# Patient Record
Sex: MALE | Race: WHITE | NOT HISPANIC OR LATINO | URBAN - METROPOLITAN AREA
[De-identification: names, ages, dates, MRNs, and addresses within clinical notes are randomized per-mention and may not be internally consistent; named-entity substitution may affect disease eponyms.]

---

## 2020-01-01 ENCOUNTER — OFFICE (OUTPATIENT)
Dept: URBAN - METROPOLITAN AREA CLINIC 64 | Facility: CLINIC | Age: 85
End: 2020-01-01
Payer: COMMERCIAL

## 2020-01-01 ENCOUNTER — INPATIENT HOSPITAL (OUTPATIENT)
Dept: URBAN - METROPOLITAN AREA HOSPITAL 84 | Facility: HOSPITAL | Age: 85
End: 2020-01-01
Payer: COMMERCIAL

## 2020-01-01 ENCOUNTER — ON CAMPUS - OUTPATIENT (OUTPATIENT)
Dept: RURAL HOSPITAL 3 | Facility: HOSPITAL | Age: 85
End: 2020-01-01
Payer: COMMERCIAL

## 2020-01-01 VITALS
HEART RATE: 69 BPM | WEIGHT: 195 LBS | SYSTOLIC BLOOD PRESSURE: 109 MMHG | DIASTOLIC BLOOD PRESSURE: 52 MMHG | HEIGHT: 69 IN

## 2020-01-01 VITALS
HEIGHT: 69 IN | SYSTOLIC BLOOD PRESSURE: 145 MMHG | DIASTOLIC BLOOD PRESSURE: 74 MMHG | WEIGHT: 207 LBS | HEART RATE: 74 BPM

## 2020-01-01 DIAGNOSIS — D50.9 IRON DEFICIENCY ANEMIA, UNSPECIFIED: ICD-10-CM

## 2020-01-01 DIAGNOSIS — C22.1 INTRAHEPATIC BILE DUCT CARCINOMA: ICD-10-CM

## 2020-01-01 DIAGNOSIS — M62.82 RHABDOMYOLYSIS: ICD-10-CM

## 2020-01-01 DIAGNOSIS — D12.0 BENIGN NEOPLASM OF CECUM: ICD-10-CM

## 2020-01-01 DIAGNOSIS — K75.81 NONALCOHOLIC STEATOHEPATITIS (NASH): ICD-10-CM

## 2020-01-01 DIAGNOSIS — R94.5 ABNORMAL RESULTS OF LIVER FUNCTION STUDIES: ICD-10-CM

## 2020-01-01 DIAGNOSIS — C22.0 LIVER CELL CARCINOMA: ICD-10-CM

## 2020-01-01 DIAGNOSIS — K74.69 OTHER CIRRHOSIS OF LIVER: ICD-10-CM

## 2020-01-01 DIAGNOSIS — K31.7 POLYP OF STOMACH AND DUODENUM: ICD-10-CM

## 2020-01-01 DIAGNOSIS — R63.0 ANOREXIA: ICD-10-CM

## 2020-01-01 DIAGNOSIS — D72.829 ELEVATED WHITE BLOOD CELL COUNT, UNSPECIFIED: ICD-10-CM

## 2020-01-01 DIAGNOSIS — I10 ESSENTIAL (PRIMARY) HYPERTENSION: ICD-10-CM

## 2020-01-01 DIAGNOSIS — R63.4 ABNORMAL WEIGHT LOSS: ICD-10-CM

## 2020-01-01 DIAGNOSIS — K57.30 DIVERTICULOSIS OF LARGE INTESTINE WITHOUT PERFORATION OR ABS: ICD-10-CM

## 2020-01-01 DIAGNOSIS — R10.31 RIGHT LOWER QUADRANT PAIN: ICD-10-CM

## 2020-01-01 DIAGNOSIS — D12.2 BENIGN NEOPLASM OF ASCENDING COLON: ICD-10-CM

## 2020-01-01 DIAGNOSIS — K21.9 GASTRO-ESOPHAGEAL REFLUX DISEASE WITHOUT ESOPHAGITIS: ICD-10-CM

## 2020-01-01 DIAGNOSIS — K64.1 SECOND DEGREE HEMORRHOIDS: ICD-10-CM

## 2020-01-01 DIAGNOSIS — J44.9 CHRONIC OBSTRUCTIVE PULMONARY DISEASE, UNSPECIFIED: ICD-10-CM

## 2020-01-01 DIAGNOSIS — K44.9 DIAPHRAGMATIC HERNIA WITHOUT OBSTRUCTION OR GANGRENE: ICD-10-CM

## 2020-01-01 DIAGNOSIS — D12.4 BENIGN NEOPLASM OF DESCENDING COLON: ICD-10-CM

## 2020-01-01 DIAGNOSIS — Z12.11 ENCOUNTER FOR SCREENING FOR MALIGNANT NEOPLASM OF COLON: ICD-10-CM

## 2020-01-01 DIAGNOSIS — E78.5 HYPERLIPIDEMIA, UNSPECIFIED: ICD-10-CM

## 2020-01-01 PROCEDURE — 99222 1ST HOSP IP/OBS MODERATE 55: CPT | Performed by: NURSE PRACTITIONER

## 2020-01-01 PROCEDURE — 99203 OFFICE O/P NEW LOW 30 MIN: CPT | Performed by: INTERNAL MEDICINE

## 2020-01-01 PROCEDURE — 43239 EGD BIOPSY SINGLE/MULTIPLE: CPT | Performed by: INTERNAL MEDICINE

## 2020-01-01 PROCEDURE — 45380 COLONOSCOPY AND BIOPSY: CPT | Performed by: INTERNAL MEDICINE

## 2020-01-01 PROCEDURE — 99213 OFFICE O/P EST LOW 20 MIN: CPT | Performed by: INTERNAL MEDICINE

## 2020-01-01 RX ORDER — URSODIOL 500 MG/1
TABLET, FILM COATED ORAL
Qty: 180 | Refills: 10 | Status: ACTIVE
Start: 2020-01-01

## 2020-01-01 RX ORDER — OMEPRAZOLE 40 MG/1
40 CAPSULE, DELAYED RELEASE ORAL
Qty: 90 | Refills: 5 | Status: ACTIVE

## 2020-10-07 RX ORDER — ATENOLOL 25 MG/1
25 TABLET ORAL DAILY
COMMUNITY
End: 2021-02-11 | Stop reason: HOSPADM

## 2020-10-07 RX ORDER — AMLODIPINE BESYLATE 10 MG/1
10 TABLET ORAL DAILY
COMMUNITY
End: 2020-10-21 | Stop reason: HOSPADM

## 2020-10-07 RX ORDER — FUROSEMIDE 40 MG/1
40 TABLET ORAL DAILY
Status: ON HOLD | COMMUNITY
End: 2020-10-21 | Stop reason: SDUPTHER

## 2020-10-07 RX ORDER — ALLOPURINOL 300 MG/1
300 TABLET ORAL DAILY
COMMUNITY
End: 2020-10-21 | Stop reason: HOSPADM

## 2020-10-07 RX ORDER — OMEPRAZOLE 20 MG/1
40 CAPSULE, DELAYED RELEASE ORAL DAILY
COMMUNITY

## 2020-10-07 RX ORDER — LOSARTAN POTASSIUM 50 MG/1
100 TABLET ORAL DAILY
COMMUNITY
End: 2020-10-21 | Stop reason: HOSPADM

## 2020-10-14 ENCOUNTER — HOSPITAL ENCOUNTER (OUTPATIENT)
Dept: CT IMAGING | Facility: HOSPITAL | Age: 85
Discharge: HOME | End: 2020-10-14
Admitting: RADIOLOGY

## 2020-10-14 VITALS
DIASTOLIC BLOOD PRESSURE: 60 MMHG | BODY MASS INDEX: 30.96 KG/M2 | HEART RATE: 67 BPM | SYSTOLIC BLOOD PRESSURE: 127 MMHG | RESPIRATION RATE: 10 BRPM | TEMPERATURE: 97.8 F | HEIGHT: 69 IN | WEIGHT: 209 LBS | OXYGEN SATURATION: 97 %

## 2020-10-14 DIAGNOSIS — D49.0 NEOPLASM OF LIVER: ICD-10-CM

## 2020-10-14 DIAGNOSIS — R63.4 LOSS OF WEIGHT: ICD-10-CM

## 2020-10-14 DIAGNOSIS — K75.81 NASH (NONALCOHOLIC STEATOHEPATITIS): ICD-10-CM

## 2020-10-14 DIAGNOSIS — K74.60 HEPATIC CIRRHOSIS, UNSPECIFIED HEPATIC CIRRHOSIS TYPE, UNSPECIFIED WHETHER ASCITES PRESENT (HCC): ICD-10-CM

## 2020-10-14 DIAGNOSIS — K21.9 GASTROESOPHAGEAL REFLUX DISEASE, UNSPECIFIED WHETHER ESOPHAGITIS PRESENT: ICD-10-CM

## 2020-10-14 DIAGNOSIS — Z12.11 SPECIAL SCREENING FOR MALIGNANT NEOPLASMS, COLON: ICD-10-CM

## 2020-10-14 LAB
APTT PPP: 26.1 SECONDS (ref 24–31)
BASOPHILS # BLD AUTO: 0.1 10*3/MM3 (ref 0–0.2)
BASOPHILS NFR BLD AUTO: 0.9 % (ref 0–1.5)
DEPRECATED RDW RBC AUTO: 56 FL (ref 37–54)
EOSINOPHIL # BLD AUTO: 0.2 10*3/MM3 (ref 0–0.4)
EOSINOPHIL NFR BLD AUTO: 1.6 % (ref 0.3–6.2)
ERYTHROCYTE [DISTWIDTH] IN BLOOD BY AUTOMATED COUNT: 19.3 % (ref 12.3–15.4)
HCT VFR BLD AUTO: 34.7 % (ref 37.5–51)
HGB BLD-MCNC: 10.7 G/DL (ref 13–17.7)
INR PPP: 0.98 (ref 0.93–1.1)
LYMPHOCYTES # BLD AUTO: 2.3 10*3/MM3 (ref 0.7–3.1)
LYMPHOCYTES NFR BLD AUTO: 17.8 % (ref 19.6–45.3)
MCH RBC QN AUTO: 25.7 PG (ref 26.6–33)
MCHC RBC AUTO-ENTMCNC: 30.8 G/DL (ref 31.5–35.7)
MCV RBC AUTO: 83.4 FL (ref 79–97)
MONOCYTES # BLD AUTO: 1.7 10*3/MM3 (ref 0.1–0.9)
MONOCYTES NFR BLD AUTO: 12.7 % (ref 5–12)
NEUTROPHILS NFR BLD AUTO: 67 % (ref 42.7–76)
NEUTROPHILS NFR BLD AUTO: 8.8 10*3/MM3 (ref 1.7–7)
NRBC BLD AUTO-RTO: 0 /100 WBC (ref 0–0.2)
PLATELET # BLD AUTO: 403 10*3/MM3 (ref 140–450)
PMV BLD AUTO: 7.8 FL (ref 6–12)
PROTHROMBIN TIME: 10.8 SECONDS (ref 9.6–11.7)
RBC # BLD AUTO: 4.16 10*6/MM3 (ref 4.14–5.8)
WBC # BLD AUTO: 13.1 10*3/MM3 (ref 3.4–10.8)

## 2020-10-14 PROCEDURE — 88377 M/PHMTRC ALYS ISHQUANT/SEMIQ: CPT

## 2020-10-14 PROCEDURE — 25010000002 MIDAZOLAM PER 1 MG: Performed by: RADIOLOGY

## 2020-10-14 PROCEDURE — 25010000002 FENTANYL CITRATE (PF) 100 MCG/2ML SOLUTION: Performed by: RADIOLOGY

## 2020-10-14 PROCEDURE — 25010000003 LIDOCAINE 1 % SOLUTION: Performed by: RADIOLOGY

## 2020-10-14 PROCEDURE — 88341 IMHCHEM/IMCYTCHM EA ADD ANTB: CPT

## 2020-10-14 PROCEDURE — 85730 THROMBOPLASTIN TIME PARTIAL: CPT | Performed by: RADIOLOGY

## 2020-10-14 PROCEDURE — 99153 MOD SED SAME PHYS/QHP EA: CPT

## 2020-10-14 PROCEDURE — 88341 IMHCHEM/IMCYTCHM EA ADD ANTB: CPT | Performed by: INTERNAL MEDICINE

## 2020-10-14 PROCEDURE — 88342 IMHCHEM/IMCYTCHM 1ST ANTB: CPT

## 2020-10-14 PROCEDURE — 85610 PROTHROMBIN TIME: CPT | Performed by: RADIOLOGY

## 2020-10-14 PROCEDURE — 88333 PATH CONSLTJ SURG CYTO XM 1: CPT | Performed by: INTERNAL MEDICINE

## 2020-10-14 PROCEDURE — 99152 MOD SED SAME PHYS/QHP 5/>YRS: CPT

## 2020-10-14 PROCEDURE — 88381 MICRODISSECTION MANUAL: CPT

## 2020-10-14 PROCEDURE — 85025 COMPLETE CBC W/AUTO DIFF WBC: CPT | Performed by: RADIOLOGY

## 2020-10-14 PROCEDURE — 88307 TISSUE EXAM BY PATHOLOGIST: CPT | Performed by: INTERNAL MEDICINE

## 2020-10-14 PROCEDURE — 77012 CT SCAN FOR NEEDLE BIOPSY: CPT

## 2020-10-14 PROCEDURE — 88342 IMHCHEM/IMCYTCHM 1ST ANTB: CPT | Performed by: INTERNAL MEDICINE

## 2020-10-14 PROCEDURE — 88360 TUMOR IMMUNOHISTOCHEM/MANUAL: CPT

## 2020-10-14 RX ORDER — LIDOCAINE HYDROCHLORIDE 10 MG/ML
INJECTION, SOLUTION INFILTRATION; PERINEURAL
Status: COMPLETED | OUTPATIENT
Start: 2020-10-14 | End: 2020-10-14

## 2020-10-14 RX ORDER — ACETAMINOPHEN 325 MG/1
650 TABLET ORAL EVERY 4 HOURS PRN
Status: DISCONTINUED | OUTPATIENT
Start: 2020-10-14 | End: 2020-10-15 | Stop reason: HOSPADM

## 2020-10-14 RX ORDER — NALOXONE HCL 0.4 MG/ML
0.4 VIAL (ML) INJECTION
Status: DISCONTINUED | OUTPATIENT
Start: 2020-10-14 | End: 2020-10-15 | Stop reason: HOSPADM

## 2020-10-14 RX ORDER — MIDAZOLAM HYDROCHLORIDE 1 MG/ML
INJECTION INTRAMUSCULAR; INTRAVENOUS
Status: COMPLETED | OUTPATIENT
Start: 2020-10-14 | End: 2020-10-14

## 2020-10-14 RX ORDER — SODIUM CHLORIDE 9 MG/ML
75 INJECTION, SOLUTION INTRAVENOUS CONTINUOUS
Status: DISCONTINUED | OUTPATIENT
Start: 2020-10-14 | End: 2020-10-15 | Stop reason: HOSPADM

## 2020-10-14 RX ORDER — SODIUM CHLORIDE 0.9 % (FLUSH) 0.9 %
3 SYRINGE (ML) INJECTION EVERY 12 HOURS SCHEDULED
Status: DISCONTINUED | OUTPATIENT
Start: 2020-10-14 | End: 2020-10-14 | Stop reason: HOSPADM

## 2020-10-14 RX ORDER — HYDROMORPHONE HCL 110MG/55ML
0.5 PATIENT CONTROLLED ANALGESIA SYRINGE INTRAVENOUS
Status: DISCONTINUED | OUTPATIENT
Start: 2020-10-14 | End: 2020-10-15 | Stop reason: HOSPADM

## 2020-10-14 RX ORDER — FENTANYL CITRATE 50 UG/ML
INJECTION, SOLUTION INTRAMUSCULAR; INTRAVENOUS
Status: COMPLETED | OUTPATIENT
Start: 2020-10-14 | End: 2020-10-14

## 2020-10-14 RX ADMIN — MIDAZOLAM 0.5 MG: 1 INJECTION INTRAMUSCULAR; INTRAVENOUS at 09:50

## 2020-10-14 RX ADMIN — FENTANYL CITRATE 50 MCG: 50 INJECTION, SOLUTION INTRAMUSCULAR; INTRAVENOUS at 09:49

## 2020-10-14 RX ADMIN — FENTANYL CITRATE 50 MCG: 50 INJECTION, SOLUTION INTRAMUSCULAR; INTRAVENOUS at 09:52

## 2020-10-14 RX ADMIN — LIDOCAINE HYDROCHLORIDE 10 ML: 10 INJECTION, SOLUTION INFILTRATION; PERINEURAL at 10:03

## 2020-10-14 RX ADMIN — SODIUM CHLORIDE 75 ML/HR: 0.9 INJECTION, SOLUTION INTRAVENOUS at 08:27

## 2020-10-14 RX ADMIN — MIDAZOLAM 0.5 MG: 1 INJECTION INTRAMUSCULAR; INTRAVENOUS at 09:52

## 2020-10-14 NOTE — DISCHARGE INSTRUCTIONS
A responsible adult should stay with you and you should rest quietly for the rest of the day. Do not drink alcohol, drive or cook for 24 hours following your procedure.  Progress your diet as tolerated.  Resume your usual medications including aspirin.  When you remove your dressing in 48 hours, a small amount of blood is to be expected. Do not be alarmed.  If you feel it is bleeding excessively apply pressure and proceed to the Emergency room.  Do not shower, bath, or get your dressing wet at all for 48 hours.  You may shower after the dressing is removed. No lifting more that 10 pounds for 48 hours.  If severe pain, increased shortness of air or racing heartbeat occur, seek immediate medical attention.  Follow up with Dr. Mojica for results.

## 2020-10-18 ENCOUNTER — HOSPITAL ENCOUNTER (INPATIENT)
Facility: HOSPITAL | Age: 85
LOS: 3 days | Discharge: NURSING, REHAB OR LONG-TERM CARE | End: 2020-10-21
Attending: INTERNAL MEDICINE | Admitting: INTERNAL MEDICINE

## 2020-10-18 PROBLEM — R16.0 LIVER MASS, RIGHT LOBE: Status: ACTIVE | Noted: 2020-10-18

## 2020-10-18 LAB
ALBUMIN SERPL-MCNC: 2.2 G/DL (ref 3.5–5.2)
ALBUMIN/GLOB SERPL: 0.7 G/DL
ALP SERPL-CCNC: 138 U/L (ref 39–117)
ALT SERPL W P-5'-P-CCNC: 52 U/L (ref 1–41)
ANION GAP SERPL CALCULATED.3IONS-SCNC: 11 MMOL/L (ref 5–15)
AST SERPL-CCNC: 146 U/L (ref 1–40)
BASOPHILS # BLD AUTO: 0 10*3/MM3 (ref 0–0.2)
BASOPHILS NFR BLD AUTO: 0.2 % (ref 0–1.5)
BILIRUB SERPL-MCNC: 0.2 MG/DL (ref 0–1.2)
BUN SERPL-MCNC: 29 MG/DL (ref 8–23)
BUN/CREAT SERPL: 15.1 (ref 7–25)
CALCIUM SPEC-SCNC: 7.7 MG/DL (ref 8.6–10.5)
CHLORIDE SERPL-SCNC: 100 MMOL/L (ref 98–107)
CK SERPL-CCNC: 3908 U/L (ref 20–200)
CO2 SERPL-SCNC: 26 MMOL/L (ref 22–29)
CREAT SERPL-MCNC: 1.92 MG/DL (ref 0.76–1.27)
DEPRECATED RDW RBC AUTO: 52.9 FL (ref 37–54)
EOSINOPHIL # BLD AUTO: 0.1 10*3/MM3 (ref 0–0.4)
EOSINOPHIL NFR BLD AUTO: 0.6 % (ref 0.3–6.2)
ERYTHROCYTE [DISTWIDTH] IN BLOOD BY AUTOMATED COUNT: 18.4 % (ref 12.3–15.4)
GFR SERPL CREATININE-BSD FRML MDRD: 33 ML/MIN/1.73
GLOBULIN UR ELPH-MCNC: 3 GM/DL
GLUCOSE SERPL-MCNC: 152 MG/DL (ref 65–99)
HCT VFR BLD AUTO: 25.9 % (ref 37.5–51)
HGB BLD-MCNC: 8 G/DL (ref 13–17.7)
LYMPHOCYTES # BLD AUTO: 1.5 10*3/MM3 (ref 0.7–3.1)
LYMPHOCYTES NFR BLD AUTO: 12.1 % (ref 19.6–45.3)
MCH RBC QN AUTO: 25.3 PG (ref 26.6–33)
MCHC RBC AUTO-ENTMCNC: 31 G/DL (ref 31.5–35.7)
MCV RBC AUTO: 81.7 FL (ref 79–97)
MONOCYTES # BLD AUTO: 1.5 10*3/MM3 (ref 0.1–0.9)
MONOCYTES NFR BLD AUTO: 12.8 % (ref 5–12)
NEUTROPHILS NFR BLD AUTO: 74.3 % (ref 42.7–76)
NEUTROPHILS NFR BLD AUTO: 9 10*3/MM3 (ref 1.7–7)
NRBC BLD AUTO-RTO: 0.1 /100 WBC (ref 0–0.2)
PLATELET # BLD AUTO: 358 10*3/MM3 (ref 140–450)
PMV BLD AUTO: 7.5 FL (ref 6–12)
POTASSIUM SERPL-SCNC: 3.7 MMOL/L (ref 3.5–5.2)
PROT SERPL-MCNC: 5.2 G/DL (ref 6–8.5)
RBC # BLD AUTO: 3.17 10*6/MM3 (ref 4.14–5.8)
SODIUM SERPL-SCNC: 137 MMOL/L (ref 136–145)
WBC # BLD AUTO: 12 10*3/MM3 (ref 3.4–10.8)

## 2020-10-18 PROCEDURE — 93010 ELECTROCARDIOGRAM REPORT: CPT | Performed by: INTERNAL MEDICINE

## 2020-10-18 PROCEDURE — 93005 ELECTROCARDIOGRAM TRACING: CPT | Performed by: INTERNAL MEDICINE

## 2020-10-18 PROCEDURE — 99223 1ST HOSP IP/OBS HIGH 75: CPT | Performed by: INTERNAL MEDICINE

## 2020-10-18 PROCEDURE — 82550 ASSAY OF CK (CPK): CPT | Performed by: INTERNAL MEDICINE

## 2020-10-18 PROCEDURE — 85025 COMPLETE CBC W/AUTO DIFF WBC: CPT | Performed by: INTERNAL MEDICINE

## 2020-10-18 PROCEDURE — 87040 BLOOD CULTURE FOR BACTERIA: CPT | Performed by: INTERNAL MEDICINE

## 2020-10-18 PROCEDURE — 80053 COMPREHEN METABOLIC PANEL: CPT | Performed by: INTERNAL MEDICINE

## 2020-10-18 PROCEDURE — 25010000002 PIPERACILLIN SOD-TAZOBACTAM PER 1 G: Performed by: INTERNAL MEDICINE

## 2020-10-18 RX ORDER — ALLOPURINOL 100 MG/1
100 TABLET ORAL DAILY
Status: DISCONTINUED | OUTPATIENT
Start: 2020-10-19 | End: 2020-10-21 | Stop reason: HOSPADM

## 2020-10-18 RX ORDER — SODIUM CHLORIDE 0.9 % (FLUSH) 0.9 %
10 SYRINGE (ML) INJECTION EVERY 12 HOURS SCHEDULED
Status: DISCONTINUED | OUTPATIENT
Start: 2020-10-18 | End: 2020-10-21 | Stop reason: HOSPADM

## 2020-10-18 RX ORDER — ONDANSETRON 4 MG/1
4 TABLET, FILM COATED ORAL EVERY 6 HOURS PRN
Status: DISCONTINUED | OUTPATIENT
Start: 2020-10-18 | End: 2020-10-21 | Stop reason: HOSPADM

## 2020-10-18 RX ORDER — ONDANSETRON 2 MG/ML
4 INJECTION INTRAMUSCULAR; INTRAVENOUS EVERY 6 HOURS PRN
Status: DISCONTINUED | OUTPATIENT
Start: 2020-10-18 | End: 2020-10-21 | Stop reason: HOSPADM

## 2020-10-18 RX ORDER — PANTOPRAZOLE SODIUM 40 MG/10ML
40 INJECTION, POWDER, LYOPHILIZED, FOR SOLUTION INTRAVENOUS
Status: DISCONTINUED | OUTPATIENT
Start: 2020-10-19 | End: 2020-10-21 | Stop reason: HOSPADM

## 2020-10-18 RX ORDER — SODIUM CHLORIDE 0.9 % (FLUSH) 0.9 %
10 SYRINGE (ML) INJECTION AS NEEDED
Status: DISCONTINUED | OUTPATIENT
Start: 2020-10-18 | End: 2020-10-21 | Stop reason: HOSPADM

## 2020-10-18 RX ORDER — ACETAMINOPHEN 325 MG/1
650 TABLET ORAL EVERY 4 HOURS PRN
Status: DISCONTINUED | OUTPATIENT
Start: 2020-10-18 | End: 2020-10-21 | Stop reason: HOSPADM

## 2020-10-18 RX ORDER — NITROGLYCERIN 0.4 MG/1
0.4 TABLET SUBLINGUAL
Status: DISCONTINUED | OUTPATIENT
Start: 2020-10-18 | End: 2020-10-21 | Stop reason: HOSPADM

## 2020-10-18 RX ORDER — ACETAMINOPHEN 160 MG/5ML
650 SOLUTION ORAL EVERY 4 HOURS PRN
Status: DISCONTINUED | OUTPATIENT
Start: 2020-10-18 | End: 2020-10-21 | Stop reason: HOSPADM

## 2020-10-18 RX ORDER — SODIUM CHLORIDE 9 MG/ML
200 INJECTION, SOLUTION INTRAVENOUS CONTINUOUS
Status: DISCONTINUED | OUTPATIENT
Start: 2020-10-18 | End: 2020-10-21 | Stop reason: HOSPADM

## 2020-10-18 RX ORDER — ACETAMINOPHEN 650 MG/1
650 SUPPOSITORY RECTAL EVERY 4 HOURS PRN
Status: DISCONTINUED | OUTPATIENT
Start: 2020-10-18 | End: 2020-10-21 | Stop reason: HOSPADM

## 2020-10-18 RX ORDER — IPRATROPIUM BROMIDE AND ALBUTEROL SULFATE 2.5; .5 MG/3ML; MG/3ML
3 SOLUTION RESPIRATORY (INHALATION) EVERY 4 HOURS PRN
Status: DISCONTINUED | OUTPATIENT
Start: 2020-10-18 | End: 2020-10-21 | Stop reason: HOSPADM

## 2020-10-18 RX ORDER — CHOLECALCIFEROL (VITAMIN D3) 125 MCG
5 CAPSULE ORAL NIGHTLY PRN
Status: DISCONTINUED | OUTPATIENT
Start: 2020-10-18 | End: 2020-10-21 | Stop reason: HOSPADM

## 2020-10-18 RX ADMIN — SODIUM CHLORIDE 100 ML/HR: 9 INJECTION, SOLUTION INTRAVENOUS at 21:27

## 2020-10-18 RX ADMIN — PIPERACILLIN AND TAZOBACTAM 3.38 G: 3; .375 INJECTION, POWDER, LYOPHILIZED, FOR SOLUTION INTRAVENOUS at 21:28

## 2020-10-18 NOTE — H&P
[facility] Medicine Services      Patient Name: Tristen Garcia  : 1935  MRN: [id number]  Primary Care Physician: Ann Marie Hodgson MD  Date of admission: 10/18/2020    Patient Care Team:  Ann Marie Hodgson MD as PCP - General (Family Medicine)          Subjective   History Present Illness     Chief Complaint: No chief complaint on file.    Generalized body weakness             History of Present Illness     Patient is an 85-year-old male with multiple comorbidities including COPD with chronic hypoxic respiratory failure on home oxygen, hypertension and hyperlipidemia.  Was recently found to have right upper lobe liver mass on imaging for which he underwent liver biopsy on 10/14/2020 at [facility]-biopsy results still pending.    He presented to [facility] ED on 10/17/2020 after granddaughter found him lying on the floor between 3 AM and 9:30 AM.  At the ED patient was noted to be hypotensive.  Blood work showed leukocytosis with WBC of 21,000, procalcitonin 26 and hemoglobin of 10.2.  Serum creatinine was noted to be 2.8, patient's baseline serum creatinine is around 1.8.  CK was 15,000 patient also had indeterminant troponin elevation though EKG did not show any findings suggestive of acute ischemia.  Patient was diagnosed with rhabdomyolysis, acute on chronic kidney disease and sepsis of unknown source.    He was admitted to the hospitalist service at [facility] where he was started on empirical antibiotics with IV vancomycin, IV Zosyn and IV Flagyl.  He was also given IV normal saline along with D5 water with bicarb drip.  Blood cultures were obtained.  While in [facility] he had a fever spike.  Due to concern for possible liver abscess family requested the patient to be transferred to [facility] for further evaluation.      Review of Systems   Constitution: Positive for chills and malaise/fatigue. Negative for fever.   HENT:  Negative for congestion.    Eyes: Negative for blurred vision.   Cardiovascular: Negative for chest pain.   Respiratory: Negative for shortness of breath.    Endocrine: Negative for cold intolerance and heat intolerance.   Musculoskeletal: Positive for back pain.   Gastrointestinal: Positive for nausea. Negative for abdominal pain and vomiting.   Genitourinary: Negative for dysuria.   Neurological: Positive for weakness.   Psychiatric/Behavioral: Negative for altered mental status.           Personal History     Past Medical History:   Past Medical History:   Diagnosis Date   • Arthritis    • Cancer (CMS/HCC)    • COPD (chronic obstructive pulmonary disease) (CMS/HCC)    • Elevated cholesterol    • GERD (gastroesophageal reflux disease)    • Hyperlipidemia    • Hypertension        Surgical History:      Past Surgical History:   Procedure Laterality Date   • COLONOSCOPY     • EYE SURGERY     • SKIN BIOPSY             Family History: family history is not on file. Otherwise pertinent FHx was reviewed and unremarkable.     Social History:  reports that he has quit smoking. He has never used smokeless tobacco. He reports previous alcohol use. He reports that he does not use drugs.      Medications:  Prior to Admission medications    Medication Sig Start Date End Date Taking? Authorizing Provider   allopurinol (ZYLOPRIM) 300 MG tablet Take 300 mg by mouth Daily.    Wendy Rivers MD   amLODIPine (NORVASC) 10 MG tablet Take 10 mg by mouth Daily.    Wendy Rivers MD   atenolol (TENORMIN) 25 MG tablet Take 25 mg by mouth Daily.    Wendy Rivers MD   furosemide (LASIX) 40 MG tablet Take 40 mg by mouth Daily.    Wendy Rivers MD   losartan (COZAAR) 50 MG tablet Take 100 mg by mouth Daily.    Wendy Rivers MD   omeprazole (priLOSEC) 20 MG capsule Take 20 mg by mouth Daily.    Wendy Rivers MD       Allergies:  No Known Allergies    Objective   Objective     Vital Signs  Temp:   [98.5 °F (36.9 °C)] 98.5 °F (36.9 °C)  Heart Rate:  [71] 71  Resp:  [18] 18  BP: (117)/(67) 117/67  SpO2:  [94 %] 94 %  on  Flow (L/min):  [3.5] 3.5;   Device (Oxygen Therapy): nasal cannula  Body mass index is 32.19 kg/m².    Physical Exam  Vitals signs reviewed.   Constitutional:       Appearance: He is ill-appearing.   HENT:      Head: Normocephalic and atraumatic.      Nose: Nose normal.      Mouth/Throat:      Mouth: Mucous membranes are moist.   Eyes:      Extraocular Movements: Extraocular movements intact.      Conjunctiva/sclera: Conjunctivae normal.      Pupils: Pupils are equal, round, and reactive to light.   Neck:      Musculoskeletal: Neck supple.   Cardiovascular:      Rate and Rhythm: Normal rate and regular rhythm.   Pulmonary:      Effort: No respiratory distress.      Breath sounds: Normal breath sounds.   Abdominal:      General: Bowel sounds are normal. There is distension.      Palpations: Abdomen is soft.      Tenderness: There is no abdominal tenderness.   Musculoskeletal:      Comments: Degenerative changes   Skin:     General: Skin is warm and dry.   Neurological:      Mental Status: He is alert and oriented to person, place, and time.   Psychiatric:         Mood and Affect: Mood normal.         Results Review:  I have personally reviewed most recent medical records that he came with from [facility]    Results from last 7 days   Lab Units 10/14/20  0814 10/14/20  0813   WBC 10*3/mm3 13.10*  --    HEMOGLOBIN g/dL 10.7*  --    HEMATOCRIT % 34.7*  --    PLATELETS 10*3/mm3 403  --    INR   --  0.98           Invalid input(s):  ALKPHOS  CrCl cannot be calculated (No successful lab value found.).  Brief Urine Lab Results     None          Microbiology Results (last 10 days)     ** No results found for the last 240 hours. **          ECG/EMG Results (most recent)     None                    Ct Needle Biopsy Liver    Result Date: 10/14/2020  Technically successful CT-guided  percutaneous liver mass core biopsy procedure as described above.  Electronically Signed By-Kalia Herrera On:10/14/2020 12:18 PM This report was finalized on [id number] by  Kalia Herrera, .        CrCl cannot be calculated (No successful lab value found.).    Assessment/Plan   Assessment/Plan       There are no hospital problems to display for this patient.    Rhabdomyolysis  Due to prolonged immobilization  Improving  CK on the day of transfer 6000    Acute kidney injury on CKD stage III.  Probably due to rhabdomyolysis above.  Baseline serum creatinine 1.8  Avoid nephrotoxic agents.  Serum creatinine on the day of transfer was 2.1    Leukocytosis  Was said to have WBC of 21,000 on presentation  No clear source of infection from transfer facility  Patient recently had liver biopsy  CT on admission showed fluid collection-rule out abscess  Will continue on Zosyn    Liver mass  Status post liver biopsy 10/14/2020  Biopsy reports pending  Consult GI    Hypotensive on presentation to the outlying ED  Systolic blood pressure in the low 100s  Continue to hold BP medications-atenolol, Norvasc, losartan and furosemide    GERD-on PPI    COPD-not in exacerbation  Respiratory care with bronchodilators  Oxygen therapy and titration        VTE Prophylaxis -SCD    Mechanical Order History:     None      Pharmalogical Order History:     None          CODE STATUS:    There are no questions and answers to display.       This patient has been examined with appropriate PPE. 10/18/20      I discussed the patient's findings and my recommendations with patient      Electronically signed by Harsha Murcia MD, 10/18/20, 7:35 PM EDT.  [facility] Hospitalist Team

## 2020-10-19 ENCOUNTER — APPOINTMENT (OUTPATIENT)
Dept: GENERAL RADIOLOGY | Facility: HOSPITAL | Age: 85
End: 2020-10-19

## 2020-10-19 PROBLEM — M62.82 RHABDOMYOLYSIS: Status: ACTIVE | Noted: 2020-10-19

## 2020-10-19 LAB
ALBUMIN SERPL-MCNC: 2.3 G/DL (ref 3.5–5.2)
ALBUMIN/GLOB SERPL: 0.7 G/DL
ALP SERPL-CCNC: 213 U/L (ref 39–117)
ALT SERPL W P-5'-P-CCNC: 63 U/L (ref 1–41)
ANION GAP SERPL CALCULATED.3IONS-SCNC: 11 MMOL/L (ref 5–15)
ANION GAP SERPL CALCULATED.3IONS-SCNC: 9 MMOL/L (ref 5–15)
AST SERPL-CCNC: 134 U/L (ref 1–40)
BASOPHILS # BLD AUTO: 0.1 10*3/MM3 (ref 0–0.2)
BASOPHILS NFR BLD AUTO: 0.4 % (ref 0–1.5)
BASOPHILS NFR BLD AUTO: 1 % (ref 0–1.5)
BASOPHILS NFR BLD AUTO: 1.1 % (ref 0–1.5)
BILIRUB SERPL-MCNC: 0.2 MG/DL (ref 0–1.2)
BUN SERPL-MCNC: 27 MG/DL (ref 8–23)
BUN SERPL-MCNC: 28 MG/DL (ref 8–23)
BUN/CREAT SERPL: 15.6 (ref 7–25)
BUN/CREAT SERPL: 16 (ref 7–25)
CALCIUM SPEC-SCNC: 7.9 MG/DL (ref 8.6–10.5)
CALCIUM SPEC-SCNC: 8.8 MG/DL (ref 8.6–10.5)
CHLORIDE SERPL-SCNC: 101 MMOL/L (ref 98–107)
CHLORIDE SERPL-SCNC: 102 MMOL/L (ref 98–107)
CK SERPL-CCNC: 2699 U/L (ref 20–200)
CK SERPL-CCNC: 3847 U/L (ref 20–200)
CO2 SERPL-SCNC: 28 MMOL/L (ref 22–29)
CO2 SERPL-SCNC: 28 MMOL/L (ref 22–29)
CREAT SERPL-MCNC: 1.69 MG/DL (ref 0.76–1.27)
CREAT SERPL-MCNC: 1.8 MG/DL (ref 0.76–1.27)
DEPRECATED RDW RBC AUTO: 52.9 FL (ref 37–54)
DEPRECATED RDW RBC AUTO: 53.8 FL (ref 37–54)
DEPRECATED RDW RBC AUTO: 55.6 FL (ref 37–54)
EOSINOPHIL # BLD AUTO: 0.1 10*3/MM3 (ref 0–0.4)
EOSINOPHIL NFR BLD AUTO: 0.6 % (ref 0.3–6.2)
EOSINOPHIL NFR BLD AUTO: 0.7 % (ref 0.3–6.2)
EOSINOPHIL NFR BLD AUTO: 0.9 % (ref 0.3–6.2)
ERYTHROCYTE [DISTWIDTH] IN BLOOD BY AUTOMATED COUNT: 18.6 % (ref 12.3–15.4)
ERYTHROCYTE [DISTWIDTH] IN BLOOD BY AUTOMATED COUNT: 18.9 % (ref 12.3–15.4)
ERYTHROCYTE [DISTWIDTH] IN BLOOD BY AUTOMATED COUNT: 19.2 % (ref 12.3–15.4)
FERRITIN SERPL-MCNC: 909.3 NG/ML (ref 30–400)
GFR SERPL CREATININE-BSD FRML MDRD: 36 ML/MIN/1.73
GFR SERPL CREATININE-BSD FRML MDRD: 39 ML/MIN/1.73
GLOBULIN UR ELPH-MCNC: 3.2 GM/DL
GLUCOSE SERPL-MCNC: 107 MG/DL (ref 65–99)
GLUCOSE SERPL-MCNC: 111 MG/DL (ref 65–99)
HCT VFR BLD AUTO: 27.2 % (ref 37.5–51)
HCT VFR BLD AUTO: 29 % (ref 37.5–51)
HCT VFR BLD AUTO: 29.7 % (ref 37.5–51)
HGB BLD-MCNC: 8.5 G/DL (ref 13–17.7)
HGB BLD-MCNC: 8.9 G/DL (ref 13–17.7)
HGB BLD-MCNC: 9.3 G/DL (ref 13–17.7)
IRON 24H UR-MRATE: 16 MCG/DL (ref 59–158)
IRON SATN MFR SERPL: 10 % (ref 20–50)
LDH SERPL-CCNC: 401 U/L (ref 135–225)
LYMPHOCYTES # BLD AUTO: 1.3 10*3/MM3 (ref 0.7–3.1)
LYMPHOCYTES # BLD AUTO: 1.9 10*3/MM3 (ref 0.7–3.1)
LYMPHOCYTES # BLD AUTO: 2.3 10*3/MM3 (ref 0.7–3.1)
LYMPHOCYTES NFR BLD AUTO: 11.5 % (ref 19.6–45.3)
LYMPHOCYTES NFR BLD AUTO: 14.5 % (ref 19.6–45.3)
LYMPHOCYTES NFR BLD AUTO: 16.7 % (ref 19.6–45.3)
MAGNESIUM SERPL-MCNC: 2 MG/DL (ref 1.6–2.4)
MCH RBC QN AUTO: 24.9 PG (ref 26.6–33)
MCH RBC QN AUTO: 25.3 PG (ref 26.6–33)
MCH RBC QN AUTO: 25.5 PG (ref 26.6–33)
MCHC RBC AUTO-ENTMCNC: 30.5 G/DL (ref 31.5–35.7)
MCHC RBC AUTO-ENTMCNC: 31.2 G/DL (ref 31.5–35.7)
MCHC RBC AUTO-ENTMCNC: 31.2 G/DL (ref 31.5–35.7)
MCV RBC AUTO: 81.2 FL (ref 79–97)
MCV RBC AUTO: 81.6 FL (ref 79–97)
MCV RBC AUTO: 81.8 FL (ref 79–97)
MONOCYTES # BLD AUTO: 1.6 10*3/MM3 (ref 0.1–0.9)
MONOCYTES # BLD AUTO: 1.7 10*3/MM3 (ref 0.1–0.9)
MONOCYTES # BLD AUTO: 1.9 10*3/MM3 (ref 0.1–0.9)
MONOCYTES NFR BLD AUTO: 13.1 % (ref 5–12)
MONOCYTES NFR BLD AUTO: 13.3 % (ref 5–12)
MONOCYTES NFR BLD AUTO: 13.9 % (ref 5–12)
NEUTROPHILS NFR BLD AUTO: 68.7 % (ref 42.7–76)
NEUTROPHILS NFR BLD AUTO: 70.6 % (ref 42.7–76)
NEUTROPHILS NFR BLD AUTO: 73 % (ref 42.7–76)
NEUTROPHILS NFR BLD AUTO: 8.4 10*3/MM3 (ref 1.7–7)
NEUTROPHILS NFR BLD AUTO: 9 10*3/MM3 (ref 1.7–7)
NEUTROPHILS NFR BLD AUTO: 9.6 10*3/MM3 (ref 1.7–7)
NRBC BLD AUTO-RTO: 0 /100 WBC (ref 0–0.2)
PHOSPHATE SERPL-MCNC: 2.7 MG/DL (ref 2.5–4.5)
PLATELET # BLD AUTO: 394 10*3/MM3 (ref 140–450)
PLATELET # BLD AUTO: 417 10*3/MM3 (ref 140–450)
PLATELET # BLD AUTO: 430 10*3/MM3 (ref 140–450)
PMV BLD AUTO: 8.1 FL (ref 6–12)
PMV BLD AUTO: 8.3 FL (ref 6–12)
PMV BLD AUTO: 8.4 FL (ref 6–12)
POTASSIUM SERPL-SCNC: 4.2 MMOL/L (ref 3.5–5.2)
POTASSIUM SERPL-SCNC: 4.3 MMOL/L (ref 3.5–5.2)
PROT SERPL-MCNC: 5.5 G/DL (ref 6–8.5)
RBC # BLD AUTO: 3.35 10*6/MM3 (ref 4.14–5.8)
RBC # BLD AUTO: 3.56 10*6/MM3 (ref 4.14–5.8)
RBC # BLD AUTO: 3.63 10*6/MM3 (ref 4.14–5.8)
RETICS # AUTO: 0.04 10*6/MM3 (ref 0.02–0.13)
RETICS # AUTO: 0.04 10*6/MM3 (ref 0.02–0.13)
RETICS/RBC NFR AUTO: 1.1 % (ref 0.7–1.9)
RETICS/RBC NFR AUTO: 1.17 % (ref 0.7–1.9)
SODIUM SERPL-SCNC: 139 MMOL/L (ref 136–145)
SODIUM SERPL-SCNC: 140 MMOL/L (ref 136–145)
TIBC SERPL-MCNC: 159 MCG/DL (ref 298–536)
TRANSFERRIN SERPL-MCNC: 107 MG/DL (ref 200–360)
WBC # BLD AUTO: 11.6 10*3/MM3 (ref 3.4–10.8)
WBC # BLD AUTO: 12.8 10*3/MM3 (ref 3.4–10.8)
WBC # BLD AUTO: 14.1 10*3/MM3 (ref 3.4–10.8)

## 2020-10-19 PROCEDURE — 83010 ASSAY OF HAPTOGLOBIN QUANT: CPT | Performed by: NURSE PRACTITIONER

## 2020-10-19 PROCEDURE — 25010000002 PIPERACILLIN SOD-TAZOBACTAM PER 1 G: Performed by: INTERNAL MEDICINE

## 2020-10-19 PROCEDURE — 85025 COMPLETE CBC W/AUTO DIFF WBC: CPT | Performed by: INTERNAL MEDICINE

## 2020-10-19 PROCEDURE — 83540 ASSAY OF IRON: CPT | Performed by: NURSE PRACTITIONER

## 2020-10-19 PROCEDURE — 87040 BLOOD CULTURE FOR BACTERIA: CPT | Performed by: INTERNAL MEDICINE

## 2020-10-19 PROCEDURE — 86301 IMMUNOASSAY TUMOR CA 19-9: CPT | Performed by: INTERNAL MEDICINE

## 2020-10-19 PROCEDURE — 82728 ASSAY OF FERRITIN: CPT | Performed by: NURSE PRACTITIONER

## 2020-10-19 PROCEDURE — 82550 ASSAY OF CK (CPK): CPT | Performed by: INTERNAL MEDICINE

## 2020-10-19 PROCEDURE — 97162 PT EVAL MOD COMPLEX 30 MIN: CPT

## 2020-10-19 PROCEDURE — 83615 LACTATE (LD) (LDH) ENZYME: CPT | Performed by: NURSE PRACTITIONER

## 2020-10-19 PROCEDURE — 84466 ASSAY OF TRANSFERRIN: CPT | Performed by: NURSE PRACTITIONER

## 2020-10-19 PROCEDURE — 84100 ASSAY OF PHOSPHORUS: CPT | Performed by: INTERNAL MEDICINE

## 2020-10-19 PROCEDURE — 97166 OT EVAL MOD COMPLEX 45 MIN: CPT

## 2020-10-19 PROCEDURE — 85045 AUTOMATED RETICULOCYTE COUNT: CPT | Performed by: INTERNAL MEDICINE

## 2020-10-19 PROCEDURE — 83921 ORGANIC ACID SINGLE QUANT: CPT | Performed by: NURSE PRACTITIONER

## 2020-10-19 PROCEDURE — 82378 CARCINOEMBRYONIC ANTIGEN: CPT | Performed by: INTERNAL MEDICINE

## 2020-10-19 PROCEDURE — 97530 THERAPEUTIC ACTIVITIES: CPT

## 2020-10-19 PROCEDURE — 84165 PROTEIN E-PHORESIS SERUM: CPT | Performed by: NURSE PRACTITIONER

## 2020-10-19 PROCEDURE — 82746 ASSAY OF FOLIC ACID SERUM: CPT | Performed by: NURSE PRACTITIONER

## 2020-10-19 PROCEDURE — 83735 ASSAY OF MAGNESIUM: CPT | Performed by: INTERNAL MEDICINE

## 2020-10-19 PROCEDURE — 85045 AUTOMATED RETICULOCYTE COUNT: CPT | Performed by: NURSE PRACTITIONER

## 2020-10-19 PROCEDURE — 82607 VITAMIN B-12: CPT | Performed by: NURSE PRACTITIONER

## 2020-10-19 PROCEDURE — 82105 ALPHA-FETOPROTEIN SERUM: CPT | Performed by: INTERNAL MEDICINE

## 2020-10-19 PROCEDURE — 99233 SBSQ HOSP IP/OBS HIGH 50: CPT | Performed by: INTERNAL MEDICINE

## 2020-10-19 PROCEDURE — 80053 COMPREHEN METABOLIC PANEL: CPT | Performed by: INTERNAL MEDICINE

## 2020-10-19 PROCEDURE — 99223 1ST HOSP IP/OBS HIGH 75: CPT | Performed by: INTERNAL MEDICINE

## 2020-10-19 RX ORDER — SODIUM BICARBONATE 650 MG/1
650 TABLET ORAL 3 TIMES DAILY
Status: DISCONTINUED | OUTPATIENT
Start: 2020-10-19 | End: 2020-10-21 | Stop reason: HOSPADM

## 2020-10-19 RX ADMIN — Medication 10 ML: at 20:41

## 2020-10-19 RX ADMIN — PIPERACILLIN AND TAZOBACTAM 3.38 G: 3; .375 INJECTION, POWDER, LYOPHILIZED, FOR SOLUTION INTRAVENOUS at 20:40

## 2020-10-19 RX ADMIN — ALLOPURINOL 100 MG: 100 TABLET ORAL at 08:02

## 2020-10-19 RX ADMIN — PANTOPRAZOLE SODIUM 40 MG: 40 INJECTION, POWDER, FOR SOLUTION INTRAVENOUS at 07:32

## 2020-10-19 RX ADMIN — Medication 10 ML: at 07:32

## 2020-10-19 RX ADMIN — SODIUM BICARBONATE 650 MG TABLET 650 MG: at 17:29

## 2020-10-19 RX ADMIN — Medication 5 MG: at 20:41

## 2020-10-19 RX ADMIN — SODIUM BICARBONATE 650 MG TABLET 650 MG: at 21:00

## 2020-10-19 RX ADMIN — PIPERACILLIN AND TAZOBACTAM 3.38 G: 3; .375 INJECTION, POWDER, LYOPHILIZED, FOR SOLUTION INTRAVENOUS at 02:56

## 2020-10-19 RX ADMIN — PIPERACILLIN AND TAZOBACTAM 3.38 G: 3; .375 INJECTION, POWDER, LYOPHILIZED, FOR SOLUTION INTRAVENOUS at 11:30

## 2020-10-19 NOTE — PLAN OF CARE
Pt is an 84 yo male ADM to the hospital with DX rhabdomyolysis, liver mass right lobe. Pt lives alone, PLOF is (I) for mobility, ADLs, and driving. Pt claims he does not use AE, according to  pt has a RW, w/c, and nebulizer. Pt this date states he is regaining strength back since his recent admit. Pt this date is SBA for bed mobility and STS, CGA for don/doff socks seated EOB. PT'S O2 sats WNL on 4L O2 throughout. Pt is limited by generalized weakness, limited endurance with minimal exertion. Recommend IP rehab at d/c to address above mentioned deficits. PPE; mask, gloves and shield.

## 2020-10-19 NOTE — PROGRESS NOTES
Discharge Planning Assessment   Zhao     Patient Name: Tristen Garcia  MRN: [id number]  Today's Date: 10/19/2020    Admit Date: 10/18/2020    Discharge Needs Assessment     Row Name 10/19/20 1032       Living Environment    Lives With  alone    Current Living Arrangements  home/apartment/condo    Primary Care Provided by  self    Provides Primary Care For  no one, unable/limited ability to care for self    Able to Return to Prior Arrangements  yes       Resource/Environmental Concerns    Resource/Environmental Concerns  none    Transportation Concerns  car, none       Transition Planning    Patient/Family Anticipates Transition to  inpatient rehabilitation facility    Patient/Family Anticipated Services at Transition  rehabilitation services    Transportation Anticipated  family or friend will provide       Discharge Needs Assessment    Readmission Within the Last 30 Days  no previous admission in last 30 days    Equipment Currently Used at Home  cane, straight;walker, rolling;oxygen;nebulizer;other (see comments) HS and Inogen tank through [facility]    Concerns to be Addressed  discharge planning    Anticipated Changes Related to Illness  inability to care for self    Equipment Needed After Discharge  none    Provided Post Acute Provider List?  Yes    Post Acute Provider List  Inpatient Rehab    Delivered To  Patient    Method of Delivery  In person    Current Discharge Risk  lives alone        Discharge Plan     Row Name 10/19/20 1033       Plan    Plan  Referral to [facility] Swing Bed Unit- pending acceptance. See SW for PASRR. No precert required.    Patient/Family in Agreement with Plan  yes    Plan Comments  Met with patient at bedside with a mask at a distance greater than 6 feet. He reports he lives at home alone, I with ADLs, still drives. PCP confirmed. No issues affording food or medications. No services currently assist in the home. Patient stated therapy just worked with him and  recommended IP rehab, he is hesitant but agreeable at this time and wanted a referral to [facility]. Notified Servando KING to make this referral. DC barriers: hem/onc consult, IVF, IV abx        Demographic Summary     Row Name 10/19/20 1032       General Information    Admission Type  inpatient    Arrived From  hospital    Required Notices Provided  Important Message from Medicare    Referral Source  admission list    Reason for Consult  discharge planning    Preferred Language  English     Used During This Interaction  no        Functional Status     Row Name 10/19/20 1032       Functional Status    Usual Activity Tolerance  moderate    Current Activity Tolerance  moderate       Functional Status, IADL    Medications  independent          Patient Forms     Row Name 10/19/20 1035       Patient Forms    Important Message from Medicare (IMM)  Delivered IM delivered 10/18            Willa Che RN

## 2020-10-19 NOTE — DISCHARGE PLACEMENT REQUEST
"Carmita Angeles (85 y.o. Male)     Date of Birth Social Security Number Address Home Phone MRN    1935  [address] [place] [facility] [phone] [id number]    [employer] Status          None Single       Admission Date Admission Type Admitting Provider Attending Provider Department, Room/Bed    10/18/20 Urgent Jese Mason MD Gad, George Fayez Labib Youssief, MD [facility] 2C MEDICAL INPATIENT, 240/1    Discharge Date Discharge Disposition Discharge Destination                       Attending Provider: Jese Mason MD    Allergies: No Known Allergies    Isolation: None   Infection: None   Code Status: CPR    Ht: 175.3 cm (69.02\")   Wt: 100 kg (220 lb 8 oz)    Admission Cmt: None   Principal Problem: None                Active Insurance as of 10/18/2020     Primary Coverage     Payor Plan Insurance Group Employer/Plan Group    MEDICARE MEDICARE A & B      Payor Plan Address Payor Plan Phone Number Payor Plan Fax Number Effective Dates    [address] [phone]  8/1/2000 - None Entered    [facility] [id number]       Subscriber Name Subscriber Birth Date Member ID       CARMITA ANGELES 1935 [id number]           Secondary Coverage     Payor Plan Insurance Group Employer/Plan Group    CIG[employer]                Payor Plan Address Payor Plan Phone Number Payor Plan Fax Number Effective Dates    [address] [address]   11/2/2010 - None Entered    [facility] [id number]       Subscriber Name Subscriber Birth Date Member ID       CARMITA ANGELES 1935 [id number]                 Emergency Contacts      (Rel.) Home Phone Work Phone Mobile Phone    REMA TELLES (Sister) [phone] -- [phone]    JOSE FISHER (Grandchild) [phone] -- [phone]            Emergency Contact Information     Name Relation Home Work Mobile    REMA TELLES Sister [phone]    JOSE FISHER Grandchild [phone]  " [phone]          Insurance Information                MEDICARE/MEDICARE A & B Phone: [phone]    Subscriber: Tristen Garcia Subscriber#: [id number]    Group#:  Precert#:         CIGNA/[employer]           Phone:     Subscriber: Tristen Garcia Subscriber#: [id number]    Group#:  Precert#:              History & Physical      Harsha Murcia MD at 10/18/20 1934                [facility] Medicine Services      Patient Name: Tristen Garcia  : 1935  MRN: [id number]  Primary Care Physician: Ann Marie Hodgson MD  Date of admission: 10/18/2020    Patient Care Team:  Ann Marie Hodgson MD as PCP - General (Family Medicine)          Subjective   History Present Illness     Chief Complaint: No chief complaint on file.    Generalized body weakness             History of Present Illness     Patient is an 85-year-old male with multiple comorbidities including COPD with chronic hypoxic respiratory failure on home oxygen, hypertension and hyperlipidemia.  Was recently found to have right upper lobe liver mass on imaging for which he underwent liver biopsy on 10/14/2020 at [facility]-biopsy results still pending.    He presented to [facility] ED on 10/17/2020 after granddaughter found him lying on the floor between 3 AM and 9:30 AM.  At the ED patient was noted to be hypotensive.  Blood work showed leukocytosis with WBC of 21,000, procalcitonin 26 and hemoglobin of 10.2.  Serum creatinine was noted to be 2.8, patient's baseline serum creatinine is around 1.8.  CK was 15,000 patient also had indeterminant troponin elevation though EKG did not show any findings suggestive of acute ischemia.  Patient was diagnosed with rhabdomyolysis, acute on chronic kidney disease and sepsis of unknown source.    He was admitted to the hospitalist service at [facility] where he was started on empirical antibiotics with IV vancomycin, IV Zosyn and IV Flagyl.  He was also  given IV normal saline along with D5 water with bicarb drip.  Blood cultures were obtained.  While in [facility] he had a fever spike.  Due to concern for possible liver abscess family requested the patient to be transferred to [facility] for further evaluation.      Review of Systems   Constitution: Positive for chills and malaise/fatigue. Negative for fever.   HENT: Negative for congestion.    Eyes: Negative for blurred vision.   Cardiovascular: Negative for chest pain.   Respiratory: Negative for shortness of breath.    Endocrine: Negative for cold intolerance and heat intolerance.   Musculoskeletal: Positive for back pain.   Gastrointestinal: Positive for nausea. Negative for abdominal pain and vomiting.   Genitourinary: Negative for dysuria.   Neurological: Positive for weakness.   Psychiatric/Behavioral: Negative for altered mental status.           Personal History     Past Medical History:   Past Medical History:   Diagnosis Date   • Arthritis    • Cancer (CMS/HCC)    • COPD (chronic obstructive pulmonary disease) (CMS/HCC)    • Elevated cholesterol    • GERD (gastroesophageal reflux disease)    • Hyperlipidemia    • Hypertension        Surgical History:      Past Surgical History:   Procedure Laterality Date   • COLONOSCOPY     • EYE SURGERY     • SKIN BIOPSY             Family History: family history is not on file. Otherwise pertinent FHx was reviewed and unremarkable.     Social History:  reports that he has quit smoking. He has never used smokeless tobacco. He reports previous alcohol use. He reports that he does not use drugs.      Medications:  Prior to Admission medications    Medication Sig Start Date End Date Taking? Authorizing Provider   allopurinol (ZYLOPRIM) 300 MG tablet Take 300 mg by mouth Daily.    ProviderWendy MD   amLODIPine (NORVASC) 10 MG tablet Take 10 mg by mouth Daily.    ProviderWendy MD   atenolol (TENORMIN) 25 MG tablet Take 25 mg by mouth  Daily.    ProviderWendy MD   furosemide (LASIX) 40 MG tablet Take 40 mg by mouth Daily.    Wendy Rivers MD   losartan (COZAAR) 50 MG tablet Take 100 mg by mouth Daily.    Wendy Rivers MD   omeprazole (priLOSEC) 20 MG capsule Take 20 mg by mouth Daily.    Wendy Rivers MD       Allergies:  No Known Allergies    Objective   Objective     Vital Signs  Temp:  [98.5 °F (36.9 °C)] 98.5 °F (36.9 °C)  Heart Rate:  [71] 71  Resp:  [18] 18  BP: (117)/(67) 117/67  SpO2:  [94 %] 94 %  on  Flow (L/min):  [3.5] 3.5;   Device (Oxygen Therapy): nasal cannula  Body mass index is 32.19 kg/m².    Physical Exam  Vitals signs reviewed.   Constitutional:       Appearance: He is ill-appearing.   HENT:      Head: Normocephalic and atraumatic.      Nose: Nose normal.      Mouth/Throat:      Mouth: Mucous membranes are moist.   Eyes:      Extraocular Movements: Extraocular movements intact.      Conjunctiva/sclera: Conjunctivae normal.      Pupils: Pupils are equal, round, and reactive to light.   Neck:      Musculoskeletal: Neck supple.   Cardiovascular:      Rate and Rhythm: Normal rate and regular rhythm.   Pulmonary:      Effort: No respiratory distress.      Breath sounds: Normal breath sounds.   Abdominal:      General: Bowel sounds are normal. There is distension.      Palpations: Abdomen is soft.      Tenderness: There is no abdominal tenderness.   Musculoskeletal:      Comments: Degenerative changes   Skin:     General: Skin is warm and dry.   Neurological:      Mental Status: He is alert and oriented to person, place, and time.   Psychiatric:         Mood and Affect: Mood normal.         Results Review:  I have personally reviewed most recent medical records that he came with from [facility]    Results from last 7 days   Lab Units 10/14/20  0814 10/14/20  0813   WBC 10*3/mm3 13.10*  --    HEMOGLOBIN g/dL 10.7*  --    HEMATOCRIT % 34.7*  --    PLATELETS 10*3/mm3 403  --    INR   --   0.98           Invalid input(s):  ALKPHOS  CrCl cannot be calculated (No successful lab value found.).  Brief Urine Lab Results     None          Microbiology Results (last 10 days)     ** No results found for the last 240 hours. **          ECG/EMG Results (most recent)     None                    Ct Needle Biopsy Liver    Result Date: 10/14/2020  Technically successful CT-guided percutaneous liver mass core biopsy procedure as described above.  Electronically Signed By-Kalia Herrera On:10/14/2020 12:18 PM This report was finalized on [id number] by  Kalia Herrera, .        CrCl cannot be calculated (No successful lab value found.).    Assessment/Plan   Assessment/Plan       There are no hospital problems to display for this patient.    Rhabdomyolysis  Due to prolonged immobilization  Improving  CK on the day of transfer 6000    Acute kidney injury on CKD stage III.  Probably due to rhabdomyolysis above.  Baseline serum creatinine 1.8  Avoid nephrotoxic agents.  Serum creatinine on the day of transfer was 2.1    Leukocytosis  Was said to have WBC of 21,000 on presentation  No clear source of infection from transfer facility  Patient recently had liver biopsy  CT on admission showed fluid collection-rule out abscess  Will continue on Zosyn    Liver mass  Status post liver biopsy 10/14/2020  Biopsy reports pending  Consult GI    Hypotensive on presentation to the outlying ED  Systolic blood pressure in the low 100s  Continue to hold BP medications-atenolol, Norvasc, losartan and furosemide    GERD-on PPI    COPD-not in exacerbation  Respiratory care with bronchodilators  Oxygen therapy and titration        VTE Prophylaxis -SCD    Mechanical Order History:     None      Pharmalogical Order History:     None          CODE STATUS:    There are no questions and answers to display.       This patient has been examined with appropriate PPE. 10/18/20      I discussed the patient's findings and my recommendations with  patient      Electronically signed by Harsha Murcia MD, 10/18/20, 7:35 PM EDT.  [facility] Hospitalist Team          Electronically signed by Harsha Murcia MD at 10/18/20 2006         Current Facility-Administered Medications   Medication Dose Route Frequency Provider Last Rate Last Dose   • acetaminophen (TYLENOL) tablet 650 mg  650 mg Oral Q4H PRN Harsha Murcia MD        Or   • acetaminophen (TYLENOL) 160 MG/5ML solution 650 mg  650 mg Oral Q4H PRN Harsha Murcia MD        Or   • acetaminophen (TYLENOL) suppository 650 mg  650 mg Rectal Q4H PRN Harsha Murcia MD       • allopurinol (ZYLOPRIM) tablet 100 mg  100 mg Oral Daily Harsha Murcia MD   100 mg at 10/19/20 0802   • ipratropium-albuterol (DUO-NEB) nebulizer solution 3 mL  3 mL Nebulization Q4H PRN Harsha Murcia MD       • melatonin tablet 5 mg  5 mg Oral Nightly PRN Harsha Murcia MD       • nitroglycerin (NITROSTAT) SL tablet 0.4 mg  0.4 mg Sublingual Q5 Min PRN Harsha Murcia MD       • ondansetron (ZOFRAN) tablet 4 mg  4 mg Oral Q6H PRN Harsha Murcia MD        Or   • ondansetron (ZOFRAN) injection 4 mg  4 mg Intravenous Q6H PRN Harsha Murcia MD       • pantoprazole (PROTONIX) injection 40 mg  40 mg Intravenous Q AM Harsha Murcia MD   40 mg at 10/19/20 0732   • Pharmacy to Dose Zosyn   Does not apply Continuous PRN Harsha Murcia MD       • piperacillin-tazobactam (ZOSYN) IVPB 3.375 g in 100 mL NS (CD)  3.375 g Intravenous Q8H Harsha Murcia MD   3.375 g at 10/19/20 0256   • sodium chloride 0.9 % flush 10 mL  10 mL Intravenous Q12H Harsha Murcia MD   10 mL at 10/19/20 0732   • sodium chloride 0.9 % flush 10 mL  10 mL Intravenous PRN Harsha Murcia MD       • sodium chloride 0.9 % infusion  100 mL/hr Intravenous Continuous Harsha Murcia  mL/hr at 10/18/20 2127 100 mL/hr at 10/18/20 2127     Physician Progress Notes (most recent note)    No notes of this type exist for this encounter.         Physical Therapy Notes (most recent note)    No  notes exist for this encounter.         Occupational Therapy Notes (most recent note)    No notes exist for this encounter.

## 2020-10-19 NOTE — PROGRESS NOTES
[facility] Medicine Services Daily Progress Note      Hospitalist Team  LOS 1 days      Patient Care Team:  Ann Marie Hodgson MD as PCP - General (Family Medicine)    Patient Location: 240/1      Subjective   Subjective     Chief Complaint / Subjective  \foot pain        Brief Synopsis of Hospital Course/HPI  Patient is an 85-year-old male with multiple comorbidities including COPD with chronic hypoxic respiratory failure on home oxygen, hypertension and hyperlipidemia.  Was recently found to have right upper lobe liver mass on imaging for which he underwent liver biopsy on 10/14/2020 at [facility]-biopsy results still pending.     He presented to [facility] ED on 10/17/2020 after granddaughter found him lying on the floor between 3 AM and 9:30 AM.  At the ED patient was noted to be hypotensive.  Blood work showed leukocytosis with WBC of 21,000, procalcitonin 26 and hemoglobin of 10.2.  Serum creatinine was noted to be 2.8, patient's baseline serum creatinine is around 1.8.  CK was 15,000 patient also had indeterminant troponin elevation though EKG did not show any findings suggestive of acute ischemia.  Patient was diagnosed with rhabdomyolysis, acute on chronic kidney disease and sepsis of unknown source.     He was admitted to the hospitalist service at [facility] where he was started on empirical antibiotics with IV vancomycin, IV Zosyn and IV Flagyl.  He was also given IV normal saline along with D5 water with bicarb drip.  Blood cultures were obtained.  While in [facility] he had a fever spike.  Due to concern for possible liver abscess family requested the patient to be transferred to [facility] for further evaluation.         Date::    10/19/2020  Patient complaining of right foot pain and concerned about his fall.  Imaging did not have any x-rays done in [facility].  X-rays ordered right foot and leg.  He reports  "being lying down on the floor for several hours.  This might explain his rhabdomyolysis.  Labs yesterday showed worsening hemoglobin drop  We will check again labs today  No active bleeding per patient.  Sister at the bedside  PT OT evaluation noted      ROS  All other systems reviewed and are negative    Objective   Objective      Vital Signs  Temp:  [98 °F (36.7 °C)-99 °F (37.2 °C)] 98 °F (36.7 °C)  Heart Rate:  [71-94] 77  Resp:  [14-18] 16  BP: ()/(50-68) 134/68  Oxygen Therapy  SpO2: 95 %  Pulse Oximetry Type: Intermittent  Device (Oxygen Therapy): nasal cannula  Flow (L/min): 2  Flowsheet Rows      First Filed Value   Admission Height  175.3 cm (69.02\") Documented at 10/19/2020 0030   Admission Weight  98.8 kg (217 lb 14.4 oz) Documented at 10/18/2020 1500        Intake & Output (last 3 days)       10/16 0701 - 10/17 0700 10/17 0701 - 10/18 0700 10/18 0701 - 10/19 0700 10/19 0701 - 10/20 0700    P.O.   400 300    Other    575    Total Intake(mL/kg)   400 (4) 875 (8.8)    Urine (mL/kg/hr)   1000 550 (0.6)    Stool   0 0    Total Output   1000 550    Net   -600 +325            Stool Unmeasured Occurrence   1 x 1 x        Lines, Drains & Airways    Active LDAs     Name:   Placement date:   Placement time:   Site:   Days:    Peripheral IV 10/18/20 1717 Anterior;Right Forearm   10/18/20    1717    Forearm   less than 1                  Physical Exam:    Physical Exam  Vitals signs and nursing note reviewed.   Constitutional:       General: He is not in acute distress.     Appearance: Normal appearance. He is well-developed. He is obese. He is not ill-appearing, toxic-appearing or diaphoretic.   HENT:      Head: Normocephalic and atraumatic.      Right Ear: Ear canal and external ear normal.      Left Ear: Ear canal and external ear normal.      Nose: Nose normal. No congestion or rhinorrhea.      Mouth/Throat:      Mouth: Mucous membranes are moist.      Pharynx: No oropharyngeal exudate.   Eyes:      " General: No scleral icterus.        Right eye: No discharge.         Left eye: No discharge.      Extraocular Movements: Extraocular movements intact.      Conjunctiva/sclera: Conjunctivae normal.      Pupils: Pupils are equal, round, and reactive to light.   Neck:      Musculoskeletal: Normal range of motion and neck supple. No neck rigidity or muscular tenderness.      Thyroid: No thyromegaly.      Vascular: No carotid bruit or JVD.      Trachea: No tracheal deviation.   Cardiovascular:      Rate and Rhythm: Normal rate and regular rhythm.      Pulses: Normal pulses.      Heart sounds: Normal heart sounds. No murmur. No friction rub. No gallop.    Pulmonary:      Effort: Pulmonary effort is normal. No respiratory distress.      Breath sounds: Normal breath sounds. No stridor. No wheezing, rhonchi or rales.   Chest:      Chest wall: No tenderness.   Abdominal:      General: Bowel sounds are normal. There is no distension.      Palpations: Abdomen is soft. There is hepatomegaly. There is no mass.      Tenderness: There is no abdominal tenderness. There is no guarding or rebound.      Hernia: No hernia is present.      Comments: Noted hepatomegaly   Musculoskeletal: Normal range of motion.         General: No swelling, tenderness, deformity or signs of injury.      Right lower leg: No edema.      Left lower leg: No edema.   Lymphadenopathy:      Cervical: No cervical adenopathy.   Skin:     General: Skin is warm and dry.      Coloration: Skin is not jaundiced or pale.      Findings: No bruising, erythema or rash.   Neurological:      General: No focal deficit present.      Mental Status: He is alert and oriented to person, place, and time. Mental status is at baseline.      Cranial Nerves: No cranial nerve deficit.      Sensory: No sensory deficit.      Motor: No weakness or abnormal muscle tone.      Coordination: Coordination normal.   Psychiatric:         Mood and Affect: Mood normal.         Behavior: Behavior  normal.         Thought Content: Thought content normal.         Judgment: Judgment normal.              Wounds (last 24 hours)      LDA Wound     Row Name 10/19/20 0300 10/18/20 2300 10/18/20 2000       Wound 10/18/20 1719 Right anterior knee Abrasion    Wound - Properties Group Placement Date: 10/18/20  -SW Placement Time: 1719 -SW Present on Hospital Admission: Y  -SW Side: Right  -SW Orientation: anterior  -SW Location: knee  -SW Primary Wound Type: Abrasion  -SW    Dressing Appearance  dry;intact  -KS  dry;intact  -KS  dry;intact  -KS    Closure  Adhesive bandage  -KS  --  Adhesive bandage  -KS    Base  --  --  moist;red  -KS    Drainage Amount  none  -KS  none  -KS  none  -KS    Retired Wound - Properties Group Date first assessed: 10/18/20  -SW Time first assessed: 1719 -SW Present on Hospital Admission: Y  -SW Side: Right  -SW Location: knee  -SW Primary Wound Type: Abrasion  -SW       Wound 10/18/20 1719 Left anterior knee Abrasion    Wound - Properties Group Placement Date: 10/18/20  -SW Placement Time: 1719 -SW Present on Hospital Admission: Y  -SW Side: Left  -SW Orientation: anterior  -SW Location: knee  -SW Primary Wound Type: Abrasion  -SW    Dressing Appearance  dry;intact  -KS  dry;intact  -KS  dry;intact  -KS    Closure  Adhesive bandage  -KS  Adhesive bandage  -KS  Adhesive bandage  -KS    Base  moist  -KS  --  moist;red  -KS    Drainage Amount  none  -KS  none  -KS  none  -KS    Retired Wound - Properties Group Date first assessed: 10/18/20  -SW Time first assessed: 1719 -SW Present on Hospital Admission: Y  -SW Side: Left  -SW Location: knee  -SW Primary Wound Type: Abrasion  -SW    Row Name 10/18/20 1715             Wound 10/18/20 1719 Right anterior knee Abrasion    Wound - Properties Group Placement Date: 10/18/20  -SW Placement Time: 1719 -SW Present on Hospital Admission: Y  -SW Side: Right  -SW Orientation: anterior  -SW Location: knee  -SW Primary Wound Type: Abrasion  -SW     Dressing Appearance  dry;intact  -SW      Closure  Adhesive bandage  -SW      Base  moist;red  -SW      Drainage Amount  none  -SW      Retired Wound - Properties Group Date first assessed: 10/18/20  -SW Time first assessed: 1719 -SW Present on Hospital Admission: Y  -SW Side: Right  -SW Location: knee  -SW Primary Wound Type: Abrasion  -SW       Wound 10/18/20 1719 Left anterior knee Abrasion    Wound - Properties Group Placement Date: 10/18/20  -SW Placement Time: 1719 -SW Present on Hospital Admission: Y  -SW Side: Left  -SW Orientation: anterior  -SW Location: knee  -SW Primary Wound Type: Abrasion  -SW    Dressing Appearance  dry;intact  -SW      Closure  Adhesive bandage  -SW      Base  moist;red  -SW      Drainage Amount  none  -SW      Retired Wound - Properties Group Date first assessed: 10/18/20  -SW Time first assessed: 1719 -SW Present on Hospital Admission: Y  -SW Side: Left  -SW Location: knee  -SW Primary Wound Type: Abrasion  -SW      User Key  (r) = Recorded By, (t) = Taken By, (c) = Cosigned By    Initials Name Provider Type    Keysha Loza RN Registered Nurse    Wilda Smith RN Registered Nurse          Procedures:              Results Review:     I reviewed the patient's new clinical results.      Lab Results (last 24 hours)     Procedure Component Value Units Date/Time    Comprehensive Metabolic Panel [[id number]]  (Abnormal) Collected: 10/19/20 1527    Specimen: Blood Updated: 10/19/20 1645     Glucose 111 mg/dL      BUN 27 mg/dL      Creatinine 1.69 mg/dL      Sodium 139 mmol/L      Potassium 4.3 mmol/L      Chloride 102 mmol/L      CO2 28.0 mmol/L      Calcium 7.9 mg/dL      Total Protein 5.5 g/dL      Albumin 2.30 g/dL      ALT (SGPT) 63 U/L      AST (SGOT) 134 U/L      Alkaline Phosphatase 213 U/L      Total Bilirubin 0.2 mg/dL      eGFR Non African Amer 39 mL/min/1.73      Globulin 3.2 gm/dL      A/G Ratio 0.7 g/dL      BUN/Creatinine Ratio 16.0     Anion Gap 9.0  mmol/L     Narrative:      GFR Normal >60  Chronic Kidney Disease <60  Kidney Failure <15      CK [[id number]]  (Abnormal) Collected: 10/19/20 1527    Specimen: Blood Updated: 10/19/20 1644     Creatine Kinase 2,699 U/L     CBC & Differential [[id number]]  (Abnormal) Collected: 10/19/20 1527    Specimen: Blood Updated: 10/19/20 1604    Narrative:      The following orders were created for panel order CBC & Differential.  Procedure                               Abnormality         Status                     ---------                               -----------         ------                     CBC Auto Differential[[id number]]        Abnormal            Final result                 Please view results for these tests on the individual orders.    CBC Auto Differential [[id number]]  (Abnormal) Collected: 10/19/20 1527    Specimen: Blood Updated: 10/19/20 1604     WBC 11.60 10*3/mm3      RBC 3.35 10*6/mm3      Hemoglobin 8.5 g/dL      Hematocrit 27.2 %      MCV 81.2 fL      MCH 25.3 pg      MCHC 31.2 g/dL      RDW 18.9 %      RDW-SD 53.8 fl      MPV 8.1 fL      Platelets 394 10*3/mm3      Neutrophil % 73.0 %      Lymphocyte % 11.5 %      Monocyte % 13.9 %      Eosinophil % 0.6 %      Basophil % 1.0 %      Neutrophils, Absolute 8.40 10*3/mm3      Lymphocytes, Absolute 1.30 10*3/mm3      Monocytes, Absolute 1.60 10*3/mm3      Eosinophils, Absolute 0.10 10*3/mm3      Basophils, Absolute 0.10 10*3/mm3      nRBC 0.0 /100 WBC     CK [[id number]]  (Abnormal) Collected: 10/19/20 0321    Specimen: Blood Updated: 10/19/20 0500     Creatine Kinase 3,847 U/L     Basic Metabolic Panel [[id number]]  (Abnormal) Collected: 10/19/20 0321    Specimen: Blood Updated: 10/19/20 0447     Glucose 107 mg/dL      BUN 28 mg/dL      Creatinine 1.80 mg/dL      Sodium 140 mmol/L      Potassium 4.2 mmol/L      Chloride 101 mmol/L      CO2 28.0 mmol/L      Calcium 8.8 mg/dL      eGFR Non African Amer 36 mL/min/1.73      BUN/Creatinine Ratio 15.6     Anion  Gap 11.0 mmol/L     Narrative:      GFR Normal >60  Chronic Kidney Disease <60  Kidney Failure <15      CBC Auto Differential [[id number]]  (Abnormal) Collected: 10/19/20 0321    Specimen: Blood Updated: 10/19/20 0424     WBC 14.10 10*3/mm3      RBC 3.56 10*6/mm3      Hemoglobin 8.9 g/dL      Hematocrit 29.0 %      MCV 81.6 fL      MCH 24.9 pg      MCHC 30.5 g/dL      RDW 18.6 %      RDW-SD 52.9 fl      MPV 8.3 fL      Platelets 430 10*3/mm3      Neutrophil % 68.7 %      Lymphocyte % 16.7 %      Monocyte % 13.3 %      Eosinophil % 0.9 %      Basophil % 0.4 %      Neutrophils, Absolute 9.60 10*3/mm3      Lymphocytes, Absolute 2.30 10*3/mm3      Monocytes, Absolute 1.90 10*3/mm3      Eosinophils, Absolute 0.10 10*3/mm3      Basophils, Absolute 0.10 10*3/mm3      nRBC 0.0 /100 WBC     CK [[id number]]  (Abnormal) Collected: 10/18/20 2026    Specimen: Blood Updated: 10/18/20 2117     Creatine Kinase 3,908 U/L     Comprehensive Metabolic Panel [[id number]]  (Abnormal) Collected: 10/18/20 2026    Specimen: Blood Updated: 10/18/20 2104     Glucose 152 mg/dL      BUN 29 mg/dL      Creatinine 1.92 mg/dL      Sodium 137 mmol/L      Potassium 3.7 mmol/L      Chloride 100 mmol/L      CO2 26.0 mmol/L      Calcium 7.7 mg/dL      Total Protein 5.2 g/dL      Albumin 2.20 g/dL      ALT (SGPT) 52 U/L      AST (SGOT) 146 U/L      Alkaline Phosphatase 138 U/L      Total Bilirubin 0.2 mg/dL      eGFR Non African Amer 33 mL/min/1.73      Globulin 3.0 gm/dL      A/G Ratio 0.7 g/dL      BUN/Creatinine Ratio 15.1     Anion Gap 11.0 mmol/L     Narrative:      GFR Normal >60  Chronic Kidney Disease <60  Kidney Failure <15      CBC Auto Differential [[id number]]  (Abnormal) Collected: 10/18/20 2026    Specimen: Blood Updated: 10/18/20 2045     WBC 12.00 10*3/mm3      RBC 3.17 10*6/mm3      Hemoglobin 8.0 g/dL      Hematocrit 25.9 %      MCV 81.7 fL      MCH 25.3 pg      MCHC 31.0 g/dL      RDW 18.4 %      RDW-SD 52.9 fl      MPV 7.5 fL       Platelets 358 10*3/mm3      Neutrophil % 74.3 %      Lymphocyte % 12.1 %      Monocyte % 12.8 %      Eosinophil % 0.6 %      Basophil % 0.2 %      Neutrophils, Absolute 9.00 10*3/mm3      Lymphocytes, Absolute 1.50 10*3/mm3      Monocytes, Absolute 1.50 10*3/mm3      Eosinophils, Absolute 0.10 10*3/mm3      Basophils, Absolute 0.00 10*3/mm3      nRBC 0.1 /100 WBC     Blood Culture - Blood, Arm, Left [[id number]] Collected: 10/18/20 2025    Specimen: Blood from Arm, Left Updated: 10/18/20 2040        No results found for: HGBA1C  Results from last 7 days   Lab Units 10/14/20  0813   INR  0.98           No results found for: LIPASE  No results found for: CHOL, CHLPL, TRIG, HDL, LDL, LDLDIRECT    Lab Results   Lab Value Date/Time    INTRAOP  10/14/2020 1009     TP: Positive for malignancy.     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 Immunohistochemistry was performed utilizing appropriate controls and the tumor is strongly positive for only CK7. The CK20, CDX2, TTF-1, napsin A, chromogranin, synaptophysin and CD56 are all negative. This is a nonspecific staining pattern but can be seen in tumors of pancreatobiliary origin, including cholangiocarcinoma which is favored in this case. Adenocarcinomas of the upper gastrointestinal tract can also display only CK7 positivity and should be excluded clinically. This staining pattern argues against, but does not completely exclude a metastasis from a primary lung adenocarcinoma as approximately 10 to 15% of lung adenocarcinomas are negative for TTF-1 and napsin A. This staining pattern does exclude a neuroendocrine tumor and adenocarcinoma of colonic origin. Further clinical workup with imaging studies and serum tumor markers is recommended.          Microbiology Results  (last 10 days)     ** No results found for the last 240 hours. **          ECG/EMG Results (most recent)     None                    Ct Needle Biopsy Liver    Result Date: 10/14/2020  Technically successful CT-guided percutaneous liver mass core biopsy procedure as described above.  Electronically Signed By-Kalia Herrera On:10/14/2020 12:18 PM This report was finalized on [id number] by  Kalia Herrera, .          Xrays, labs reviewed personally by physician.    Medication Review:   I have reviewed the patient's current medication list      Scheduled Meds  allopurinol, 100 mg, Oral, Daily  pantoprazole, 40 mg, Intravenous, Q AM  piperacillin-tazobactam, 3.375 g, Intravenous, Q8H  sodium bicarbonate, 650 mg, Oral, TID  sodium chloride, 10 mL, Intravenous, Q12H        Meds Infusions  Pharmacy to Dose Zosyn,   sodium chloride, 200 mL/hr, Last Rate: 100 mL/hr (10/18/20 2127)        Meds PRN  •  acetaminophen **OR** acetaminophen **OR** acetaminophen  •  ipratropium-albuterol  •  melatonin  •  nitroglycerin  •  ondansetron **OR** ondansetron  •  Pharmacy to Dose Zosyn  •  sodium chloride        Assessment/Plan   Assessment/Plan     Active Hospital Problems    Diagnosis  POA   • **Rhabdomyolysis [M62.82]  Yes   • Liver mass, right lobe [R16.0]  Yes      Resolved Hospital Problems   No resolved problems to display.       MEDICAL DECISION MAKING COMPLEXITY BY PROBLEM:   Rhabdomyolysis  Due to prolonged immobilization  Improving  CK on the day of transfer 6000  Monitor daily  Increase IV fluids  Add p.o. bicarb  Watch for fluid overload  Monitor and correct electrolytes as needed    Acute kidney injury on CKD stage III.  Probably due to rhabdomyolysis above.  Baseline serum creatinine 1.8  Avoid nephrotoxic agents.  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  Status post liver biopsy 10/14/2020  Biopsy reports reviewed  Consult GI  Oncologist consulted    Hypotensive on presentation to the [facility] ED  Systolic blood pressure in the low 100s  Continue to hold BP medications-atenolol, Norvasc, losartan and furosemide     GERD-on PPI     COPD-not in exacerbation  Respiratory care with bronchodilators  Oxygen therapy and titration           VTE Prophylaxis -SCD  VTE Prophylaxis -   Mechanical Order History:      Ordered        10/18/20 2004  Place Sequential Compression Device  Once         10/18/20 2004  Maintain Sequential Compression Device  Continuous                 Pharmalogical Order History:     None            Code Status -   Code Status and Medical Interventions:   Ordered at: 10/18/20 2004     Level Of Support Discussed With:    Patient     Code Status:    CPR     Medical Interventions (Level of Support Prior to Arrest):    Full       This patient has been examined wearing appropriate Personal Protective Equipment and discussed with hospital infection control department. 10/19/20        Discharge Planning  Home versus rehab          Electronically signed by Jese Mason MD, 10/19/20, 16:47 EDT.  [facility] Hospitalist Team

## 2020-10-19 NOTE — THERAPY EVALUATION
Patient Name: Tristen Garcia  : 1935    MRN: [id number]                              Today's Date: 10/19/2020       Admit Date: 10/18/2020    Visit Dx: No diagnosis found.  Patient Active Problem List   Diagnosis   • Liver mass, right lobe     Past Medical History:   Diagnosis Date   • Arthritis    • Cancer (CMS/HCC)    • COPD (chronic obstructive pulmonary disease) (CMS/HCC)    • Elevated cholesterol    • GERD (gastroesophageal reflux disease)    • Hyperlipidemia    • Hypertension      Past Surgical History:   Procedure Laterality Date   • COLONOSCOPY     • EYE SURGERY     • SKIN BIOPSY       General Information     Row Name 10/19/20 1458          OT Time and Intention    Document Type  evaluation  (Pended)   -AB     Mode of Treatment  occupational therapy  (Pended)   -AB     Row Name 10/19/20 1458          General Information    Patient Profile Reviewed  yes  (Pended)   -AB     Prior Level of Function  independent:;ADL's;driving;transfer;community mobility;all household mobility;home management;shopping  (Pended)   -AB     Existing Precautions/Restrictions  oxygen therapy device and L/min  (Pended)   -AB     Barriers to Rehab  none identified  (Pended)   -AB     Row Name 10/19/20 1458          Living Environment    Lives With  alone  (Pended)   -AB     Row Name 10/19/20 1458          Cognition    Orientation Status (Cognition)  oriented x 4  (Pended)   -AB     Row Name 10/19/20 1458          Safety Issues, Functional Mobility    Impairments Affecting Function (Mobility)  endurance/activity tolerance;strength  (Pended)   -AB       User Key  (r) = Recorded By, (t) = Taken By, (c) = Cosigned By    Initials Name Provider Type    AB Chilango Mendez, OT Student OT Student        Mobility/ADL's     Row Name 10/19/20 1459          Bed Mobility    Bed Mobility  bed mobility (all) activities  (Pended)   -AB     All Activities, [place] (Bed Mobility)  standby assist  (Pended)   -AB     Rolling Right [place]  (Bed Mobility)  standby assist  (Pended)   -AB     Supine-Sit [place] (Bed Mobility)  standby assist  (Pended)   -AB     Bed Mobility, Safety Issues  decreased use of arms for pushing/pulling;decreased use of legs for bridging/pushing  (Pended)   -AB     Assistive Device (Bed Mobility)  bed rails  (Pended)   -AB     Row Name 10/19/20 1459          Transfers    Transfers  sit-stand transfer  (Pended)   -AB     Sit-Stand [place] (Transfers)  contact guard  (Pended)   -AB     Row Name 10/19/20 1459          Functional Mobility    Functional Mobility- Ind. Level  not tested  (Pended)   -AB     Row Name 10/19/20 1459          Activities of Daily Living    BADL Assessment/Intervention  lower body dressing  (Pended)   -AB     Row Name 10/19/20 1459          Lower Body Dressing Assessment/Training    [place] Level (Lower Body Dressing)  don;doff;socks;contact guard assist  (Pended)   -AB     Position (Lower Body Dressing)  edge of bed sitting  (Pended)   -AB       User Key  (r) = Recorded By, (t) = Taken By, (c) = Cosigned By    Initials Name Provider Type    AB Chilango Mendez, OT Student OT Student        Obj/Interventions     Row Name 10/19/20 1500          Range of Motion Comprehensive    General Range of Motion  no range of motion deficits identified  (Pended)   -AB     Row Name 10/19/20 1500          Strength Comprehensive (MMT)    General Manual Muscle Testing (MMT) Assessment  upper extremity strength deficits identified  (Pended)   -AB     Comment, General Manual Muscle Testing (MMT) Assessment  BUE grossly 4-/5  (Pended)   -AB       User Key  (r) = Recorded By, (t) = Taken By, (c) = Cosigned By    Initials Name Provider Type    AB Chilango Mendez OT Student OT Student        Goals/Plan     Row Name 10/19/20 1501          Transfer Goal 1 (OT)    Activity/Assistive Device (Transfer Goal 1, OT)  transfers, all  (Pended)   -AB     [place] Level/Cues Needed (Transfer Goal 1, OT)  supervision required   (Pended)   -AB     Time Frame (Transfer Goal 1, OT)  long term goal (LTG);2 weeks  (Pended)   -AB     Row Name 10/19/20 1501          Bathing Goal 1 (OT)    Activity/Device (Bathing Goal 1, OT)  bathing skills, all  (Pended)   -AB     [place] Level/Cues Needed (Bathing Goal 1, OT)  supervision required  (Pended)   -AB     Time Frame (Bathing Goal 1, OT)  long term goal (LTG);2 weeks  (Pended)   -AB     Row Name 10/19/20 1501          Dressing Goal 1 (OT)    Activity/Device (Dressing Goal 1, OT)  dressing skills, all  (Pended)   -AB     [place]/Cues Needed (Dressing Goal 1, OT)  independent  (Pended)   -AB     Time Frame (Dressing Goal 1, OT)  long term goal (LTG);2 weeks  (Pended)   -AB       User Key  (r) = Recorded By, (t) = Taken By, (c) = Cosigned By    Initials Name Provider Type    Chilango [place]wood, OT Student OT Student        Clinical Impression     Row Name 10/19/20 1500          Pain Scale: FACES Pre/Post-Treatment    Pain: FACES Scale, Pretreatment  0-->no hurt  (Pended)   -AB     Posttreatment Pain Rating  0-->no hurt  (Pended)   -AB     Row Name 10/19/20 1500          Plan of Care Review    Plan of Care Reviewed With  patient;family  (Pended)   -AB     Progress  improving  (Pended)   -AB     Outcome Summary  Pt is an 84 yo male ADM to the hospital with DX rhabdomyolysis, liver mass right lobe. Pt lives alone, PLOF is (I) for mobility, ADLs, and driving. Pt claims he does not use AE, according to  pt has a RW, w/c, and nebulizer. Pt this date states he is regaining strength back since his recent admit. Pt this date is SBA for bed mobility and STS, CGA for don/doff socks seated EOB. PT’S O2 sats WNL on 4L O2 throughout. Pt is limited by generalized weakness, limited endurance with minimal exertion. Recommend IP rehab at d/c to address above mentioned deficits. PPE; mask, gloves and shield.  (Pended)   -AB     Row Name 10/19/20 1500          Therapy Assessment/Plan (OT)    Rehab  Potential (OT)  good, to achieve stated therapy goals  (Pended)   -AB     Criteria for Skilled Therapeutic Interventions Met (OT)  yes  (Pended)   -AB     Therapy Frequency (OT)  3 times/wk  (Pended)   -AB     Row Name 10/19/20 1500          Therapy Plan Review/Discharge Plan (OT)    Anticipated Discharge Disposition (OT)  inpatient rehabilitation facility  (Pended)   -AB     Row Name 10/19/20 1500          Vital Signs    O2 Delivery Pre Treatment  supplemental O2  (Pended)   -AB     O2 Delivery Intra Treatment  supplemental O2  (Pended)   -AB     O2 Delivery Post Treatment  supplemental O2  (Pended)   -AB     Pre Patient Position  Supine  (Pended)   -AB     Intra Patient Position  Standing  (Pended)   -AB     Post Patient Position  Supine  (Pended)   -AB     Row Name 10/19/20 1500          Positioning and Restraints    Pre-Treatment Position  in bed  (Pended)   -AB     Post Treatment Position  bed  (Pended)   -AB     In Bed  supine;call light within reach;encouraged to call for assist;exit alarm on;with family/caregiver  (Pended)   -AB       User Key  (r) = Recorded By, (t) = Taken By, (c) = Cosigned By    Initials Name Provider Type    AB Chilango Mendez OT Student OT Student        Outcome Measures    No documentation.       Occupational Therapy Education                 Title: PT OT SLP Therapies (Done)     Topic: Occupational Therapy (Done)     Point: ADL training (Done)     Description:   Instruct learner(s) on proper safety adaptation and remediation techniques during self care or transfers.   Instruct in proper use of assistive devices.              Learning Progress Summary           Patient Acceptance, E,TB, VU by AB at 10/19/2020 1502   Family Acceptance, E,TB, VU by AB at 10/19/2020 1502                               User Key     Initials Effective Dates Name Provider Type Discipline    AB 07/20/20 -  Chilango Mendez OT Student OT Student OT              OT Recommendation and Plan  Therapy Frequency (OT):  (P) 3 times/wk  Plan of Care Review  Plan of Care Reviewed With: (P) patient, family  Progress: (P) improving  Outcome Summary: (P) Pt is an 84 yo male ADM to the hospital with DX rhabdomyolysis, liver mass right lobe. Pt lives alone, PLOF is (I) for mobility, ADLs, and driving. Pt claims he does not use AE, according to  pt has a RW, w/c, and nebulizer. Pt this date states he is regaining strength back since his recent admit. Pt this date is SBA for bed mobility and STS, CGA for don/doff socks seated EOB. PT’S O2 sats WNL on 4L O2 throughout. Pt is limited by generalized weakness, limited endurance with minimal exertion. Recommend IP rehab at d/c to address above mentioned deficits. PPE; mask, gloves and shield.     Time Calculation:   Time Calculation- OT     Row Name 10/19/20 1502             Time Calculation- OT    OT Start Time  1347  (Pended)   -AB      OT Stop Time  1406  (Pended)   -AB      OT Time Calculation (min)  19 min  (Pended)   -AB      Total Timed Code Minutes- OT  10 minute(s)  (Pended)   -AB      OT Received On  10/19/20  (Pended)   -AB      OT - Next Appointment  10/21/20  (Pended)   -AB      OT Goal Re-Cert Due Date  11/02/20  (Pended)   -AB        User Key  (r) = Recorded By, (t) = Taken By, (c) = Cosigned By    Initials Name Provider Type    AB Chilango Mendez OT Student OT Student        Therapy Charges for Today     Code Description Service Date Service Provider Modifiers Qty    22046382230  OT EVAL MOD COMPLEXITY 2 10/19/2020 Chilango Mendez OT Student GO 1    [id number]  OT THERAPEUTIC ACT EA 15 MIN 10/19/2020 Chilango Mendez OT Student GO 1               ANGELA Sandoval  10/19/2020

## 2020-10-19 NOTE — THERAPY EVALUATION
Patient Name: Tristen Garcia  : 1935    MRN: [id number]                              Today's Date: 10/19/2020       Admit Date: 10/18/2020    Visit Dx: No diagnosis found.  Patient Active Problem List   Diagnosis   • Liver mass, right lobe     Past Medical History:   Diagnosis Date   • Arthritis    • Cancer (CMS/HCC)    • COPD (chronic obstructive pulmonary disease) (CMS/HCC)    • Elevated cholesterol    • GERD (gastroesophageal reflux disease)    • Hyperlipidemia    • Hypertension      Past Surgical History:   Procedure Laterality Date   • COLONOSCOPY     • EYE SURGERY     • SKIN BIOPSY       General Information     Row Name 10/19/20 1108          Physical Therapy Time and Intention    Document Type  evaluation  -     Mode of Treatment  physical therapy  -     Row Name 10/19/20 1108          General Information    Patient Profile Reviewed  yes  -     Prior Level of Function  independent:;feeding;grooming;dressing;bathing;bed mobility;driving  -     Existing Precautions/Restrictions  fall  -     Row Name 10/19/20 1108          Living Environment    Lives With  alone  -     Row Name 10/19/20 1108          Home Main Entrance    Number of Stairs, Main Entrance  none RAMP  -     Stair Railings, Main Entrance  none  -     Row Name 10/19/20 1108          Cognition    Orientation Status (Cognition)  oriented x 3  -     Row Name 10/19/20 1108          Safety Issues, Functional Mobility    Impairments Affecting Function (Mobility)  balance;endurance/activity tolerance;strength;postural/trunk control  -       User Key  (r) = Recorded By, (t) = Taken By, (c) = Cosigned By    Initials Name Provider Type     Myrna Gary PT Physical Therapist        Mobility     Row Name 10/19/20 1110          Bed Mobility    Bed Mobility  rolling right;supine-sit  -     Rolling Right [place] (Bed Mobility)  standby assist  -     Supine-Sit [place] (Bed Mobility)  minimum assist (75% patient effort)   -     Assistive Device (Bed Mobility)  bed rails;head of bed elevated  -     Row Name 10/19/20 1110          Sit-Stand Transfer    Sit-Stand [place] (Transfers)  minimum assist (75% patient effort)  -     Assistive Device (Sit-Stand Transfers)  walker, front-wheeled  -     Row Name 10/19/20 1110          Gait/Stairs (Locomotion)    [place] Level (Gait)  contact guard  -     Assistive Device (Gait)  walker, front-wheeled  -     Distance in Feet (Gait)  5  -     Deviations/Abnormal Patterns (Gait)  gait speed decreased;stride length decreased  -     Bilateral Gait Deviations  forward flexed posture  -       User Key  (r) = Recorded By, (t) = Taken By, (c) = Cosigned By    Initials Name Provider Type    Myrna Ma PT Physical Therapist        Obj/Interventions     Row Name 10/19/20 1111          Range of Motion Comprehensive    General Range of Motion  bilateral lower extremity ROM WFL  -     Row Name 10/19/20 1111          Strength Comprehensive (MMT)    Comment, General Manual Muscle Testing (MMT) Assessment  4/5 [place] LE grossly  -     Row Name 10/19/20 1111          Balance    Balance Assessment  sitting static balance;sitting dynamic balance;standing static balance;standing dynamic balance  -     Static Sitting Balance  mild impairment  -     Dynamic Sitting Balance  mild impairment  -     Static Standing Balance  mild impairment  -     Dynamic Standing Balance  moderate impairment  -     Balance Interventions  sitting;standing;sit to stand;supported;static;dynamic;minimal challenge  -       User Key  (r) = Recorded By, (t) = Taken By, (c) = Cosigned By    Initials Name Provider Type    Myrna Ma PT Physical Therapist        Goals/Plan     Row Name 10/19/20 1112          Bed Mobility Goal 1 (PT)    Activity/Assistive Device (Bed Mobility Goal 1, PT)  rolling to left;rolling to right;sit to supine;scooting;supine to sit  -     [place] Level/Cues Needed  (Bed Mobility Goal 1, PT)  modified independence  -WC     Time Frame (Bed Mobility Goal 1, PT)  short term goal (STG);2 weeks  -     Row Name 10/19/20 1112          Transfer Goal 1 (PT)    Activity/Assistive Device (Transfer Goal 1, PT)  sit-to-stand/stand-to-sit;bed-to-chair/chair-to-bed;toilet  -     [place] Level/Cues Needed (Transfer Goal 1, PT)  modified independence  -WC     Time Frame (Transfer Goal 1, PT)  2 weeks;short term goal (STG)  -     Row Name 10/19/20 1112          Gait Training Goal 1 (PT)    Activity/Assistive Device (Gait Training Goal 1, PT)  gait (walking locomotion);increase endurance/gait distance;decrease fall risk;walker, rolling  -     [place] Level (Gait Training Goal 1, PT)  modified independence  -WC     Distance (Gait Training Goal 1, PT)  45  -WC     Time Frame (Gait Training Goal 1, PT)  short term goal (STG);2 weeks  -     Row Name 10/19/20 1112          Patient Education Goal (PT)    Activity (Patient Education Goal, PT)  LE exercises  -     [place]/Cues/Accuracy (Memory Goal 2, PT)  demonstrates adequately;verbalizes understanding  -     Time Frame (Patient Education Goal, PT)  short term goal (STG);2 weeks  -       User Key  (r) = Recorded By, (t) = Taken By, (c) = Cosigned By    Initials Name Provider Type     Myrna Gary, PT Physical Therapist        Clinical Impression     Row Name 10/19/20 1111          Pain    Additional Documentation  Pain Scale: FACES Pre/Post-Treatment (Group)  -     Row Name 10/19/20 1111          Pain Scale: FACES Pre/Post-Treatment    Pain: FACES Scale, Pretreatment  2-->hurts little bit  -     Posttreatment Pain Rating  2-->hurts little bit  -     Pain Location  abdomen  -     Row Name 10/19/20 1111          Plan of Care Review    Plan of Care Reviewed With  patient  -     Row Name 10/19/20 1111          Therapy Assessment/Plan (PT)    Rehab Potential (PT)  fair, will monitor progress closely  -      Criteria for Skilled Interventions Met (PT)  yes  -     Predicted Duration of Therapy Intervention (PT)  Until D/C  -     Row Name 10/19/20 1111          Vital Signs    Pre Patient Position  Supine  -     Intra Patient Position  Standing  -WC     Post Patient Position  Sitting  -     Row Name 10/19/20 1111          Positioning and Restraints    Pre-Treatment Position  in bed  -     Post Treatment Position  chair  -       User Key  (r) = Recorded By, (t) = Taken By, (c) = Cosigned By    Initials Name Provider Type     Myrna Gary PT Physical Therapist        Outcome Measures     Row Name 10/19/20 1114          How much help from another person do you currently need...    Turning from your back to your side while in flat bed without using bedrails?  4  -WC     Moving from lying on back to sitting on the side of a flat bed without bedrails?  3  -WC     Moving to and from a bed to a chair (including a wheelchair)?  3  -WC     Standing up from a chair using your arms (e.g., wheelchair, bedside chair)?  3  -WC     Climbing 3-5 steps with a railing?  2  -WC     To walk in hospital room?  3  -WC     AM-PAC 6 Clicks Score (PT)  18  -     Row Name 10/19/20 1114          Modified [place] Scale    Pre-Stroke Modified [place] Scale  4 - Moderately severe disability.  Unable to walk without assistance, and unable to attend to own bodily needs without assistance.  -     Row Name 10/19/20 1114          Functional Assessment    Outcome Measure Options  Modified [place]  -       User Key  (r) = Recorded By, (t) = Taken By, (c) = Cosigned By    Initials Name Provider Type    Myrna Ma PT Physical Therapist        Physical Therapy Education                 Title: PT OT SLP Therapies (Done)     Topic: Physical Therapy (Done)     Point: Mobility training (Done)     Learning Progress Summary           Patient Acceptance, E,TB, VU by  at 10/19/2020 1115                   Point: Body mechanics (Done)      Learning Progress Summary           Patient Acceptance, E,TB, VU by  at 10/19/2020 1115                   Point: Precautions (Done)     Learning Progress Summary           Patient Acceptance, E,TB, VU by  at 10/19/2020 1115                               User Key     Initials Effective Dates Name Provider Type Discipline     01/07/20 -  Myrna Gary PT Physical Therapist PT              PT Recommendation and Plan  Pt is an 84 yo male ADM to the hospital with DX rhabdomyolysis, live massright lobe. Pt lives alone. Pt has a ramp to enter home.  Prior pt was independent with mobility in the home and community with out AD. Pt was driving self. Today pt as alert and oriented x 4. Pt needed MIN A supine to sit, sit to stand MIN A with FWW and CGA FWW 5 feet to chair. Pt reports he is improving however is afraid he will not be able to manage at home alone.      Pt is below baseline recommendation is D/C to IP Rehab. PPE: Mask, eyeshield, gloves.    Planned Therapy Interventions (PT): balance training, bed mobility training, gait training, home exercise program, patient/family education, postural re-education, neuromuscular re-education, strengthening, transfer training  Plan of Care Reviewed With: patient     Time Calculation:   PT Charges     Row Name 10/19/20 1120             Time Calculation    Start Time  0954  -      Stop Time  1019  -      Time Calculation (min)  25 min  -      PT Received On  10/19/20  -      PT - Next Appointment  10/20/20  -      PT Goal Re-Cert Due Date  11/02/20  -         Time Calculation- PT    TCU Minutes- PT  15 min  -        User Key  (r) = Recorded By, (t) = Taken By, (c) = Cosigned By    Initials Name Provider Type     Myrna Gary PT Physical Therapist        Therapy Charges for Today     Code Description Service Date Service Provider Modifiers Qty    01030737830  PT EVAL MOD COMPLEXITY 3 10/19/2020 Myrna Gary PT GP 1    [id number]  PT THERAPEUTIC ACT EA  15 MIN 10/19/2020 Myrna Gary, PT GP 1          PT G-Codes  Outcome Measure Options: Modified [place]  AM-PAC 6 Clicks Score (PT): 18    Myrna Gary, PT  10/19/2020

## 2020-10-19 NOTE — PLAN OF CARE
Problem: Adult Inpatient Plan of Care  Goal: Plan of Care Review  Recent Flowsheet Documentation  Taken 10/19/2020 1111 by Myrna Gary, MARIA D  Plan of Care Reviewed With: patient    Pt is an 84 yo male ADM to the hospital with DX rhabdomyolysis, live massright lobe. Pt lives alone. Pt has a ramp to enter home.  Prior pt was independent with mobility in the home and community with out AD. Pt was driving self. Today pt as alert and oriented x 4. Pt needed MIN A supine to sit, sit to stand MIN A with FWW and CGA FWW 5 feet to chair. Pt reports he is improving however is afraid he will not be able to manage at home alone.  Pt is below baseline recommendation is D/C to IP Rehab. PPE: Mask, eyeshield, gloves.

## 2020-10-19 NOTE — PROGRESS NOTES
Continued Stay Note   [facility]hao     Patient Name: Tristen Garcia  MRN: [id number]  Today's Date: 10/19/2020    Admit Date: 10/18/2020    Discharge Plan     Row Name 10/19/20 1559       Plan    Plan  [facility] No PAS[facility] required. No precert required.    Plan Comments  SW spoke with care coordination with [facility] and was informed that they can accept under Dr. العلي but a bed would not be available until Wednesday.        [place] ALMEIDA   Cell: [phone]  Office: [phone]  Fax: [phone]

## 2020-10-19 NOTE — PLAN OF CARE
Problem: Adult Inpatient Plan of Care  Goal: Plan of Care Review  10/19/2020 0032 by Wilda Mayes RN  Outcome: Ongoing, Progressing  Flowsheets (Taken 10/18/2020 2000)  Progress: no change  10/19/2020 0028 by Wilda Mayes RN  Outcome: Ongoing, Progressing  Flowsheets (Taken 10/19/2020 0028)  Outcome Summary: gastroenterolgy to see, iv antibiotics for acute kidney injury  10/19/2020 0027 by Wilda Mayes RN  Outcome: Ongoing, Progressing  Flowsheets (Taken 10/18/2020 2000)  Progress: no change  Goal: Patient-Specific Goal (Individualized)  10/19/2020 0032 by Wilda Mayes RN  Outcome: Ongoing, Progressing  10/19/2020 0028 by Wilda Mayes RN  Outcome: Ongoing, Progressing  10/19/2020 0027 by Wilda Mayes RN  Outcome: Ongoing, Progressing  Goal: Absence of Hospital-Acquired Illness or Injury  10/19/2020 0032 by Wilda Mayes RN  Outcome: Ongoing, Progressing  10/19/2020 0028 by Wilda Mayes RN  Outcome: Ongoing, Progressing  10/19/2020 0027 by Wilda Mayes RN  Outcome: Ongoing, Progressing  Intervention: Identify and Manage Fall Risk  Recent Flowsheet Documentation  Taken 10/18/2020 2300 by Wilda Mayes RN  Safety Promotion/Fall Prevention: safety round/check completed  Taken 10/18/2020 2000 by Wilda Mayes RN  Safety Promotion/Fall Prevention: safety round/check completed  Intervention: Prevent Skin Injury  Recent Flowsheet Documentation  Taken 10/18/2020 2000 by Wilda Mayes RN  Body Position: position maintained  Intervention: Prevent Infection  Recent Flowsheet Documentation  Taken 10/18/2020 2300 by Wilda Mayes RN  Infection Prevention: personal protective equipment utilized  Taken 10/18/2020 2000 by Wilda Mayes RN  Infection Prevention: personal protective equipment utilized  Goal: Optimal Comfort and Wellbeing  10/19/2020 0032 by Wilda Mayes RN  Outcome: Ongoing, Progressing  10/19/2020 0028 by Sugey  ELIEZER Astudillo  Outcome: Ongoing, Progressing  10/19/2020 0027 by Wilda Mayes RN  Outcome: Ongoing, Progressing  Goal: Readiness for Transition of Care  10/19/2020 0032 by Wilda Mayes RN  Outcome: Ongoing, Progressing  10/19/2020 0028 by Wilda Mayes RN  Outcome: Ongoing, Progressing  10/19/2020 0027 by Wilda Mayes RN  Outcome: Ongoing, Progressing   Goal Outcome Evaluation:     Progress: no change  Outcome Summary: gastroenterolgy to see, iv antibiotics for acute kidney injury

## 2020-10-19 NOTE — PLAN OF CARE
Problem: Adult Inpatient Plan of Care  Goal: Plan of Care Review  Outcome: Ongoing, Progressing  Goal: Patient-Specific Goal (Individualized)  Outcome: Ongoing, Progressing  Goal: Absence of Hospital-Acquired Illness or Injury  Outcome: Ongoing, Progressing  Intervention: Identify and Manage Fall Risk  Recent Flowsheet Documentation  Taken 10/18/2020 2300 by Wilda Mayes RN  Safety Promotion/Fall Prevention: safety round/check completed  Taken 10/18/2020 2000 by Wilda Mayes RN  Safety Promotion/Fall Prevention: safety round/check completed  Intervention: Prevent Skin Injury  Recent Flowsheet Documentation  Taken 10/18/2020 2000 by Wilda Mayes RN  Body Position: position maintained  Intervention: Prevent Infection  Recent Flowsheet Documentation  Taken 10/18/2020 2300 by Wilda Mayes RN  Infection Prevention: personal protective equipment utilized  Taken 10/18/2020 2000 by Wilda Mayes RN  Infection Prevention: personal protective equipment utilized  Goal: Optimal Comfort and Wellbeing  Outcome: Ongoing, Progressing  Goal: Readiness for Transition of Care  Outcome: Ongoing, Progressing   Goal Outcome Evaluation:

## 2020-10-19 NOTE — CONSULTS
GI CONSULT  NOTE:    Referring Provider:  Dr. Murcia    Chief complaint: Liver mass     Subjective .     History of present illness: Tristen Garcia is a 85 y.o. male with history of COPD, hyperlipidemia, and hypertension who presents with complaints of weakness.  The patient reportedly fell out of bed and was found down at home by family.  He was noted to have rhabdomyolysis on admission with a CK of 3908 (had been 15,000 at [facility] prior to transfer to [facility]).  We have been asked to consult for liver mass.  The patient is well-known to our practice and is normally followed by Dr. Briceno.  His initial visit with Dr. Briceno was on 9/30/2020.  At that time, CT guided liver biopsy was ordered as he had had outpatient imaging showing a focal lobulated large area of signal laterality in the liver parenchyma in the inferior tip of the right lobe of the liver which was thought to represent a primary liver cancer.  Liver biopsy was performed on 10/14/2020.  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  The patient reports that he has been having some right lower quadrant pain.  Unable to identify any exacerbating or alleviating factors.  He does complain of some nausea, but denies vomiting.  Denies any bright red blood per rectum or melena.  He denies any history of alcohol use, hepatitis, or jaundice.  He is unsure of any previous elevation in LFTs.  Review of records shows that the patient did spike a temperature at [facility] which prompted his transfer to [facility].  However, he has been afebrile here.  WBC count is trending down on antibiotics.    9/25/2020 MRCP -focal lobulated large area of signal laterality in the liver parenchyma in the inferior tip of the right lobe of the liver which could represent a primary liver cancer versus liver parenchymal infection, cholelithiasis without evidence of  choledocholithiasis  9/23/2020 abdominal US -patchy areas of hypoechogenicity in the right lobe of the liver of uncertain etiology which could be caused by liver cancer, metastatic liver disease, or cirrhosis      Endo History:  No record of previous endoscopy.    Past Medical History:  Past Medical History:   Diagnosis Date   • Arthritis    • Cancer (CMS/HCC)    • COPD (chronic obstructive pulmonary disease) (CMS/HCC)    • Elevated cholesterol    • GERD (gastroesophageal reflux disease)    • Hyperlipidemia    • Hypertension        Past Surgical History:  Past Surgical History:   Procedure Laterality Date   • COLONOSCOPY     • EYE SURGERY     • SKIN BIOPSY         Social History:  Social History     Tobacco Use   • Smoking status: Former Smoker   • Smokeless tobacco: Never Used   Substance Use Topics   • Alcohol use: Not Currently   • Drug use: Never       Family History:  No family history on file.    Medications:  Medications Prior to Admission   Medication Sig Dispense Refill Last Dose   • allopurinol (ZYLOPRIM) 300 MG tablet Take 300 mg by mouth Daily.      • amLODIPine (NORVASC) 10 MG tablet Take 10 mg by mouth Daily.      • atenolol (TENORMIN) 25 MG tablet Take 25 mg by mouth Daily.      • furosemide (LASIX) 40 MG tablet Take 40 mg by mouth Daily.      • losartan (COZAAR) 50 MG tablet Take 100 mg by mouth Daily.      • omeprazole (priLOSEC) 20 MG capsule Take 20 mg by mouth Daily.          Scheduled Meds:allopurinol, 100 mg, Oral, Daily  pantoprazole, 40 mg, Intravenous, Q AM  piperacillin-tazobactam, 3.375 g, Intravenous, Q8H  sodium chloride, 10 mL, Intravenous, Q12H      Continuous Infusions:Pharmacy to Dose Zosyn,   sodium chloride, 100 mL/hr, Last Rate: 100 mL/hr (10/18/20 2127)      PRN Meds:.•  acetaminophen **OR** acetaminophen **OR** acetaminophen  •  ipratropium-albuterol  •  melatonin  •  nitroglycerin  •  ondansetron **OR** ondansetron  •  Pharmacy to Dose Zosyn  •  sodium  "chloride    ALLERGIES:  Patient has no known allergies.    ROS:  The following systems were reviewed and negative;   Constitution:  Fever, no chills, no unintentional weight loss  Skin: no rash, no jaundice  Eyes:  No blurry vision, no eye pain  HENT:  No change in hearing or smell  Resp:  No dyspnea or cough  CV:  No chest pain or palpitations  :  No dysuria, hematuria  Musculoskeletal:  No leg cramps or arthralgias  Neuro:  No tremor, no numbness  Psych:  No depression or confusion    Objective     Vital Signs:   Vitals:    10/18/20 1650 10/18/20 2000 10/19/20 0030 10/19/20 0405   BP: 117/67 92/50 117/67 117/66   BP Location: Left arm Left arm  Left arm   Patient Position: Lying  Lying Lying   Pulse: 71 74 94 81   Resp: 18 18 18 14   Temp: 98.5 °F (36.9 °C) 99 °F (37.2 °C) 98.5 °F (36.9 °C) 98.4 °F (36.9 °C)   TempSrc: Oral Oral  Oral   SpO2: 94%  97% 92%   Weight: 98.9 kg (218 lb)  98.8 kg (217 lb 14.4 oz) 100 kg (220 lb 8 oz)   Height:   175.3 cm (69.02\")        Physical Exam:       General Appearance:    Awake and alert, in no acute distress   Head:    Normocephalic, without obvious abnormality, atraumatic   Throat:   No oral lesions, no thrush, oral mucosa moist   Lungs:     Respirations regular, even and unlabored   Chest Wall:    No abnormalities observed   Abdomen:     Soft, mild RLQ tenderness, no rebound or guarding, non-distended   Rectal:     Deferred   Extremities:   Moves all extremities, no edema, no cyanosis   Pulses:   Pulses palpable and equal bilaterally   Skin:   No rash, no jaundice, normal palpation   Lymph nodes:   No cervical, supraclavicular or submandibular palpable adenopathy   Neurologic:   Cranial nerves 2 - 12 grossly intact, no asterixis       Results Review:   I reviewed the patient's labs and imaging.  CBC    Results from last 7 days   Lab Units 10/19/20  0321 10/18/20  2026 10/14/20  0814   WBC 10*3/mm3 14.10* 12.00* 13.10*   HEMOGLOBIN g/dL 8.9* 8.0* 10.7*   PLATELETS 10*3/mm3 " 430 358 403     CMP   Results from last 7 days   Lab Units 10/19/20  0321 10/18/20  2026   SODIUM mmol/L 140 137   POTASSIUM mmol/L 4.2 3.7   CHLORIDE mmol/L 101 100   CO2 mmol/L 28.0 26.0   BUN mg/dL 28* 29*   CREATININE mg/dL 1.80* 1.92*   GLUCOSE mg/dL 107* 152*   ALBUMIN g/dL  --  2.20*   BILIRUBIN mg/dL  --  0.2   ALK PHOS U/L  --  138*   AST (SGOT) U/L  --  146*   ALT (SGPT) U/L  --  52*     Cr Clearance Estimated Creatinine Clearance: 35 mL/min (A) (by C-G formula based on SCr of 1.8 mg/dL (H)).  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 after being found down at home.  Has been diagnosed with rhabdomyolysis.  CK trending down.  Continue to monitor.  The patient has a known history of a liver mass.  Underwent CT-guided liver biopsy on 10/14/2020.  Pathology from liver biopsy shows well to moderately differentiated adenocarcinoma consistent with cholangiocarcinoma.  We will consult oncology.  Anemia noted.  The patient is currently scheduled for EGD/colonoscopy as an outpatient on 11/18/2020.  No current evidence of overt GI bleeding.  Continue to monitor H/H and transfuse as needed.  Continue antibiotics.  Antiemetics/analgesics as needed.  Supportive care.    I discussed the patients findings and my recommendations with the patient.  I will discuss the case with Dr. Marshall and change the plan accordingly.    We  appreciate the referral.    CAREY Myles  10/19/20  10:06 EDT

## 2020-10-19 NOTE — PLAN OF CARE
Problem: Adult Inpatient Plan of Care  Goal: Plan of Care Review  10/19/2020 0028 by Wilda Mayes RN  Outcome: Ongoing, Progressing  Flowsheets (Taken 10/19/2020 0028)  Outcome Summary: gastroenterolgy to see, iv antibiotics for acute kidney injury  10/19/2020 0027 by Wilda Mayes RN  Outcome: Ongoing, Progressing  Goal: Patient-Specific Goal (Individualized)  10/19/2020 0028 by Wilda Mayes RN  Outcome: Ongoing, Progressing  10/19/2020 0027 by Wilda Mayes RN  Outcome: Ongoing, Progressing  Goal: Absence of Hospital-Acquired Illness or Injury  10/19/2020 0028 by Wilda Mayes RN  Outcome: Ongoing, Progressing  10/19/2020 0027 by Wilda Mayes RN  Outcome: Ongoing, Progressing  Intervention: Identify and Manage Fall Risk  Recent Flowsheet Documentation  Taken 10/18/2020 2300 by Wilda Mayes RN  Safety Promotion/Fall Prevention: safety round/check completed  Taken 10/18/2020 2000 by Wilda Mayes RN  Safety Promotion/Fall Prevention: safety round/check completed  Intervention: Prevent Skin Injury  Recent Flowsheet Documentation  Taken 10/18/2020 2000 by Wilda Mayes RN  Body Position: position maintained  Intervention: Prevent Infection  Recent Flowsheet Documentation  Taken 10/18/2020 2300 by Wilda Mayes RN  Infection Prevention: personal protective equipment utilized  Taken 10/18/2020 2000 by Wilda Mayes RN  Infection Prevention: personal protective equipment utilized  Goal: Optimal Comfort and Wellbeing  10/19/2020 0028 by Wilda Mayes RN  Outcome: Ongoing, Progressing  10/19/2020 0027 by Wilda Mayes RN  Outcome: Ongoing, Progressing  Goal: Readiness for Transition of Care  10/19/2020 0028 by Wilda Mayes RN  Outcome: Ongoing, Progressing  10/19/2020 0027 by Wilda Mayes RN  Outcome: Ongoing, Progressing   Goal Outcome Evaluation:        Outcome Summary: gastroenterolgy to see, iv antibiotics for acute kidney  injury

## 2020-10-19 NOTE — CONSULTS
Hematology/Oncology Inpatient Consultation    Patient name: Tristen Garcia  : 1935  MRN: [id number]  Referring Provider: Abena Turner APRN  Reason for Consultation: new diagnosis cholangiocarcinoma from liver mass bx on 10/14/20    Chief complaint: Generalized weakness    History of present illness:    Tristen Garcia is a 85 y.o. male who presented to [facility] on 10/18/2020 is a transfer from [facility].  Patient went to [facility] ED on 10/17/2020 after his granddaughter found him lying on the floor between 3 AM and 9:30 AM.  Patient was noted to have leukocytosis with WBC of 21,000, anemia with hemoglobin of 10.2, and elevated creatinine of 2.8.  Patient was diagnosed with rhabdomyolysis, acute on chronic kidney disease, and sepsis at [facility].  He was started on antibiotics and given IV fluids.  Blood cultures were drawn.  There was concern for liver abscess and patient's family requested patient be transferred to [facility] for further evaluation.    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   · 2020 patient had a CT of the abdomen and pelvis completed for right upper quadrant abdominal pain, elevated WBC, and elevated alk phos.  CT of the abdomen and pelvis showed small amount of increased density with fatty soft tissues overlying the inferior tip of the right lobe of the liver of uncertain etiology might be caused by inflammation or infection or scarring.  Clinical correlation would be helpful.  This does not appear to be associated with the gallbladder or in the pancreas or the right kidney and has uncertain etiology.  This finding could be connected to the duodenum might be caused by peptic ulcer disease.  Clinical.  Correlation with pancreas enzymes to be helpful as well.  Small tiny amount of fluid in the pelvis might be caused by the  diverticulosis in the sigmoid colon.  This may be because of mild diverticulitis.  · 9/23/2020 ultrasound of the abdomen showed patchy areas of hypoechogenicity in the right lobe of the liver of uncertain etiology might be caused by liver cancer metastatic liver disease or cirrhosis of the liver possible fatty infiltrated areas liver parenchyma admixed with more normal liver parenchyma.  A CT liver with contrast MRI of the liver without and with contrast to further evaluate the abnormality was recommended.   · 10/5/2020: MRI of the abdomen was completed that showed inhomogenous ill-defined patchy areas of signal around the in the liver parenchyma in the inferior aspect of the right lobe of the liver and involved the inferior tip the right lobe of the liver of uncertain etiology.  This disease process appears to break through the liver The posterior medial aspect of the inferior tip of the right lobe of the liver.  The disease process in the liver parenchyma appears to extend into the fatty soft tissue adjacent to the inferior medial aspect of the inferior tip of the right lobe of the liver.  There Is a small amount of fluid surrounding the inferior tip of the right lobe of the liver.  · 10/14/2020 CT-guided core biopsy of the liver showed well to moderately differentiated adenocarcinoma.  The tumor is strongly positive for only CK7.  CK20, CDX2, TTF-1, Napsin A, chromogranin, synaptophysin and CD56 are negative.  Is a nonspecific staining pattern, but can be seen in tumors of pancreaticobiliary origin, including Reema angiosarcoma which is favored in this case.    He  has a past medical history of Arthritis, Cancer (CMS/HCC), COPD (chronic obstructive pulmonary disease) (CMS/HCC), Elevated cholesterol, GERD (gastroesophageal reflux disease), Hyperlipidemia, and Hypertension.    PCP: Ann Marie Hodgson MD    History:  Past Medical History:   Diagnosis Date   • Arthritis    • Cancer (CMS/HCC)    • COPD (chronic  obstructive pulmonary disease) (CMS/HCC)    • Elevated cholesterol    • GERD (gastroesophageal reflux disease)    • Hyperlipidemia    • Hypertension    ,   Past Surgical History:   Procedure Laterality Date   • COLONOSCOPY     • EYE SURGERY     • SKIN BIOPSY     , No family history on file.,   Social History     Tobacco Use   • Smoking status: Former Smoker   • Smokeless tobacco: Never Used   Substance Use Topics   • Alcohol use: Not Currently   • Drug use: Never   ,   Medications Prior to Admission   Medication Sig Dispense Refill Last Dose   • allopurinol (ZYLOPRIM) 300 MG tablet Take 300 mg by mouth Daily.      • amLODIPine (NORVASC) 10 MG tablet Take 10 mg by mouth Daily.      • atenolol (TENORMIN) 25 MG tablet Take 25 mg by mouth Daily.      • furosemide (LASIX) 40 MG tablet Take 40 mg by mouth Daily.      • losartan (COZAAR) 50 MG tablet Take 100 mg by mouth Daily.      • omeprazole (priLOSEC) 20 MG capsule Take 20 mg by mouth Daily.      , Scheduled Meds:  allopurinol, 100 mg, Oral, Daily  pantoprazole, 40 mg, Intravenous, Q AM  piperacillin-tazobactam, 3.375 g, Intravenous, Q8H  sodium chloride, 10 mL, Intravenous, Q12H    , Continuous Infusions:  Pharmacy to Dose Zosyn,   sodium chloride, 100 mL/hr, Last Rate: 100 mL/hr (10/18/20 2127)    , PRN Meds:  •  acetaminophen **OR** acetaminophen **OR** acetaminophen  •  ipratropium-albuterol  •  melatonin  •  nitroglycerin  •  ondansetron **OR** ondansetron  •  Pharmacy to Dose Zosyn  •  sodium chloride   Allergies:  Patient has no known allergies.    Subjective     ROS:  Review of Systems   Constitutional: Positive for chills, fatigue and fever.   HENT: Negative for mouth sores and nosebleeds.    Eyes: Negative for photophobia and visual disturbance.   Respiratory: Negative for cough and shortness of breath.    Gastrointestinal: Positive for abdominal pain. Negative for diarrhea, nausea and vomiting.   Endocrine: Negative for cold intolerance and heat  "intolerance.   Musculoskeletal: Negative for arthralgias and myalgias.   Neurological: Positive for weakness. Negative for dizziness and headaches.   Psychiatric/Behavioral: Negative for confusion. The patient is not nervous/anxious.         Objective   Vital Signs:   /66 (BP Location: Left arm, Patient Position: Lying)   Pulse 81   Temp 98.4 °F (36.9 °C) (Oral)   Resp 14   Ht 175.3 cm (69.02\")   Wt 100 kg (220 lb 8 oz)   SpO2 92%   BMI 32.55 kg/m²     Physical Exam: (performed by MD)  Physical Exam  Vitals signs and nursing note reviewed.   Constitutional:       General: He is not in acute distress.     Appearance: Normal appearance. He is obese. He is not diaphoretic.   HENT:      Head: Normocephalic and atraumatic.   Eyes:      General: No scleral icterus.        Right eye: No discharge.         Left eye: No discharge.      Conjunctiva/sclera: Conjunctivae normal.   Neck:      Musculoskeletal: Normal range of motion and neck supple.      Thyroid: No thyromegaly.   Cardiovascular:      Rate and Rhythm: Normal rate and regular rhythm.      Heart sounds: Normal heart sounds. No friction rub. No gallop.    Pulmonary:      Effort: Pulmonary effort is normal. No respiratory distress.      Breath sounds: No stridor. No wheezing.   Abdominal:      General: Bowel sounds are normal.      Palpations: Abdomen is soft. There is no mass.      Tenderness: There is no abdominal tenderness. There is no guarding or rebound.      Comments: Tenderness right upper quadrant   Musculoskeletal: Normal range of motion.         General: No tenderness or deformity.   Lymphadenopathy:      Cervical: No cervical adenopathy.   Skin:     General: Skin is warm.      Findings: No erythema or rash.   Neurological:      General: No focal deficit present.      Mental Status: He is alert and oriented to person, place, and time.      Motor: No abnormal muscle tone.   Psychiatric:         Mood and Affect: Mood normal.         Behavior: " Behavior normal.         Results Review:  Lab Results (last 48 hours)     Procedure Component Value Units Date/Time    CK [[id number]]  (Abnormal) Collected: 10/19/20 0321    Specimen: Blood Updated: 10/19/20 0500     Creatine Kinase 3,847 U/L     Basic Metabolic Panel [[id number]]  (Abnormal) Collected: 10/19/20 0321    Specimen: Blood Updated: 10/19/20 0447     Glucose 107 mg/dL      BUN 28 mg/dL      Creatinine 1.80 mg/dL      Sodium 140 mmol/L      Potassium 4.2 mmol/L      Chloride 101 mmol/L      CO2 28.0 mmol/L      Calcium 8.8 mg/dL      eGFR Non African Amer 36 mL/min/1.73      BUN/Creatinine Ratio 15.6     Anion Gap 11.0 mmol/L     Narrative:      GFR Normal >60  Chronic Kidney Disease <60  Kidney Failure <15      CBC Auto Differential [[id number]]  (Abnormal) Collected: 10/19/20 0321    Specimen: Blood Updated: 10/19/20 0424     WBC 14.10 10*3/mm3      RBC 3.56 10*6/mm3      Hemoglobin 8.9 g/dL      Hematocrit 29.0 %      MCV 81.6 fL      MCH 24.9 pg      MCHC 30.5 g/dL      RDW 18.6 %      RDW-SD 52.9 fl      MPV 8.3 fL      Platelets 430 10*3/mm3      Neutrophil % 68.7 %      Lymphocyte % 16.7 %      Monocyte % 13.3 %      Eosinophil % 0.9 %      Basophil % 0.4 %      Neutrophils, Absolute 9.60 10*3/mm3      Lymphocytes, Absolute 2.30 10*3/mm3      Monocytes, Absolute 1.90 10*3/mm3      Eosinophils, Absolute 0.10 10*3/mm3      Basophils, Absolute 0.10 10*3/mm3      nRBC 0.0 /100 WBC     CK [[id number]]  (Abnormal) Collected: 10/18/20 2026    Specimen: Blood Updated: 10/18/20 2117     Creatine Kinase 3,908 U/L     Comprehensive Metabolic Panel [[id number]]  (Abnormal) Collected: 10/18/20 2026    Specimen: Blood Updated: 10/18/20 2104     Glucose 152 mg/dL      BUN 29 mg/dL      Creatinine 1.92 mg/dL      Sodium 137 mmol/L      Potassium 3.7 mmol/L      Chloride 100 mmol/L      CO2 26.0 mmol/L      Calcium 7.7 mg/dL      Total Protein 5.2 g/dL      Albumin 2.20 g/dL      ALT (SGPT) 52 U/L      AST (SGOT) 146  U/L      Alkaline Phosphatase 138 U/L      Total Bilirubin 0.2 mg/dL      eGFR Non African Amer 33 mL/min/1.73      Globulin 3.0 gm/dL      A/G Ratio 0.7 g/dL      BUN/Creatinine Ratio 15.1     Anion Gap 11.0 mmol/L     Narrative:      GFR Normal >60  Chronic Kidney Disease <60  Kidney Failure <15      CBC Auto Differential [[id number]]  (Abnormal) Collected: 10/18/20 2026    Specimen: Blood Updated: 10/18/20 2045     WBC 12.00 10*3/mm3      RBC 3.17 10*6/mm3      Hemoglobin 8.0 g/dL      Hematocrit 25.9 %      MCV 81.7 fL      MCH 25.3 pg      MCHC 31.0 g/dL      RDW 18.4 %      RDW-SD 52.9 fl      MPV 7.5 fL      Platelets 358 10*3/mm3      Neutrophil % 74.3 %      Lymphocyte % 12.1 %      Monocyte % 12.8 %      Eosinophil % 0.6 %      Basophil % 0.2 %      Neutrophils, Absolute 9.00 10*3/mm3      Lymphocytes, Absolute 1.50 10*3/mm3      Monocytes, Absolute 1.50 10*3/mm3      Eosinophils, Absolute 0.10 10*3/mm3      Basophils, Absolute 0.00 10*3/mm3      nRBC 0.1 /100 WBC     Blood Culture - Blood, Arm, Left [[id number]] Collected: 10/18/20 2025    Specimen: Blood from Arm, Left Updated: 10/18/20 2040           Pending Results: Ferritin, iron profile, haptoglobin, reticulocytes, LDH, vitamin B12, protein electrophoresis, blood cultures (final)    Imaging Reviewed:   Ct Needle Biopsy Liver    Result Date: 10/14/2020  Technically successful CT-guided percutaneous liver mass core biopsy procedure as described above.  Electronically Signed By-Kalia Herrera On:10/14/2020 12:18 PM This report was finalized on [id number] by  Kalia Herrera, .           Assessment/Plan   ASSESSMENT  1. Liver mass resulting well to moderately differentiated adenocarcinoma: S/p liver biopsy 10/14/2020. Cholangiocarcinoma favored.  GI consulted.  2. Leukocytosis: Mild.  Blood cultures drawn.  On antibiotics.  3. Anemia: Mild.  Will obtain further anemia studies.  4. Rhabdomyolysis: CK on day of transfer was 6000.  CK today 3847.  Managed per  primary team.  5. Acute kidney injury/CKD stage III: Consider relation to rhabdomyolysis.  6. GERD/COPD: Managed per primary team    PLAN  1. CBC daily  2. Anemia studies ordered  3. Consider CT of the chest to rule out malignant disease  4. Appreciate GI input  5. Discuss further management Dr. Garcia    Electronically signed by CAREY Cho, 10/19/20, 4:47 PM EDT.    Thank you for this consult. We will be happy to follow along with you.       I personally reviewed all pertinent labs, related documentation and the  imaging. ROS performed and physical exam done during face to face enounter with patient. I agree with  nurse practitioner's doumentation, assessment and plan.    Patient presents to the hospital with severe weakness..  He has lost about 30 to 40 pounds over the last couple of months.  He was recently diagnosed with a liver mass.  As an outpatient he underwent CT-guided liver biopsy which is consistent with well differentiated to moderately differentiated cholangiocarcinoma.  Biopsy done 10/14/2020.  He does report right upper quadrant pain..  Lab work is consistent with elevated liver enzymes, elevated alkaline phosphatase.  LDH on 10/19/2020 is elevated.    Review of recent imaging:  Ultrasound on 9/23/2020 showed a large area of abnormal patchy hypoechogenicity in the right lobe of the liver suspicious for a mass or lesion..  MRI/MRCP on 9/25/2020 showed an inhomogeneous ill-defined patchy area of signal in the liver parenchyma in the inferior aspect of the right lobe of the liver and involves the inferior tip of the right lobe.  This process appears to break through the liver capsule in the posterior medial aspect of the inferior tip of the right lobe of the liver.  It appears to extend into the fatty soft tissues adjacent to the inferior tip.      We will order CA 19-9, CEA, alpha-fetoprotein levels.  I do not think patient's tumor is operable and he is also not a surgical candidate.  I will  however ask general surgeon for opinion.    If patient wants to be treated, we can order molecular studies to diagnose somatic mutations including FGF R2 , PD-L1, MSI/MMR status, IDH 1, NTRK etc.  Due to the fact that we have several targeted agents available for treatment for the scans obtained    Electronically signed by Aamir Garcia MD, 10/19/20, 8:15 PM EDT.

## 2020-10-20 ENCOUNTER — APPOINTMENT (OUTPATIENT)
Dept: GENERAL RADIOLOGY | Facility: HOSPITAL | Age: 85
End: 2020-10-20

## 2020-10-20 ENCOUNTER — TELEPHONE (OUTPATIENT)
Dept: ONCOLOGY | Facility: CLINIC | Age: 85
End: 2020-10-20

## 2020-10-20 LAB
ALBUMIN SERPL ELPH-MCNC: 2.2 G/DL (ref 2.9–4.4)
ALBUMIN SERPL-MCNC: 2.4 G/DL (ref 3.5–5.2)
ALBUMIN/GLOB SERPL: 0.6 {RATIO} (ref 0.7–1.7)
ALBUMIN/GLOB SERPL: 0.9 G/DL
ALP SERPL-CCNC: 168 U/L (ref 39–117)
ALPHA-FETOPROTEIN: 32.6 NG/ML (ref 0–8.3)
ALPHA1 GLOB SERPL ELPH-MCNC: 0.6 G/DL (ref 0–0.4)
ALPHA2 GLOB SERPL ELPH-MCNC: 1.4 G/DL (ref 0.4–1)
ALT SERPL W P-5'-P-CCNC: 54 U/L (ref 1–41)
ANION GAP SERPL CALCULATED.3IONS-SCNC: 9 MMOL/L (ref 5–15)
AST SERPL-CCNC: 104 U/L (ref 1–40)
B-GLOBULIN SERPL ELPH-MCNC: 0.7 G/DL (ref 0.7–1.3)
BASOPHILS # BLD AUTO: 0.1 10*3/MM3 (ref 0–0.2)
BASOPHILS NFR BLD AUTO: 1.3 % (ref 0–1.5)
BILIRUB SERPL-MCNC: 0.3 MG/DL (ref 0–1.2)
BUN SERPL-MCNC: 25 MG/DL (ref 8–23)
BUN/CREAT SERPL: 15.3 (ref 7–25)
CALCIUM SPEC-SCNC: 8 MG/DL (ref 8.6–10.5)
CANCER AG19-9 SERPL-ACNC: 3215 U/ML
CEA SERPL-MCNC: 8.27 NG/ML
CHLORIDE SERPL-SCNC: 104 MMOL/L (ref 98–107)
CK SERPL-CCNC: 1728 U/L (ref 20–200)
CO2 SERPL-SCNC: 27 MMOL/L (ref 22–29)
CREAT SERPL-MCNC: 1.63 MG/DL (ref 0.76–1.27)
DEPRECATED RDW RBC AUTO: 54.3 FL (ref 37–54)
EOSINOPHIL # BLD AUTO: 0.2 10*3/MM3 (ref 0–0.4)
EOSINOPHIL NFR BLD AUTO: 1.8 % (ref 0.3–6.2)
ERYTHROCYTE [DISTWIDTH] IN BLOOD BY AUTOMATED COUNT: 18.7 % (ref 12.3–15.4)
FOLATE SERPL-MCNC: 7.29 NG/ML (ref 4.78–24.2)
GAMMA GLOB SERPL ELPH-MCNC: 0.7 G/DL (ref 0.4–1.8)
GFR SERPL CREATININE-BSD FRML MDRD: 40 ML/MIN/1.73
GLOBULIN SER CALC-MCNC: 3.4 G/DL (ref 2.2–3.9)
GLOBULIN UR ELPH-MCNC: 2.8 GM/DL
GLUCOSE SERPL-MCNC: 94 MG/DL (ref 65–99)
HAPTOGLOB SERPL-MCNC: 563 MG/DL (ref 30–200)
HCT VFR BLD AUTO: 26 % (ref 37.5–51)
HEMOCCULT STL QL IA: POSITIVE
HGB BLD-MCNC: 8.1 G/DL (ref 13–17.7)
LABORATORY COMMENT REPORT: ABNORMAL
LYMPHOCYTES # BLD AUTO: 1.6 10*3/MM3 (ref 0.7–3.1)
LYMPHOCYTES NFR BLD AUTO: 16.2 % (ref 19.6–45.3)
M PROTEIN SERPL ELPH-MCNC: ABNORMAL G/DL
MAGNESIUM SERPL-MCNC: 1.8 MG/DL (ref 1.6–2.4)
MCH RBC QN AUTO: 25.4 PG (ref 26.6–33)
MCHC RBC AUTO-ENTMCNC: 31.1 G/DL (ref 31.5–35.7)
MCV RBC AUTO: 81.6 FL (ref 79–97)
MONOCYTES # BLD AUTO: 1.6 10*3/MM3 (ref 0.1–0.9)
MONOCYTES NFR BLD AUTO: 16.3 % (ref 5–12)
NEUTROPHILS NFR BLD AUTO: 6.4 10*3/MM3 (ref 1.7–7)
NEUTROPHILS NFR BLD AUTO: 64.4 % (ref 42.7–76)
NRBC BLD AUTO-RTO: 0.1 /100 WBC (ref 0–0.2)
PHOSPHATE SERPL-MCNC: 2.7 MG/DL (ref 2.5–4.5)
PLATELET # BLD AUTO: 416 10*3/MM3 (ref 140–450)
PMV BLD AUTO: 8 FL (ref 6–12)
POTASSIUM SERPL-SCNC: 4 MMOL/L (ref 3.5–5.2)
PROT SERPL-MCNC: 5.2 G/DL (ref 6–8.5)
PROT SERPL-MCNC: 5.6 G/DL (ref 6–8.5)
RBC # BLD AUTO: 3.18 10*6/MM3 (ref 4.14–5.8)
SODIUM SERPL-SCNC: 140 MMOL/L (ref 136–145)
VIT B12 BLD-MCNC: 905 PG/ML (ref 211–946)
WBC # BLD AUTO: 10 10*3/MM3 (ref 3.4–10.8)

## 2020-10-20 PROCEDURE — 84100 ASSAY OF PHOSPHORUS: CPT | Performed by: INTERNAL MEDICINE

## 2020-10-20 PROCEDURE — 99233 SBSQ HOSP IP/OBS HIGH 50: CPT | Performed by: INTERNAL MEDICINE

## 2020-10-20 PROCEDURE — 85025 COMPLETE CBC W/AUTO DIFF WBC: CPT | Performed by: INTERNAL MEDICINE

## 2020-10-20 PROCEDURE — 25010000002 PIPERACILLIN SOD-TAZOBACTAM PER 1 G: Performed by: INTERNAL MEDICINE

## 2020-10-20 PROCEDURE — 82274 ASSAY TEST FOR BLOOD FECAL: CPT | Performed by: INTERNAL MEDICINE

## 2020-10-20 PROCEDURE — 99232 SBSQ HOSP IP/OBS MODERATE 35: CPT | Performed by: INTERNAL MEDICINE

## 2020-10-20 PROCEDURE — 99222 1ST HOSP IP/OBS MODERATE 55: CPT | Performed by: SURGERY

## 2020-10-20 PROCEDURE — 80053 COMPREHEN METABOLIC PANEL: CPT | Performed by: INTERNAL MEDICINE

## 2020-10-20 PROCEDURE — 83735 ASSAY OF MAGNESIUM: CPT | Performed by: INTERNAL MEDICINE

## 2020-10-20 PROCEDURE — U0004 COV-19 TEST NON-CDC HGH THRU: HCPCS | Performed by: INTERNAL MEDICINE

## 2020-10-20 PROCEDURE — 82550 ASSAY OF CK (CPK): CPT | Performed by: INTERNAL MEDICINE

## 2020-10-20 RX ADMIN — SODIUM BICARBONATE 650 MG TABLET 650 MG: at 08:17

## 2020-10-20 RX ADMIN — Medication 10 ML: at 20:37

## 2020-10-20 RX ADMIN — ALLOPURINOL 100 MG: 100 TABLET ORAL at 08:17

## 2020-10-20 RX ADMIN — PIPERACILLIN AND TAZOBACTAM 3.38 G: 3; .375 INJECTION, POWDER, LYOPHILIZED, FOR SOLUTION INTRAVENOUS at 12:21

## 2020-10-20 RX ADMIN — PIPERACILLIN AND TAZOBACTAM 3.38 G: 3; .375 INJECTION, POWDER, LYOPHILIZED, FOR SOLUTION INTRAVENOUS at 03:25

## 2020-10-20 RX ADMIN — PANTOPRAZOLE SODIUM 40 MG: 40 INJECTION, POWDER, FOR SOLUTION INTRAVENOUS at 06:05

## 2020-10-20 RX ADMIN — SODIUM BICARBONATE 650 MG TABLET 650 MG: at 17:20

## 2020-10-20 RX ADMIN — SODIUM BICARBONATE 650 MG TABLET 650 MG: at 20:35

## 2020-10-20 RX ADMIN — Medication 5 MG: at 20:35

## 2020-10-20 RX ADMIN — PIPERACILLIN AND TAZOBACTAM 3.38 G: 3; .375 INJECTION, POWDER, LYOPHILIZED, FOR SOLUTION INTRAVENOUS at 20:37

## 2020-10-20 RX ADMIN — ACETAMINOPHEN 650 MG: 325 TABLET, FILM COATED ORAL at 20:38

## 2020-10-20 RX ADMIN — SODIUM CHLORIDE 200 ML/HR: 9 INJECTION, SOLUTION INTRAVENOUS at 12:21

## 2020-10-20 RX ADMIN — Medication 10 ML: at 09:00

## 2020-10-20 NOTE — PROGRESS NOTES
Hematology/Oncology Inpatient Progress Note    PATIENT NAME: Tristen Garcia  : 1935  MRN: [id number]    CHIEF COMPLAINT: Generalized weakness    HISTORY OF PRESENT ILLNESS:    Tristen Garcia is a 85 y.o. male who presented to [facility] on 10[facility]18[facility]2020 as a transfer from [facility].  Patient went to [facility] ED on 10/17/2020 after his granddaughter found him lying on the floor between 3 AM and 9:30 AM. He has lost about 30 to 40 pounds over the last couple of months. Patient was noted to have leukocytosis with WBC of 21,000, anemia with hemoglobin of 10.2, and elevated creatinine of 2.8.  Patient was diagnosed with rhabdomyolysis, acute on chronic kidney disease, and sepsis at [facility].  He was started on antibiotics and given IV fluids.  Blood cultures were drawn.  There was concern for liver abscess and patient's family requested patient be transferred to [facility] for further evaluation.     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   · 2020 patient had a CT of the abdomen and pelvis completed for right upper quadrant abdominal pain, elevated WBC, and elevated alk phos.  CT of the abdomen and pelvis showed small amount of increased density with fatty soft tissues overlying the inferior tip of the right lobe of the liver of uncertain etiology might be caused by inflammation or infection or scarring.  Clinical correlation would be helpful.  This does not appear to be associated with the gallbladder or in the pancreas or the right kidney and has uncertain etiology.  This finding could be connected to the duodenum might be caused by peptic ulcer disease.  Clinical.  Correlation with pancreas enzymes to be helpful as well.  Small tiny amount of fluid in the pelvis might be caused by the diverticulosis in the sigmoid colon.  This may be because of mild  diverticulitis.  · 9/23/2020 ultrasound of the abdomen showed patchy areas of hypoechogenicity in the right lobe of the liver of uncertain etiology might be caused by liver cancer metastatic liver disease or cirrhosis of the liver possible fatty infiltrated areas liver parenchyma admixed with more normal liver parenchyma.  A CT liver with contrast MRI of the liver without and with contrast to further evaluate the abnormality was recommended.   · 10/5/2020: MRI of the abdomen was completed that showed inhomogenous ill-defined patchy areas of signal around the in the liver parenchyma in the inferior aspect of the right lobe of the liver and involved the inferior tip the right lobe of the liver of uncertain etiology.  This disease process appears to break through the liver The posterior medial aspect of the inferior tip of the right lobe of the liver.  The disease process in the liver parenchyma appears to extend into the fatty soft tissue adjacent to the inferior medial aspect of the inferior tip of the right lobe of the liver.  There Is a small amount of fluid surrounding the inferior tip of the right lobe of the liver.  · 10/14/2020 CT-guided core biopsy of the liver showed well to moderately differentiated adenocarcinoma.  The tumor is strongly positive for only CK7.  CK20, CDX2, TTF-1, Napsin A, chromogranin, synaptophysin and CD56 are negative.  Is a nonspecific staining pattern, but can be seen in tumors of pancreaticobiliary origin, including Reema angiosarcoma which is favored in this case.  · Anemia studies: , haptoglobin 563, triglycerides 1.10%, ferritin 909.3, iron 16, iron saturation 10%, transferrin 107, TIBC 159, folate 7.29.     He  has a past medical history of Arthritis, Cancer (CMS/HCC), COPD (chronic obstructive pulmonary disease) (CMS/HCC), Elevated cholesterol, GERD (gastroesophageal reflux disease), Hyperlipidemia, and Hypertension.     PCP: Ann Marie Hodgson MD    Subjective   Patient  "seen this morning.  He states he is feeling better.  Pain is better.  He is going to rehab.  ROS:  Review of Systems   Constitutional: Positive for fatigue and fever. Negative for chills.   HENT: Negative for mouth sores and nosebleeds.    Eyes: Negative for photophobia and visual disturbance.   Respiratory: Negative for shortness of breath.    Cardiovascular: Negative for leg swelling.   Gastrointestinal: Positive for abdominal pain. Negative for diarrhea, nausea and vomiting.   Endocrine: Negative for cold intolerance and heat intolerance.   Genitourinary: Negative for difficulty urinating.   Musculoskeletal: Negative for arthralgias, back pain and myalgias.   Skin: Negative for rash and wound.   Neurological: Positive for weakness. Negative for dizziness and headaches.   Psychiatric/Behavioral: Negative for confusion. The patient is not nervous/anxious.       MEDICATIONS:    Scheduled Meds:  allopurinol, 100 mg, Oral, Daily  pantoprazole, 40 mg, Intravenous, Q AM  piperacillin-tazobactam, 3.375 g, Intravenous, Q8H  sodium bicarbonate, 650 mg, Oral, TID  sodium chloride, 10 mL, Intravenous, Q12H       Continuous Infusions:  Pharmacy to Dose Zosyn,   sodium chloride, 200 mL/hr, Last Rate: 200 mL/hr (10/20/20 1221)       PRN Meds:  •  acetaminophen **OR** acetaminophen **OR** acetaminophen  •  ipratropium-albuterol  •  melatonin  •  nitroglycerin  •  ondansetron **OR** ondansetron  •  Pharmacy to Dose Zosyn  •  sodium chloride     ALLERGIES:  No Known Allergies    Objective    VITALS:   /75   Pulse 78   Temp 98.5 °F (36.9 °C)   Resp 18   Ht 175.3 cm (69.02\")   Wt 100 kg (220 lb 8 oz)   SpO2 97%   BMI 32.55 kg/m²     PHYSICAL EXAM: (performed by MD)  Physical Exam  Constitutional:       General: He is not in acute distress.     Appearance: Normal appearance. He is obese. He is not ill-appearing, toxic-appearing or diaphoretic.   HENT:      Head: Normocephalic and atraumatic.   Eyes:      General: No " scleral icterus.        Right eye: No discharge.         Left eye: No discharge.      Extraocular Movements: Extraocular movements intact.   Neck:      Musculoskeletal: No neck rigidity.   Cardiovascular:      Pulses: Normal pulses.      Heart sounds: Normal heart sounds.   Pulmonary:      Effort: No respiratory distress.      Breath sounds: Normal breath sounds. No wheezing.   Abdominal:      General: Bowel sounds are normal.      Palpations: Abdomen is soft.      Tenderness: There is abdominal tenderness.      Comments: Tenderness to RUQ   Musculoskeletal:         General: No swelling.      Right lower leg: No edema.      Left lower leg: No edema.   Lymphadenopathy:      Cervical: No cervical adenopathy.   Skin:     General: Skin is warm.   Neurological:      General: No focal deficit present.      Mental Status: He is alert and oriented to person, place, and time.      Motor: No weakness.      Gait: Gait normal.   Psychiatric:         Mood and Affect: Mood normal.         Behavior: Behavior normal.         Thought Content: Thought content normal.         RECENT LABS:  Lab Results (last 24 hours)     Procedure Component Value Units Date/Time    Cancer Antigen 19-9 [[id number]]  (Abnormal) Collected: 10/19/20 2111    Specimen: Blood Updated: 10/20/20 1359     CA 19-9 3,215.0 U/mL     Narrative:      Results may be falsely decreased if patient taking Biotin.     COVID-19,[facility], NP SWAB IN [facility] VIRAL TRANSPORT MEDIA 24-30 HR TAT - Swab, Nasopharynx [[id number]] Collected: 10/20/20 1225    Specimen: Swab from Nasopharynx Updated: 10/20/20 1317    CK [[id number]]  (Abnormal) Collected: 10/20/20 0254    Specimen: Blood Updated: 10/20/20 0353     Creatine Kinase 1,728 U/L     Comprehensive Metabolic Panel [[id number]]  (Abnormal) Collected: 10/20/20 0254    Specimen: Blood Updated: 10/20/20 0353     Glucose 94 mg/dL      BUN 25 mg/dL      Creatinine 1.63 mg/dL      Sodium 140 mmol/L      Potassium 4.0 mmol/L       Chloride 104 mmol/L      CO2 27.0 mmol/L      Calcium 8.0 mg/dL      Total Protein 5.2 g/dL      Albumin 2.40 g/dL      ALT (SGPT) 54 U/L      AST (SGOT) 104 U/L      Alkaline Phosphatase 168 U/L      Total Bilirubin 0.3 mg/dL      eGFR Non African Amer 40 mL/min/1.73      Globulin 2.8 gm/dL      A/G Ratio 0.9 g/dL      BUN/Creatinine Ratio 15.3     Anion Gap 9.0 mmol/L     Narrative:      GFR Normal >60  Chronic Kidney Disease <60  Kidney Failure <15      Magnesium [[id number]]  (Normal) Collected: 10/20/20 0254    Specimen: Blood Updated: 10/20/20 0353     Magnesium 1.8 mg/dL     Phosphorus [[id number]]  (Normal) Collected: 10/20/20 0254    Specimen: Blood Updated: 10/20/20 0353     Phosphorus 2.7 mg/dL     CBC & Differential [[id number]]  (Abnormal) Collected: 10/20/20 0254    Specimen: Blood Updated: 10/20/20 0325    Narrative:      The following orders were created for panel order CBC & Differential.  Procedure                               Abnormality         Status                     ---------                               -----------         ------                     CBC Auto Differential[[id number]]        Abnormal            Final result                 Please view results for these tests on the individual orders.    CBC Auto Differential [[id number]]  (Abnormal) Collected: 10/20/20 0254    Specimen: Blood Updated: 10/20/20 0325     WBC 10.00 10*3/mm3      RBC 3.18 10*6/mm3      Hemoglobin 8.1 g/dL      Hematocrit 26.0 %      MCV 81.6 fL      MCH 25.4 pg      MCHC 31.1 g/dL      RDW 18.7 %      RDW-SD 54.3 fl      MPV 8.0 fL      Platelets 416 10*3/mm3      Neutrophil % 64.4 %      Lymphocyte % 16.2 %      Monocyte % 16.3 %      Eosinophil % 1.8 %      Basophil % 1.3 %      Neutrophils, Absolute 6.40 10*3/mm3      Lymphocytes, Absolute 1.60 10*3/mm3      Monocytes, Absolute 1.60 10*3/mm3      Eosinophils, Absolute 0.20 10*3/mm3      Basophils, Absolute 0.10 10*3/mm3      nRBC 0.1 /100 WBC     AFP Tumor Marker  [[id number]]  (Abnormal) Collected: 10/19/20 1741    Specimen: Blood Updated: 10/20/20 0223     ALPHA-FETOPROTEIN 32.60 ng/mL     Narrative:      Alpha Fetoprotein Tumor Marker Reference Range:    0.0-8.3 ng/mL    Note: Normal values apply only to males and nonpregnant females. These results are not interpretable for pregnant females.    Results may be falsely decreased if patient taking Biotin.      CEA [[id number]] Collected: 10/19/20 1741    Specimen: Blood Updated: 10/20/20 0156     CEA 8.27 ng/mL     Narrative:      CEA Reference Range:    Non Smokers:   Less than 3 ng/mL  Smokers:       Less than 5 ng/mL  Results may be falsely decreased if patient taking Biotin.      Vitamin B12 [[id number]]  (Normal) Collected: 10/19/20 1741    Specimen: Blood Updated: 10/20/20 0131     Vitamin B-12 905 pg/mL     Narrative:      Results may be falsely increased if patient taking Biotin.      Folate [[id number]]  (Normal) Collected: 10/19/20 1741    Specimen: Blood Updated: 10/20/20 0131     Folate 7.29 ng/mL     Narrative:      Results may be falsely increased if patient taking Biotin.      Haptoglobin [[id number]]  (Abnormal) Collected: 10/19/20 1741    Specimen: Blood Updated: 10/20/20 0127     Haptoglobin 563 mg/dL      Comment: Specimen hemolyzed. Results may be affected.       Blood Culture - Blood, Arm, Left [[id number]] Collected: 10/18/20 2025    Specimen: Blood from Arm, Left Updated: 10/19/20 2045     Blood Culture No growth at 24 hours    CBC & Differential [[id number]]  (Abnormal) Collected: 10/19/20 1748    Specimen: Blood Updated: 10/19/20 1811    Narrative:      The following orders were created for panel order CBC & Differential.  Procedure                               Abnormality         Status                     ---------                               -----------         ------                     CBC Auto Differential[[id number]]        Abnormal            Final result                 Please view results for  these tests on the individual orders.    CBC Auto Differential [[id number]]  (Abnormal) Collected: 10/19/20 1748    Specimen: Blood Updated: 10/19/20 1811     WBC 12.80 10*3/mm3      RBC 3.63 10*6/mm3      Hemoglobin 9.3 g/dL      Hematocrit 29.7 %      MCV 81.8 fL      MCH 25.5 pg      MCHC 31.2 g/dL      RDW 19.2 %      RDW-SD 55.6 fl      MPV 8.4 fL      Platelets 417 10*3/mm3      Neutrophil % 70.6 %      Lymphocyte % 14.5 %      Monocyte % 13.1 %      Eosinophil % 0.7 %      Basophil % 1.1 %      Neutrophils, Absolute 9.00 10*3/mm3      Lymphocytes, Absolute 1.90 10*3/mm3      Monocytes, Absolute 1.70 10*3/mm3      Eosinophils, Absolute 0.10 10*3/mm3      Basophils, Absolute 0.10 10*3/mm3      nRBC 0.0 /100 WBC     Reticulocytes [[id number]]  (Normal) Collected: 10/19/20 1748    Specimen: Blood Updated: 10/19/20 1811     Reticulocyte % 1.17 %      Reticulocyte Absolute 0.0425 10*6/mm3     Methylmalonic Acid, Serum [[id number]] Collected: 10/19/20 1741    Specimen: Blood Updated: 10/19/20 1805    Protein Electrophoresis, Total [[id number]] Collected: 10/19/20 1741    Specimen: Blood Updated: 10/19/20 1805    Blood Culture - Blood, Hand, Right [[id number]] Collected: 10/19/20 1741    Specimen: Blood from Hand, Right Updated: 10/19/20 1805    Ferritin [[id number]]  (Abnormal) Collected: 10/19/20 1527    Specimen: Blood Updated: 10/19/20 1735     Ferritin 909.30 ng/mL     Narrative:      Results may be falsely decreased if patient taking Biotin.      Lactate Dehydrogenase [[id number]]  (Abnormal) Collected: 10/19/20 1527    Specimen: Blood Updated: 10/19/20 1734      U/L     Iron Profile [[id number]]  (Abnormal) Collected: 10/19/20 1527    Specimen: Blood Updated: 10/19/20 1734     Iron 16 mcg/dL      Iron Saturation 10 %      Transferrin 107 mg/dL      TIBC 159 mcg/dL     Magnesium [[id number]]  (Normal) Collected: 10/19/20 1527    Specimen: Blood Updated: 10/19/20 1954     Magnesium 2.0 mg/dL     Phosphorus  [[id number]]  (Normal) Collected: 10/19/20 1527    Specimen: Blood Updated: 10/19/20 1734     Phosphorus 2.7 mg/dL     Reticulocytes [[id number]]  (Normal) Collected: 10/19/20 1527    Specimen: Blood Updated: 10/19/20 1707     Reticulocyte % 1.10 %      Reticulocyte Absolute 0.0366 10*6/mm3     Comprehensive Metabolic Panel [[id number]]  (Abnormal) Collected: 10/19/20 1527    Specimen: Blood Updated: 10/19/20 1645     Glucose 111 mg/dL      BUN 27 mg/dL      Creatinine 1.69 mg/dL      Sodium 139 mmol/L      Potassium 4.3 mmol/L      Chloride 102 mmol/L      CO2 28.0 mmol/L      Calcium 7.9 mg/dL      Total Protein 5.5 g/dL      Albumin 2.30 g/dL      ALT (SGPT) 63 U/L      AST (SGOT) 134 U/L      Alkaline Phosphatase 213 U/L      Total Bilirubin 0.2 mg/dL      eGFR Non African Amer 39 mL/min/1.73      Globulin 3.2 gm/dL      A/G Ratio 0.7 g/dL      BUN/Creatinine Ratio 16.0     Anion Gap 9.0 mmol/L     Narrative:      GFR Normal >60  Chronic Kidney Disease <60  Kidney Failure <15      CK [[id number]]  (Abnormal) Collected: 10/19/20 1527    Specimen: Blood Updated: 10/19/20 1644     Creatine Kinase 2,699 U/L     CBC & Differential [[id number]]  (Abnormal) Collected: 10/19/20 1527    Specimen: Blood Updated: 10/19/20 1604    Narrative:      The following orders were created for panel order CBC & Differential.  Procedure                               Abnormality         Status                     ---------                               -----------         ------                     CBC Auto Differential[[id number]]        Abnormal            Final result                 Please view results for these tests on the individual orders.    CBC Auto Differential [[id number]]  (Abnormal) Collected: 10/19/20 1527    Specimen: Blood Updated: 10/19/20 1604     WBC 11.60 10*3/mm3      RBC 3.35 10*6/mm3      Hemoglobin 8.5 g/dL      Hematocrit 27.2 %      MCV 81.2 fL      MCH 25.3 pg      MCHC 31.2 g/dL      RDW 18.9 %      RDW-SD 53.8 fl       MPV 8.1 fL      Platelets 394 10*3/mm3      Neutrophil % 73.0 %      Lymphocyte % 11.5 %      Monocyte % 13.9 %      Eosinophil % 0.6 %      Basophil % 1.0 %      Neutrophils, Absolute 8.40 10*3/mm3      Lymphocytes, Absolute 1.30 10*3/mm3      Monocytes, Absolute 1.60 10*3/mm3      Eosinophils, Absolute 0.10 10*3/mm3      Basophils, Absolute 0.10 10*3/mm3      nRBC 0.0 /100 WBC           PENDING RESULTS: Protein electrophoresis, blood cultures (final), fecal occult blood test    IMAGING REVIEWED:  No radiology results for the last day    Assessment/Plan   ASSESSMENT:  1. Liver mass resulting well to moderately differentiated adenocarcinoma: S/p liver biopsy 10/14/2020. Cholangiocarcinoma favored.   CEA 8.27, AFP 32.60, CA 19-9 3,215. GI consulted.  Patient stable not likely operable, and patient not a surgical candidate.  General surgery consulted-surgical resection would require a right hepatectomy and possible extended right hepatectomy.  Patient is high risk for surgery.  General surgery recommends evaluation by hepatobiliary surgeon.  2. Leukocytosis: Mild.  Blood cultures drawn.  On antibiotics.  3. Anemia: Mild.  Will obtain further anemia studies.  4. Rhabdomyolysis: CK on day of transfer was 6000.  CK today 3847.  Managed per primary team.  5. Acute kidney injury/CKD stage III: Consider relation to rhabdomyolysis.  6. GERD/COPD: Managed per primary team    PLAN:  1. CBC daily  2. Anemia studies pending  3. If treatment with chemotherapy, will order molecular studies to diagnose somatic mutations including FGF R2, PD-L1, MSI/MMR status, IDH 1, and TRK, etc.  4. Plan for hepatobiliary surgeon consult for surgery  5. Appreciate GI input  6. Appreciate general surgery input  7. Continue antibiotics per primary team    Electronically signed by CAREY Cho, 10/20/20, 2:49 PM EDT.          I have personally performed a face-to-face diagnostic evaluation of this patient.  I have reviewed and agree with  the care plan scribed above by nurse practitioner.  Pertinent labs and imaging have been reviewed.     We will do additional testing on the tumor specimen.  Tumor markers ordered.  Awaiting results.  Appreciate surgery input.  I had a lengthy discussion with the patient this morning.  Also had a long conversation with the granddaughter who is a nurse.  We are all in agreement that he is not a candidate for surgery.  Patient however would like to consider palliative therapies.  Discussed about ordering next generation sequencing for molecular therapies.    Patient has elevated CA 19-9 which we can be followed during treatment.      Electronically signed by Aamir Garcia MD, 10/20/20, 5:40 PM EDT.

## 2020-10-20 NOTE — PROGRESS NOTES
"Continued Stay Note   [facility]hao     Patient Name: Tristen Garcia  MRN: [id number]  Today's Date: 10/20/2020    Admit Date: 10/18/2020    Discharge Plan     Row Name 10/20/20 1224       Plan    Plan  [facility]- accepted, bed available on 10/21. No PASRR required. No precert required.    Plan Comments  Per general surgery \"recommend referral to hepatobiliary surgeon and management per oncology.\" DC barriers: IVF, IV abx            Willa Che RN    "

## 2020-10-20 NOTE — PROGRESS NOTES
[facility] Medicine Services Daily Progress Note      Hospitalist Team  LOS 2 days      Patient Care Team:  Ann Marie Hodgson MD as PCP - General (Family Medicine)    Patient Location: 240/1      Subjective   Subjective     Chief Complaint / Subjective  \foot pain        Brief Synopsis of Hospital Course/HPI  Patient is an 85-year-old male with multiple comorbidities including COPD with chronic hypoxic respiratory failure on home oxygen, hypertension and hyperlipidemia.  Was recently found to have right upper lobe liver mass on imaging for which he underwent liver biopsy on 10/14/2020 at [facility]-biopsy results still pending.     He presented to [facility] ED on 10/17/2020 after granddaughter found him lying on the floor between 3 AM and 9:30 AM.  At the ED patient was noted to be hypotensive.  Blood work showed leukocytosis with WBC of 21,000, procalcitonin 26 and hemoglobin of 10.2.  Serum creatinine was noted to be 2.8, patient's baseline serum creatinine is around 1.8.  CK was 15,000 patient also had indeterminant troponin elevation though EKG did not show any findings suggestive of acute ischemia.  Patient was diagnosed with rhabdomyolysis, acute on chronic kidney disease and sepsis of unknown source.     He was admitted to the hospitalist service at [facility] where he was started on empirical antibiotics with IV vancomycin, IV Zosyn and IV Flagyl.  He was also given IV normal saline along with D5 water with bicarb drip.  Blood cultures were obtained.  While in [facility] he had a fever spike.  Due to concern for possible liver abscess family requested the patient to be transferred to [facility] for further evaluation.         Date::    10/19/2020  Patient complaining of right foot pain and concerned about his fall.  Imaging did not have any x-rays done in [facility].  X-rays ordered right foot and leg.  He reports  "being lying down on the floor for several hours.  This might explain his rhabdomyolysis.  Labs yesterday showed worsening hemoglobin drop  We will check again labs today  No active bleeding per patient.  Sister at the bedside  PT OT evaluation noted    10/20/2020  Patient seen by surgeon and Dr. Mann as well.  Patient accepted for rehab placement  CKs improving  Continued on IV fluids    His renal function is stable  Leukocytosis has improved  Tumor markers reviewed  Blood cultures currently pending  CT scan from outlying facility reviewed and showed no evidence of collection but some stranding possible related to the biopsy patient recently had.  ROS  All other systems reviewed and are negative    Objective   Objective      Vital Signs  Temp:  [97.9 °F (36.6 °C)-98.7 °F (37.1 °C)] 98.5 °F (36.9 °C)  Heart Rate:  [74-78] 78  Resp:  [16-18] 18  BP: (133-139)/(62-75) 139/75  Oxygen Therapy  SpO2: 97 %  Pulse Oximetry Type: Intermittent  Device (Oxygen Therapy): room air  Flow (L/min): 2  Flowsheet Rows      First Filed Value   Admission Height  175.3 cm (69.02\") Documented at 10/19/2020 0030   Admission Weight  98.8 kg (217 lb 14.4 oz) Documented at 10/18/2020 1500        Intake & Output (last 3 days)       10/17 0701 - 10/18 0700 10/18 0701 - 10/19 0700 10/19 0701 - 10/20 0700 10/20 0701 - 10/21 0700    P.O.  400 540 480    Other   575     IV Piggyback   100     Total Intake(mL/kg)  400 (4) 1215 (12.2) 480 (4.8)    Urine (mL/kg/hr)  1000 1300 (0.5) 500 (0.8)    Stool  0 0     Total Output  1000 1300 500    Net  -600 -85 -20            Stool Unmeasured Occurrence  1 x 1 x         Lines, Drains & Airways    Active LDAs     Name:   Placement date:   Placement time:   Site:   Days:    Peripheral IV 10/18/20 1717 Anterior;Right Forearm   10/18/20    1717    Forearm   less than 1                  Physical Exam:    Physical Exam  Vitals signs and nursing note reviewed.   Constitutional:       General: He is not in acute " distress.     Appearance: Normal appearance. He is well-developed. He is obese. He is not ill-appearing, toxic-appearing or diaphoretic.   HENT:      Head: Normocephalic and atraumatic.      Right Ear: Ear canal and external ear normal.      Left Ear: Ear canal and external ear normal.      Nose: Nose normal. No congestion or rhinorrhea.      Mouth/Throat:      Mouth: Mucous membranes are moist.      Pharynx: No oropharyngeal exudate.   Eyes:      General: No scleral icterus.        Right eye: No discharge.         Left eye: No discharge.      Extraocular Movements: Extraocular movements intact.      Conjunctiva/sclera: Conjunctivae normal.      Pupils: Pupils are equal, round, and reactive to light.   Neck:      Musculoskeletal: Normal range of motion and neck supple. No neck rigidity or muscular tenderness.      Thyroid: No thyromegaly.      Vascular: No carotid bruit or JVD.      Trachea: No tracheal deviation.   Cardiovascular:      Rate and Rhythm: Normal rate and regular rhythm.      Pulses: Normal pulses.      Heart sounds: Normal heart sounds. No murmur. No friction rub. No gallop.    Pulmonary:      Effort: Pulmonary effort is normal. No respiratory distress.      Breath sounds: Normal breath sounds. No stridor. No wheezing, rhonchi or rales.   Chest:      Chest wall: No tenderness.   Abdominal:      General: Bowel sounds are normal. There is no distension.      Palpations: Abdomen is soft. There is hepatomegaly. There is no mass.      Tenderness: There is no abdominal tenderness. There is no guarding or rebound.      Hernia: No hernia is present.      Comments: Noted hepatomegaly   Musculoskeletal: Normal range of motion.         General: No swelling, tenderness, deformity or signs of injury.      Right lower leg: No edema.      Left lower leg: No edema.   Lymphadenopathy:      Cervical: No cervical adenopathy.   Skin:     General: Skin is warm and dry.      Coloration: Skin is not jaundiced or pale.       Findings: No bruising, erythema or rash.   Neurological:      General: No focal deficit present.      Mental Status: He is alert and oriented to person, place, and time. Mental status is at baseline.      Cranial Nerves: No cranial nerve deficit.      Sensory: No sensory deficit.      Motor: No weakness or abnormal muscle tone.      Coordination: Coordination normal.   Psychiatric:         Mood and Affect: Mood normal.         Behavior: Behavior normal.         Thought Content: Thought content normal.         Judgment: Judgment normal.              Wounds (last 24 hours)      LDA Wound     Row Name 10/20/20 0701 10/19/20 2019          Wound 10/18/20 1719 Right anterior knee Abrasion    Wound - Properties Group Placement Date: 10/18/20  -SW Placement Time: 1719 -SW Present on Hospital Admission: Y  -SW Side: Right  -SW Orientation: anterior  -SW Location: knee  -SW Primary Wound Type: Abrasion  -SW    Dressing Appearance  intact;dry  -HW  dry;intact  -DM     Closure  Adhesive bandage  -HW  Adhesive bandage  -DM     Base  --  moist;red  -DM     Drainage Amount  --  none  -DM     Retired Wound - Properties Group Date first assessed: 10/18/20  -SW Time first assessed: 1719 -SW Present on Hospital Admission: Y  -SW Side: Right  -SW Location: knee  -SW Primary Wound Type: Abrasion  -SW       Wound 10/18/20 1719 Left anterior knee Abrasion    Wound - Properties Group Placement Date: 10/18/20  -SW Placement Time: 1719 -SW Present on Hospital Admission: Y  -SW Side: Left  -SW Orientation: anterior  -SW Location: knee  -SW Primary Wound Type: Abrasion  -SW    Dressing Appearance  dry;intact  -HW  dry;intact  -DM     Closure  Adhesive bandage  -HW  Adhesive bandage  -DM     Base  --  moist  -DM     Drainage Amount  --  none  -DM     Retired Wound - Properties Group Date first assessed: 10/18/20  -SW Time first assessed: 1719 -SW Present on Hospital Admission: Y  -SW Side: Left  -SW Location: knee  -SW Primary Wound Type:  Husseinasion  -FERNANDO      User Key  (r) = Recorded By, (t) = Taken By, (c) = Cosigned By    Initials Name Provider Type    Yamilet Matute, RN Registered Nurse    Keysha Loza RN Registered Nurse    Luisa Keller LPN Licensed Nurse          Procedures:              Results Review:     I reviewed the patient's new clinical results.      Lab Results (last 24 hours)     Procedure Component Value Units Date/Time    CK [[id number]]  (Abnormal) Collected: 10/20/20 0254    Specimen: Blood Updated: 10/20/20 0353     Creatine Kinase 1,728 U/L     Comprehensive Metabolic Panel [[id number]]  (Abnormal) Collected: 10/20/20 0254    Specimen: Blood Updated: 10/20/20 0353     Glucose 94 mg/dL      BUN 25 mg/dL      Creatinine 1.63 mg/dL      Sodium 140 mmol/L      Potassium 4.0 mmol/L      Chloride 104 mmol/L      CO2 27.0 mmol/L      Calcium 8.0 mg/dL      Total Protein 5.2 g/dL      Albumin 2.40 g/dL      ALT (SGPT) 54 U/L      AST (SGOT) 104 U/L      Alkaline Phosphatase 168 U/L      Total Bilirubin 0.3 mg/dL      eGFR Non African Amer 40 mL/min/1.73      Globulin 2.8 gm/dL      A/G Ratio 0.9 g/dL      BUN/Creatinine Ratio 15.3     Anion Gap 9.0 mmol/L     Narrative:      GFR Normal >60  Chronic Kidney Disease <60  Kidney Failure <15      Magnesium [[id number]]  (Normal) Collected: 10/20/20 0254    Specimen: Blood Updated: 10/20/20 0353     Magnesium 1.8 mg/dL     Phosphorus [[id number]]  (Normal) Collected: 10/20/20 0254    Specimen: Blood Updated: 10/20/20 0353     Phosphorus 2.7 mg/dL     CBC & Differential [[id number]]  (Abnormal) Collected: 10/20/20 0254    Specimen: Blood Updated: 10/20/20 0325    Narrative:      The following orders were created for panel order CBC & Differential.  Procedure                               Abnormality         Status                     ---------                               -----------         ------                     CBC Auto Differential[[id number]]        Abnormal             Final result                 Please view results for these tests on the individual orders.    CBC Auto Differential [[id number]]  (Abnormal) Collected: 10/20/20 0254    Specimen: Blood Updated: 10/20/20 0325     WBC 10.00 10*3/mm3      RBC 3.18 10*6/mm3      Hemoglobin 8.1 g/dL      Hematocrit 26.0 %      MCV 81.6 fL      MCH 25.4 pg      MCHC 31.1 g/dL      RDW 18.7 %      RDW-SD 54.3 fl      MPV 8.0 fL      Platelets 416 10*3/mm3      Neutrophil % 64.4 %      Lymphocyte % 16.2 %      Monocyte % 16.3 %      Eosinophil % 1.8 %      Basophil % 1.3 %      Neutrophils, Absolute 6.40 10*3/mm3      Lymphocytes, Absolute 1.60 10*3/mm3      Monocytes, Absolute 1.60 10*3/mm3      Eosinophils, Absolute 0.20 10*3/mm3      Basophils, Absolute 0.10 10*3/mm3      nRBC 0.1 /100 WBC     AFP Tumor Marker [[id number]]  (Abnormal) Collected: 10/19/20 1741    Specimen: Blood Updated: 10/20/20 0223     ALPHA-FETOPROTEIN 32.60 ng/mL     Narrative:      Alpha Fetoprotein Tumor Marker Reference Range:    0.0-8.3 ng/mL    Note: Normal values apply only to males and nonpregnant females. These results are not interpretable for pregnant females.    Results may be falsely decreased if patient taking Biotin.      CEA [[id number]] Collected: 10/19/20 1741    Specimen: Blood Updated: 10/20/20 0156     CEA 8.27 ng/mL     Narrative:      CEA Reference Range:    Non Smokers:   Less than 3 ng/mL  Smokers:       Less than 5 ng/mL  Results may be falsely decreased if patient taking Biotin.      Vitamin B12 [[id number]]  (Normal) Collected: 10/19/20 1741    Specimen: Blood Updated: 10/20/20 0131     Vitamin B-12 905 pg/mL     Narrative:      Results may be falsely increased if patient taking Biotin.      Folate [[id number]]  (Normal) Collected: 10/19/20 1741    Specimen: Blood Updated: 10/20/20 0131     Folate 7.29 ng/mL     Narrative:      Results may be falsely increased if patient taking Biotin.      Haptoglobin [[id number]]  (Abnormal) Collected: 10/19/20  1741    Specimen: Blood Updated: 10/20/20 0127     Haptoglobin 563 mg/dL      Comment: Specimen hemolyzed. Results may be affected.       Cancer Antigen 19-9 [[id number]] Collected: 10/19/20 2111    Specimen: Blood Updated: 10/19/20 2122    Blood Culture - Blood, Arm, Left [[id number]] Collected: 10/18/20 2025    Specimen: Blood from Arm, Left Updated: 10/19/20 2045     Blood Culture No growth at 24 hours    CBC & Differential [[id number]]  (Abnormal) Collected: 10/19/20 1748    Specimen: Blood Updated: 10/19/20 1811    Narrative:      The following orders were created for panel order CBC & Differential.  Procedure                               Abnormality         Status                     ---------                               -----------         ------                     CBC Auto Differential[[id number]]        Abnormal            Final result                 Please view results for these tests on the individual orders.    CBC Auto Differential [[id number]]  (Abnormal) Collected: 10/19/20 1748    Specimen: Blood Updated: 10/19/20 1811     WBC 12.80 10*3/mm3      RBC 3.63 10*6/mm3      Hemoglobin 9.3 g/dL      Hematocrit 29.7 %      MCV 81.8 fL      MCH 25.5 pg      MCHC 31.2 g/dL      RDW 19.2 %      RDW-SD 55.6 fl      MPV 8.4 fL      Platelets 417 10*3/mm3      Neutrophil % 70.6 %      Lymphocyte % 14.5 %      Monocyte % 13.1 %      Eosinophil % 0.7 %      Basophil % 1.1 %      Neutrophils, Absolute 9.00 10*3/mm3      Lymphocytes, Absolute 1.90 10*3/mm3      Monocytes, Absolute 1.70 10*3/mm3      Eosinophils, Absolute 0.10 10*3/mm3      Basophils, Absolute 0.10 10*3/mm3      nRBC 0.0 /100 WBC     Reticulocytes [[id number]]  (Normal) Collected: 10/19/20 1748    Specimen: Blood Updated: 10/19/20 1811     Reticulocyte % 1.17 %      Reticulocyte Absolute 0.0425 10*6/mm3     Methylmalonic Acid, Serum [[id number]] Collected: 10/19/20 1741    Specimen: Blood Updated: 10/19/20 1805    Protein Electrophoresis, Total  [[id number]] Collected: 10/19/20 1741    Specimen: Blood Updated: 10/19/20 1805    Blood Culture - Blood, Hand, Right [[id number]] Collected: 10/19/20 1741    Specimen: Blood from Hand, Right Updated: 10/19/20 1805    Ferritin [[id number]]  (Abnormal) Collected: 10/19/20 1527    Specimen: Blood Updated: 10/19/20 1735     Ferritin 909.30 ng/mL     Narrative:      Results may be falsely decreased if patient taking Biotin.      Lactate Dehydrogenase [[id number]]  (Abnormal) Collected: 10/19/20 1527    Specimen: Blood Updated: 10/19/20 1734      U/L     Iron Profile [[id number]]  (Abnormal) Collected: 10/19/20 1527    Specimen: Blood Updated: 10/19/20 1734     Iron 16 mcg/dL      Iron Saturation 10 %      Transferrin 107 mg/dL      TIBC 159 mcg/dL     Magnesium [[id number]]  (Normal) Collected: 10/19/20 1527    Specimen: Blood Updated: 10/19/20 1734     Magnesium 2.0 mg/dL     Phosphorus [[id number]]  (Normal) Collected: 10/19/20 1527    Specimen: Blood Updated: 10/19/20 1734     Phosphorus 2.7 mg/dL     Reticulocytes [[id number]]  (Normal) Collected: 10/19/20 1527    Specimen: Blood Updated: 10/19/20 1707     Reticulocyte % 1.10 %      Reticulocyte Absolute 0.0366 10*6/mm3     Comprehensive Metabolic Panel [[id number]]  (Abnormal) Collected: 10/19/20 1527    Specimen: Blood Updated: 10/19/20 1645     Glucose 111 mg/dL      BUN 27 mg/dL      Creatinine 1.69 mg/dL      Sodium 139 mmol/L      Potassium 4.3 mmol/L      Chloride 102 mmol/L      CO2 28.0 mmol/L      Calcium 7.9 mg/dL      Total Protein 5.5 g/dL      Albumin 2.30 g/dL      ALT (SGPT) 63 U/L      AST (SGOT) 134 U/L      Alkaline Phosphatase 213 U/L      Total Bilirubin 0.2 mg/dL      eGFR Non African Amer 39 mL/min/1.73      Globulin 3.2 gm/dL      A/G Ratio 0.7 g/dL      BUN/Creatinine Ratio 16.0     Anion Gap 9.0 mmol/L     Narrative:      GFR Normal >60  Chronic Kidney Disease <60  Kidney Failure <15      CK [[id number]]  (Abnormal) Collected: 10/19/20 4430     Specimen: Blood Updated: 10/19/20 1644     Creatine Kinase 2,699 U/L     CBC & Differential [[id number]]  (Abnormal) Collected: 10/19/20 1527    Specimen: Blood Updated: 10/19/20 1604    Narrative:      The following orders were created for panel order CBC & Differential.  Procedure                               Abnormality         Status                     ---------                               -----------         ------                     CBC Auto Differential[[id number]]        Abnormal            Final result                 Please view results for these tests on the individual orders.    CBC Auto Differential [[id number]]  (Abnormal) Collected: 10/19/20 1527    Specimen: Blood Updated: 10/19/20 1604     WBC 11.60 10*3/mm3      RBC 3.35 10*6/mm3      Hemoglobin 8.5 g/dL      Hematocrit 27.2 %      MCV 81.2 fL      MCH 25.3 pg      MCHC 31.2 g/dL      RDW 18.9 %      RDW-SD 53.8 fl      MPV 8.1 fL      Platelets 394 10*3/mm3      Neutrophil % 73.0 %      Lymphocyte % 11.5 %      Monocyte % 13.9 %      Eosinophil % 0.6 %      Basophil % 1.0 %      Neutrophils, Absolute 8.40 10*3/mm3      Lymphocytes, Absolute 1.30 10*3/mm3      Monocytes, Absolute 1.60 10*3/mm3      Eosinophils, Absolute 0.10 10*3/mm3      Basophils, Absolute 0.10 10*3/mm3      nRBC 0.0 /100 WBC         No results found for: HGBA1C  Results from last 7 days   Lab Units 10/14/20  0813   INR  0.98           No results found for: LIPASE  No results found for: CHOL, CHLPL, TRIG, HDL, LDL, LDLDIRECT    Lab Results   Lab Value Date/Time    INTRAOP  10/14/2020 1009     TP: Positive for malignancy.     SHIRA/sms       FINALDX  10/14/2020 1009     Liver, CT-guided core biopsy:    Well to moderately differentiated adenocarcinoma, see COMMENT     JPR/sms       COMDX  10/14/2020 1009     The biopsy shows a well to moderately differentiated adenocarcinoma. Immunohistochemistry was performed utilizing appropriate controls and the tumor is strongly positive for  only CK7. The CK20, CDX2, TTF-1, napsin A, chromogranin, synaptophysin and CD56 are all negative. This is a nonspecific staining pattern but can be seen in tumors of pancreatobiliary origin, including cholangiocarcinoma which is favored in this case. Adenocarcinomas of the upper gastrointestinal tract can also display only CK7 positivity and should be excluded clinically. This staining pattern argues against, but does not completely exclude a metastasis from a primary lung adenocarcinoma as approximately 10 to 15% of lung adenocarcinomas are negative for TTF-1 and napsin A. This staining pattern does exclude a neuroendocrine tumor and adenocarcinoma of colonic origin. Further clinical workup with imaging studies and serum tumor markers is recommended.          Microbiology Results (last 10 days)     Procedure Component Value - Date/Time    Blood Culture - Blood, Arm, Left [[id number]] Collected: 10/18/20 2025    Lab Status: Preliminary result Specimen: Blood from Arm, Left Updated: 10/19/20 2045     Blood Culture No growth at 24 hours          ECG/EMG Results (most recent)     Procedure Component Value Units Date/Time    ECG 12 Lead [[id number]] Collected: 10/18/20 1841     Updated: 10/20/20 0600    Narrative:      HEART RATE= 87  bpm  RR Interval= 692  ms  MT Interval= 160  ms  P Horizontal Axis= -18  deg  P Front Axis= 54  deg  QRSD Interval= 105  ms  QT Interval= 376  ms  QRS Axis= 57  deg  T Wave Axis= 67  deg  - ABNORMAL ECG -  Sinus rhythm  Ventricular trigeminy  Anteroseptal infarct, old  No previous ECG available for comparison  Electronically Signed By:   Date and Time of Study: 2020-10-18 18:41:11                    Ct Needle Biopsy Liver    Result Date: 10/14/2020  Technically successful CT-guided percutaneous liver mass core biopsy procedure as described above.  Electronically Signed By-Kalia Herrera On:10/14/2020 12:18 PM This report was finalized on [id number] by  Kalia Herrera, .          Xrays, labs  reviewed personally by physician.    Medication Review:   I have reviewed the patient's current medication list      Scheduled Meds  allopurinol, 100 mg, Oral, Daily  pantoprazole, 40 mg, Intravenous, Q AM  piperacillin-tazobactam, 3.375 g, Intravenous, Q8H  sodium bicarbonate, 650 mg, Oral, TID  sodium chloride, 10 mL, Intravenous, Q12H        Meds Infusions  Pharmacy to Dose Zosyn,   sodium chloride, 200 mL/hr, Last Rate: 200 mL/hr (10/20/20 1221)        Meds PRN  •  acetaminophen **OR** acetaminophen **OR** acetaminophen  •  ipratropium-albuterol  •  melatonin  •  nitroglycerin  •  ondansetron **OR** ondansetron  •  Pharmacy to Dose Zosyn  •  sodium chloride        Assessment/Plan   Assessment/Plan     Active Hospital Problems    Diagnosis  POA   • **Rhabdomyolysis [M62.82]  Yes   • Liver mass, right lobe [R16.0]  Yes      Resolved Hospital Problems   No resolved problems to display.       MEDICAL DECISION MAKING COMPLEXITY BY PROBLEM:   Rhabdomyolysis  Due to prolonged immobilization  Improving  CK on the day of transfer 6000  Monitor daily  Increase IV fluids  Add p.o. bicarb  Watch for fluid overload  Monitor and correct electrolytes as needed    Acute kidney injury on CKD stage III.  Probably due to rhabdomyolysis above.  Baseline serum creatinine 1.8  Avoid nephrotoxic agents.  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 swing bed    VTE Prophylaxis -SCD  VTE Prophylaxis -   Mechanical Order History:      Ordered        10/18/20 2004  Place Sequential Compression Device  Once         10/18/20 2004  Maintain Sequential Compression Device  Continuous                 Pharmalogical Order History:     None            Code Status -   Code Status and Medical Interventions:   Ordered at: 10/18/20 2004     Level Of Support Discussed With:    Patient     Code Status:    CPR     Medical Interventions (Level of Support Prior to Arrest):    Full       This patient has been examined wearing appropriate Personal Protective Equipment and discussed with hospital infection control department. 10/20/20        Discharge Planning  Home versus rehab          Electronically signed by Jese Mason MD, 10/20/20, 13:01 EDT.  [facility]ist Team

## 2020-10-20 NOTE — PLAN OF CARE
Problem: Adult Inpatient Plan of Care  Goal: Plan of Care Review  Outcome: Ongoing, Progressing  Goal: Patient-Specific Goal (Individualized)  Outcome: Ongoing, Progressing  Goal: Absence of Hospital-Acquired Illness or Injury  Outcome: Ongoing, Progressing  Intervention: Identify and Manage Fall Risk  Recent Flowsheet Documentation  Taken 10/20/2020 0900 by Yamilet Kline RN  Safety Promotion/Fall Prevention: safety round/check completed  Taken 10/20/2020 0701 by Yamilet Kline RN  Safety Promotion/Fall Prevention:   safety round/check completed   nonskid shoes/slippers when out of bed   muscle strengthening facilitated  Goal: Optimal Comfort and Wellbeing  Outcome: Ongoing, Progressing  Intervention: Provide Person-Centered Care  Recent Flowsheet Documentation  Taken 10/20/2020 0701 by Yamilet Kline RN  Trust Relationship/Rapport:   care explained   choices provided   emotional support provided  Goal: Readiness for Transition of Care  Outcome: Ongoing, Progressing     Problem: Balance Impairment (Functional Deficit)  Goal: Improved Balance and Postural Control  Outcome: Ongoing, Progressing  Intervention: Optimize Balance and Safe Activity  Recent Flowsheet Documentation  Taken 10/20/2020 0900 by Yamilet Kline RN  Safety Promotion/Fall Prevention: safety round/check completed  Taken 10/20/2020 0701 by Yamilet Klien RN  Safety Promotion/Fall Prevention:   safety round/check completed   nonskid shoes/slippers when out of bed   muscle strengthening facilitated     Problem: Cognitive Impairment (Functional Deficit)  Goal: Optimal Functional [place]  Outcome: Ongoing, Progressing  Intervention: Optimize Cognitive Function  Recent Flowsheet Documentation  Taken 10/20/2020 0701 by Yamilet Kline RN  Environment Familiarity/Consistency: daily routine followed     Problem: Coordination Impairment (Functional Deficit)  Goal: Optimal Coordination  Outcome: Ongoing, Progressing     Problem: Muscle  Strength Impairment (Functional Deficit)  Goal: Improved Muscle Strength  Outcome: Ongoing, Progressing     Problem: Muscle Tone Impairment (Functional Deficit)  Goal: Improved Muscle Tone  Outcome: Ongoing, Progressing     Problem: Range of Motion Impairment (Functional Deficit)  Goal: Optimal Range of Motion  Outcome: Ongoing, Progressing     Problem: Sensory Impairment (Functional Deficit)  Goal: Compensation for Sensory Deficit  Outcome: Ongoing, Progressing     Problem: Fall Injury Risk  Goal: Absence of Fall and Fall-Related Injury  Outcome: Ongoing, Progressing  Intervention: Promote Injury-Free Environment  Recent Flowsheet Documentation  Taken 10/20/2020 0900 by Yamilet Kline RN  Safety Promotion/Fall Prevention: safety round/check completed  Taken 10/20/2020 0701 by Yamilet Kline RN  Safety Promotion/Fall Prevention:   safety round/check completed   nonskid shoes/slippers when out of bed   muscle strengthening facilitated     Problem: Skin Injury Risk Increased  Goal: Skin Health and Integrity  Outcome: Ongoing, Progressing     Problem: Electrolyte Imbalance (Acute Kidney Injury/Impairment)  Goal: Serum Electrolyte Balance  Outcome: Ongoing, Progressing     Problem: Fluid Imbalance (Acute Kidney Injury/Impairment)  Goal: Optimal Fluid Balance  Outcome: Ongoing, Progressing     Problem: Hematologic Alteration (Acute Kidney Injury/Impairment)  Goal: Hemoglobin, Hematocrit and Platelets Within Normal Range  Outcome: Ongoing, Progressing     Problem: Oral Intake Inadequate (Acute Kidney Injury/Impairment)  Goal: Optimal Nutrition Intake  Outcome: Ongoing, Progressing     Problem: Renal Function Impairment (Acute Kidney Injury/Impairment)  Goal: Effective Renal Function  Outcome: Ongoing, Progressing   Goal Outcome Evaluation:  Plan of Care Reviewed With: patient  Progress: improving   Pt is waiting on bed at [facility], will be available tomorrow.general surgery consult for liver abscess although  patient may not be able to have surgery as he is not a surgical candidate

## 2020-10-20 NOTE — CONSULTS
GENERAL SURGERY CONSULTATION NOTE    Consult requested by: Dr. Garcia    Patient Care Team:  Ann Marie Hodgson MD as PCP - General (Family Medicine)    Reason for consult: Cholangiocarcinoma    Subjective     Patient is a 85 y.o. 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  The patient reportedly underwent a liver biopsy on 10/14/2020, and pathology revealed concerns for cholangiocarcinoma.  Following his biopsy, the patient had an episode of syncope and fell on his knees in his bedroom.  The patient reports that he was on the ground for approximately 5 hours.  He does not believe that he hit his head, and he does not take any blood thinners.  Since the patient fell, he has felt like his legs are heavy and is having difficulty ambulating.  Prior to his biopsy however the patient reports that he was living independently and was able to ambulate freely and perform all of his activities of daily living.  He does have hypertension for which he takes 3 different blood pressure medications, COPD which causes him to use oxygen at night, and hyperlipidemia.  The patient was evaluated by oncology who believes that he is not a surgical candidate, but wanted general surgery input to help make this decision.    Review of Systems   Constitutional: Positive for fatigue. Negative for appetite change, chills and fever.   HENT: Negative for congestion and sore throat.    Respiratory: Negative for cough and shortness of breath.    Cardiovascular: Negative for chest pain and palpitations.   Gastrointestinal: Negative for abdominal pain, constipation, diarrhea, nausea, vomiting and GERD.   Genitourinary: Negative for difficulty urinating, dysuria and frequency.   Musculoskeletal: Positive for gait problem and myalgias. Negative for arthralgias and back pain.   Skin: Negative for rash and skin lesions.   Neurological: Negative for  dizziness, seizures and memory problem.   Hematological: Negative for adenopathy. Does not bruise/bleed easily.   Psychiatric/Behavioral: Negative for sleep disturbance and depressed mood.        History  Past Medical History:   Diagnosis Date   • Arthritis    • Cancer (CMS/HCC)    • COPD (chronic obstructive pulmonary disease) (CMS/HCC)    • Elevated cholesterol    • GERD (gastroesophageal reflux disease)    • Hyperlipidemia    • Hypertension      Past Surgical History:   Procedure Laterality Date   • COLONOSCOPY     • EYE SURGERY     • SKIN BIOPSY       No family history on file.  Social History     Tobacco Use   • Smoking status: Former Smoker   • Smokeless tobacco: Never Used   Substance Use Topics   • Alcohol use: Not Currently   • Drug use: Never     Medications Prior to Admission   Medication Sig Dispense Refill Last Dose   • allopurinol (ZYLOPRIM) 300 MG tablet Take 300 mg by mouth Daily.      • amLODIPine (NORVASC) 10 MG tablet Take 10 mg by mouth Daily.      • atenolol (TENORMIN) 25 MG tablet Take 25 mg by mouth Daily.      • furosemide (LASIX) 40 MG tablet Take 40 mg by mouth Daily.      • losartan (COZAAR) 50 MG tablet Take 100 mg by mouth Daily.      • omeprazole (priLOSEC) 20 MG capsule Take 20 mg by mouth Daily.        Allergies:  Patient has no known allergies.    Objective     Vital Signs  Temp:  [97.9 °F (36.6 °C)-98.7 °F (37.1 °C)] 97.9 °F (36.6 °C)  Heart Rate:  [74-77] 74  Resp:  [16-17] 16  BP: (133-136)/(62-71) 133/62    Physical Exam  Vitals signs reviewed.   Constitutional:       Appearance: He is well-developed.   HENT:      Head: Normocephalic and atraumatic.   Eyes:      Pupils: Pupils are equal, round, and reactive to light.   Neck:      Musculoskeletal: Normal range of motion.   Cardiovascular:      Rate and Rhythm: Normal rate and regular rhythm.   Pulmonary:      Effort: Pulmonary effort is normal.      Breath sounds: Normal breath sounds.   Abdominal:      General: There is no  distension.      Palpations: Abdomen is soft. There is mass.      Tenderness: There is no abdominal tenderness.      Hernia: No hernia is present.      Comments: Patient has a firm palpable mass on the right lateral aspect of his abdomen consistent with his known right hepatic cholangiocarcinoma.   Musculoskeletal: Normal range of motion.   Lymphadenopathy:      Cervical: No cervical adenopathy.   Skin:     General: Skin is warm and dry.      Findings: No erythema.      Comments: Patient has excoriations over his knees bilaterally and dorsums of his feet.   Neurological:      Mental Status: He is alert and oriented to person, place, and time.         Results Review:   Lab Results (last 24 hours)     Procedure Component Value Units Date/Time    CK [[id number]]  (Abnormal) Collected: 10/20/20 0254    Specimen: Blood Updated: 10/20/20 0353     Creatine Kinase 1,728 U/L     Comprehensive Metabolic Panel [[id number]]  (Abnormal) Collected: 10/20/20 0254    Specimen: Blood Updated: 10/20/20 0353     Glucose 94 mg/dL      BUN 25 mg/dL      Creatinine 1.63 mg/dL      Sodium 140 mmol/L      Potassium 4.0 mmol/L      Chloride 104 mmol/L      CO2 27.0 mmol/L      Calcium 8.0 mg/dL      Total Protein 5.2 g/dL      Albumin 2.40 g/dL      ALT (SGPT) 54 U/L      AST (SGOT) 104 U/L      Alkaline Phosphatase 168 U/L      Total Bilirubin 0.3 mg/dL      eGFR Non African Amer 40 mL/min/1.73      Globulin 2.8 gm/dL      A/G Ratio 0.9 g/dL      BUN/Creatinine Ratio 15.3     Anion Gap 9.0 mmol/L     Narrative:      GFR Normal >60  Chronic Kidney Disease <60  Kidney Failure <15      Magnesium [[id number]]  (Normal) Collected: 10/20/20 0254    Specimen: Blood Updated: 10/20/20 0353     Magnesium 1.8 mg/dL     Phosphorus [[id number]]  (Normal) Collected: 10/20/20 0254    Specimen: Blood Updated: 10/20/20 0353     Phosphorus 2.7 mg/dL     CBC & Differential [[id number]]  (Abnormal) Collected: 10/20/20 0254    Specimen: Blood Updated: 10/20/20 0325     Narrative:      The following orders were created for panel order CBC & Differential.  Procedure                               Abnormality         Status                     ---------                               -----------         ------                     CBC Auto Differential[[id number]]        Abnormal            Final result                 Please view results for these tests on the individual orders.    CBC Auto Differential [[id number]]  (Abnormal) Collected: 10/20/20 0254    Specimen: Blood Updated: 10/20/20 0325     WBC 10.00 10*3/mm3      RBC 3.18 10*6/mm3      Hemoglobin 8.1 g/dL      Hematocrit 26.0 %      MCV 81.6 fL      MCH 25.4 pg      MCHC 31.1 g/dL      RDW 18.7 %      RDW-SD 54.3 fl      MPV 8.0 fL      Platelets 416 10*3/mm3      Neutrophil % 64.4 %      Lymphocyte % 16.2 %      Monocyte % 16.3 %      Eosinophil % 1.8 %      Basophil % 1.3 %      Neutrophils, Absolute 6.40 10*3/mm3      Lymphocytes, Absolute 1.60 10*3/mm3      Monocytes, Absolute 1.60 10*3/mm3      Eosinophils, Absolute 0.20 10*3/mm3      Basophils, Absolute 0.10 10*3/mm3      nRBC 0.1 /100 WBC     AFP Tumor Marker [[id number]]  (Abnormal) Collected: 10/19/20 1741    Specimen: Blood Updated: 10/20/20 0223     ALPHA-FETOPROTEIN 32.60 ng/mL     Narrative:      Alpha Fetoprotein Tumor Marker Reference Range:    0.0-8.3 ng/mL    Note: Normal values apply only to males and nonpregnant females. These results are not interpretable for pregnant females.    Results may be falsely decreased if patient taking Biotin.      CEA [[id number]] Collected: 10/19/20 1741    Specimen: Blood Updated: 10/20/20 0156     CEA 8.27 ng/mL     Narrative:      CEA Reference Range:    Non Smokers:   Less than 3 ng/mL  Smokers:       Less than 5 ng/mL  Results may be falsely decreased if patient taking Biotin.      Vitamin B12 [[id number]]  (Normal) Collected: 10/19/20 1741    Specimen: Blood Updated: 10/20/20 0131     Vitamin B-12 905 pg/mL     Narrative:       Results may be falsely increased if patient taking Biotin.      Folate [[id number]]  (Normal) Collected: 10/19/20 1741    Specimen: Blood Updated: 10/20/20 0131     Folate 7.29 ng/mL     Narrative:      Results may be falsely increased if patient taking Biotin.      Haptoglobin [[id number]]  (Abnormal) Collected: 10/19/20 1741    Specimen: Blood Updated: 10/20/20 0127     Haptoglobin 563 mg/dL      Comment: Specimen hemolyzed. Results may be affected.       Cancer Antigen 19-9 [[id number]] Collected: 10/19/20 2111    Specimen: Blood Updated: 10/19/20 2122    Blood Culture - Blood, Arm, Left [[id number]] Collected: 10/18/20 2025    Specimen: Blood from Arm, Left Updated: 10/19/20 2045     Blood Culture No growth at 24 hours    CBC & Differential [[id number]]  (Abnormal) Collected: 10/19/20 1748    Specimen: Blood Updated: 10/19/20 1811    Narrative:      The following orders were created for panel order CBC & Differential.  Procedure                               Abnormality         Status                     ---------                               -----------         ------                     CBC Auto Differential[[id number]]        Abnormal            Final result                 Please view results for these tests on the individual orders.    CBC Auto Differential [[id number]]  (Abnormal) Collected: 10/19/20 1748    Specimen: Blood Updated: 10/19/20 1811     WBC 12.80 10*3/mm3      RBC 3.63 10*6/mm3      Hemoglobin 9.3 g/dL      Hematocrit 29.7 %      MCV 81.8 fL      MCH 25.5 pg      MCHC 31.2 g/dL      RDW 19.2 %      RDW-SD 55.6 fl      MPV 8.4 fL      Platelets 417 10*3/mm3      Neutrophil % 70.6 %      Lymphocyte % 14.5 %      Monocyte % 13.1 %      Eosinophil % 0.7 %      Basophil % 1.1 %      Neutrophils, Absolute 9.00 10*3/mm3      Lymphocytes, Absolute 1.90 10*3/mm3      Monocytes, Absolute 1.70 10*3/mm3      Eosinophils, Absolute 0.10 10*3/mm3      Basophils, Absolute 0.10 10*3/mm3      nRBC 0.0 /100 WBC      Reticulocytes [[id number]]  (Normal) Collected: 10/19/20 1748    Specimen: Blood Updated: 10/19/20 1811     Reticulocyte % 1.17 %      Reticulocyte Absolute 0.0425 10*6/mm3     Methylmalonic Acid, Serum [[id number]] Collected: 10/19/20 1741    Specimen: Blood Updated: 10/19/20 1805    Protein Electrophoresis, Total [[id number]] Collected: 10/19/20 1741    Specimen: Blood Updated: 10/19/20 1805    Blood Culture - Blood, Hand, Right [[id number]] Collected: 10/19/20 1741    Specimen: Blood from Hand, Right Updated: 10/19/20 1805    Ferritin [[id number]]  (Abnormal) Collected: 10/19/20 1527    Specimen: Blood Updated: 10/19/20 1735     Ferritin 909.30 ng/mL     Narrative:      Results may be falsely decreased if patient taking Biotin.      Lactate Dehydrogenase [[id number]]  (Abnormal) Collected: 10/19/20 1527    Specimen: Blood Updated: 10/19/20 1734      U/L     Iron Profile [[id number]]  (Abnormal) Collected: 10/19/20 1527    Specimen: Blood Updated: 10/19/20 1734     Iron 16 mcg/dL      Iron Saturation 10 %      Transferrin 107 mg/dL      TIBC 159 mcg/dL     Magnesium [[id number]]  (Normal) Collected: 10/19/20 1527    Specimen: Blood Updated: 10/19/20 1734     Magnesium 2.0 mg/dL     Phosphorus [[id number]]  (Normal) Collected: 10/19/20 1527    Specimen: Blood Updated: 10/19/20 1734     Phosphorus 2.7 mg/dL     Reticulocytes [[id number]]  (Normal) Collected: 10/19/20 1527    Specimen: Blood Updated: 10/19/20 1707     Reticulocyte % 1.10 %      Reticulocyte Absolute 0.0366 10*6/mm3     Comprehensive Metabolic Panel [[id number]]  (Abnormal) Collected: 10/19/20 1527    Specimen: Blood Updated: 10/19/20 1645     Glucose 111 mg/dL      BUN 27 mg/dL      Creatinine 1.69 mg/dL      Sodium 139 mmol/L      Potassium 4.3 mmol/L      Chloride 102 mmol/L      CO2 28.0 mmol/L      Calcium 7.9 mg/dL      Total Protein 5.5 g/dL      Albumin 2.30 g/dL      ALT (SGPT) 63 U/L      AST (SGOT) 134 U/L      Alkaline Phosphatase 213 U/L       Total Bilirubin 0.2 mg/dL      eGFR Non African Amer 39 mL/min/1.73      Globulin 3.2 gm/dL      A/G Ratio 0.7 g/dL      BUN/Creatinine Ratio 16.0     Anion Gap 9.0 mmol/L     Narrative:      GFR Normal >60  Chronic Kidney Disease <60  Kidney Failure <15      CK [[id number]]  (Abnormal) Collected: 10/19/20 1527    Specimen: Blood Updated: 10/19/20 1644     Creatine Kinase 2,699 U/L     CBC & Differential [[id number]]  (Abnormal) Collected: 10/19/20 1527    Specimen: Blood Updated: 10/19/20 1604    Narrative:      The following orders were created for panel order CBC & Differential.  Procedure                               Abnormality         Status                     ---------                               -----------         ------                     CBC Auto Differential[[id number]]        Abnormal            Final result                 Please view results for these tests on the individual orders.    CBC Auto Differential [[id number]]  (Abnormal) Collected: 10/19/20 1527    Specimen: Blood Updated: 10/19/20 1604     WBC 11.60 10*3/mm3      RBC 3.35 10*6/mm3      Hemoglobin 8.5 g/dL      Hematocrit 27.2 %      MCV 81.2 fL      MCH 25.3 pg      MCHC 31.2 g/dL      RDW 18.9 %      RDW-SD 53.8 fl      MPV 8.1 fL      Platelets 394 10*3/mm3      Neutrophil % 73.0 %      Lymphocyte % 11.5 %      Monocyte % 13.9 %      Eosinophil % 0.6 %      Basophil % 1.0 %      Neutrophils, Absolute 8.40 10*3/mm3      Lymphocytes, Absolute 1.30 10*3/mm3      Monocytes, Absolute 1.60 10*3/mm3      Eosinophils, Absolute 0.10 10*3/mm3      Basophils, Absolute 0.10 10*3/mm3      nRBC 0.0 /100 WBC         No radiology results for the last day     · 9/14/2020 patient had a CT of the abdomen and pelvis completed for right upper quadrant abdominal pain, elevated WBC, and elevated alk phos.  CT of the abdomen and pelvis showed small amount of increased density with fatty soft tissues overlying the inferior tip of the right lobe of the liver  of uncertain etiology might be caused by inflammation or infection or scarring.  Clinical correlation would be helpful.  This does not appear to be associated with the gallbladder or in the pancreas or the right kidney and has uncertain etiology.  This finding could be connected to the duodenum might be caused by peptic ulcer disease.  Clinical.  Correlation with pancreas enzymes to be helpful as well.  Small tiny amount of fluid in the pelvis might be caused by the diverticulosis in the sigmoid colon.  This may be because of mild diverticulitis.  · 9/23/2020 ultrasound of the abdomen showed patchy areas of hypoechogenicity in the right lobe of the liver of uncertain etiology might be caused by liver cancer metastatic liver disease or cirrhosis of the liver possible fatty infiltrated areas liver parenchyma admixed with more normal liver parenchyma.  A CT liver with contrast MRI of the liver without and with contrast to further evaluate the abnormality was recommended.   · 10/5/2020: MRI of the abdomen was completed that showed inhomogenous ill-defined patchy areas of signal around the in the liver parenchyma in the inferior aspect of the right lobe of the liver and involved the inferior tip the right lobe of the liver of uncertain etiology.  This disease process appears to break through the liver The posterior medial aspect of the inferior tip of the right lobe of the liver.  The disease process in the liver parenchyma appears to extend into the fatty soft tissue adjacent to the inferior medial aspect of the inferior tip of the right lobe of the liver.  There Is a small amount of fluid surrounding the inferior tip of the right lobe of the liver.  · 10/14/2020 CT-guided core biopsy of the liver showed well to moderately differentiated adenocarcinoma.  The tumor is strongly positive for only CK7.  CK20, CDX2, TTF-1, Napsin A, chromogranin, synaptophysin and CD56 are negative.  Is a nonspecific staining pattern, but  can be seen in tumors of pancreaticobiliary origin, including Reema angiosarcoma which is favored in this case.  ·   I reviewed the patient's new imaging results and agree with the interpretation.  I reviewed the patient's other test results and agree with the interpretation    Assessment/Plan     Principal Problem:    Rhabdomyolysis  Active Problems:    Liver mass, right lobe    Had a long discussion with the patient and his son at bedside today.  While the patient certainly is of advanced age and has medical comorbidities of hypertension, hyperlipidemia, GERD, and COPD on oxygen, he does however perform his activities of daily living and until his most recent episode of syncope and fall was living independently.    On imaging the patient appears to have a locally advanced right hepatic lobe cholangiocarcinoma.  Surgical resection of this would require at least a right hepatectomy and possibly extended right hepatectomy.  Given his advanced age and comorbidities, I think he would be too high risk to undergo the surgery.  I am not sure that the benefits of resection would outweigh the potential harm.  However, I do not offer this surgery, nor do any of my partners at this institution.  Before telling the patient that he is not a surgical candidate, he should be evaluated by a hepatobiliary surgeon to ensure that they agree with this assessment.  This will likely need to be done as an outpatient at either the [facility] or [facility].    There are local regional treatment modalities available; however, given its peripheral location, this could result in tumor rupture and have catastrophic consequences.    I will defer the decision on systemic therapies to oncology.    Recommend referral to hepatobiliary surgeon and management per oncology.  Please call with questions.    Pramod Wyatt MD  10/20/20  11:59 EDT

## 2020-10-20 NOTE — DISCHARGE PLACEMENT REQUEST
"Carmita Angeles (85 y.o. Male)     Date of Birth Social Security Number Address Home Phone MRN    [id number]  [id number]  [facility] [place] [facility] [phone] [id number]    Catholic Marital Status          None Single       Admission Date Admission Type Admitting Provider Attending Provider Department, Room/Bed    10/18/20 Urgent Jese Mason MD Gad, George Fayez Labib Youssief, MD [address] MEDICAL INPATIENT, 240/1    Discharge Date Discharge Disposition Discharge Destination                       Attending Provider: Jese Mason MD    Allergies: No Known Allergies    Isolation: None   Infection: None   Code Status: CPR    Ht: 175.3 cm (69.02\")   Wt: 100 kg (220 lb 8 oz)    Admission Cmt: None   Principal Problem: Rhabdomyolysis [M62.82]                 Active Insurance as of 10/18/2020     Primary Coverage     Payor Plan Insurance Group Employer/Plan Group    MEDICARE MEDICARE A & B      Payor Plan Address Payor Plan Phone Number Payor Plan Fax Number Effective Dates    [address] [phone]  8/1/2000 - None Entered    [facility] [id number]       Subscriber Name Subscriber Birth Date Member ID       CARMITA ANGELES 1935 [id number]           Secondary Coverage     Payor Plan Insurance Group Employer/Plan Group    [employer]                Payor Plan Address Payor Plan Phone Number Payor Plan Fax Number Effective Dates    [address] [address]   11/2/2010 - None Entered    [facility] [address]       Subscriber Name Subscriber Birth Date Member ID       CARMITA ANGELES 1935 [id number]                 Emergency Contacts      (Rel.) Home Phone Work Phone Mobile Phone    REMA TELLES (Sister) [phone] -- [phone]    JOSE FISHER (Grandchild) [phone] -- [phone]               Physician Progress Notes (most recent note)      Jese Mason MD at 10/19/20 [address] [place] [address] " [facility] Medicine Services Daily Progress Note      Hospitalist Team  LOS 1 days      Patient Care Team:  Ann Marie Hodgson MD as PCP - General (Family Medicine)    Patient Location: 240/1      Subjective   Subjective     Chief Complaint / Subjective  \foot pain        Brief Synopsis of Hospital Course/HPI  Patient is an 85-year-old male with multiple comorbidities including COPD with chronic hypoxic respiratory failure on home oxygen, hypertension and hyperlipidemia.  Was recently found to have right upper lobe liver mass on imaging for which he underwent liver biopsy on 10/14/2020 at [facility]-biopsy results still pending.     He presented to [facility] ED on 10/17/2020 after granddaughter found him lying on the floor between 3 AM and 9:30 AM.  At the ED patient was noted to be hypotensive.  Blood work showed leukocytosis with WBC of 21,000, procalcitonin 26 and hemoglobin of 10.2.  Serum creatinine was noted to be 2.8, patient's baseline serum creatinine is around 1.8.  CK was 15,000 patient also had indeterminant troponin elevation though EKG did not show any findings suggestive of acute ischemia.  Patient was diagnosed with rhabdomyolysis, acute on chronic kidney disease and sepsis of unknown source.     He was admitted to the hospitalist service at [facility] where he was started on empirical antibiotics with IV vancomycin, IV Zosyn and IV Flagyl.  He was also given IV normal saline along with D5 water with bicarb drip.  Blood cultures were obtained.  While in [facility] he had a fever spike.  Due to concern for possible liver abscess family requested the patient to be transferred to [facility] for further evaluation.         Date::    10/19/2020  Patient complaining of right foot pain and concerned about his fall.  Imaging did not have any x-rays done in [facility].  X-rays ordered right foot and leg.  He reports being lying down on the  "floor for several hours.  This might explain his rhabdomyolysis.  Labs yesterday showed worsening hemoglobin drop  We will check again labs today  No active bleeding per patient.  Sister at the bedside  PT OT evaluation noted      ROS  All other systems reviewed and are negative    Objective   Objective      Vital Signs  Temp:  [98 °F (36.7 °C)-99 °F (37.2 °C)] 98 °F (36.7 °C)  Heart Rate:  [71-94] 77  Resp:  [14-18] 16  BP: ()/(50-68) 134/68  Oxygen Therapy  SpO2: 95 %  Pulse Oximetry Type: Intermittent  Device (Oxygen Therapy): nasal cannula  Flow (L/min): 2  Flowsheet Rows      First Filed Value   Admission Height  175.3 cm (69.02\") Documented at 10/19/2020 0030   Admission Weight  98.8 kg (217 lb 14.4 oz) Documented at 10/18/2020 1500        Intake & Output (last 3 days)       10/16 0701 - 10/17 0700 10/17 0701 - 10/18 0700 10/18 0701 - 10/19 0700 10/19 0701 - 10/20 0700    P.O.   400 300    Other    575    Total Intake(mL/kg)   400 (4) 875 (8.8)    Urine (mL/kg/hr)   1000 550 (0.6)    Stool   0 0    Total Output   1000 550    Net   -600 +325            Stool Unmeasured Occurrence   1 x 1 x        Lines, Drains & Airways    Active LDAs     Name:   Placement date:   Placement time:   Site:   Days:    Peripheral IV 10/18/20 1717 Anterior;Right Forearm   10/18/20    1717    Forearm   less than 1                  Physical Exam:    Physical Exam  Vitals signs and nursing note reviewed.   Constitutional:       General: He is not in acute distress.     Appearance: Normal appearance. He is well-developed. He is obese. He is not ill-appearing, toxic-appearing or diaphoretic.   HENT:      Head: Normocephalic and atraumatic.      Right Ear: Ear canal and external ear normal.      Left Ear: Ear canal and external ear normal.      Nose: Nose normal. No congestion or rhinorrhea.      Mouth/Throat:      Mouth: Mucous membranes are moist.      Pharynx: No oropharyngeal exudate.   Eyes:      General: No scleral icterus.   "      Right eye: No discharge.         Left eye: No discharge.      Extraocular Movements: Extraocular movements intact.      Conjunctiva/sclera: Conjunctivae normal.      Pupils: Pupils are equal, round, and reactive to light.   Neck:      Musculoskeletal: Normal range of motion and neck supple. No neck rigidity or muscular tenderness.      Thyroid: No thyromegaly.      Vascular: No carotid bruit or JVD.      Trachea: No tracheal deviation.   Cardiovascular:      Rate and Rhythm: Normal rate and regular rhythm.      Pulses: Normal pulses.      Heart sounds: Normal heart sounds. No murmur. No friction rub. No gallop.    Pulmonary:      Effort: Pulmonary effort is normal. No respiratory distress.      Breath sounds: Normal breath sounds. No stridor. No wheezing, rhonchi or rales.   Chest:      Chest wall: No tenderness.   Abdominal:      General: Bowel sounds are normal. There is no distension.      Palpations: Abdomen is soft. There is hepatomegaly. There is no mass.      Tenderness: There is no abdominal tenderness. There is no guarding or rebound.      Hernia: No hernia is present.      Comments: Noted hepatomegaly   Musculoskeletal: Normal range of motion.         General: No swelling, tenderness, deformity or signs of injury.      Right lower leg: No edema.      Left lower leg: No edema.   Lymphadenopathy:      Cervical: No cervical adenopathy.   Skin:     General: Skin is warm and dry.      Coloration: Skin is not jaundiced or pale.      Findings: No bruising, erythema or rash.   Neurological:      General: No focal deficit present.      Mental Status: He is alert and oriented to person, place, and time. Mental status is at baseline.      Cranial Nerves: No cranial nerve deficit.      Sensory: No sensory deficit.      Motor: No weakness or abnormal muscle tone.      Coordination: Coordination normal.   Psychiatric:         Mood and Affect: Mood normal.         Behavior: Behavior normal.         Thought Content:  Thought content normal.         Judgment: Judgment normal.              Wounds (last 24 hours)      LDA Wound     Row Name 10/19/20 0300 10/18/20 2300 10/18/20 2000       Wound 10/18/20 1719 Right anterior knee Abrasion    Wound - Properties Group Placement Date: 10/18/20  -SW Placement Time: 1719 -SW Present on Hospital Admission: Y  -SW Side: Right  -SW Orientation: anterior  -SW Location: knee  -SW Primary Wound Type: Abrasion  -SW    Dressing Appearance  dry;intact  -KS  dry;intact  -KS  dry;intact  -KS    Closure  Adhesive bandage  -KS  --  Adhesive bandage  -KS    Base  --  --  moist;red  -KS    Drainage Amount  none  -KS  none  -KS  none  -KS    Retired Wound - Properties Group Date first assessed: 10/18/20  -SW Time first assessed: 1719 -SW Present on Hospital Admission: Y  -SW Side: Right  -SW Location: knee  -SW Primary Wound Type: Abrasion  -SW       Wound 10/18/20 1719 Left anterior knee Abrasion    Wound - Properties Group Placement Date: 10/18/20  -SW Placement Time: 1719 -SW Present on Hospital Admission: Y  -SW Side: Left  -SW Orientation: anterior  -SW Location: knee  -SW Primary Wound Type: Abrasion  -SW    Dressing Appearance  dry;intact  -KS  dry;intact  -KS  dry;intact  -KS    Closure  Adhesive bandage  -KS  Adhesive bandage  -KS  Adhesive bandage  -KS    Base  moist  -KS  --  moist;red  -KS    Drainage Amount  none  -KS  none  -KS  none  -KS    Retired Wound - Properties Group Date first assessed: 10/18/20  -SW Time first assessed: 1719 -SW Present on Hospital Admission: Y  -SW Side: Left  -SW Location: knee  -SW Primary Wound Type: Abrasion  -SW    Row Name 10/18/20 1715             Wound 10/18/20 1719 Right anterior knee Abrasion    Wound - Properties Group Placement Date: 10/18/20  -SW Placement Time: 1719 -SW Present on Hospital Admission: Y  -SW Side: Right  -SW Orientation: anterior  -SW Location: knee  -SW Primary Wound Type: Abrasion  -SW    Dressing Appearance  dry;intact  -SW       Closure  Adhesive bandage  -SW      Base  moist;red  -SW      Drainage Amount  none  -SW      Retired Wound - Properties Group Date first assessed: 10/18/20  -SW Time first assessed: 1719 -SW Present on Hospital Admission: Y  -SW Side: Right  -SW Location: knee  -SW Primary Wound Type: Abrasion  -SW       Wound 10/18/20 1719 Left anterior knee Abrasion    Wound - Properties Group Placement Date: 10/18/20  -SW Placement Time: 1719 -SW Present on Hospital Admission: Y  -SW Side: Left  -SW Orientation: anterior  -SW Location: knee  -SW Primary Wound Type: Abrasion  -SW    Dressing Appearance  dry;intact  -SW      Closure  Adhesive bandage  -SW      Base  moist;red  -SW      Drainage Amount  none  -SW      Retired Wound - Properties Group Date first assessed: 10/18/20  -SW Time first assessed: 1719 -SW Present on Hospital Admission: Y  -SW Side: Left  -SW Location: knee  -SW Primary Wound Type: Abrasion  -SW      User Key  (r) = Recorded By, (t) = Taken By, (c) = Cosigned By    Initials Name Provider Type    Keysha Loza RN Registered Nurse    Wilda Smith RN Registered Nurse          Procedures:              Results Review:     I reviewed the patient's new clinical results.      Lab Results (last 24 hours)     Procedure Component Value Units Date/Time    Comprehensive Metabolic Panel [[id number]]  (Abnormal) Collected: 10/19/20 1527    Specimen: Blood Updated: 10/19/20 1645     Glucose 111 mg/dL      BUN 27 mg/dL      Creatinine 1.69 mg/dL      Sodium 139 mmol/L      Potassium 4.3 mmol/L      Chloride 102 mmol/L      CO2 28.0 mmol/L      Calcium 7.9 mg/dL      Total Protein 5.5 g/dL      Albumin 2.30 g/dL      ALT (SGPT) 63 U/L      AST (SGOT) 134 U/L      Alkaline Phosphatase 213 U/L      Total Bilirubin 0.2 mg/dL      eGFR Non African Amer 39 mL/min/1.73      Globulin 3.2 gm/dL      A/G Ratio 0.7 g/dL      BUN/Creatinine Ratio 16.0     Anion Gap 9.0 mmol/L     Narrative:      GFR Normal  >60  Chronic Kidney Disease <60  Kidney Failure <15      CK [[id number]]  (Abnormal) Collected: 10/19/20 1527    Specimen: Blood Updated: 10/19/20 1644     Creatine Kinase 2,699 U/L     CBC & Differential [[id number]]  (Abnormal) Collected: 10/19/20 1527    Specimen: Blood Updated: 10/19/20 1604    Narrative:      The following orders were created for panel order CBC & Differential.  Procedure                               Abnormality         Status                     ---------                               -----------         ------                     CBC Auto Differential[[id number]]        Abnormal            Final result                 Please view results for these tests on the individual orders.    CBC Auto Differential [[id number]]  (Abnormal) Collected: 10/19/20 1527    Specimen: Blood Updated: 10/19/20 1604     WBC 11.60 10*3/mm3      RBC 3.35 10*6/mm3      Hemoglobin 8.5 g/dL      Hematocrit 27.2 %      MCV 81.2 fL      MCH 25.3 pg      MCHC 31.2 g/dL      RDW 18.9 %      RDW-SD 53.8 fl      MPV 8.1 fL      Platelets 394 10*3/mm3      Neutrophil % 73.0 %      Lymphocyte % 11.5 %      Monocyte % 13.9 %      Eosinophil % 0.6 %      Basophil % 1.0 %      Neutrophils, Absolute 8.40 10*3/mm3      Lymphocytes, Absolute 1.30 10*3/mm3      Monocytes, Absolute 1.60 10*3/mm3      Eosinophils, Absolute 0.10 10*3/mm3      Basophils, Absolute 0.10 10*3/mm3      nRBC 0.0 /100 WBC     CK [[id number]]  (Abnormal) Collected: 10/19/20 0321    Specimen: Blood Updated: 10/19/20 0500     Creatine Kinase 3,847 U/L     Basic Metabolic Panel [[id number]]  (Abnormal) Collected: 10/19/20 0321    Specimen: Blood Updated: 10/19/20 0447     Glucose 107 mg/dL      BUN 28 mg/dL      Creatinine 1.80 mg/dL      Sodium 140 mmol/L      Potassium 4.2 mmol/L      Chloride 101 mmol/L      CO2 28.0 mmol/L      Calcium 8.8 mg/dL      eGFR Non African Amer 36 mL/min/1.73      BUN/Creatinine Ratio 15.6     Anion Gap 11.0 mmol/L     Narrative:      GFR  Normal >60  Chronic Kidney Disease <60  Kidney Failure <15      CBC Auto Differential [[id number]]  (Abnormal) Collected: 10/19/20 0321    Specimen: Blood Updated: 10/19/20 0424     WBC 14.10 10*3/mm3      RBC 3.56 10*6/mm3      Hemoglobin 8.9 g/dL      Hematocrit 29.0 %      MCV 81.6 fL      MCH 24.9 pg      MCHC 30.5 g/dL      RDW 18.6 %      RDW-SD 52.9 fl      MPV 8.3 fL      Platelets 430 10*3/mm3      Neutrophil % 68.7 %      Lymphocyte % 16.7 %      Monocyte % 13.3 %      Eosinophil % 0.9 %      Basophil % 0.4 %      Neutrophils, Absolute 9.60 10*3/mm3      Lymphocytes, Absolute 2.30 10*3/mm3      Monocytes, Absolute 1.90 10*3/mm3      Eosinophils, Absolute 0.10 10*3/mm3      Basophils, Absolute 0.10 10*3/mm3      nRBC 0.0 /100 WBC     CK [[id number]]  (Abnormal) Collected: 10/18/20 2026    Specimen: Blood Updated: 10/18/20 2117     Creatine Kinase 3,908 U/L     Comprehensive Metabolic Panel [[id number]]  (Abnormal) Collected: 10/18/20 2026    Specimen: Blood Updated: 10/18/20 2104     Glucose 152 mg/dL      BUN 29 mg/dL      Creatinine 1.92 mg/dL      Sodium 137 mmol/L      Potassium 3.7 mmol/L      Chloride 100 mmol/L      CO2 26.0 mmol/L      Calcium 7.7 mg/dL      Total Protein 5.2 g/dL      Albumin 2.20 g/dL      ALT (SGPT) 52 U/L      AST (SGOT) 146 U/L      Alkaline Phosphatase 138 U/L      Total Bilirubin 0.2 mg/dL      eGFR Non African Amer 33 mL/min/1.73      Globulin 3.0 gm/dL      A/G Ratio 0.7 g/dL      BUN/Creatinine Ratio 15.1     Anion Gap 11.0 mmol/L     Narrative:      GFR Normal >60  Chronic Kidney Disease <60  Kidney Failure <15      CBC Auto Differential [[id number]]  (Abnormal) Collected: 10/18/20 2026    Specimen: Blood Updated: 10/18/20 2045     WBC 12.00 10*3/mm3      RBC 3.17 10*6/mm3      Hemoglobin 8.0 g/dL      Hematocrit 25.9 %      MCV 81.7 fL      MCH 25.3 pg      MCHC 31.0 g/dL      RDW 18.4 %      RDW-SD 52.9 fl      MPV 7.5 fL      Platelets 358 10*3/mm3      Neutrophil % 74.3 %       Lymphocyte % 12.1 %      Monocyte % 12.8 %      Eosinophil % 0.6 %      Basophil % 0.2 %      Neutrophils, Absolute 9.00 10*3/mm3      Lymphocytes, Absolute 1.50 10*3/mm3      Monocytes, Absolute 1.50 10*3/mm3      Eosinophils, Absolute 0.10 10*3/mm3      Basophils, Absolute 0.00 10*3/mm3      nRBC 0.1 /100 WBC     Blood Culture - Blood, Arm, Left [[id number]] Collected: 10/18/20 2025    Specimen: Blood from Arm, Left Updated: 10/18/20 2040        No results found for: HGBA1C  Results from last 7 days   Lab Units 10/14/20  0813   INR  0.98           No results found for: LIPASE  No results found for: CHOL, CHLPL, TRIG, HDL, LDL, LDLDIRECT    Lab Results   Lab Value Date/Time    INTRAOP  10/14/2020 1009     TP: Positive for malignancy.     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 Immunohistochemistry was performed utilizing appropriate controls and the tumor is strongly positive for only CK7. The CK20, CDX2, TTF-1, napsin A, chromogranin, synaptophysin and CD56 are all negative. This is a nonspecific staining pattern but can be seen in tumors of pancreatobiliary origin, including cholangiocarcinoma which is favored in this case. Adenocarcinomas of the upper gastrointestinal tract can also display only CK7 positivity and should be excluded clinically. This staining pattern argues against, but does not completely exclude a metastasis from a primary lung adenocarcinoma as approximately 10 to 15% of lung adenocarcinomas are negative for TTF-1 and napsin A. This staining pattern does exclude a neuroendocrine tumor and adenocarcinoma of colonic origin. Further clinical workup with imaging studies and serum tumor markers is recommended.          Microbiology Results (last 10 days)     ** No results found for the  last 240 hours. **          ECG/EMG Results (most recent)     None                    Ct Needle Biopsy Liver    Result Date: 10/14/2020  Technically successful CT-guided percutaneous liver mass core biopsy procedure as described above.  Electronically Signed By-Kalia Herrera On:10/14/2020 12:18 PM This report was finalized on [id number] by  Kalia Herrera, .          Xrays, labs reviewed personally by physician.    Medication Review:   I have reviewed the patient's current medication list      Scheduled Meds  allopurinol, 100 mg, Oral, Daily  pantoprazole, 40 mg, Intravenous, Q AM  piperacillin-tazobactam, 3.375 g, Intravenous, Q8H  sodium bicarbonate, 650 mg, Oral, TID  sodium chloride, 10 mL, Intravenous, Q12H        Meds Infusions  Pharmacy to Dose Zosyn,   sodium chloride, 200 mL/hr, Last Rate: 100 mL/hr (10/18/20 2127)        Meds PRN  •  acetaminophen **OR** acetaminophen **OR** acetaminophen  •  ipratropium-albuterol  •  melatonin  •  nitroglycerin  •  ondansetron **OR** ondansetron  •  Pharmacy to Dose Zosyn  •  sodium chloride        Assessment/Plan   Assessment/Plan     Active Hospital Problems    Diagnosis  POA   • **Rhabdomyolysis [M62.82]  Yes   • Liver mass, right lobe [R16.0]  Yes      Resolved Hospital Problems   No resolved problems to display.       MEDICAL DECISION MAKING COMPLEXITY BY PROBLEM:   Rhabdomyolysis  Due to prolonged immobilization  Improving  CK on the day of transfer 6000  Monitor daily  Increase IV fluids  Add p.o. bicarb  Watch for fluid overload  Monitor and correct electrolytes as needed    Acute kidney injury on CKD stage III.  Probably due to rhabdomyolysis above.  Baseline serum creatinine 1.8  Avoid nephrotoxic agents.  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  Status post liver biopsy 10/14/2020  Biopsy reports reviewed  Consult GI  Oncologist consulted    Hypotensive on presentation to the [facility] ED  Systolic blood pressure in the low 100s  Continue to hold BP medications-atenolol, Norvasc, losartan and furosemide     GERD-on PPI     COPD-not in exacerbation  Respiratory care with bronchodilators  Oxygen therapy and titration           VTE Prophylaxis -SCD  VTE Prophylaxis -   Mechanical Order History:      Ordered        10/18/20 2004  Place Sequential Compression Device  Once         10/18/20 2004  Maintain Sequential Compression Device  Continuous                 Pharmalogical Order History:     None            Code Status -   Code Status and Medical Interventions:   Ordered at: 10/18/20 2004     Level Of Support Discussed With:    Patient     Code Status:    CPR     Medical Interventions (Level of Support Prior to Arrest):    Full       This patient has been examined wearing appropriate Personal Protective Equipment and discussed with hospital infection control department. 10/19/20        Discharge Planning  Home versus rehab          Electronically signed by Jese Mason MD, 10/19/20, 16:47 EDT.  [facility] [place], operated by [facility] Hospitalist Team        Electronically signed by Jese Mason MD at 10/19/20 1649          Physical Therapy Notes (most recent note)      Myrna Gary, PT at 10/19/20 1121  Version 1 of 1         Patient Name: Tristen Garcia  : 1935    MRN: [id number]                              Today's Date: 10/19/2020       Admit Date: 10/18/2020    Visit Dx: No diagnosis found.  Patient Active Problem List   Diagnosis   • Liver mass, right lobe     Past Medical History:   Diagnosis Date   • Arthritis    •  Cancer (CMS/[facility])    • COPD (chronic obstructive pulmonary disease) (CMS/[facility])    • Elevated cholesterol    • GERD (gastroesophageal reflux disease)    • Hyperlipidemia    • Hypertension      Past Surgical History:   Procedure Laterality Date   • COLONOSCOPY     • EYE SURGERY     • SKIN BIOPSY       General Information     Row Name 10/19/20 1108          Physical Therapy Time and Intention    Document Type  evaluation  -     Mode of Treatment  physical therapy  -     Row Name 10/19/20 1108          General Information    Patient Profile Reviewed  yes  -     Prior Level of Function  independent:;feeding;grooming;dressing;bathing;bed mobility;driving  -     Existing Precautions/Restrictions  fall  -     Row Name 10/19/20 1108          Living Environment    Lives With  alone  -     Row Name 10/19/20 1108          Home Main Entrance    Number of Stairs, Main Entrance  none RAMP  -     Stair Railings, Main Entrance  none  -     Row Name 10/19/20 1108          Cognition    Orientation Status (Cognition)  oriented x 3  -     Row Name 10/19/20 1108          Safety Issues, Functional Mobility    Impairments Affecting Function (Mobility)  balance;endurance/activity tolerance;strength;postural/trunk control  -       User Key  (r) = Recorded By, (t) = Taken By, (c) = Cosigned By    Initials Name Provider Type     Myrna Gary PT Physical Therapist        Mobility     Row Name 10/19/20 1110          Bed Mobility    Bed Mobility  rolling right;supine-sit  -     Rolling Right Lena (Bed Mobility)  standby assist  -     Supine-Sit Lena (Bed Mobility)  minimum assist (75% patient effort)  -     Assistive Device (Bed Mobility)  bed rails;head of bed elevated  -     Row Name 10/19/20 1110          Sit-Stand Transfer    Sit-Stand Lena (Transfers)  minimum assist (75% patient effort)  -     Assistive Device (Sit-Stand Transfers)  walker, front-wheeled  -     Row Name 10/19/20 1110           Gait/Stairs (Locomotion)    Kings Level (Gait)  contact guard  -     Assistive Device (Gait)  walker, front-wheeled  -     Distance in Feet (Gait)  5  -     Deviations/Abnormal Patterns (Gait)  gait speed decreased;stride length decreased  -     Bilateral Gait Deviations  forward flexed posture  -       User Key  (r) = Recorded By, (t) = Taken By, (c) = Cosigned By    Initials Name Provider Type     Myrna Gary PT Physical Therapist        Obj/Interventions     Row Name 10/19/20 1111          Range of Motion Comprehensive    General Range of Motion  bilateral lower extremity ROM WFL  -     Row Name 10/19/20 1111          Strength Comprehensive (MMT)    Comment, General Manual Muscle Testing (MMT) Assessment  4/5 [place] LE grossly  -     Row Name 10/19/20 1111          Balance    Balance Assessment  sitting static balance;sitting dynamic balance;standing static balance;standing dynamic balance  -     Static Sitting Balance  mild impairment  -     Dynamic Sitting Balance  mild impairment  -     Static Standing Balance  mild impairment  -     Dynamic Standing Balance  moderate impairment  -     Balance Interventions  sitting;standing;sit to stand;supported;static;dynamic;minimal challenge  -       User Key  (r) = Recorded By, (t) = Taken By, (c) = Cosigned By    Initials Name Provider Type     Myrna Gary PT Physical Therapist        Goals/Plan     Row Name 10/19/20 1112          Bed Mobility Goal 1 (PT)    Activity/Assistive Device (Bed Mobility Goal 1, PT)  rolling to left;rolling to right;sit to supine;scooting;supine to sit  -     [place] Level/Cues Needed (Bed Mobility Goal 1, PT)  modified independence  -     Time Frame (Bed Mobility Goal 1, PT)  short term goal (STG);2 weeks  -     Row Name 10/19/20 1112          Transfer Goal 1 (PT)    Activity/Assistive Device (Transfer Goal 1, PT)  sit-to-stand/stand-to-sit;bed-to-chair/chair-to-bed;toilet  -      [place] Level/Cues Needed (Transfer Goal 1, PT)  modified independence  -WC     Time Frame (Transfer Goal 1, PT)  2 weeks;short term goal (STG)  -     Row Name 10/19/20 1112          Gait Training Goal 1 (PT)    Activity/Assistive Device (Gait Training Goal 1, PT)  gait (walking locomotion);increase endurance/gait distance;decrease fall risk;walker, rolling  -     [place] Level (Gait Training Goal 1, PT)  modified independence  -WC     Distance (Gait Training Goal 1, PT)  45  -WC     Time Frame (Gait Training Goal 1, PT)  short term goal (STG);2 weeks  -     Row Name 10/19/20 1112          Patient Education Goal (PT)    Activity (Patient Education Goal, PT)  LE exercises  -     [place]/Cues/Accuracy (Memory Goal 2, PT)  demonstrates adequately;verbalizes understanding  -WC     Time Frame (Patient Education Goal, PT)  short term goal (STG);2 weeks  -       User Key  (r) = Recorded By, (t) = Taken By, (c) = Cosigned By    Initials Name Provider Type    Myrna Ma, PT Physical Therapist        Clinical Impression     Row Name 10/19/20 1111          Pain    Additional Documentation  Pain Scale: FACES Pre/Post-Treatment (Group)  -     Row Name 10/19/20 1111          Pain Scale: FACES Pre/Post-Treatment    Pain: FACES Scale, Pretreatment  2-->hurts little bit  -     Posttreatment Pain Rating  2-->hurts little bit  -     Pain Location  abdomen  -     Row Name 10/19/20 1111          Plan of Care Review    Plan of Care Reviewed With  patient  -     Row Name 10/19/20 1111          Therapy Assessment/Plan (PT)    Rehab Potential (PT)  fair, will monitor progress closely  -     Criteria for Skilled Interventions Met (PT)  yes  -WC     Predicted Duration of Therapy Intervention (PT)  Until D/C  -     Row Name 10/19/20 1111          Vital Signs    Pre Patient Position  Supine  -WC     Intra Patient Position  Standing  -WC     Post Patient Position  Sitting  -     Row Name 10/19/20  1111          Positioning and Restraints    Pre-Treatment Position  in bed  -     Post Treatment Position  chair  -       User Key  (r) = Recorded By, (t) = Taken By, (c) = Cosigned By    Initials Name Provider Type    Myrna Ma PT Physical Therapist        Outcome Measures     Row Name 10/19/20 1114          How much help from another person do you currently need...    Turning from your back to your side while in flat bed without using bedrails?  4  -WC     Moving from lying on back to sitting on the side of a flat bed without bedrails?  3  -WC     Moving to and from a bed to a chair (including a wheelchair)?  3  -WC     Standing up from a chair using your arms (e.g., wheelchair, bedside chair)?  3  -WC     Climbing 3-5 steps with a railing?  2  -WC     To walk in hospital room?  3  -WC     AM-PAC 6 Clicks Score (PT)  18  -     Row Name 10/19/20 1114          Modified [place] Scale    Pre-Stroke Modified [place] Scale  4 - Moderately severe disability.  Unable to walk without assistance, and unable to attend to own bodily needs without assistance.  -     Row Name 10/19/20 1114          Functional Assessment    Outcome Measure Options  Modified [place]  -       User Key  (r) = Recorded By, (t) = Taken By, (c) = Cosigned By    Initials Name Provider Type    Myrna Ma PT Physical Therapist        Physical Therapy Education                 Title: PT OT SLP Therapies (Done)     Topic: Physical Therapy (Done)     Point: Mobility training (Done)     Learning Progress Summary           Patient Acceptance, E,TB, VU by  at 10/19/2020 1115                   Point: Body mechanics (Done)     Learning Progress Summary           Patient Acceptance, E,TB, VU by  at 10/19/2020 1115                   Point: Precautions (Done)     Learning Progress Summary           Patient Acceptance, E,TB, VU by  at 10/19/2020 1115                               User Key     Initials Effective Dates Name Provider Type  Discipline     01/07/20 -  Myrna Gary PT Physical Therapist PT              PT Recommendation and Plan  Pt is an 84 yo male ADM to the hospital with DX rhabdomyolysis, live massright lobe. Pt lives alone. Pt has a ramp to enter home.  Prior pt was independent with mobility in the home and community with out AD. Pt was driving self. Today pt as alert and oriented x 4. Pt needed MIN A supine to sit, sit to stand MIN A with FWW and CGA FWW 5 feet to chair. Pt reports he is improving however is afraid he will not be able to manage at home alone.      Pt is below baseline recommendation is D/C to IP Rehab. PPE: Mask, eyeshield, gloves.    Planned Therapy Interventions (PT): balance training, bed mobility training, gait training, home exercise program, patient/family education, postural re-education, neuromuscular re-education, strengthening, transfer training  Plan of Care Reviewed With: patient     Time Calculation:   PT Charges     Row Name 10/19/20 1120             Time Calculation    Start Time  0954  -WC      Stop Time  1019  -      Time Calculation (min)  25 min  -      PT Received On  10/19/20  -      PT - Next Appointment  10/20/20  -      PT Goal Re-Cert Due Date  11/02/20  -         Time Calculation- PT    TCU Minutes- PT  15 min  -        User Key  (r) = Recorded By, (t) = Taken By, (c) = Cosigned By    Initials Name Provider Type     Myrna Gary PT Physical Therapist        Therapy Charges for Today     Code Description Service Date Service Provider Modifiers Qty    94879234170 HC PT EVAL MOD COMPLEXITY 3 10/19/2020 Myrna Gary, PT GP 1    33252801319  PT THERAPEUTIC ACT EA 15 MIN 10/19/2020 Myrna Gary PT GP 1          PT G-Codes  Outcome Measure Options: Modified [place]  AM-PAC 6 Clicks Score (PT): 18    Myrna Gary PT  10/19/2020      Electronically signed by Myrna Gary PT at 10/19/20 1122       Occupational Therapy Notes (most recent note)    No notes exist for this  encounter.

## 2020-10-20 NOTE — TELEPHONE ENCOUNTER
Caller: JOSE FISHER    Relationship to patient: GRAND DAUGHTER    Best call back number: [phone]    DR GUTHRIE SAW THE PT IN THE HOSPITAL. GRAND DAUGHTER STATES DR GUTHRIE WAS SUPPOSED TO CALL HER TO DISCUSS HER GRANDFATHER AND HAS NOT HEARD FROM HIM YET.

## 2020-10-20 NOTE — PLAN OF CARE
Goal Outcome Evaluation:  Plan of Care Reviewed With: patient  Progress: improving  Outcome Summary: Patient on 3.5L O2 via nasal cannula and able to sleep comfortably through the night without complaint.  Patient is awaiting consult with general surgery pertaining to liver mass.  Patient receiving IV abx and fluid therapy; tolerating well.  Will continue to monitor.

## 2020-10-21 VITALS
WEIGHT: 220.5 LBS | OXYGEN SATURATION: 92 % | HEIGHT: 69 IN | DIASTOLIC BLOOD PRESSURE: 71 MMHG | SYSTOLIC BLOOD PRESSURE: 137 MMHG | BODY MASS INDEX: 32.66 KG/M2 | TEMPERATURE: 97.6 F | HEART RATE: 80 BPM | RESPIRATION RATE: 16 BRPM

## 2020-10-21 LAB
ALBUMIN SERPL-MCNC: 2.3 G/DL (ref 3.5–5.2)
ALBUMIN/GLOB SERPL: 0.8 G/DL
ALP SERPL-CCNC: 183 U/L (ref 39–117)
ALT SERPL W P-5'-P-CCNC: 48 U/L (ref 1–41)
ANION GAP SERPL CALCULATED.3IONS-SCNC: 8 MMOL/L (ref 5–15)
AST SERPL-CCNC: 68 U/L (ref 1–40)
BASOPHILS # BLD AUTO: 0.1 10*3/MM3 (ref 0–0.2)
BASOPHILS NFR BLD AUTO: 0.8 % (ref 0–1.5)
BILIRUB SERPL-MCNC: 0.3 MG/DL (ref 0–1.2)
BUN SERPL-MCNC: 15 MG/DL (ref 8–23)
BUN/CREAT SERPL: 13.6 (ref 7–25)
CALCIUM SPEC-SCNC: 8.5 MG/DL (ref 8.6–10.5)
CHLORIDE SERPL-SCNC: 104 MMOL/L (ref 98–107)
CK SERPL-CCNC: 928 U/L (ref 20–200)
CO2 SERPL-SCNC: 28 MMOL/L (ref 22–29)
CREAT SERPL-MCNC: 1.1 MG/DL (ref 0.76–1.27)
DEPRECATED RDW RBC AUTO: 54.7 FL (ref 37–54)
EOSINOPHIL # BLD AUTO: 0.3 10*3/MM3 (ref 0–0.4)
EOSINOPHIL NFR BLD AUTO: 2.6 % (ref 0.3–6.2)
ERYTHROCYTE [DISTWIDTH] IN BLOOD BY AUTOMATED COUNT: 18.7 % (ref 12.3–15.4)
GFR SERPL CREATININE-BSD FRML MDRD: 64 ML/MIN/1.73
GLOBULIN UR ELPH-MCNC: 2.8 GM/DL
GLUCOSE SERPL-MCNC: 97 MG/DL (ref 65–99)
HCT VFR BLD AUTO: 26.3 % (ref 37.5–51)
HGB BLD-MCNC: 8.2 G/DL (ref 13–17.7)
LYMPHOCYTES # BLD AUTO: 1.8 10*3/MM3 (ref 0.7–3.1)
LYMPHOCYTES NFR BLD AUTO: 17.5 % (ref 19.6–45.3)
MAGNESIUM SERPL-MCNC: 1.7 MG/DL (ref 1.6–2.4)
MCH RBC QN AUTO: 25.6 PG (ref 26.6–33)
MCHC RBC AUTO-ENTMCNC: 31.2 G/DL (ref 31.5–35.7)
MCV RBC AUTO: 81.8 FL (ref 79–97)
MONOCYTES # BLD AUTO: 1.6 10*3/MM3 (ref 0.1–0.9)
MONOCYTES NFR BLD AUTO: 15.7 % (ref 5–12)
NEUTROPHILS NFR BLD AUTO: 6.6 10*3/MM3 (ref 1.7–7)
NEUTROPHILS NFR BLD AUTO: 63.4 % (ref 42.7–76)
NRBC BLD AUTO-RTO: 0.1 /100 WBC (ref 0–0.2)
PHOSPHATE SERPL-MCNC: 2.5 MG/DL (ref 2.5–4.5)
PLATELET # BLD AUTO: 427 10*3/MM3 (ref 140–450)
PMV BLD AUTO: 7.4 FL (ref 6–12)
POTASSIUM SERPL-SCNC: 4 MMOL/L (ref 3.5–5.2)
PROT SERPL-MCNC: 5.1 G/DL (ref 6–8.5)
RBC # BLD AUTO: 3.22 10*6/MM3 (ref 4.14–5.8)
SARS-COV-2 RNA RESP QL NAA+PROBE: NOT DETECTED
SODIUM SERPL-SCNC: 140 MMOL/L (ref 136–145)
WBC # BLD AUTO: 10.4 10*3/MM3 (ref 3.4–10.8)

## 2020-10-21 PROCEDURE — 99239 HOSP IP/OBS DSCHRG MGMT >30: CPT | Performed by: INTERNAL MEDICINE

## 2020-10-21 PROCEDURE — 85025 COMPLETE CBC W/AUTO DIFF WBC: CPT | Performed by: INTERNAL MEDICINE

## 2020-10-21 PROCEDURE — 83735 ASSAY OF MAGNESIUM: CPT | Performed by: INTERNAL MEDICINE

## 2020-10-21 PROCEDURE — 84100 ASSAY OF PHOSPHORUS: CPT | Performed by: INTERNAL MEDICINE

## 2020-10-21 PROCEDURE — 82550 ASSAY OF CK (CPK): CPT | Performed by: INTERNAL MEDICINE

## 2020-10-21 PROCEDURE — 80053 COMPREHEN METABOLIC PANEL: CPT | Performed by: INTERNAL MEDICINE

## 2020-10-21 PROCEDURE — 97116 GAIT TRAINING THERAPY: CPT

## 2020-10-21 PROCEDURE — 99233 SBSQ HOSP IP/OBS HIGH 50: CPT | Performed by: INTERNAL MEDICINE

## 2020-10-21 PROCEDURE — 25010000002 PIPERACILLIN SOD-TAZOBACTAM PER 1 G: Performed by: INTERNAL MEDICINE

## 2020-10-21 RX ORDER — SODIUM BICARBONATE 650 MG/1
650 TABLET ORAL 3 TIMES DAILY
Qty: 9 TABLET | Refills: 0 | Status: SHIPPED | OUTPATIENT
Start: 2020-10-21 | End: 2020-10-24

## 2020-10-21 RX ORDER — ALLOPURINOL 100 MG/1
100 TABLET ORAL DAILY
Start: 2020-10-22 | End: 2020-12-03

## 2020-10-21 RX ORDER — FUROSEMIDE 40 MG/1
40 TABLET ORAL DAILY
Start: 2020-10-23 | End: 2021-02-11 | Stop reason: HOSPADM

## 2020-10-21 RX ADMIN — SODIUM CHLORIDE 200 ML/HR: 9 INJECTION, SOLUTION INTRAVENOUS at 10:30

## 2020-10-21 RX ADMIN — Medication 10 ML: at 08:02

## 2020-10-21 RX ADMIN — PIPERACILLIN AND TAZOBACTAM 3.38 G: 3; .375 INJECTION, POWDER, LYOPHILIZED, FOR SOLUTION INTRAVENOUS at 10:47

## 2020-10-21 RX ADMIN — SODIUM BICARBONATE 650 MG TABLET 650 MG: at 08:02

## 2020-10-21 RX ADMIN — PIPERACILLIN AND TAZOBACTAM 3.38 G: 3; .375 INJECTION, POWDER, LYOPHILIZED, FOR SOLUTION INTRAVENOUS at 04:01

## 2020-10-21 RX ADMIN — ALLOPURINOL 100 MG: 100 TABLET ORAL at 08:03

## 2020-10-21 RX ADMIN — PANTOPRAZOLE SODIUM 40 MG: 40 INJECTION, POWDER, FOR SOLUTION INTRAVENOUS at 06:14

## 2020-10-21 RX ADMIN — SODIUM BICARBONATE 650 MG TABLET 650 MG: at 15:48

## 2020-10-21 NOTE — PLAN OF CARE
Problem: Adult Inpatient Plan of Care  Goal: Plan of Care Review  Recent Flowsheet Documentation  Taken 10/21/2020 1234 by Myrna Gary, PT  Progress: improving  Plan of Care Reviewed With: patient    Pt was alert and oriented x 3. Pt had no c/o pain. Pt needed MIN A bed mobility, CGA sit to stand FWW, CGA gait 5 feet to bedside chair. Pt needed assist for comfort positioning. Pt needed VC leg exercises in sitting. Pt is still below baseline with mobility, strength and balance. Recommendation is D/C to IP Rehab. PPE: Mask, eyeshield, gloves.

## 2020-10-21 NOTE — THERAPY TREATMENT NOTE
Patient Name: Tristen Garcia  : 1935    MRN: [id number]                              Today's Date: 10/21/2020       Admit Date: 10/18/2020    Visit Dx: No diagnosis found.  Patient Active Problem List   Diagnosis   • Liver mass, right lobe   • Rhabdomyolysis     Past Medical History:   Diagnosis Date   • Arthritis    • Cancer (CMS/HCC)    • COPD (chronic obstructive pulmonary disease) (CMS/HCC)    • Elevated cholesterol    • GERD (gastroesophageal reflux disease)    • Hyperlipidemia    • Hypertension      Past Surgical History:   Procedure Laterality Date   • COLONOSCOPY     • EYE SURGERY     • SKIN BIOPSY       General Information     Row Name 10/21/20 1233          Physical Therapy Time and Intention    Document Type  therapy note (daily note)  -WC     Mode of Treatment  physical therapy  -     Row Name 10/21/20 1233          General Information    Patient Profile Reviewed  yes  -     Row Name 10/21/20 1233          Cognition    Orientation Status (Cognition)  oriented x 3  -WC       User Key  (r) = Recorded By, (t) = Taken By, (c) = Cosigned By    Initials Name Provider Type    WC Myrna Gary PT Physical Therapist        Mobility     Row Name 10/21/20 1233          Bed Mobility    Bed Mobility  rolling right;supine-sit  -WC     Rolling Right [place] (Bed Mobility)  minimum assist (75% patient effort)  -WC     Supine-Sit [place] (Bed Mobility)  minimum assist (75% patient effort)  -WC     Row Name 10/21/20 1233          Sit-Stand Transfer    Sit-Stand [place] (Transfers)  contact guard  -WC     Assistive Device (Sit-Stand Transfers)  walker, front-wheeled  -WC     Row Name 10/21/20 1233          Gait/Stairs (Locomotion)    [place] Level (Gait)  contact guard  -WC     Assistive Device (Gait)  walker, front-wheeled  -WC     Distance in Feet (Gait)  5  -WC     Deviations/Abnormal Patterns (Gait)  stride length decreased  -WC     Bilateral Gait Deviations  heel strike decreased  -WC        User Key  (r) = Recorded By, (t) = Taken By, (c) = Cosigned By    Initials Name Provider Type    Myrna Ma PT Physical Therapist        Obj/Interventions     Row Name 10/21/20 1234          Range of Motion Comprehensive    General Range of Motion  bilateral lower extremity ROM WFL  -     Row Name 10/21/20 1234          Balance    Balance Assessment  sitting static balance;sitting dynamic balance;standing static balance;standing dynamic balance  -     Static Sitting Balance  WFL  -     Dynamic Sitting Balance  mild impairment  -WC     Static Standing Balance  mild impairment  -WC     Dynamic Standing Balance  mild impairment  -WC     Balance Interventions  sitting;standing;sit to stand;supported;static;dynamic;minimal challenge  -       User Key  (r) = Recorded By, (t) = Taken By, (c) = Cosigned By    Initials Name Provider Type    Myrna Ma PT Physical Therapist        Goals/Plan    No documentation.       Clinical Impression     Row Name 10/21/20 1234          Pain    Additional Documentation  Pain Scale: FACES Pre/Post-Treatment (Group)  -     Row Name 10/21/20 1234          Pain Scale: FACES Pre/Post-Treatment    Pain: FACES Scale, Pretreatment  0-->no hurt  -     Posttreatment Pain Rating  0-->no hurt  -     Row Name 10/21/20 1234          Plan of Care Review    Plan of Care Reviewed With  patient  -     Progress  improving  -     Row Name 10/21/20 1234          Therapy Assessment/Plan (PT)    Rehab Potential (PT)  fair, will monitor progress closely  -     Criteria for Skilled Interventions Met (PT)  yes;skilled treatment is necessary  -     Predicted Duration of Therapy Intervention (PT)  Until D/C  -     Row Name 10/21/20 1234          Vital Signs    O2 Delivery Pre Treatment  supplemental O2  -     O2 Delivery Intra Treatment  supplemental O2  -WC     Pre Patient Position  Supine  -WC     Intra Patient Position  Standing  -WC     Post Patient Position  Sitting   -     Row Name 10/21/20 1234          Positioning and Restraints    Pre-Treatment Position  in bed  -WC     Post Treatment Position  chair  -       User Key  (r) = Recorded By, (t) = Taken By, (c) = Cosigned By    Initials Name Provider Type    Myrna Ma PT Physical Therapist        Outcome Measures     Row Name 10/21/20 1235          How much help from another person do you currently need...    Turning from your back to your side while in flat bed without using bedrails?  3  -WC     Moving from lying on back to sitting on the side of a flat bed without bedrails?  3  -WC     Moving to and from a bed to a chair (including a wheelchair)?  3  -WC     Standing up from a chair using your arms (e.g., wheelchair, bedside chair)?  3  -WC     Climbing 3-5 steps with a railing?  2  -WC     To walk in hospital room?  3  -WC     AM-PAC 6 Clicks Score (PT)  17  -     Row Name 10/21/20 1235          Modified [place] Scale    Pre-Stroke Modified [place] Scale  4 - Moderately severe disability.  Unable to walk without assistance, and unable to attend to own bodily needs without assistance.  -       User Key  (r) = Recorded By, (t) = Taken By, (c) = Cosigned By    Initials Name Provider Type    Myrna Ma PT Physical Therapist        Physical Therapy Education                 Title: PT OT SLP Therapies (Done)     Topic: Physical Therapy (Done)     Point: Mobility training (Done)     Learning Progress Summary           Patient Acceptance, E,TB, VU by  at 10/21/2020 1236    Acceptance, E,TB, VU by AB at 10/19/2020 1502    Acceptance, E,TB, VU by WC at 10/19/2020 1115   Family Acceptance, E,TB, VU by AB at 10/19/2020 1502                   Point: Body mechanics (Done)     Learning Progress Summary           Patient Acceptance, E,TB, VU by  at 10/21/2020 1236    Acceptance, E,TB, VU by AB at 10/19/2020 1502    Acceptance, E,TB, VU by WC at 10/19/2020 1115   Family Acceptance, E,TB, VU by AB at 10/19/2020 1502                    Point: Precautions (Done)     Learning Progress Summary           Patient Acceptance, E,TB, VU by  at 10/21/2020 1236    Acceptance, E,TB, VU by AB at 10/19/2020 1502    Acceptance, E,TB, VU by  at 10/19/2020 1115   Family Acceptance, E,TB, VU by AB at 10/19/2020 1502                               User Key     Initials Effective Dates Name Provider Type Discipline     01/07/20 -  Myrna Gary PT Physical Therapist PT    AB 07/20/20 -  Chilango Mendez OT Student OT Student OT              PT Recommendation and Plan    Pt was alert and oriented x 3. Pt had no c/o pain. Pt needed MIN A bed mobility, CGA sit to stand FWW, CGA gait 5 feet to bedside chair. Pt needed assist for comfort positioning. Pt needed VC leg exercises in sitting. Pt is still below baseline with mobility, strength and balance.     Recommendation is D/C to IP Rehab. PPE: Mask, eyeshield, gloves.     Planned Therapy Interventions (PT): balance training, bed mobility training, gait training, home exercise program, patient/family education, postural re-education, neuromuscular re-education, strengthening, transfer training  Plan of Care Reviewed With: patient  Progress: improving     Time Calculation:   PT Charges     Row Name 10/21/20 1238             Time Calculation    Start Time  1210  -      Stop Time  1229  -      Time Calculation (min)  19 min  -      PT Received On  10/21/20  -      PT - Next Appointment  10/22/20  -      PT Goal Re-Cert Due Date  11/04/20  -         Time Calculation- PT    TCU Minutes- PT  19 min  -        User Key  (r) = Recorded By, (t) = Taken By, (c) = Cosigned By    Initials Name Provider Type     Myrna Gary PT Physical Therapist        Therapy Charges for Today     Code Description Service Date Service Provider Modifiers Qty    21246114921 HC GAIT TRAINING EA 15 MIN 10/21/2020 Myrna Gary PT GP 1          PT G-Codes  Outcome Measure Options: Modified [place]  AM-PAC 6 Clicks  Score (PT): 17    Myrna Gary, PT  10/21/2020

## 2020-10-21 NOTE — PROGRESS NOTES
Hematology/Oncology Inpatient Progress Note    PATIENT NAME: Tristen Garcia  : 1935  MRN: [id number]    CHIEF COMPLAINT: Generalized weakness    HISTORY OF PRESENT ILLNESS:    Tristen Garcia is a 85 y.o. male who presented to [facility] on 10/18/2020 as a transfer from [facility].  Patient went to [facility] ED on 10/17/2020 after his granddaughter found him lying on the floor between 3 AM and 9:30 AM. He has lost about 30 to 40 pounds over the last couple of months. Patient was noted to have leukocytosis with WBC of 21,000, anemia with hemoglobin of 10.2, and elevated creatinine of 2.8.  Patient was diagnosed with rhabdomyolysis, acute on chronic kidney disease, and sepsis at [facility].  He was started on antibiotics and given IV fluids.  Blood cultures were drawn.  There was concern for liver abscess and patient's family requested patient be transferred to [facility] for further evaluation.     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   · 2020 patient had a CT of the abdomen and pelvis completed for right upper quadrant abdominal pain, elevated WBC, and elevated alk phos.  CT of the abdomen and pelvis showed small amount of increased density with fatty soft tissues overlying the inferior tip of the right lobe of the liver of uncertain etiology might be caused by inflammation or infection or scarring.  Clinical correlation would be helpful.  This does not appear to be associated with the gallbladder or in the pancreas or the right kidney and has uncertain etiology.  This finding could be connected to the duodenum might be caused by peptic ulcer disease.  Clinical.  Correlation with pancreas enzymes to be helpful as well.  Small tiny amount of fluid in the pelvis might be caused by the diverticulosis in the sigmoid colon.  This may be because of mild  diverticulitis.  · 9/23/2020 ultrasound of the abdomen showed patchy areas of hypoechogenicity in the right lobe of the liver of uncertain etiology might be caused by liver cancer metastatic liver disease or cirrhosis of the liver possible fatty infiltrated areas liver parenchyma admixed with more normal liver parenchyma.  A CT liver with contrast MRI of the liver without and with contrast to further evaluate the abnormality was recommended.   · 10/5/2020: MRI of the abdomen was completed that showed inhomogenous ill-defined patchy areas of signal around the in the liver parenchyma in the inferior aspect of the right lobe of the liver and involved the inferior tip the right lobe of the liver of uncertain etiology.  This disease process appears to break through the liver The posterior medial aspect of the inferior tip of the right lobe of the liver.  The disease process in the liver parenchyma appears to extend into the fatty soft tissue adjacent to the inferior medial aspect of the inferior tip of the right lobe of the liver.  There Is a small amount of fluid surrounding the inferior tip of the right lobe of the liver.  · 10/14/2020 CT-guided core biopsy of the liver showed well to moderately differentiated adenocarcinoma.  The tumor is strongly positive for only CK7.  CK20, CDX2, TTF-1, Napsin A, chromogranin, synaptophysin and CD56 are negative.  Is a nonspecific staining pattern, but can be seen in tumors of pancreaticobiliary origin, including Reema angiosarcoma which is favored in this case.  · Anemia studies: , haptoglobin 563, triglycerides 1.10%, ferritin 909.3, iron 16, iron saturation 10%, transferrin 107, TIBC 159, folate 7.29.     He  has a past medical history of Arthritis, Cancer (CMS/HCC), COPD (chronic obstructive pulmonary disease) (CMS/HCC), Elevated cholesterol, GERD (gastroesophageal reflux disease), Hyperlipidemia, and Hypertension.     PCP: Ann Marie Hodgson MD    Subjective   Patient  "seen this morning.  He understands some of the details of the planned treatment related to his cancer.  Understands that his not considered candidate for surgery.  He states he is feeling better.  Pain is better.  Ready to go to rehab.  ROS:  Review of Systems   Constitutional: Positive for fatigue and fever. Negative for chills.   HENT: Negative for mouth sores and nosebleeds.    Eyes: Negative for photophobia and visual disturbance.   Respiratory: Negative for shortness of breath.    Cardiovascular: Negative for leg swelling.   Gastrointestinal: Negative for abdominal pain, diarrhea, nausea and vomiting.   Endocrine: Negative for cold intolerance and heat intolerance.   Genitourinary: Negative for difficulty urinating.   Musculoskeletal: Negative for arthralgias, back pain and myalgias.   Skin: Negative for rash and wound.   Neurological: Positive for weakness. Negative for dizziness and headaches.   Psychiatric/Behavioral: Negative for confusion. The patient is not nervous/anxious.       MEDICATIONS:    Scheduled Meds:  allopurinol, 100 mg, Oral, Daily  pantoprazole, 40 mg, Intravenous, Q AM  piperacillin-tazobactam, 3.375 g, Intravenous, Q8H  sodium bicarbonate, 650 mg, Oral, TID  sodium chloride, 10 mL, Intravenous, Q12H       Continuous Infusions:  Pharmacy to Dose Zosyn,   sodium chloride, 200 mL/hr, Last Rate: 200 mL/hr (10/21/20 1030)       PRN Meds:  •  acetaminophen **OR** acetaminophen **OR** acetaminophen  •  ipratropium-albuterol  •  melatonin  •  nitroglycerin  •  ondansetron **OR** ondansetron  •  Pharmacy to Dose Zosyn  •  sodium chloride     ALLERGIES:  No Known Allergies    Objective    VITALS:   /71 (BP Location: Left arm, Patient Position: Lying)   Pulse 80   Temp 97.6 °F (36.4 °C) (Oral)   Resp 16   Ht 175.3 cm (69.02\")   Wt 100 kg (220 lb 8 oz)   SpO2 92%   BMI 32.55 kg/m²     PHYSICAL EXAM: (performed by MD)  Physical Exam  Constitutional:       General: He is not in acute " distress.     Appearance: Normal appearance. He is obese. He is not ill-appearing, toxic-appearing or diaphoretic.   HENT:      Head: Normocephalic and atraumatic.   Eyes:      General: No scleral icterus.        Right eye: No discharge.         Left eye: No discharge.      Extraocular Movements: Extraocular movements intact.   Neck:      Musculoskeletal: No neck rigidity.   Cardiovascular:      Rate and Rhythm: Normal rate.      Pulses: Normal pulses.      Heart sounds: Normal heart sounds.   Pulmonary:      Effort: No respiratory distress.      Breath sounds: Normal breath sounds. No wheezing.   Abdominal:      General: Bowel sounds are normal.      Palpations: Abdomen is soft.      Tenderness: There is abdominal tenderness.      Comments: Tenderness to RUQ   Musculoskeletal:         General: No swelling or tenderness.      Right lower leg: No edema.      Left lower leg: No edema.   Lymphadenopathy:      Cervical: No cervical adenopathy.   Skin:     General: Skin is warm.      Coloration: Skin is not pale.      Findings: No erythema.   Neurological:      General: No focal deficit present.      Mental Status: He is alert and oriented to person, place, and time.      Motor: No weakness.      Gait: Gait normal.   Psychiatric:         Mood and Affect: Mood normal.         Behavior: Behavior normal.         Thought Content: Thought content normal.         RECENT LABS:  Lab Results (last 24 hours)     Procedure Component Value Units Date/Time    Blood Culture - Blood, Hand, Right [[id number]] Collected: 10/19/20 1741    Specimen: Blood from Hand, Right Updated: 10/21/20 1815     Blood Culture No growth at 2 days    COVID-19,[facility], NP SWAB IN [facility]EXAR VIRAL TRANSPORT MEDIA 24-30 HR TAT - Swab, Nasopharynx [[id number]] Collected: 10/20/20 1225    Specimen: Swab from Nasopharynx Updated: 10/21/20 1405     SARS-CoV-2 URMILA Not Detected    CK [[id number]]  (Abnormal) Collected: 10/21/20 0350    Specimen: Blood Updated: 10/21/20  0444     Creatine Kinase 928 U/L     Comprehensive Metabolic Panel [[id number]]  (Abnormal) Collected: 10/21/20 0350    Specimen: Blood Updated: 10/21/20 0444     Glucose 97 mg/dL      BUN 15 mg/dL      Creatinine 1.10 mg/dL      Sodium 140 mmol/L      Potassium 4.0 mmol/L      Chloride 104 mmol/L      CO2 28.0 mmol/L      Calcium 8.5 mg/dL      Total Protein 5.1 g/dL      Albumin 2.30 g/dL      ALT (SGPT) 48 U/L      AST (SGOT) 68 U/L      Alkaline Phosphatase 183 U/L      Total Bilirubin 0.3 mg/dL      eGFR Non African Amer 64 mL/min/1.73      Globulin 2.8 gm/dL      A/G Ratio 0.8 g/dL      BUN/Creatinine Ratio 13.6     Anion Gap 8.0 mmol/L     Narrative:      GFR Normal >60  Chronic Kidney Disease <60  Kidney Failure <15      Magnesium [[id number]]  (Normal) Collected: 10/21/20 0350    Specimen: Blood Updated: 10/21/20 0444     Magnesium 1.7 mg/dL     Phosphorus [[id number]]  (Normal) Collected: 10/21/20 0350    Specimen: Blood Updated: 10/21/20 0444     Phosphorus 2.5 mg/dL     CBC & Differential [[id number]]  (Abnormal) Collected: 10/21/20 0350    Specimen: Blood Updated: 10/21/20 0415    Narrative:      The following orders were created for panel order CBC & Differential.  Procedure                               Abnormality         Status                     ---------                               -----------         ------                     CBC Auto Differential[[id number]]        Abnormal            Final result                 Please view results for these tests on the individual orders.    CBC Auto Differential [[id number]]  (Abnormal) Collected: 10/21/20 0350    Specimen: Blood Updated: 10/21/20 0415     WBC 10.40 10*3/mm3      RBC 3.22 10*6/mm3      Hemoglobin 8.2 g/dL      Hematocrit 26.3 %      MCV 81.8 fL      MCH 25.6 pg      MCHC 31.2 g/dL      RDW 18.7 %      RDW-SD 54.7 fl      MPV 7.4 fL      Platelets 427 10*3/mm3      Neutrophil % 63.4 %      Lymphocyte % 17.5 %      Monocyte % 15.7 %       Eosinophil % 2.6 %      Basophil % 0.8 %      Neutrophils, Absolute 6.60 10*3/mm3      Lymphocytes, Absolute 1.80 10*3/mm3      Monocytes, Absolute 1.60 10*3/mm3      Eosinophils, Absolute 0.30 10*3/mm3      Basophils, Absolute 0.10 10*3/mm3      nRBC 0.1 /100 WBC     Occult Blood, Fecal By Immunoassay - Stool, Per Rectum [[id number]]  (Abnormal) Collected: 10/20/20 2159    Specimen: Stool from Per Rectum Updated: 10/20/20 2207     Occult Blood, Fecal by Immunoassay Positive    Blood Culture - Blood, Arm, Left [[id number]] Collected: 10/18/20 2025    Specimen: Blood from Arm, Left Updated: 10/20/20 2045     Blood Culture No growth at 2 days          PENDING RESULTS: Protein electrophoresis, blood cultures (final), fecal occult blood test    IMAGING REVIEWED:  No radiology results for the last day    Assessment/Plan   ASSESSMENT:  1. Liver mass resulting well to moderately differentiated adenocarcinoma: S/p liver biopsy 10/14/2020. Cholangiocarcinoma favored.   CEA 8.27, AFP 32.60, CA 19-9  3,215. GI consulted.  Patient stable not likely operable, and patient not a surgical candidate.  General surgery consulted-surgical resection would require a right hepatectomy and possible extended right hepatectomy.  Patient is high risk for surgery.  General surgeon Dr. Wyatt personally does not feel patient would tolerate such a major surgery, however he ultimately recommends evaluation by hepatobiliary surgeon.  2. Leukocytosis: Mild.  Blood cultures drawn.  On antibiotics.  3. Anemia: Mild.  Will obtain further anemia studies.  4. Rhabdomyolysis: CK on day of transfer was 6000.  CK today 3847.  Managed per primary team.  5. Acute kidney injury/CKD stage III: Consider relation to rhabdomyolysis.  6. GERD/COPD: Managed per primary team    PLAN:  1. Discussed with granddaughter and patient.  They have come to an understanding that he would not be a candidate for surgery.  2. Discussed with radiation oncologist Dr. Wilkinson.  He  will evaluate him to see if he would be a candidate for radiation  3. NGS testing to be ordered.  4. He would still be a candidate for regimen such as Gemox,, dose reduced FOLFOX etc.  5. If treatment with chemotherapy, will order molecular studies to diagnose somatic mutations including FGF R2, PD-L1, MSI/MMR status, IDH 1, and TRK, etc.  6. Appreciate GI input  7. Appreciate general surgery input  8. Continue antibiotics per primary team  9. Further follow-up as outpatient    Electronically signed by Aamir Garcia MD, 10/21/20, 7:47 PM EDT.

## 2020-10-21 NOTE — DISCHARGE SUMMARY
[facility] Medicine Services  DISCHARGE SUMMARY        Prepared For PCP:  Ann Marie Hodgson MD    Patient Name: Tristen Garcia  : 1935  MRN: [id number]      Date of Admission:   10/18/2020    Date of Discharge:  10/21/2020    Length of stay:  LOS: 3 days     Hospital Course     Presenting Problem:   Liver mass, right lobe [R16.0]      Active Hospital Problems    Diagnosis  POA   • **Rhabdomyolysis [M62.82]  Yes   • Liver mass, right lobe [R16.0]  Yes      Resolved Hospital Problems   No resolved problems to display.       Rhabdomyolysis  Due to prolonged immobilization  Improving  CK on the day of transfer 6000  Monitor daily  Increase IV fluids  Add p.o. bicarb  Watch for fluid overload  Monitor and correct electrolytes as needed     Acute kidney injury on CKD stage III.  Probably due to rhabdomyolysis above.  Baseline serum creatinine 1.8  Avoid nephrotoxic agents.  Serum creatinine on the day of transfer was 2.1  Consider nephrology evaluation if not improving     Leukocytosis  Was said to have WBC of 21,000 on presentation  Likely related to his rhabdomyolysis  [facility]  Continue Zosyn for now  CT abdomen pelvis reviewed no evidence of collection noted but stranding around the surgical track for biopsy  No clear source of infection from transfer facility        Liver mass, possible cholangiocarcinoma  Status post liver biopsy 10/14/2020  Biopsy reports reviewed  Consult GI  Oncologist consulted  General surgeon consulted and recommending gastrointestinal surgery referral in [place]     Hypotensive on presentation to the [facility] ED  Systolic blood pressure in the low 100s  Restart atenolol today or tomorrow  Consider restarting Lasix in the next 2 to 3 days if renal function stable  Blood pressure improved    GERD-on PPI     COPD-not in exacerbation  Respiratory care with bronchodilators  Oxygen therapy and titration        Disposition [facility]  hospital swing bed    Hospital Course:  Tristen Garcia is a 85 y.o. male     Patient is an 85-year-old male with multiple comorbidities including COPD with chronic hypoxic respiratory failure on home oxygen, hypertension and hyperlipidemia.  Was recently found to have right upper lobe liver mass on imaging for which he underwent liver biopsy on 10/14/2020 at [facility]-biopsy results still pending.     He presented to [facility] ED on 10/17/2020 after granddaughter found him lying on the floor between 3 AM and 9:30 AM.  At the ED patient was noted to be hypotensive.  Blood work showed leukocytosis with WBC of 21,000, procalcitonin 26 and hemoglobin of 10.2.  Serum creatinine was noted to be 2.8, patient's baseline serum creatinine is around 1.8.  CK was 15,000 patient also had indeterminant troponin elevation though EKG did not show any findings suggestive of acute ischemia.  Patient was diagnosed with rhabdomyolysis, acute on chronic kidney disease and sepsis of unknown source.     He was admitted to the hospitalist service at [facility] where he was started on empirical antibiotics with IV vancomycin, IV Zosyn and IV Flagyl.  He was also given IV normal saline along with D5 water with bicarb drip.  Blood cultures were obtained.  While in [facility] he had a fever spike.  Due to concern for possible liver abscess family requested the patient to be transferred to [facility] for further evaluation      After admission  Patient was seen by GI oncologist and general surgery.  Patient was given IV fluids for rhabdomyolysis and no culprit medications were involved except the fact that patient had fallen had been on the floor for a long period of time.  His CK levels have improved dramatically and now below 1000  He hadInitial acute kidney injury that resolved with IV fluids  Patient continued on p.o. bicarb for additional 3 days in addition to holding his diuretics and  may start Trezix back in the next 2 to 3 days  Patient was cleared by GI service who recommended EGD and colonoscopy as an outpatient under general surgeon Dr. Medina recommended hepatobiliary surgery follow-up for confirmation of his opinion and further discussion of other options that might be available to the patient preferably at [facility] or [facility].  Patient is aware of the plan.  He feels much better.  His leukocytosis has resolved.  We will stop his laxatives related to rhabdomyolysis.  His blood cultures negative from [facility].  He has been afebrile here.  I think patient would not benefit from any additional antibiotic therapy that was started with Zosyn.  No collection noted on CT scan of the abdomen pelvis from outlying facility.      Recommendation for Outpatient Providers:     Follow-up BMP and CBC next 1 to 2 days  Consider repeat CPK in the next 2 to 3 days  May start Lasix after 2 to 3 days  Blood pressure medications may need to be readdressed once blood pressure starts to be above 140/90  Follow-up with [facility] or [facility] for hepatobiliary surgical opinion.  Follow-up with oncologist Dr. Garcia as instructed  Consider nephrology evaluation if renal function worsens.  Dr. Garcia does not think patient has an operable tumor at this time as well.  Patient was given the option for treatment and he ordered additional testing on the tumor specimen.  Palliative therapy was discussed by Dr. Garcia awaiting genetic studies          Reasons For Change In Medications and Indications for New Medications:        Day of Discharge     HPI:       Vital Signs:   Temp:  [97.6 °F (36.4 °C)-98 °F (36.7 °C)] 97.6 °F (36.4 °C)  Heart Rate:  [74-83] 80  Resp:  [16-18] 16  BP: (133-137)/(52-71) 137/71     Physical Exam:  Physical Exam  Awake alert oriented x3  Abdomen is mildly tender on the right lower quadrant but there is no collection or skin  lesions noted  There is some abdominal wall edema around the right lower quadrant and upper quadrant.    Pertinent  and/or Most Recent Results     Results from last 7 days   Lab Units 10/21/20  0350 10/20/20  0254 10/19/20  1748 10/19/20  1527 10/19/20  0321 10/18/20  2026   WBC 10*3/mm3 10.40 10.00 12.80* 11.60* 14.10* 12.00*   HEMOGLOBIN g/dL 8.2* 8.1* 9.3* 8.5* 8.9* 8.0*   HEMATOCRIT % 26.3* 26.0* 29.7* 27.2* 29.0* 25.9*   PLATELETS 10*3/mm3 427 416 417 394 430 358   SODIUM mmol/L 140 140  --  139 140 137   POTASSIUM mmol/L 4.0 4.0  --  4.3 4.2 3.7   CHLORIDE mmol/L 104 104  --  102 101 100   CO2 mmol/L 28.0 27.0  --  28.0 28.0 26.0   BUN mg/dL 15 25*  --  27* 28* 29*   CREATININE mg/dL 1.10 1.63*  --  1.69* 1.80* 1.92*   GLUCOSE mg/dL 97 94  --  111* 107* 152*   CALCIUM mg/dL 8.5* 8.0*  --  7.9* 8.8 7.7*     Results from last 7 days   Lab Units 10/21/20  0350 10/20/20  0254 10/19/20  1527 10/18/20  2026   BILIRUBIN mg/dL 0.3 0.3 0.2 0.2   ALK PHOS U/L 183* 168* 213* 138*   ALT (SGPT) U/L 48* 54* 63* 52*   AST (SGOT) U/L 68* 104* 134* 146*           Invalid input(s): TG, LDLCALC, LDLREALC        Brief Urine Lab Results     None          Microbiology Results Abnormal     Procedure Component Value - Date/Time    [facility] SWAB IN [facility] VIRAL TRANSPORT MEDIA 24-30 HR TAT - Swab, Nasopharynx [[id number]] Collected: 10/20/20 1225    Lab Status: Final result Specimen: Swab from Nasopharynx Updated: 10/21/20 1405     SARS-CoV-2 URMILA Not Detected    Blood Culture - Blood, Arm, Left [[id number]] Collected: 10/18/20 2025    Lab Status: Preliminary result Specimen: Blood from Arm, Left Updated: 10/20/20 2045     Blood Culture No growth at 2 days    Blood Culture - Blood, Hand, Right [[id number]] Collected: 10/19/20 1741    Lab Status: Preliminary result Specimen: Blood from Hand, Right Updated: 10/20/20 1815     Blood Culture No growth at 24 hours          Ct Needle Biopsy Liver    Result Date:  10/14/2020  Impression: Technically successful CT-guided percutaneous liver mass core biopsy procedure as described above.  Electronically Signed By-Kalia Herrera On:10/14/2020 12:18 PM This report was finalized on [id number] by  Kalia Herrera, .                             Test Results Pending at Discharge  Pending Labs     Order Current Status    Methylmalonic Acid, Serum In process    Blood Culture - Blood, Arm, Left Preliminary result    Blood Culture - Blood, Hand, Right Preliminary result            Procedures Performed           Consults:   Consults     Date and Time Order Name Status Description    10/19/2020 2018 Inpatient General Surgery Consult Completed     10/19/2020 1023 Hematology & Oncology Inpatient Consult      10/18/2020 2004 Inpatient Gastroenterology Consult Completed             Discharge Details        Discharge Medications      New Medications      Instructions Start Date   sodium bicarbonate 650 MG tablet   650 mg, Oral, 3 Times Daily         Changes to Medications      Instructions Start Date   allopurinol 100 MG tablet  Commonly known as: ZYLOPRIM  What changed:   · medication strength  · how much to take   100 mg, Oral, Daily   Start Date: October 22, 2020     furosemide 40 MG tablet  Commonly known as: LASIX  What changed: These instructions start on October 23, 2020. If you are unsure what to do until then, ask your doctor or other care provider.   40 mg, Oral, Daily   Start Date: October 23, 2020        Continue These Medications      Instructions Start Date   atenolol 25 MG tablet  Commonly known as: TENORMIN   25 mg, Oral, Daily      omeprazole 20 MG capsule  Commonly known as: priLOSEC   20 mg, Oral, Daily         Stop These Medications    amLODIPine 10 MG tablet  Commonly known as: NORVASC     losartan 50 MG tablet  Commonly known as: COZAAR            No Known Allergies      Discharge Disposition:  Skilled Nursing Facility (DC - External)    Diet:  Hospital:  Diet Order   Procedures    • Diet Texture; Soft to Chew         Discharge Activity:         CODE STATUS:    Code Status and Medical Interventions:   Ordered at: 10/18/20 2004     Level Of Support Discussed With:    Patient     Code Status:    CPR     Medical Interventions (Level of Support Prior to Arrest):    Full         Follow-up Appointments  No future appointments.          Condition on Discharge:      Stable      This patient has been examined wearing appropriate Personal Protective Equipment and discussed with hospital infection control department. 10/21/20      Electronically signed by Jese Mason MD, 10/21/20, 2:44 PM EDT.      Time: I spent  40  minutes on this discharge activity which included face-to-face encounter with the patient/reviewing the data in the system/coordination of the care with the nursing staff as well as consultants/documentation/entering orders.

## 2020-10-21 NOTE — PROGRESS NOTES
Continued Stay Note  JORDAN Churchill     Patient Name: Tristen Garcia  MRN: [id number]  Today's Date: 10/21/2020    Admit Date: 10/18/2020    Discharge Plan     Row Name 10/21/20 1626       Plan    Plan Comments  FERNANDO spoke with Ros from [facility] who informed FERNANDO that she is waiting to hear back from the physician to take the pt today. FERNANDO left Ros the number to the nurses station and provided the nurse with Sharri number with [facility].        [facility]   Cell: [phone]  Office: [phone]  Fax: [phone]

## 2020-10-21 NOTE — PLAN OF CARE
Goal Outcome Evaluation:  Plan of Care Reviewed With: patient  Progress: improving  Outcome Summary: Patient on 2L O2 via nasal cannula.  Patient with some difficulty getting to sleep overnight, but without complaint otherwise.  Patient continues to receive IV abx and fluid therapy and is tolerating well.  Patient expecting to d/c to [facility].  Will continue to monitor.

## 2020-10-21 NOTE — DISCHARGE PLACEMENT REQUEST
"Carmita Angeles (85 y.o. Male)     Date of Birth Social Security Number Address Home Phone MRN    1935  [id number] [place] [facility] [phone] [id number]    [employer] Marital Status          None Single       Admission Date Admission Type Admitting Provider Attending Provider Department, Room/Bed    10/18/20 Urgent Jese Mason MD Gad, George Fayez Labib Youssief, MD [facility] 2C MEDICAL INPATIENT, 240/1    Discharge Date Discharge Disposition Discharge Destination         Skilled Nursing Facility (DC - External)              Attending Provider: eJse Mason MD    Allergies: No Known Allergies    Isolation: None   Infection: COVID (rule out) (10/20/20)   Code Status: CPR    Ht: 175.3 cm (69.02\")   Wt: 100 kg (220 lb 8 oz)    Admission Cmt: None   Principal Problem: Rhabdomyolysis [M62.82]                 Active Insurance as of 10/18/2020     Primary Coverage     Payor Plan Insurance Group Employer/Plan Group    MEDICARE MEDICARE A & B      Payor Plan Address Payor Plan Phone Number Payor Plan Fax Number Effective Dates    [address] [phone]  8/1/2000 - None Entered    [facility] [id number]       Subscriber Name Subscriber Birth Date Member ID       CARMITA ANGELES 1935 [id number]           Secondary Coverage     Payor Plan Insurance Group Employer/Plan Group    [employer]                Payor Plan Address Payor Plan Phone Number Payor Plan Fax Number Effective Dates    [address] [address]   11/2/2010 - None Entered    [facility] [id number]       Subscriber Name Subscriber Birth Date Member ID       CARMITA ANGELES 1935 [id number]                 Emergency Contacts      (Rel.) Home Phone Work Phone Mobile Phone    TELLESREMA HOUSER (Sister) [phone] -- [phone]    JOSE FISHER (Grandchild) [phone] -- [phone]            {Outbreak/Travel/Exposure Documentation......;  Question Available Choices " Patient Response   Outbreak Screen: Do you currently have a new onset of the following symptoms?        Fever/Chils, Cough, Shortness of air, Loss of taste or smell, No, Unknown  No (10/18/20 1703)   Outbreak Screen: In the last 14 days, have you had contact with anyone who is ill, has show any of the symptoms listed above and/or has been diagnosis with the 2019 Novel Coronavirus? This includes any immediate household members but excludes any patients with whom you have been in contact within your normal work duties wearing proper PPE, if you are a healthcare worker.  Yes, No, Unknown              No (10/18/20 1703)   Outbreak Screen: Who was notified?    Free text  (not recorded)   Travel Screen: Have you traveled in the last month? If so, to what country have you traveled? If [place] what state? Yes, No, Unknown  List of all countries  List of all States No (10/18/20 1703)  (not recorded)  (not recorded)   Infection Risk: Do you currently have the following symptoms?  (If cough is selected, the Tuberculosis Screen is performed.) Cough, Fever, Rash, No No (10/18/20 1703)   Tuberculosis Screen: Do you have any of the following Tuberculosis Risks?  · Have you lived or spent time with anyone who had or may have TB?  · Have you lived in or visited any of the following areas for more than one month: [place], [place], [place], [place] or [place], the [place] or [place]?  · Do you have HIV/AIDS?  · Have you lived in or worked in a nursing home, homeless shelter, correctional facility, or substance abuse treatment facility?   · No    If Yes do you have any of the following symptoms? Yes responses display to the right    If Yes, symptoms listed are:  Cough greater than or equal to 3 weeks, Loss of appetite, Unexplained weight loss, Night sweats, Bloody sputum or hemoptysis, Hoarseness, Fever, Fatigue, Chest pain, No (not recorded)  (not recorded)   Exposure Screen: Have you been exposed to any of these contagious  diseases in the last month? Measles, Chickenpox, Meningitis, Pertussis, Whooping Cough, No No (10/18/20 1703)         Current Facility-Administered Medications   Medication Dose Route Frequency Provider Last Rate Last Dose   • acetaminophen (TYLENOL) tablet 650 mg  650 mg Oral Q4H PRN Harsha Murcia MD   650 mg at 10/20/20 2038    Or   • acetaminophen (TYLENOL) 160 MG/5ML solution 650 mg  650 mg Oral Q4H PRN Harsha Murcia MD        Or   • acetaminophen (TYLENOL) suppository 650 mg  650 mg Rectal Q4H PRN Harsha Murcia MD       • allopurinol (ZYLOPRIM) tablet 100 mg  100 mg Oral Daily Harsha Murcia MD   100 mg at 10/21/20 0803   • ipratropium-albuterol (DUO-NEB) nebulizer solution 3 mL  3 mL Nebulization Q4H PRN Harsha Murcia MD       • melatonin tablet 5 mg  5 mg Oral Nightly PRN Harsha Murcia MD   5 mg at 10/20/20 2035   • nitroglycerin (NITROSTAT) SL tablet 0.4 mg  0.4 mg Sublingual Q5 Min PRN Harsha Murcia MD       • ondansetron (ZOFRAN) tablet 4 mg  4 mg Oral Q6H PRN Harsha Murcia MD        Or   • ondansetron (ZOFRAN) injection 4 mg  4 mg Intravenous Q6H PRN Harsha Murcia MD       • pantoprazole (PROTONIX) injection 40 mg  40 mg Intravenous Q AM Harsha Murcia MD   40 mg at 10/21/20 0614   • Pharmacy to Dose Zosyn   Does not apply Continuous PRN Harsha Murcia MD       • piperacillin-tazobactam (ZOSYN) IVPB 3.375 g in 100 mL NS (CD)  3.375 g Intravenous Q8H Harsha Murcia MD   3.375 g at 10/21/20 1047   • sodium bicarbonate tablet 650 mg  650 mg Oral TID Jese Mason MD   650 mg at 10/21/20 0802   • sodium chloride 0.9 % flush 10 mL  10 mL Intravenous Q12H Harsha Murcia MD   10 mL at 10/21/20 0802   • sodium chloride 0.9 % flush 10 mL  10 mL Intravenous PRN Harsha Murcia MD       • sodium chloride 0.9 % infusion  200 mL/hr Intravenous Continuous Jese Mason  mL/hr at 10/21/20 1030 200 mL/hr at 10/21/20 1030     Lab Results (last 48 hours)     Procedure  Component Value Units Date/Time    COVID-19,[facility], NP SWAB IN LEXAR VIRAL TRANSPORT MEDIA 24-30 HR TAT - Swab, Nasopharynx [[id number]] Collected: 10/20/20 1225    Specimen: Swab from Nasopharynx Updated: 10/21/20 1405     SARS-CoV-2 URMILA Not Detected    CK [[id number]]  (Abnormal) Collected: 10/21/20 0350    Specimen: Blood Updated: 10/21/20 0444     Creatine Kinase 928 U/L     Comprehensive Metabolic Panel [[id number]]  (Abnormal) Collected: 10/21/20 0350    Specimen: Blood Updated: 10/21/20 0444     Glucose 97 mg/dL      BUN 15 mg/dL      Creatinine 1.10 mg/dL      Sodium 140 mmol/L      Potassium 4.0 mmol/L      Chloride 104 mmol/L      CO2 28.0 mmol/L      Calcium 8.5 mg/dL      Total Protein 5.1 g/dL      Albumin 2.30 g/dL      ALT (SGPT) 48 U/L      AST (SGOT) 68 U/L      Alkaline Phosphatase 183 U/L      Total Bilirubin 0.3 mg/dL      eGFR Non African Amer 64 mL/min/1.73      Globulin 2.8 gm/dL      A/G Ratio 0.8 g/dL      BUN/Creatinine Ratio 13.6     Anion Gap 8.0 mmol/L     Narrative:      GFR Normal >60  Chronic Kidney Disease <60  Kidney Failure <15      Magnesium [[id number]]  (Normal) Collected: 10/21/20 0350    Specimen: Blood Updated: 10/21/20 0444     Magnesium 1.7 mg/dL     Phosphorus [[id number]]  (Normal) Collected: 10/21/20 0350    Specimen: Blood Updated: 10/21/20 0444     Phosphorus 2.5 mg/dL     CBC & Differential [[id number]]  (Abnormal) Collected: 10/21/20 0350    Specimen: Blood Updated: 10/21/20 0415    Narrative:      The following orders were created for panel order CBC & Differential.  Procedure                               Abnormality         Status                     ---------                               -----------         ------                     CBC Auto Differential[[id number]]        Abnormal            Final result                 Please view results for these tests on the individual orders.    CBC Auto Differential [[id number]]  (Abnormal) Collected: 10/21/20 0350     Specimen: Blood Updated: 10/21/20 0415     WBC 10.40 10*3/mm3      RBC 3.22 10*6/mm3      Hemoglobin 8.2 g/dL      Hematocrit 26.3 %      MCV 81.8 fL      MCH 25.6 pg      MCHC 31.2 g/dL      RDW 18.7 %      RDW-SD 54.7 fl      MPV 7.4 fL      Platelets 427 10*3/mm3      Neutrophil % 63.4 %      Lymphocyte % 17.5 %      Monocyte % 15.7 %      Eosinophil % 2.6 %      Basophil % 0.8 %      Neutrophils, Absolute 6.60 10*3/mm3      Lymphocytes, Absolute 1.80 10*3/mm3      Monocytes, Absolute 1.60 10*3/mm3      Eosinophils, Absolute 0.30 10*3/mm3      Basophils, Absolute 0.10 10*3/mm3      nRBC 0.1 /100 WBC     Occult Blood, Fecal By Immunoassay - Stool, Per Rectum [[id number]]  (Abnormal) Collected: 10/20/20 2159    Specimen: Stool from Per Rectum Updated: 10/20/20 2207     Occult Blood, Fecal by Immunoassay Positive    Blood Culture - Blood, Arm, Left [[id number]] Collected: 10/18/20 2025    Specimen: Blood from Arm, Left Updated: 10/20/20 2045     Blood Culture No growth at 2 days    Blood Culture - Blood, Hand, Right [[id number]] Collected: 10/19/20 1741    Specimen: Blood from Hand, Right Updated: 10/20/20 1815     Blood Culture No growth at 24 hours    Protein Electrophoresis, Total [[id number]]  (Abnormal) Collected: 10/19/20 1741    Specimen: Blood Updated: 10/20/20 1611     Total Protein 5.6 g/dL      Albumin 2.2 g/dL      Alpha-1-Globulin 0.6 g/dL      Alpha-2-Globulin 1.4 g/dL      Beta Globulin 0.7 g/dL      Gamma Globulin 0.7 g/dL      M-Kentrell Comment: g/dL      Comment: SPE SHOWS ASYMMETRICAL GAMMA.        Globulin 3.4 g/dL      A/G Ratio 0.6     Please note Comment     Comment: Protein electrophoresis scan will follow via computer, mail, or   delivery.       Narrative:      Performed at:  [address] [place] [address]  [id number]  : Manuel Zendejas PhD, Phone:  [phone]    Cancer Antigen 19-9 [[id number]]  (Abnormal) Collected: 10/19/20 2111    Specimen: Blood Updated:  10/20/20 1359     CA 19-9 3,215.0 U/mL     Narrative:      Results may be falsely decreased if patient taking Biotin.     CK [[id number]]  (Abnormal) Collected: 10/20/20 0254    Specimen: Blood Updated: 10/20/20 0353     Creatine Kinase 1,728 U/L     Comprehensive Metabolic Panel [[id number]]  (Abnormal) Collected: 10/20/20 0254    Specimen: Blood Updated: 10/20/20 0353     Glucose 94 mg/dL      BUN 25 mg/dL      Creatinine 1.63 mg/dL      Sodium 140 mmol/L      Potassium 4.0 mmol/L      Chloride 104 mmol/L      CO2 27.0 mmol/L      Calcium 8.0 mg/dL      Total Protein 5.2 g/dL      Albumin 2.40 g/dL      ALT (SGPT) 54 U/L      AST (SGOT) 104 U/L      Alkaline Phosphatase 168 U/L      Total Bilirubin 0.3 mg/dL      eGFR Non African Amer 40 mL/min/1.73      Globulin 2.8 gm/dL      A/G Ratio 0.9 g/dL      BUN/Creatinine Ratio 15.3     Anion Gap 9.0 mmol/L     Narrative:      GFR Normal >60  Chronic Kidney Disease <60  Kidney Failure <15      Magnesium [[id number]]  (Normal) Collected: 10/20/20 0254    Specimen: Blood Updated: 10/20/20 0353     Magnesium 1.8 mg/dL     Phosphorus [[id number]]  (Normal) Collected: 10/20/20 0254    Specimen: Blood Updated: 10/20/20 0353     Phosphorus 2.7 mg/dL     CBC & Differential [[id number]]  (Abnormal) Collected: 10/20/20 0254    Specimen: Blood Updated: 10/20/20 0325    Narrative:      The following orders were created for panel order CBC & Differential.  Procedure                               Abnormality         Status                     ---------                               -----------         ------                     CBC Auto Differential[[id number]]        Abnormal            Final result                 Please view results for these tests on the individual orders.    CBC Auto Differential [[id number]]  (Abnormal) Collected: 10/20/20 0254    Specimen: Blood Updated: 10/20/20 0325     WBC 10.00 10*3/mm3      RBC 3.18 10*6/mm3      Hemoglobin 8.1 g/dL      Hematocrit 26.0 %       MCV 81.6 fL      MCH 25.4 pg      MCHC 31.1 g/dL      RDW 18.7 %      RDW-SD 54.3 fl      MPV 8.0 fL      Platelets 416 10*3/mm3      Neutrophil % 64.4 %      Lymphocyte % 16.2 %      Monocyte % 16.3 %      Eosinophil % 1.8 %      Basophil % 1.3 %      Neutrophils, Absolute 6.40 10*3/mm3      Lymphocytes, Absolute 1.60 10*3/mm3      Monocytes, Absolute 1.60 10*3/mm3      Eosinophils, Absolute 0.20 10*3/mm3      Basophils, Absolute 0.10 10*3/mm3      nRBC 0.1 /100 WBC     AFP Tumor Marker [[id number]]  (Abnormal) Collected: 10/19/20 1741    Specimen: Blood Updated: 10/20/20 0223     ALPHA-FETOPROTEIN 32.60 ng/mL     Narrative:      Alpha Fetoprotein Tumor Marker Reference Range:    0.0-8.3 ng/mL    Note: Normal values apply only to males and nonpregnant females. These results are not interpretable for pregnant females.    Results may be falsely decreased if patient taking Biotin.      CEA [[id number]] Collected: 10/19/20 1741    Specimen: Blood Updated: 10/20/20 0156     CEA 8.27 ng/mL     Narrative:      CEA Reference Range:    Non Smokers:   Less than 3 ng/mL  Smokers:       Less than 5 ng/mL  Results may be falsely decreased if patient taking Biotin.      Vitamin B12 [[id number]]  (Normal) Collected: 10/19/20 1741    Specimen: Blood Updated: 10/20/20 0131     Vitamin B-12 905 pg/mL     Narrative:      Results may be falsely increased if patient taking Biotin.      Folate [[id number]]  (Normal) Collected: 10/19/20 1741    Specimen: Blood Updated: 10/20/20 0131     Folate 7.29 ng/mL     Narrative:      Results may be falsely increased if patient taking Biotin.      Haptoglobin [[id number]]  (Abnormal) Collected: 10/19/20 1741    Specimen: Blood Updated: 10/20/20 0127     Haptoglobin 563 mg/dL      Comment: Specimen hemolyzed. Results may be affected.       CBC & Differential [[id number]]  (Abnormal) Collected: 10/19/20 1748    Specimen: Blood Updated: 10/19/20 1811    Narrative:      The following orders were created for  panel order CBC & Differential.  Procedure                               Abnormality         Status                     ---------                               -----------         ------                     CBC Auto Differential[[id number]]        Abnormal            Final result                 Please view results for these tests on the individual orders.    CBC Auto Differential [[id number]]  (Abnormal) Collected: 10/19/20 1748    Specimen: Blood Updated: 10/19/20 1811     WBC 12.80 10*3/mm3      RBC 3.63 10*6/mm3      Hemoglobin 9.3 g/dL      Hematocrit 29.7 %      MCV 81.8 fL      MCH 25.5 pg      MCHC 31.2 g/dL      RDW 19.2 %      RDW-SD 55.6 fl      MPV 8.4 fL      Platelets 417 10*3/mm3      Neutrophil % 70.6 %      Lymphocyte % 14.5 %      Monocyte % 13.1 %      Eosinophil % 0.7 %      Basophil % 1.1 %      Neutrophils, Absolute 9.00 10*3/mm3      Lymphocytes, Absolute 1.90 10*3/mm3      Monocytes, Absolute 1.70 10*3/mm3      Eosinophils, Absolute 0.10 10*3/mm3      Basophils, Absolute 0.10 10*3/mm3      nRBC 0.0 /100 WBC     Reticulocytes [[id number]]  (Normal) Collected: 10/19/20 1748    Specimen: Blood Updated: 10/19/20 1811     Reticulocyte % 1.17 %      Reticulocyte Absolute 0.0425 10*6/mm3     Methylmalonic Acid, Serum [[id number]] Collected: 10/19/20 1741    Specimen: Blood Updated: 10/19/20 1805    Ferritin [[id number]]  (Abnormal) Collected: 10/19/20 1527    Specimen: Blood Updated: 10/19/20 1735     Ferritin 909.30 ng/mL     Narrative:      Results may be falsely decreased if patient taking Biotin.      Lactate Dehydrogenase [[id number]]  (Abnormal) Collected: 10/19/20 1527    Specimen: Blood Updated: 10/19/20 1734      U/L     Iron Profile [[id number]]  (Abnormal) Collected: 10/19/20 1527    Specimen: Blood Updated: 10/19/20 1734     Iron 16 mcg/dL      Iron Saturation 10 %      Transferrin 107 mg/dL      TIBC 159 mcg/dL     Magnesium [[id number]]  (Normal) Collected: 10/19/20 1527    Specimen:  Blood Updated: 10/19/20 1734     Magnesium 2.0 mg/dL     Phosphorus [[id number]]  (Normal) Collected: 10/19/20 1527    Specimen: Blood Updated: 10/19/20 1734     Phosphorus 2.7 mg/dL     Reticulocytes [[id number]]  (Normal) Collected: 10/19/20 1527    Specimen: Blood Updated: 10/19/20 1707     Reticulocyte % 1.10 %      Reticulocyte Absolute 0.0366 10*6/mm3     Comprehensive Metabolic Panel [[id number]]  (Abnormal) Collected: 10/19/20 1527    Specimen: Blood Updated: 10/19/20 1645     Glucose 111 mg/dL      BUN 27 mg/dL      Creatinine 1.69 mg/dL      Sodium 139 mmol/L      Potassium 4.3 mmol/L      Chloride 102 mmol/L      CO2 28.0 mmol/L      Calcium 7.9 mg/dL      Total Protein 5.5 g/dL      Albumin 2.30 g/dL      ALT (SGPT) 63 U/L      AST (SGOT) 134 U/L      Alkaline Phosphatase 213 U/L      Total Bilirubin 0.2 mg/dL      eGFR Non African Amer 39 mL/min/1.73      Globulin 3.2 gm/dL      A/G Ratio 0.7 g/dL      BUN/Creatinine Ratio 16.0     Anion Gap 9.0 mmol/L     Narrative:      GFR Normal >60  Chronic Kidney Disease <60  Kidney Failure <15      CK [[id number]]  (Abnormal) Collected: 10/19/20 1527    Specimen: Blood Updated: 10/19/20 1644     Creatine Kinase 2,699 U/L     CBC & Differential [[id number]]  (Abnormal) Collected: 10/19/20 1527    Specimen: Blood Updated: 10/19/20 1604    Narrative:      The following orders were created for panel order CBC & Differential.  Procedure                               Abnormality         Status                     ---------                               -----------         ------                     CBC Auto Differential[[id number]]        Abnormal            Final result                 Please view results for these tests on the individual orders.    CBC Auto Differential [[id number]]  (Abnormal) Collected: 10/19/20 1527    Specimen: Blood Updated: 10/19/20 1604     WBC 11.60 10*3/mm3      RBC 3.35 10*6/mm3      Hemoglobin 8.5 g/dL      Hematocrit 27.2 %      MCV 81.2 fL       MCH 25.3 pg      MCHC 31.2 g/dL      RDW 18.9 %      RDW-SD 53.8 fl      MPV 8.1 fL      Platelets 394 10*3/mm3      Neutrophil % 73.0 %      Lymphocyte % 11.5 %      Monocyte % 13.9 %      Eosinophil % 0.6 %      Basophil % 1.0 %      Neutrophils, Absolute 8.40 10*3/mm3      Lymphocytes, Absolute 1.30 10*3/mm3      Monocytes, Absolute 1.60 10*3/mm3      Eosinophils, Absolute 0.10 10*3/mm3      Basophils, Absolute 0.10 10*3/mm3      nRBC 0.0 /100 WBC              Discharge Summary      Jese Mason MD at 10/21/20 1444                [facility]  DISCHARGE SUMMARY        Prepared For PCP:  Ann Marie Hodgson MD    Patient Name: Tristen Garcia  : 1935  MRN: [id number]      Date of Admission:   10/18/2020    Date of Discharge:  10/21/2020    Length of stay:  LOS: 3 days     Hospital Course     Presenting Problem:   Liver mass, right lobe [R16.0]      Active Hospital Problems    Diagnosis  POA   • **Rhabdomyolysis [M62.82]  Yes   • Liver mass, right lobe [R16.0]  Yes      Resolved Hospital Problems   No resolved problems to display.       Rhabdomyolysis  Due to prolonged immobilization  Improving  CK on the day of transfer 6000  Monitor daily  Increase IV fluids  Add p.o. bicarb  Watch for fluid overload  Monitor and correct electrolytes as needed     Acute kidney injury on CKD stage III.  Probably due to rhabdomyolysis above.  Baseline serum creatinine 1.8  Avoid nephrotoxic agents.  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  Status post liver biopsy 10/14/2020  Biopsy reports reviewed  Consult GI  Oncologist consulted  General surgeon consulted and recommending gastrointestinal surgery referral in [facility]     Hypotensive on presentation to the outlying ED  Systolic blood pressure in the low 100s  Restart atenolol today or tomorrow  Consider restarting Lasix in the next 2 to 3 days if renal function stable  Blood pressure improved    GERD-on PPI     COPD-not in exacerbation  Respiratory care with bronchodilators  Oxygen therapy and titration        [facility]    Hospital Course:  Tristen Garcia is a 85 y.o. male     Patient is an 85-year-old male with multiple comorbidities including COPD with chronic hypoxic respiratory failure on home oxygen, hypertension and hyperlipidemia.  Was recently found to have right upper lobe liver mass on imaging for which he underwent liver biopsy on 10/14/2020 at [facility]-biopsy results still pending.     He presented to [facility] ED on 10/17/2020 after granddaughter found him lying on the floor between 3 AM and 9:30 AM.  At the ED patient was noted to be hypotensive.  Blood work showed leukocytosis with WBC of 21,000, procalcitonin 26 and hemoglobin of 10.2.  Serum creatinine was noted to be 2.8, patient's baseline serum creatinine is around 1.8.  CK was 15,000 patient also had indeterminant troponin elevation though EKG did not show any findings suggestive of acute ischemia.  Patient was diagnosed with rhabdomyolysis, acute on chronic kidney disease and sepsis of unknown source.     He was admitted to the hospitalist service at [facility] where he was started on empirical antibiotics with IV vancomycin, IV Zosyn and IV Flagyl.  He was also given IV normal saline along with D5 water with bicarb drip.  Blood cultures were obtained.  While in [facility] he had a fever spike.  Due to concern for possible liver  abscess family requested the [facility]      After admission  Patient was seen by GI oncologist and general surgery.  Patient was given IV fluids for rhabdomyolysis and no culprit medications were involved except the fact that patient had fallen had been on the floor for a long period of time.  His CK levels have improved dramatically and now below 1000  He hadInitial acute kidney injury that resolved with IV fluids  Patient continued on p.o. bicarb for additional 3 days in addition to holding his diuretics and may start Trezix back in the next 2 to 3 days  Patient was cleared by GI service who recommended EGD and colonoscopy as an outpatient under general surgeon Dr. Medina recommended hepatobiliary surgery follow-up for confirmation of his opinion and further discussion of other options that might be available to the patient preferably at [facility] or [facility].  Patient is aware of the plan.  He feels much better.  His leukocytosis has resolved.  We will stop his laxatives related to rhabdomyolysis.  His blood cultures negative from [facility].  He has been afebrile here.  I think patient would not benefit from any additional antibiotic therapy that was started with Zosyn.  No collection noted on CT scan of the abdomen pelvis from outlying facility.      Recommendation for Outpatient Providers:     Follow-up BMP and CBC next 1 to 2 days  Consider repeat CPK in the next 2 to 3 days  May start Lasix after 2 to 3 days  Blood pressure medications may need to be readdressed once blood pressure starts to be above 140/90  Follow-up with [facility] or [facility] for hepatobiliary surgical opinion.  Follow-up with oncologist Dr. Garcia as instructed  Consider nephrology evaluation if renal function worsens.  Dr. Garcia does not think patient has an operable tumor at this time as well.  Patient was  given the option for treatment and he ordered additional testing on the tumor specimen.  Palliative therapy was discussed by Dr. Garcia awaiting genetic studies          Reasons For Change In Medications and Indications for New Medications:        Day of Discharge     HPI:       Vital Signs:   Temp:  [97.6 °F (36.4 °C)-98 °F (36.7 °C)] 97.6 °F (36.4 °C)  Heart Rate:  [74-83] 80  Resp:  [16-18] 16  BP: (133-137)/(52-71) 137/71     Physical Exam:  Physical Exam  Awake alert oriented x3  Abdomen is mildly tender on the right lower quadrant but there is no collection or skin lesions noted  There is some abdominal wall edema around the right lower quadrant and upper quadrant.    Pertinent  and/or Most Recent Results     Results from last 7 days   Lab Units 10/21/20  0350 10/20/20  0254 10/19/20  1748 10/19/20  1527 10/19/20  0321 10/18/20  2026   WBC 10*3/mm3 10.40 10.00 12.80* 11.60* 14.10* 12.00*   HEMOGLOBIN g/dL 8.2* 8.1* 9.3* 8.5* 8.9* 8.0*   HEMATOCRIT % 26.3* 26.0* 29.7* 27.2* 29.0* 25.9*   PLATELETS 10*3/mm3 427 416 417 394 430 358   SODIUM mmol/L 140 140  --  139 140 137   POTASSIUM mmol/L 4.0 4.0  --  4.3 4.2 3.7   CHLORIDE mmol/L 104 104  --  102 101 100   CO2 mmol/L 28.0 27.0  --  28.0 28.0 26.0   BUN mg/dL 15 25*  --  27* 28* 29*   CREATININE mg/dL 1.10 1.63*  --  1.69* 1.80* 1.92*   GLUCOSE mg/dL 97 94  --  111* 107* 152*   CALCIUM mg/dL 8.5* 8.0*  --  7.9* 8.8 7.7*     Results from last 7 days   Lab Units 10/21/20  0350 10/20/20  0254 10/19/20  1527 10/18/20  2026   BILIRUBIN mg/dL 0.3 0.3 0.2 0.2   ALK PHOS U/L 183* 168* 213* 138*   ALT (SGPT) U/L 48* 54* 63* 52*   AST (SGOT) U/L 68* 104* 134* 146*           Invalid input(s): TG, LDLCALC, LDLREALC        Brief Urine Lab Results     None          Microbiology Results Abnormal     Procedure Component Value - Date/Time    COVID-19,[facility], NP SWAB IN [facility] VIRAL TRANSPORT MEDIA 24-30 HR TAT - Swab, Nasopharynx [[id number]] Collected: 10/20/20 1225    Lab  Status: Final result Specimen: Swab from Nasopharynx Updated: 10/21/20 1405     SARS-CoV-2 URMILA Not Detected    Blood Culture - Blood, Arm, Left [[id number]] Collected: 10/18/20 2025    Lab Status: Preliminary result Specimen: Blood from Arm, Left Updated: 10/20/20 2045     Blood Culture No growth at 2 days    Blood Culture - Blood, Hand, Right [[id number]] Collected: 10/19/20 1741    Lab Status: Preliminary result Specimen: Blood from Hand, Right Updated: 10/20/20 1815     Blood Culture No growth at 24 hours          Ct Needle Biopsy Liver    Result Date: 10/14/2020  Impression: Technically successful CT-guided percutaneous liver mass core biopsy procedure as described above.  Electronically Signed By-Kalia Herrera On:10/14/2020 12:18 PM This report was finalized on [id number] by  Kalia Herrera, .                             Test Results Pending at Discharge  Pending Labs     Order Current Status    Methylmalonic Acid, Serum In process    Blood Culture - Blood, Arm, Left Preliminary result    Blood Culture - Blood, Hand, Right Preliminary result            Procedures Performed           Consults:   Consults     Date and Time Order Name Status Description    10/19/2020 2018 Inpatient General Surgery Consult Completed     10/19/2020 1023 Hematology & Oncology Inpatient Consult      10/18/2020 2004 Inpatient Gastroenterology Consult Completed             Discharge Details        Discharge Medications      New Medications      Instructions Start Date   sodium bicarbonate 650 MG tablet   650 mg, Oral, 3 Times Daily         Changes to Medications      Instructions Start Date   allopurinol 100 MG tablet  Commonly known as: ZYLOPRIM  What changed:   · medication strength  · how much to take   100 mg, Oral, Daily   Start Date: October 22, 2020     furosemide 40 MG tablet  Commonly known as: LASIX  What changed: These instructions start on October 23, 2020. If you are unsure what to do until then, ask your doctor or other care  provider.   40 mg, Oral, Daily   Start Date: October 23, 2020        Continue These Medications      Instructions Start Date   atenolol 25 MG tablet  Commonly known as: TENORMIN   25 mg, Oral, Daily      omeprazole 20 MG capsule  Commonly known as: priLOSEC   20 mg, Oral, Daily         Stop These Medications    amLODIPine 10 MG tablet  Commonly known as: NORVASC     losartan 50 MG tablet  Commonly known as: COZAAR            No Known Allergies      Discharge Disposition:  Skilled Nursing Facility (DC - External)    Diet:  Hospital:  Diet Order   Procedures   • Diet Texture; Soft to Chew         Discharge Activity:         CODE STATUS:    Code Status and Medical Interventions:   Ordered at: 10/18/20 2004     Level Of Support Discussed With:    Patient     Code Status:    CPR     Medical Interventions (Level of Support Prior to Arrest):    Full         Follow-up Appointments  No future appointments.          Condition on Discharge:      Stable      This patient has been examined wearing appropriate Personal Protective Equipment and discussed with hospital infection control department. 10/21/20      Electronically signed by Jese Mason MD, 10/21/20, 2:44 PM EDT.      Time: I spent  40  minutes on this discharge activity which included face-to-face encounter with the patient/reviewing the data in the system/coordination of the care with the nursing staff as well as consultants/documentation/entering orders.      Electronically signed by Jese Mason MD at 10/21/20 7937

## 2020-10-21 NOTE — PROGRESS NOTES
Continued Stay Note  JORDAN Churchill     Patient Name: Tristen Garcia  MRN: [id number]  Today's Date: 10/21/2020    Admit Date: 10/18/2020    Discharge Plan     Row Name 10/21/20 1115       Plan    Plan Comments  Met with patient in room wearing PPE: mask, face goggles.    Maintained distance greater than six feet and spent less than 15 minutes in the room.      Pending covid 19 test results prior to d/c to rehab. Discharge anticipated today, pending covid 19 results and MD rounds.          Expected Discharge Date and Time     Expected Discharge Date Expected Discharge Time    Oct 21, 2020             Willa Che RN

## 2020-10-22 LAB
Lab: ABNORMAL
METHYLMALONATE SERPL-SCNC: 702 NMOL/L (ref 0–378)

## 2020-10-22 NOTE — PROGRESS NOTES
Case Management Discharge Note      Final Note: [facility] Swing Bed Unit    Provided Post Acute Provider List?: Yes  Post Acute Provider List: Inpatient Rehab  Delivered To: Patient  Method of Delivery: In person    Selected Continued Care - Discharged on 10/21/2020 Admission date: 10/18/2020 - Discharge disposition: Skilled Nursing Facility (DC - External)     Final Discharge Disposition Code: 61 - hospital-based swing bed

## 2020-10-23 ENCOUNTER — TELEPHONE (OUTPATIENT)
Dept: ONCOLOGY | Facility: CLINIC | Age: 85
End: 2020-10-23

## 2020-10-23 LAB — BACTERIA SPEC AEROBE CULT: NORMAL

## 2020-10-23 NOTE — TELEPHONE ENCOUNTER
----- Message from CAREY Bailey sent at 10/22/2020  2:40 PM EDT -----  Regarding: Hospital follow-up  Patient was discharged from the hospital 10/21/2020.  Please schedule hospital follow-up appointment with Dr. Garcia in 1 week.  Thank you.    Electronically signed by CAREY Cho, 10/22/20, 2:41 PM EDT.

## 2020-10-24 LAB — BACTERIA SPEC AEROBE CULT: NORMAL

## 2020-10-26 ENCOUNTER — TELEPHONE (OUTPATIENT)
Dept: ONCOLOGY | Facility: CLINIC | Age: 85
End: 2020-10-26

## 2020-10-26 ENCOUNTER — TELEPHONE (OUTPATIENT)
Dept: RADIATION ONCOLOGY | Facility: HOSPITAL | Age: 85
End: 2020-10-26

## 2020-10-26 NOTE — TELEPHONE ENCOUNTER
Called patient's granddaughter and LVM pertaining to schedule the patient for consultation with Dr. Wilkinson.

## 2020-10-27 RX ORDER — SODIUM BICARBONATE 650 MG/1
650 TABLET ORAL 4 TIMES DAILY
COMMUNITY
End: 2021-01-27

## 2020-10-28 ENCOUNTER — TELEPHONE (OUTPATIENT)
Dept: RADIATION ONCOLOGY | Facility: HOSPITAL | Age: 85
End: 2020-10-28

## 2020-10-28 NOTE — TELEPHONE ENCOUNTER
Tried to leave appointment reminder call but phone number provided could not have a message left.

## 2020-10-29 ENCOUNTER — APPOINTMENT (OUTPATIENT)
Dept: LAB | Facility: HOSPITAL | Age: 85
End: 2020-10-29

## 2020-10-29 ENCOUNTER — TELEPHONE (OUTPATIENT)
Dept: ONCOLOGY | Facility: CLINIC | Age: 85
End: 2020-10-29

## 2020-10-29 ENCOUNTER — OFFICE VISIT (OUTPATIENT)
Dept: ONCOLOGY | Facility: CLINIC | Age: 85
End: 2020-10-29

## 2020-10-29 ENCOUNTER — CONSULT (OUTPATIENT)
Dept: RADIATION ONCOLOGY | Facility: HOSPITAL | Age: 85
End: 2020-10-29

## 2020-10-29 VITALS
SYSTOLIC BLOOD PRESSURE: 169 MMHG | WEIGHT: 211 LBS | HEART RATE: 60 BPM | BODY MASS INDEX: 31.25 KG/M2 | TEMPERATURE: 98 F | DIASTOLIC BLOOD PRESSURE: 76 MMHG | RESPIRATION RATE: 16 BRPM | HEIGHT: 69 IN

## 2020-10-29 VITALS
HEIGHT: 69 IN | SYSTOLIC BLOOD PRESSURE: 156 MMHG | DIASTOLIC BLOOD PRESSURE: 77 MMHG | OXYGEN SATURATION: 90 % | BODY MASS INDEX: 31.31 KG/M2 | WEIGHT: 211.4 LBS | HEART RATE: 70 BPM | TEMPERATURE: 98 F

## 2020-10-29 DIAGNOSIS — R16.0 LIVER MASS, RIGHT LOBE: Primary | ICD-10-CM

## 2020-10-29 DIAGNOSIS — C22.1 CHOLANGIOCARCINOMA (HCC): Primary | ICD-10-CM

## 2020-10-29 LAB
ALBUMIN SERPL-MCNC: 3.5 G/DL (ref 3.5–5.2)
ALBUMIN/GLOB SERPL: 1.1 G/DL
ALP SERPL-CCNC: 216 U/L (ref 39–117)
ALT SERPL W P-5'-P-CCNC: 20 U/L (ref 1–41)
ANION GAP SERPL CALCULATED.3IONS-SCNC: 9 MMOL/L (ref 5–15)
AST SERPL-CCNC: 32 U/L (ref 1–40)
BASOPHILS # BLD AUTO: 0.08 10*3/MM3 (ref 0–0.2)
BASOPHILS NFR BLD AUTO: 0.5 % (ref 0–1.5)
BILIRUB SERPL-MCNC: 0.2 MG/DL (ref 0–1.2)
BUN SERPL-MCNC: 14 MG/DL (ref 8–23)
BUN/CREAT SERPL: 14.4 (ref 7–25)
CALCIUM SPEC-SCNC: 9.4 MG/DL (ref 8.6–10.5)
CHLORIDE SERPL-SCNC: 98 MMOL/L (ref 98–107)
CO2 SERPL-SCNC: 33 MMOL/L (ref 22–29)
CREAT SERPL-MCNC: 0.97 MG/DL (ref 0.76–1.27)
DEPRECATED RDW RBC AUTO: 57.7 FL (ref 37–54)
EOSINOPHIL # BLD AUTO: 0.16 10*3/MM3 (ref 0–0.4)
EOSINOPHIL NFR BLD AUTO: 1 % (ref 0.3–6.2)
ERYTHROCYTE [DISTWIDTH] IN BLOOD BY AUTOMATED COUNT: 18.9 % (ref 12.3–15.4)
GFR SERPL CREATININE-BSD FRML MDRD: 74 ML/MIN/1.73
GLOBULIN UR ELPH-MCNC: 3.1 GM/DL
GLUCOSE SERPL-MCNC: 87 MG/DL (ref 65–99)
HCT VFR BLD AUTO: 32.9 % (ref 37.5–51)
HGB BLD-MCNC: 9.7 G/DL (ref 13–17.7)
LYMPHOCYTES # BLD AUTO: 2.2 10*3/MM3 (ref 0.7–3.1)
LYMPHOCYTES NFR BLD AUTO: 13.2 % (ref 19.6–45.3)
MCH RBC QN AUTO: 25.8 PG (ref 26.6–33)
MCHC RBC AUTO-ENTMCNC: 29.5 G/DL (ref 31.5–35.7)
MCV RBC AUTO: 87.5 FL (ref 79–97)
MONOCYTES # BLD AUTO: 2.16 10*3/MM3 (ref 0.1–0.9)
MONOCYTES NFR BLD AUTO: 13 % (ref 5–12)
NEUTROPHILS NFR BLD AUTO: 12.06 10*3/MM3 (ref 1.7–7)
NEUTROPHILS NFR BLD AUTO: 72.3 % (ref 42.7–76)
PLATELET # BLD AUTO: 525 10*3/MM3 (ref 140–450)
PMV BLD AUTO: 9.3 FL (ref 6–12)
POTASSIUM SERPL-SCNC: 4.5 MMOL/L (ref 3.5–5.2)
PROT SERPL-MCNC: 6.6 G/DL (ref 6–8.5)
RBC # BLD AUTO: 3.76 10*6/MM3 (ref 4.14–5.8)
SODIUM SERPL-SCNC: 140 MMOL/L (ref 136–145)
WBC # BLD AUTO: 16.66 10*3/MM3 (ref 3.4–10.8)

## 2020-10-29 PROCEDURE — 85025 COMPLETE CBC W/AUTO DIFF WBC: CPT | Performed by: INTERNAL MEDICINE

## 2020-10-29 PROCEDURE — 80053 COMPREHEN METABOLIC PANEL: CPT | Performed by: INTERNAL MEDICINE

## 2020-10-29 PROCEDURE — 99204 OFFICE O/P NEW MOD 45 MIN: CPT | Performed by: RADIOLOGY

## 2020-10-29 PROCEDURE — 36415 COLL VENOUS BLD VENIPUNCTURE: CPT | Performed by: INTERNAL MEDICINE

## 2020-10-29 PROCEDURE — 99215 OFFICE O/P EST HI 40 MIN: CPT | Performed by: INTERNAL MEDICINE

## 2020-10-29 NOTE — PROGRESS NOTES
HEMATOLOGY ONCOLOGY FOLLOW UP        Patient name: Tristen Garcia  : 1935  MRN: [id number]  Primary Care Physician: Ann Marie Hodgson MD  Referring Physician: Ann Marie Hodgson MD  Reason For Consult:   Intrahepatic cholangiocarcinoma    History of Present Illness:  Tristen Garcia is a 85 y.o. male who presented to [facility] on 10/18/2020 as a transfer from [facility].  Patient went to [facility] ED on 10/17/2020 after his granddaughter found him lying on the floor between 3 AM and 9:30 AM. He has lost about 30 to 40 pounds over the last couple of months. Patient was noted to have leukocytosis with WBC of 21,000, anemia with hemoglobin of 10.2, and elevated creatinine of 2.8.  Patient was diagnosed with rhabdomyolysis, acute on chronic kidney disease, and sepsis at [facility].  He was started on antibiotics and given IV fluids.  Blood cultures were drawn.  There was concern for liver abscess and patient's family requested patient be transferred to [facility] for further evaluation.     10/19/20  Hematology/Oncology was consulted for new diagnosis of cholangiocarcinoma from liver mass biopsy on 10/14/2020.   · 2020 patient had a CT of the abdomen and pelvis completed for right upper quadrant abdominal pain, elevated WBC, and elevated alk phos.  CT of the abdomen and pelvis showed small amount of increased density with fatty soft tissues overlying the inferior tip of the right lobe of the liver of uncertain etiology might be caused by inflammation or infection or scarring.  Clinical correlation would be helpful.  This does not appear to be associated with the gallbladder or in the pancreas or the right kidney and has uncertain etiology.  This finding could be connected to the duodenum might be caused by peptic ulcer disease.  Clinical.  Correlation with pancreas enzymes to be helpful as well.  Small tiny amount of fluid in  the pelvis might be caused by the diverticulosis in the sigmoid colon.  This may be because of mild diverticulitis.  · 9/23/2020 ultrasound of the abdomen showed patchy areas of hypoechogenicity in the right lobe of the liver of uncertain etiology might be caused by liver cancer metastatic liver disease or cirrhosis of the liver possible fatty infiltrated areas liver parenchyma admixed with more normal liver parenchyma.  A CT liver with contrast MRI of the liver without and with contrast to further evaluate the abnormality was recommended.   · 10/5/2020: MRI of the abdomen was completed that showed inhomogenous ill-defined patchy areas of signal around the in the liver parenchyma in the inferior aspect of the right lobe of the liver and involved the inferior tip the right lobe of the liver of uncertain etiology.  This disease process appears to break through the liver The posterior medial aspect of the inferior tip of the right lobe of the liver.  The disease process in the liver parenchyma appears to extend into the fatty soft tissue adjacent to the inferior medial aspect of the inferior tip of the right lobe of the liver.  There Is a small amount of fluid surrounding the inferior tip of the right lobe of the liver.  · 10/14/2020 CT-guided core biopsy of the liver showed well to moderately differentiated adenocarcinoma.  The tumor is strongly positive for only CK7.  CK20, CDX2, TTF-1, Napsin A, chromogranin, synaptophysin and CD56 are negative.  Is a nonspecific staining pattern, but can be seen in tumors of pancreaticobiliary origin, including Reema angiosarcoma which is favored in this case.  · Anemia studies: , haptoglobin 563, triglycerides 1.10%, ferritin 909.3, iron 16, iron saturation 10%, transferrin 107, TIBC 159, folate 7.29.    Subjective:  Patient presents with his granddaughter to the office.  His granddaughter is a nurse.  He does not want to consider surgery and therefore is not interested in  getting the surgical oncology evaluation.  He has seen radiation oncologist Dr. Wilkinson today.  He is willing to consider palliative chemotherapy.  Discussed the risks and benefits.  His appetite is okay.  Reports fatigue.  Does not have any nausea, vomiting, abdominal pain.  .  He is using a wheelchair.  The following portions of the patient's history were reviewed and updated as appropriate: problem list.      Past Medical History:   Diagnosis Date   • Arthritis    • Cancer (CMS/HCC)    • COPD (chronic obstructive pulmonary disease) (CMS/HCC)    • Elevated cholesterol    • GERD (gastroesophageal reflux disease)    • Hyperlipidemia    • Hypertension        Past Surgical History:   Procedure Laterality Date   • COLONOSCOPY     • EYE SURGERY     • SKIN BIOPSY           Current Outpatient Medications:   •  allopurinol (ZYLOPRIM) 100 MG tablet, Take 1 tablet by mouth Daily., Disp:  , Rfl:   •  atenolol (TENORMIN) 25 MG tablet, Take 25 mg by mouth Daily., Disp: , Rfl:   •  furosemide (LASIX) 40 MG tablet, Take 1 tablet by mouth Daily., Disp:  , Rfl:   •  Ipratropium-Albuterol (DUONEB IN), 3 (Three) Times a Day., Disp: , Rfl:   •  omeprazole (priLOSEC) 20 MG capsule, Take 20 mg by mouth Daily., Disp: , Rfl:   •  sodium bicarbonate 650 MG tablet, Take 650 mg by mouth 3 (Three) Times a Day., Disp: , Rfl:     No Known Allergies    Family History   Problem Relation Age of Onset   • Kidney failure Father        Cancer-related family history is not on file.    Social History     Tobacco Use   • Smoking status: Former Smoker   • Smokeless tobacco: Never Used   Substance Use Topics   • Alcohol use: Not Currently   • Drug use: Never     Social History     Social History Narrative   • Not on file        I have reviewed the history of present illness, past medical history, family history, social history, lab results, all notes and other records since the patient was last seen on  10/26/2020.    ROS:   Review of Systems  "  Constitutional: Positive for fatigue. Negative for activity change, chills and fever.   HENT: Negative for drooling, ear discharge, mouth sores, nosebleeds and sore throat.    Eyes: Negative for photophobia, pain, redness and visual disturbance.   Respiratory: Negative for cough, choking, shortness of breath and wheezing.    Cardiovascular: Negative for chest pain and palpitations.   Gastrointestinal: Negative for abdominal pain, diarrhea, nausea and vomiting.   Genitourinary: Negative for dysuria.   Musculoskeletal: Positive for gait problem (Patient is in wheelchair). Negative for neck stiffness.   Skin: Negative for rash.   Neurological: Negative for seizures, syncope and speech difficulty.   Psychiatric/Behavioral: Negative for agitation, confusion and hallucinations.       I have reviewed and confirmed the accuracy of the ROS as documented by the MA/LPN/RN Aamir Garcia MD    Objective:  Vital signs:  Vitals:    10/29/20 1016   BP: 169/76   Pulse: 60   Resp: 16   Temp: 98 °F (36.7 °C)   Weight: 95.7 kg (211 lb)   Height: 175.3 cm (69\")     Body mass index is 31.16 kg/m².  ECOG  (2) Ambulatory and capable of self care, unable to carry out work activity, up and about > 50% or waking hours    Physical Exam:   Physical Exam  Vitals signs reviewed.   Constitutional:       Appearance: He is well-developed. He is obese. He is ill-appearing.   HENT:      Head: Normocephalic and atraumatic.      Nose: Nose normal.      Mouth/Throat:      Pharynx: No oropharyngeal exudate.   Eyes:      General: No scleral icterus.     Conjunctiva/sclera: Conjunctivae normal.   Neck:      Musculoskeletal: Normal range of motion.      Thyroid: No thyromegaly.      Trachea: No tracheal deviation.   Cardiovascular:      Rate and Rhythm: Regular rhythm.      Pulses: Normal pulses.      Heart sounds: Normal heart sounds.   Pulmonary:      Breath sounds: No stridor.   Abdominal:      General: Bowel sounds are normal. There is distension.      " Palpations: Abdomen is soft. There is no mass.      Tenderness: There is no abdominal tenderness.   Musculoskeletal: Normal range of motion.         General: No deformity.   Lymphadenopathy:      Cervical: No cervical adenopathy.   Skin:     General: Skin is warm.   Neurological:      General: No focal deficit present.      Mental Status: He is alert and oriented to person, place, and time.      Sensory: No sensory deficit.   Psychiatric:         Mood and Affect: Mood normal.         Behavior: Behavior normal.         Thought Content: Thought content normal.         I have reexamined the patient and the results are consistent with the previously documented exam. Aamir Garcia MD     Lab Results - Last 18 Months   Lab Units 10/21/20  0350 10/20/20  0254 10/19/20  1748   WBC 10*3/mm3 10.40 10.00 12.80*   HEMOGLOBIN g/dL 8.2* 8.1* 9.3*   HEMATOCRIT % 26.3* 26.0* 29.7*   PLATELETS 10*3/mm3 427 416 417   MCV fL 81.8 81.6 81.8     Lab Results - Last 18 Months   Lab Units 10/21/20  0350 10/20/20  0254 10/19/20  1527   SODIUM mmol/L 140 140 139   POTASSIUM mmol/L 4.0 4.0 4.3   CHLORIDE mmol/L 104 104 102   CO2 mmol/L 28.0 27.0 28.0   BUN mg/dL 15 25* 27*   CREATININE mg/dL 1.10 1.63* 1.69*   CALCIUM mg/dL 8.5* 8.0* 7.9*   BILIRUBIN mg/dL 0.3 0.3 0.2   ALK PHOS U/L 183* 168* 213*   ALT (SGPT) U/L 48* 54* 63*   AST (SGOT) U/L 68* 104* 134*   GLUCOSE mg/dL 97 94 111*       Lab Results   Component Value Date    GLUCOSE 97 10/21/2020    BUN 15 10/21/2020    CREATININE 1.10 10/21/2020    EGFRIFNONA 64 10/21/2020    BCR 13.6 10/21/2020    K 4.0 10/21/2020    CO2 28.0 10/21/2020    CALCIUM 8.5 (L) 10/21/2020    PROTENTOTREF 5.6 (L) 10/19/2020    ALBUMIN 2.30 (L) 10/21/2020    LABIL2 0.6 (L) 10/19/2020    AST 68 (H) 10/21/2020    ALT 48 (H) 10/21/2020       Lab Results - Last 18 Months   Lab Units 10/14/20  0813   INR  0.98   APTT seconds 26.1       Lab Results   Component Value Date    IRON 16 (L) 10/19/2020    TIBC 159 (L)  10/19/2020    FERRITIN 909.30 (H) 10/19/2020       Lab Results   Component Value Date    FOLATE 7.29 10/19/2020       No results found for: OCCULTBLD    Lab Results   Component Value Date    RETICCTPCT 1.17 10/19/2020     Lab Results   Component Value Date    JIIUSPIJ82 905 10/19/2020     No results found for: SPEP, UPEP  LDH   Date Value Ref Range Status   10/19/2020 401 (H) 135 - 225 U/L Final     No results found for: DIMITRIS, RF, SEDRATE  Lab Results   Component Value Date    HAPTOGLOBIN 563 (H) 10/19/2020     Lab Results   Component Value Date    PTT 26.1 10/14/2020    INR 0.98 10/14/2020     No results found for:   Lab Results   Component Value Date    CEA 8.27 10/19/2020     No components found for: CA-19-9  No results found for: PSA      Assessment/Plan       Assessment:  1. Liver mass resulting well to moderately differentiated adenocarcinoma: S/p liver biopsy 10/14/2020. Cholangiocarcinoma favored.   CEA 8.27, AFP 32.60, CA 19-9 3,215. GI consulted.  Patient stable not likely operable, and patient not a surgical candidate.  General surgery consulted-surgical resection would require a right hepatectomy and possible extended right hepatectomy.  Patient is high risk for surgery.  General surgery recommends evaluation by hepatobiliary surgeon.  However patient personally does not feel he he can tolerate the surgery and we are in agreement.  2. Leukocytosis: Reactive  3. Anemia: Likely from malignancy/chronic disease.  4. Rhabdomyolysis: CK on day of transfer was 6000.  CK today 3847.  Managed per primary team.  5. Acute kidney injury/CKD stage III: Consider relation to rhabdomyolysis.  6. GERD/COPD: Managed per primary team     PLAN:  1. Discussed treatment with chemotherapy, cisplatin/gemcitabine.  His disease is inoperable/nonsurgical..  Goal is more or less palliative.  We will try to achieve maximal response to chemotherapy and follow this with the radiation.  2. We will order MMR testing/MSI studies and  see if he qualifies for immunotherapy.  If he does, we will consider immunotherapy as first-line.  3. Ordering NGS molecular studies to identify somatic mutations including FGF R2, PD-L1, IDH 1, NTRK  4. Appreciate radiation oncology input  5. CBC CMP today  6. Arrange Port-A-Cath placement  7. Follow-up in 3 to 4 weeks                               Thank you very much for providing the opportunity to participate in this patient’s care. Please do not hesitate to call if there are any other questions.    I have reviewed and confirmed the accuracy of the patient's history: Chief complaint, HPI, ROS and Subjective as entered by the MA/LPN/RN. Aamir Garcia MD 10/29/20         Electronically signed by Aamir Garcia MD, 10/29/20, 11:06 AM EDT.

## 2020-10-29 NOTE — PROGRESS NOTES
"Radiation Oncology Consult Note    Name: Tristen Garcia  YOB: 1935  MRN #: [id number]  Date of Service: 10/29/2020  Referring Provider: Aamir Garcia MD  Primary Care Provider: Ann Marie Hodgson MD    DIAGNOSIS: Intrahepatic cholangiocarcinoma, moderately differentiated adenocarcinoma  Encounter Diagnosis   Name Primary?   • Liver mass, right lobe Yes       REASON FOR CONSULTATION/CHIEF COMPLAINT:  \"Liver mass\"  I was asked to see the patient at the request of the referring provider noted below for advice and recommendations regarding this diagnosis and the role of radiation therapy.                              REQUESTING PHYSICIAN:  Aamir Garcia MD    RECORDS OBTAINED:  Records of the patients history including those obtained from the referring provider were reviewed and summarized in detail.    HISTORY OF PRESENT ILLNESS:  Tristen Garcia is a 85 y.o. male, accmpanied by his granddaughter who is a nurse, who presented to [facility] 10/2020 from [facility]. He was found lying on the floor on 10/17/2020 at home, he had lost 30-40 lbs, he was diagnosed with rhabdomyolysis, acute on chronic kidney disease and sepsis.  He was treated at [facility] and transferred to [facility] once a liver mass was noted on scans.     On earlier scans, 09/14/2020, which were ordered for right upper abdominal pain, elevated WBC and alk phos, it showed an increased density in the inferior tip of the liver with uncertain etiology.    09/23/2020 u/s of the abdomen showed patchy areas of hypoechogenicity in the right lobe of the liver.    MRI of the abdomen on 10/05/2020 showed an inhomogenous ill-defined patchy areas of signal around the in the liver parenchyma in the inferior aspect of the right lobe of the liver and involved the inferior tip the right lobe of the liver of uncertain etiology.  This disease process appears to break through the liver The posterior medial aspect of the inferior tip of the right lobe of the liver.  The disease process in " the liver parenchyma appears to extend into the fatty soft tissue adjacent to the inferior medial aspect of the inferior tip of the right lobe of the liver.  There Is a small amount of fluid surrounding the inferior tip of the right lobe of the liver.    He did get this area CT guided biopsy on 10/14/2020 and this showed moderately differentiated adenocarcinoma.    Tumor markers for CA 19-9 were elevated to 3,215.    He has an appt today with Dr. Garcia to discuss systemic options, [place]/Ox.  He is not having the right upper quadrant pain any more he notes.    His grand-daughter is a nurse and has many great questions.        The following portions of the patient's history were reviewed and updated as appropriate: allergies, current medications, past family history, past medical history, past social history, past surgical history and problem list. Reviewed with the patient and remain unchanged.    PAST MEDICAL HISTORY:  he  has a past medical history of Arthritis, Cancer (CMS/HCC), COPD (chronic obstructive pulmonary disease) (CMS/HCC), Elevated cholesterol, GERD (gastroesophageal reflux disease), Hyperlipidemia, and Hypertension.  MEDICATIONS:   Current Outpatient Medications:   •  allopurinol (ZYLOPRIM) 100 MG tablet, Take 1 tablet by mouth Daily., Disp:  , Rfl:   •  atenolol (TENORMIN) 25 MG tablet, Take 25 mg by mouth Daily., Disp: , Rfl:   •  furosemide (LASIX) 40 MG tablet, Take 1 tablet by mouth Daily., Disp:  , Rfl:   •  Ipratropium-Albuterol (DUONEB IN), 3 (Three) Times a Day., Disp: , Rfl:   •  omeprazole (priLOSEC) 20 MG capsule, Take 20 mg by mouth Daily., Disp: , Rfl:   •  sodium bicarbonate 650 MG tablet, Take 650 mg by mouth 3 (Three) Times a Day., Disp: , Rfl:   ALLERGIES: No Known Allergies  PAST SURGICAL HISTORY: he has a past surgical history that includes Colonoscopy; Eye surgery; and Skin biopsy.  PREVIOUS RADIOTHERAPY OR CHEMOTHERAPY: No  FAMILY HISTORY: his family history includes Kidney failure  "in his father.  SOCIAL HISTORY: he  reports that he has quit smoking. He has never used smokeless tobacco. He reports previous alcohol use. He reports that he does not use drugs.  PAIN AND PAIN MANAGEMENT:   Vitals:    10/29/20 0853   BP: 156/77   Pulse: 70   Temp: 98 °F (36.7 °C)   TempSrc: Oral   SpO2: 90%   Weight: 95.9 kg (211 lb 6.4 oz)   Height: 175.3 cm (69\")   PainSc: 0-No pain     NUTRITIONAL STATUS:  Otherwise no issues  KPS: 70:  Cares for self; unable to carry on nomal activity  ECOG: (1) Restricted in physically strenuous activity, ambulatory and able to do work of light nature       PHQ-9 Total Score: 9   Patient screened positive for depression based on a PHQ-9 score of 9 on 10/29/2020. Follow-up recommendations include: Referral to Social Work.        Review of Systems:   Review of Systems     Otherwise negative as below.     General: No fevers, chills, weight change, or drenching night sweats. Skin: No rashes or jaundice.  HEENT: No change in vision or hearing, no headaches.  Neck: No dysphagia or masses.  Heme/Lymph: No easy bruising or bleeding.  Respiratory System: No shortness of breath or cough.  Cardiovascular: No chest pain, palpitations, or dyspnea on exertion.  +/- Pacemaker. GI: No nausea, vomiting, diarrhea, melena, or hematochezia.  : No dysuria or hematuria.  Endocrine: No heat or cold intolerance. Musculoskeletal: No myalgias or arthralgias.  Neuro: No weakness, numbness, syncope, or seizures. Psych: No mood changes or depression. Ext: Denies swelling.        Objective     Vitals:  Vitals:    10/29/20 0853   BP: 156/77   Pulse: 70   Temp: 98 °F (36.7 °C)   TempSrc: Oral   SpO2: 90%   Weight: 95.9 kg (211 lb 6.4 oz)   Height: 175.3 cm (69\")   PainSc: 0-No pain         PHYSICAL EXAM:  GENERAL: in no apparent distress, sitting comfortably in room. In wheelchair.   HEENT: normocephalic, atraumatic. Pupils are equal, round, reactive to light. Sclera anicteric. Conjunctiva not injected. " Oropharynx without erythema, ulcerations or thrush.   NECK: Supple with no masses.  LYMPHATIC: no cervical, supraclavicular or axillary adenopathy appreciated bilaterally.   CARDIOVASCULAR: S1 & S2 detected; no murmurs, rubs or gallops.  CHEST: clear to auscultation bilaterally; no wheezes, crackles or rubs. Work of breathing normal.  ABDOMEN: bowel sounds present. Abdomen is soft, nontender, nondistended.   MUSCULOSKELETAL: no tenderness to palpation along the spine or scapulae. Normal range of motion.  EXTREMITIES: no clubbing, cyanosis, edema.  SKIN: no erythema, rashes, ulcerations noted.   NEUROLOGIC: cranial nerves II-XII grossly intact bilaterally. No focal neurologic deficits.  PSYCHIATRIC:  alert, aware, and appropriate.      PERTINENT IMAGING/PATHOLOGY/LABS (Medical Decision Making):     COORDINATION OF CARE: A copy of this note is sent to the referring provider.    PATHOLOGY (Reviewed):     IMAGING (Reviewed): Independently reviewed the OSH MRI and discussed with the patient and his grand-daughter.    LABS (Reviewed):  Hematology WBC   Date Value Ref Range Status   10/21/2020 10.40 3.40 - 10.80 10*3/mm3 Final     RBC   Date Value Ref Range Status   10/21/2020 3.22 (L) 4.14 - 5.80 10*6/mm3 Final     Hemoglobin   Date Value Ref Range Status   10/21/2020 8.2 (L) 13.0 - 17.7 g/dL Final     Hematocrit   Date Value Ref Range Status   10/21/2020 26.3 (L) 37.5 - 51.0 % Final     Platelets   Date Value Ref Range Status   10/21/2020 427 140 - 450 10*3/mm3 Final      Chemistry   Lab Results   Component Value Date    GLUCOSE 97 10/21/2020    BUN 15 10/21/2020    CREATININE 1.10 10/21/2020    EGFRIFNONA 64 10/21/2020    BCR 13.6 10/21/2020    K 4.0 10/21/2020    CO2 28.0 10/21/2020    CALCIUM 8.5 (L) 10/21/2020    PROTENTOTREF 5.6 (L) 10/19/2020    ALBUMIN 2.30 (L) 10/21/2020    LABIL2 0.6 (L) 10/19/2020    AST 68 (H) 10/21/2020    ALT 48 (H) 10/21/2020       Assessment/Plan     ASSESSMENT AND PLAN:  1. Liver mass,  right lobe       Unresectable intrahepatic cholangiocarcinoma  -Appt today with Dr. Garcia to talk about GemOx  -CA 19-9 elevated to 3215  CEA 8.27    I shared that at  and in training at [facility], I have treated lesions to ablative dosing using hypofractionated techniques.  His lesion is large, near the kidney and bowel. He is not symptomatic to need local palliation and hopefully he will have wonderful response to chemo and atleast shrink the tumor and this can be considered for hypofractionated XRT at some point.    I have talked about this case with Dr. Garcia today and he is going to see the patient.  Mr. Garcia is going to have his family staying with him for assistance.          Will present the case at upcoming tumor board.    I did discuss referral to Surgeon at  or  but Mr. Garcia state he will not consider surgery.    This assessment comes from my review of the imaging, pathology, physician notes and other pertinent information as mentioned.    DISPOSITION: 3 month f/u given but remain available to see him earlier if symptoms develop of disease is progressing on chemotherapy.    TIME SPENT WITH PATIENT:   I spent greater than 45 minutes in face-to-face time with the patient and greater than 60% of those minutes  were spent in counseling and coordination of care, including review of imaging and pathology; indications, goals, logistics, alternatives and risks - both common and rare - for my recommendations as well as surveillance and potential outcomes.       CC: No ref. provider found Ann Marie Hodgson MD Ajay Kandra, MD John A Cox, MD  10/29/2020  9:52 AM EDT

## 2020-10-29 NOTE — TELEPHONE ENCOUNTER
I returned call to [facility] stating that a power port is what is usually placed. They can discuss with surgeon at appointment.

## 2020-10-29 NOTE — TELEPHONE ENCOUNTER
Emily Ames granddaughter called to ask if there is a certain port that the pt needs.     He is going to his consultation tomorrow about his port.     Please give Emily a call at [phone]    (pt does not have a verbal release on file)

## 2020-10-30 ENCOUNTER — TELEPHONE (OUTPATIENT)
Dept: ONCOLOGY | Facility: HOSPITAL | Age: 85
End: 2020-10-30

## 2020-10-30 DIAGNOSIS — C22.1 CHOLANGIOCARCINOMA (HCC): Primary | ICD-10-CM

## 2020-10-30 RX ORDER — PALONOSETRON 0.05 MG/ML
0.25 INJECTION, SOLUTION INTRAVENOUS ONCE
Status: CANCELLED | OUTPATIENT
Start: 2020-11-11

## 2020-10-30 RX ORDER — SODIUM CHLORIDE 9 MG/ML
250 INJECTION, SOLUTION INTRAVENOUS ONCE
Status: CANCELLED | OUTPATIENT
Start: 2020-11-19

## 2020-10-30 RX ORDER — PALONOSETRON 0.05 MG/ML
0.25 INJECTION, SOLUTION INTRAVENOUS ONCE
Status: CANCELLED | OUTPATIENT
Start: 2020-11-19

## 2020-10-30 RX ORDER — SODIUM CHLORIDE 9 MG/ML
250 INJECTION, SOLUTION INTRAVENOUS ONCE
Status: CANCELLED | OUTPATIENT
Start: 2020-11-11

## 2020-10-30 NOTE — TELEPHONE ENCOUNTER
Distress Screening   Patient Name: Tristen Garcia  YOB: 1935  MRN #: [id number]    Patient completed distress screening: 10/29/2020  Distress Level: 10/10   Problems indicated:treatment decisions, loss of interest in activities, advanced directive, physical complaints     OSW called patient who is alert and oriented to person, place and time. He said he is doing much better and has less stress since he came home. He said he was hospitalized and released from rehab yesterday. He has home health - caretenders coming to the home, and his granddaughter, Emily is staying with him for a couple of day after which, his son will stay with him. When asked how he was doing since he got home he handed the phone to his granddaughter, asking OSW to speak with her.     OSW spoke with his granddaughter, Emily, explained OSW role. She said he has what he needs at this time. Will plan to meet with the family during chemotherapy teaching.     Referrals:None at this time.     Electronically signed by:   Cecille Mcguire LCSW, OSW-C  10/30/20, 12:57 EDT

## 2020-10-31 LAB — QT INTERVAL: 376 MS

## 2020-11-03 DIAGNOSIS — C22.1 CHOLANGIOCARCINOMA (HCC): Primary | ICD-10-CM

## 2020-11-05 NOTE — PROGRESS NOTES
Order received from Dr. Garcia to start pt chemo treatment. Bhavna advised to schedule pt for teaching and treatment.

## 2020-11-08 PROBLEM — Z71.9 ENCOUNTER FOR EDUCATION: Status: ACTIVE | Noted: 2020-11-08

## 2020-11-08 NOTE — PROGRESS NOTES
Education for Administration of Chemotherapy and/or Biotherapy     NAME: Tristen Garcia  : 1935  MRN: [id number]  DATE OF SERVICE: 2020  REASON FOR VISIT: PATIENT EDUCATION    Chief Complaint   Patient presents with   • Follow-up     Chloangiocarcinoma   • Patient Education     Encounter for medication management and education of chemotherapy/biotherapy          Mr. Tristen Garcia is here today for education on his upcoming chemotherapy and/or biotherapy recommended for treatment of his disease. The patient was accompanied by his son.     I reviewed treatment options, obtained signed consent, and answered any questions he had regarding the administration of cisplatin and gemcitabine.     Tristen Garcia has already consulted with Aamir Garcia MD for the treatment of intrahepatic cholangiocarcinoma. The patient's oncologist has discussed the same treatment options with the patient and answered his questions prior to today's visit on 10/29/2020.    TREATMENT GOALS:  The goal of the treatment is to:    [] Curative intent - intent is cure; cure implies patient survival will not be restricted by current cancer diagnosis   [] Control  - intent is to extend survival but not long enough to meet definition of cure for patient with that diagnosis   [x] Palliative - means treatment given in a non-curative setting to optimize symptom control, improve quality of life, and improve survival    This treatment has been explained to Tristen Garcia. Alternative methods of treatment, if any, have been explained to Tristen Garcia, as have the benefits and risks of each. Based on the physician's explanation of the benefits and risks of this treatment and any alternatives available, the patient agrees the potential benefits outweighs the potential risks involved. I have explained to the patient the most likely complications which might occur from this treatment. The patient understands along with the treatment additional  medications may be necessary to lessen the side effects.     SIDE EFFECTS:  Possible side effects may include but are not limited to, any of the following, or a combination of the following:    []  Abdominal pain  []  Hypersensitivity reaction [x]  Rash   []  Allergic Reaction []  Hypertension []  Secondary malignancies   [x]  Anemia []  Hypertensive crisis  []  Sexual side effects    []  Anxiety []  Hypertriglyceridemia [x]  Shortness of breath   []  Back pain []  Hypoalbuminemia []  Skin changes   []  Body pain []  Hyponatremia  []  Somnolence   []  Blood clots (DVT/PE) []  Immune-mediated reaction []  Sore throat   []  Bleeding []  Infection  []  Swelling   []  Bone pain []  Infusion reaction  [x]  Taste changes   []  Bruising []  Injection site reaction  []  Temperature sensitivity   []  Cardiovascular events  []  Injection site ulceration [x]  Thrombocytopenia   []  Central neurotoxicity [x]  Insomnia []  Thyroid changes   []  Chest pain []  Itching []  Tinnitus   []  Chills []  Joint pain []  Upper respiratory tract infection    []  Confusion [x]  Kidney toxicity/damage []  Visual changes   []  Congestive heart failure [x]  Leukopenia []  Vitlligo   []  Constipation [x]  LFT imbalances [x]  Vomiting   []  Cough []  Liver damage []  Watery eyes   []  Depression [x]  Loss of appetite [x]  Weakness   [x]  Diarrhea []  Low blood pressure []  Weight gain   []  Dizziness []  Lung damage []  Weight loss   []  Dry skin []  Menopausal symptoms []  Wound healing complication   []  Ecchymosis []  Menstrual irregularities [x]  Blood test abnormalities: low magnesium, low calcium, low potassium   []  Electrolyte imbalances []  Metallic taste  []  Other   []  Elevated LDH []  Mood changes []  Other   []  Eye irritation [x]  Mouth sores []  Other   [x]  Fatigue []  Muscle aches  []  Other   []  Fertility effects  []  Nephrotic syndrome []  Other   [x]  Fevers []  Nail changes []  Other   []  Fistula formation [x]  Nausea  []   Other   [x]  Flu-like symptoms []  Neck pain  []  Other   []  Fluid retention [x]  Neutropenia []  Other   []  Forgetfulness []  Nosebleeds []  Other   []  Gastrointestinal perforation []  Pain in arms/legs []  Other   []  Hand foot syndrome []  Pericardial effusion  []  Other   [x]  Hair loss/discoloration [x]  Peripheral neuropathy []  Other   []  Headaches []  Petechiae []  Other   [x]  Hearing loss/change []  Pharyngitis  []  Other   []  Heart damage []  Photosensitivity  []  Other   [x]  Hematuria []  Pleural effusion  []  Other   []  Hemorrhage [x]  Proteinuria  []  Other     VASCULAR ACCESS:  The patient was educated on the possible need for vascular access/port placement.  The patient was advised although uncommon, leakage of an infused medication from the vein or venous access device (port) may lead to skin breakdown and/or other tissue damage.  The patient was advised he may have pain, bleeding, and/or bruising from the insertion of a needle in their vein or venous access device (port).  The patient was further advised despite proper technique, infection with redness and irritation may rarely occur at the site where the needle was inserted.  The patient was advised if complications occur, additional medical treatment is available.    BLOOD COUNT MONITORING:  While receiving treatment, it has been explained to the patient blood counts will be monitored.  This may include but is not limited to a complete blood count (CBC). The patient may develop neutropenia, anemia, or thrombocytopenia. This has been explained and a handout was provided to the patient.     NUTRITION:   It was explained to the patient about nutrition and its importance while undergoing chemotherapy and/or biotherapy. Certain medications will be prescribed during the treatment which may change the way foods taste or smell. These changes may cause poor or no appetite. The patient was advised food is fuel for the body, and if it does not get  the fuel it needs, he may become malnourished, which can lead to severe fatigue. It was discussed with the patient about calories and how to add high-calorie foods to his diet.  Protein was also mentioned in regards to how it will help make new cells for the body. Information was given to Tristen Thompsonmargaux regarding good protein sources.   It was also discussed with the patient the importance of  eating and drinking every 2-3 hours while awake. We discussed fluid intake of at least 6 to 8 ounce glasses of liquids per day to stay hydrated. Examples are listed below:   Water  Juice (fruit or vegetable)  Soda Sport Drinks Soup   Milk  Ensure, Boost, Glucerna Ice Cream Popsicles Jello   Milkshakes Pudding  Gatorade Sherbert Yogurt     REPRODUCTION:  Reproductive risks were discussed, including appropriate use of birth control, and protection during sexual relations. The risks of becoming pregnant while receiving chemotherapy and/or biotherapy were reviewed for females.  Males were instructed to use appropriate birth control to prevent conception during treatment.  We also discussed the importance of using reliable barrier methods while participating in intimate activities as this may expose their partners to a potentially harmful drug. The importance of pregnancy prevention was emphasized due to risks of increased chance of birth defects and miscarriages.     Tristen Garcia was provided handouts on:   1. Home instructions  2. Complete blood counts and terminology  3. Nutrition during cancer therapy   4. Fluids and dehydration  5. Mouth care  6. Cancer-related fatigue  7. Management of constipation    8. Management of diarrhea   9. Handouts from [employer] on specific drugs: Gemcitabine and cisplatin  10. Handouts from [employer] on specific drugs if applicable: Dexamethasone and Zofran  11. A signed copy of chemotherapy/biotherapy consent     TOPICS EDUCATION PROVIDED EDUCATION REINFORCED COMMENTS   ANEMIA:  role of  RBC, cause, s/s, ways to manage, role of transfusion [x] [x]    THROMBOCYTOPENIA:  role of platelet, cause, s/s, ways to prevent bleeding, things to avoid, when to seek help [x] [x]    NEUTROPENIA:  role of WBC, cause, infection precautions, s/s of infection, when to call MD [x] [x]    NUTRITION & APPETITE CHANGES:  importance of maintaining healthy diet & weight, ways to manage to improve intake, dietary consult, exercise regimen [x] [x]    DIARRHEA:  causes, s/s of dehydration, ways to manage, dietary changes, when to call MD [x] [x]    CONSTIPATION:  causes, ways to manage, dietary changes, when to call MD [x] [x]    NAUSEA & VOMITING:  causes, use of antiemetics, dietary changes, when to call MD [x] [x]    MOUTH SORES:  causes, oral care, ways to manage [x] [x]    ALOPECIA:  causes, ways to manage, resources [x] [x]    INFERTILITY & SEXUALITY:  causes, fertility preservation options, sexuality changes, ways to manage, importance of birth control [x] [x]    NERVOUS SYSTEM CHANGES:  causes, s/s, neuropathies, cognitive changes, ways to manage [x] [x]    PAIN:  causes, ways to manage [x] [x]    SKIN & NAIL CHANGES:  cause, s/s, ways to manage [x] [x]    ORGAN TOXICITIES:  cause, s/s, need for diagnostic tests, labs, when to notify MD [x] [x] Risk of kidney toxicity-encouraged increasing p.o. fluid intake   SURVIVORSHIP:  distress, distress assessment, secondary malignancies, early/late effects, follow-up, social issues, social support [x] [x]    HOME CARE:  use of spill kits, storing of PO chemo, how to manage bodily fluids [x] [x]    MISCELLANEOUS:  drug interactions, administration, vesicants  [x] [x]      PAST MEDICAL HISTORY:  Past Medical History:   Diagnosis Date   • Arthritis    • Cancer (CMS/[facility])    • COPD (chronic obstructive pulmonary disease) (CMS/[facility])    • Elevated cholesterol    • GERD (gastroesophageal reflux disease)    • Hyperlipidemia    • Hypertension        PAST SURGICAL HISTORY:  Past Surgical  History:   Procedure Laterality Date   • COLONOSCOPY     • EYE SURGERY     • SKIN BIOPSY         CURRENT MEDICATIONS:    Current Outpatient Medications:   •  allopurinol (ZYLOPRIM) 100 MG tablet, Take 1 tablet by mouth Daily. (Patient taking differently: Take 300 mg by mouth Daily.), Disp:  , Rfl:   •  amLODIPine (NORVASC) 10 MG tablet, Take 10 mg by mouth Daily., Disp: , Rfl:   •  atenolol (TENORMIN) 25 MG tablet, Take 25 mg by mouth Daily., Disp: , Rfl:   •  dexamethasone (DECADRON) 4 MG tablet, Take 2 tablets in the morning daily on Days 2, 3, and 4 and Days 9, 10, and 11.  Take with food., Disp: 12 tablet, Rfl: 5  •  furosemide (LASIX) 40 MG tablet, Take 1 tablet by mouth Daily., Disp:  , Rfl:   •  Ipratropium-Albuterol (DUONEB IN), 3 (Three) Times a Day. 0.083 3ml, Disp: , Rfl:   •  losartan (COZAAR) 100 MG tablet, Take 100 mg by mouth Daily., Disp: , Rfl:   •  omeprazole (priLOSEC) 20 MG capsule, Take 20 mg by mouth Daily., Disp: , Rfl:   •  ondansetron (ZOFRAN) 8 MG tablet, Take 1 tablet by mouth 3 (Three) Times a Day As Needed for Nausea or Vomiting., Disp: 30 tablet, Rfl: 5  •  simvastatin (Zocor) 20 MG tablet, Take 20 mg by mouth Every Night., Disp: , Rfl:   •  sodium bicarbonate 650 MG tablet, Take 650 mg by mouth 4 (Four) Times a Day., Disp: , Rfl:   No current facility-administered medications for this visit.     Facility-Administered Medications Ordered in Other Visits:   •  dexamethasone (DECADRON) IVPB 12 mg, 12 mg, Intravenous, Once, Aamir Garcia MD  •  FOSAPREPITANT 150 MG/100ML NORMAL SALINE (CBC) IVPB 100 mL 100 mL, 150 mg, Intravenous, Once, Aamir Garcia MD  •  gemcitabine (GEMZAR) 2,100 mg in sodium chloride 0.9 % 305.2 mL chemo IVPB, 1,000 mg/m2 (Treatment Plan Recorded), Intravenous, Once, Aamir Garcia MD  •  palonosetron (ALOXI) injection 0.25 mg, 0.25 mg, Intravenous, Once, Aamir Garcia MD  •  sodium chloride 0.9 % 500 mL with potassium chloride 10 mEq, magnesium sulfate 0.5 g infusion,  500 mL, Intravenous, Once, Aamir Garcia MD, Last Rate: 500 mL/hr at 11/11/20 1006, 500 mL at 11/11/20 1006  •  sodium chloride 0.9 % infusion 250 mL, 250 mL, Intravenous, Once, Aamir Garcia MD    ALLERGIES:  No Known Allergies    FAMILY HISTORY:  Family History   Problem Relation Age of Onset   • Kidney failure Father        ONCOLOGIC FAMILY HISTORY:  Cancer-related family history is not on file.    SOCIAL HISTORY:  Social History     Tobacco Use   • Smoking status: Former Smoker   • Smokeless tobacco: Never Used   Substance Use Topics   • Alcohol use: Not Currently   • Drug use: Never     HPI, ROS and PFSH have been reviewed and confirmed on 11/11/2020.     REVIEW OF SYSTEMS:  Review of Systems   Constitutional: Negative for fatigue and fever.   Respiratory: Positive for shortness of breath (intermittent with exertion). Negative for cough.    Neurological: Negative for facial asymmetry and speech difficulty.   Psychiatric/Behavioral: Negative for confusion.     PHYSICAL EXAMINATION:  Physical Exam  Vitals signs reviewed.   Constitutional:       General: He is not in acute distress.     Appearance: Normal appearance. He is obese. He is not ill-appearing, toxic-appearing or diaphoretic.   HENT:      Head: Normocephalic and atraumatic.   Eyes:      General: No scleral icterus.        Right eye: No discharge.         Left eye: No discharge.      Extraocular Movements: Extraocular movements intact.   Pulmonary:      Effort: Pulmonary effort is normal. No respiratory distress.   Skin:     General: Skin is warm.   Neurological:      General: No focal deficit present.      Mental Status: He is alert and oriented to person, place, and time.   Psychiatric:         Mood and Affect: Mood normal.         Behavior: Behavior normal.       LABS:  WBC   Date Value Ref Range Status   11/11/2020 10.21 3.40 - 10.80 10*3/mm3 Final     RBC   Date Value Ref Range Status   11/11/2020 3.62 (L) 4.14 - 5.80 10*6/mm3 Final     Hemoglobin    Date Value Ref Range Status   11/11/2020 9.2 (L) 13.0 - 17.7 g/dL Final     Hematocrit   Date Value Ref Range Status   11/11/2020 30.8 (L) 37.5 - 51.0 % Final     MCV   Date Value Ref Range Status   11/11/2020 85.1 79.0 - 97.0 fL Final     MCH   Date Value Ref Range Status   11/11/2020 25.4 (L) 26.6 - 33.0 pg Final     MCHC   Date Value Ref Range Status   11/11/2020 29.9 (L) 31.5 - 35.7 g/dL Final     RDW   Date Value Ref Range Status   11/11/2020 17.5 (H) 12.3 - 15.4 % Final     RDW-SD   Date Value Ref Range Status   11/11/2020 53.2 37.0 - 54.0 fl Final     MPV   Date Value Ref Range Status   11/11/2020 9.7 6.0 - 12.0 fL Final     Platelets   Date Value Ref Range Status   11/11/2020 512 (H) 140 - 450 10*3/mm3 Final     Neutrophil %   Date Value Ref Range Status   11/11/2020 72.8 42.7 - 76.0 % Final     Lymphocyte %   Date Value Ref Range Status   11/11/2020 12.3 (L) 19.6 - 45.3 % Final     Monocyte %   Date Value Ref Range Status   11/11/2020 11.9 5.0 - 12.0 % Final     Eosinophil %   Date Value Ref Range Status   11/11/2020 2.4 0.3 - 6.2 % Final     Basophil %   Date Value Ref Range Status   11/11/2020 0.6 0.0 - 1.5 % Final     Neutrophils, Absolute   Date Value Ref Range Status   11/11/2020 7.43 (H) 1.70 - 7.00 10*3/mm3 Final     Lymphocytes, Absolute   Date Value Ref Range Status   11/11/2020 1.26 0.70 - 3.10 10*3/mm3 Final     Monocytes, Absolute   Date Value Ref Range Status   11/11/2020 1.22 (H) 0.10 - 0.90 10*3/mm3 Final     Eosinophils, Absolute   Date Value Ref Range Status   11/11/2020 0.24 0.00 - 0.40 10*3/mm3 Final     Basophils, Absolute   Date Value Ref Range Status   11/11/2020 0.06 0.00 - 0.20 10*3/mm3 Final     nRBC   Date Value Ref Range Status   10/21/2020 0.1 0.0 - 0.2 /100 WBC Final     Lab Results   Component Value Date    GLUCOSE 87 10/29/2020    BUN 14 10/29/2020    CREATININE 1.60 (H) 11/11/2020    EGFRIFNONA 74 10/29/2020    BCR 14.4 10/29/2020    K 4.5 10/29/2020    CO2 33.0 (H)  10/29/2020    CALCIUM 9.4 10/29/2020    PROTENTOTREF 5.6 (L) 10/19/2020    ALBUMIN 3.50 10/29/2020    LABIL2 0.6 (L) 10/19/2020    AST 32 10/29/2020    ALT 20 10/29/2020     Vital Signs:   Temperature: 97.5 °F  Pulse: 76  Respirations: 18  Blood pressure: 126/76  BMI 29.7 kg/m²  Pain score 6    Assessment/Plan   Encounter for medication management and education of chemotherapy/biotherapy      Cholangiocarcinoma (CMS/HCC)      ASSESSMENT   1. Encounter for medication management and education of chemotherapy/biotherapy    2. 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 Elevated serum creatinine    PLAN  • Start gemcitabine   • Administer prehydration fluids per treatment plan  • Hold cisplatin until day 8 of cycle 1 on 11/18/2020 due to elevated serum creatinine  • E-scribed dexamethasone 4 mg tablets per treatment plan: Take 2 tablets in the morning daily on days 2, 3, and 4 and days 9, 10, and 11.  Take with food.  Patient to  from pharmacy today.  • E-scribed Zofran 8 mg tablets per treatment plan: Take 1 tablet by mouth 3 times a day as needed for nausea or vomiting.  Patient to  from pharmacy today.  • Notified , financial counselor, and dietician patient starting new treatment   • Return to the clinic 11/18/2020 for chemotherapy infusion  • Follow-up appointment scheduled with Dr. Garcia on 11/19/2020    DISCUSSION  Patient's serum creatinine is 1.60 today.  I discussed this with patient's oncologist Dr. Garcia.  Dr. Garcia would like to hold cisplatin today and only administer gemcitabine.  The plan is to administer cisplatin next week 11/18/2020 if patient's serum creatinine level is within normal limits.  Dr. Garcia is considering increasing the dose of cisplatin on 11/18/2020 because we are holding the dose today.    I have reviewed labs results, imaging, vitals, and medications with the patient today.    A total of 45 minutes were  spent with the patient, with greater then 50% of time spent in education and counseling.    Electronically signed by CAREY Cho, 11/11/20, 10:51 AM EST.

## 2020-11-10 ENCOUNTER — TELEPHONE (OUTPATIENT)
Dept: ONCOLOGY | Facility: CLINIC | Age: 85
End: 2020-11-10

## 2020-11-10 NOTE — TELEPHONE ENCOUNTER
10.29.20 progress note - Ordering NGS molecular studies to identify somatic mutations including FGF R2, PD-L1, IDH 1, NTRK. I don't see an order for these under labs.

## 2020-11-11 ENCOUNTER — HOSPITAL ENCOUNTER (OUTPATIENT)
Dept: ONCOLOGY | Facility: HOSPITAL | Age: 85
Setting detail: INFUSION SERIES
Discharge: HOME | End: 2020-11-11

## 2020-11-11 ENCOUNTER — DOCUMENTATION (OUTPATIENT)
Dept: ONCOLOGY | Facility: HOSPITAL | Age: 85
End: 2020-11-11

## 2020-11-11 ENCOUNTER — OFFICE VISIT (OUTPATIENT)
Dept: ONCOLOGY | Facility: CLINIC | Age: 85
End: 2020-11-11

## 2020-11-11 VITALS
TEMPERATURE: 97.5 F | WEIGHT: 201.5 LBS | DIASTOLIC BLOOD PRESSURE: 76 MMHG | HEART RATE: 76 BPM | BODY MASS INDEX: 29.84 KG/M2 | SYSTOLIC BLOOD PRESSURE: 126 MMHG | RESPIRATION RATE: 18 BRPM | HEIGHT: 69 IN

## 2020-11-11 DIAGNOSIS — Z45.2 FITTING AND ADJUSTMENT OF VASCULAR CATHETER: ICD-10-CM

## 2020-11-11 DIAGNOSIS — C22.1 CHOLANGIOCARCINOMA (HCC): ICD-10-CM

## 2020-11-11 DIAGNOSIS — R16.0 LIVER MASS, RIGHT LOBE: Primary | ICD-10-CM

## 2020-11-11 DIAGNOSIS — C22.1 CHOLANGIOCARCINOMA (HCC): Primary | ICD-10-CM

## 2020-11-11 DIAGNOSIS — Z71.9 ENCOUNTER FOR EDUCATION: Primary | ICD-10-CM

## 2020-11-11 LAB
ALBUMIN SERPL-MCNC: 3 G/DL (ref 3.5–5.2)
ALBUMIN/GLOB SERPL: 0.9 G/DL
ALP SERPL-CCNC: 211 U/L (ref 39–117)
ALT SERPL W P-5'-P-CCNC: 19 U/L (ref 1–41)
ANION GAP SERPL CALCULATED.3IONS-SCNC: 11 MMOL/L (ref 5–15)
AST SERPL-CCNC: 36 U/L (ref 1–40)
BASOPHILS # BLD AUTO: 0.06 10*3/MM3 (ref 0–0.2)
BASOPHILS NFR BLD AUTO: 0.6 % (ref 0–1.5)
BILIRUB SERPL-MCNC: 0.2 MG/DL (ref 0–1.2)
BUN SERPL-MCNC: 19 MG/DL (ref 8–23)
BUN/CREAT SERPL: 13.3 (ref 7–25)
CALCIUM SPEC-SCNC: 8.9 MG/DL (ref 8.6–10.5)
CHLORIDE SERPL-SCNC: 96 MMOL/L (ref 98–107)
CO2 SERPL-SCNC: 30 MMOL/L (ref 22–29)
CREAT BLDA-MCNC: 1.6 MG/DL (ref 0.6–1.3)
CREAT SERPL-MCNC: 1.43 MG/DL (ref 0.76–1.27)
DEPRECATED RDW RBC AUTO: 53.2 FL (ref 37–54)
EOSINOPHIL # BLD AUTO: 0.24 10*3/MM3 (ref 0–0.4)
EOSINOPHIL NFR BLD AUTO: 2.4 % (ref 0.3–6.2)
ERYTHROCYTE [DISTWIDTH] IN BLOOD BY AUTOMATED COUNT: 17.5 % (ref 12.3–15.4)
GFR SERPL CREATININE-BSD FRML MDRD: 47 ML/MIN/1.73
GLOBULIN UR ELPH-MCNC: 3.3 GM/DL
GLUCOSE SERPL-MCNC: 107 MG/DL (ref 65–99)
HCT VFR BLD AUTO: 30.8 % (ref 37.5–51)
HGB BLD-MCNC: 9.2 G/DL (ref 13–17.7)
LYMPHOCYTES # BLD AUTO: 1.26 10*3/MM3 (ref 0.7–3.1)
LYMPHOCYTES NFR BLD AUTO: 12.3 % (ref 19.6–45.3)
MAGNESIUM SERPL-MCNC: 1.9 MG/DL (ref 1.6–2.4)
MCH RBC QN AUTO: 25.4 PG (ref 26.6–33)
MCHC RBC AUTO-ENTMCNC: 29.9 G/DL (ref 31.5–35.7)
MCV RBC AUTO: 85.1 FL (ref 79–97)
MONOCYTES # BLD AUTO: 1.22 10*3/MM3 (ref 0.1–0.9)
MONOCYTES NFR BLD AUTO: 11.9 % (ref 5–12)
NEUTROPHILS NFR BLD AUTO: 7.43 10*3/MM3 (ref 1.7–7)
NEUTROPHILS NFR BLD AUTO: 72.8 % (ref 42.7–76)
PLATELET # BLD AUTO: 512 10*3/MM3 (ref 140–450)
PMV BLD AUTO: 9.7 FL (ref 6–12)
POTASSIUM SERPL-SCNC: 3.9 MMOL/L (ref 3.5–5.2)
PROT SERPL-MCNC: 6.3 G/DL (ref 6–8.5)
RBC # BLD AUTO: 3.62 10*6/MM3 (ref 4.14–5.8)
SODIUM SERPL-SCNC: 137 MMOL/L (ref 136–145)
WBC # BLD AUTO: 10.21 10*3/MM3 (ref 3.4–10.8)

## 2020-11-11 PROCEDURE — 25010000002 POTASSIUM CHLORIDE PER 2 MEQ OF POTASSIUM: Performed by: INTERNAL MEDICINE

## 2020-11-11 PROCEDURE — 96365 THER/PROPH/DIAG IV INF INIT: CPT

## 2020-11-11 PROCEDURE — 96413 CHEMO IV INFUSION 1 HR: CPT

## 2020-11-11 PROCEDURE — 25010000002 GEMCITABINE 200 MG/5.26ML SOLUTION 5.26 ML VIAL: Performed by: INTERNAL MEDICINE

## 2020-11-11 PROCEDURE — 25010000002 DEXAMETHASONE SODIUM PHOSPHATE 120 MG/30ML SOLUTION: Performed by: INTERNAL MEDICINE

## 2020-11-11 PROCEDURE — 85025 COMPLETE CBC W/AUTO DIFF WBC: CPT | Performed by: INTERNAL MEDICINE

## 2020-11-11 PROCEDURE — 99215 OFFICE O/P EST HI 40 MIN: CPT | Performed by: NURSE PRACTITIONER

## 2020-11-11 PROCEDURE — 80053 COMPREHEN METABOLIC PANEL: CPT | Performed by: INTERNAL MEDICINE

## 2020-11-11 PROCEDURE — 96361 HYDRATE IV INFUSION ADD-ON: CPT

## 2020-11-11 PROCEDURE — 36591 DRAW BLOOD OFF VENOUS DEVICE: CPT

## 2020-11-11 PROCEDURE — 25010000002 MAGNESIUM SULFATE PER 500 MG OF MAGNESIUM: Performed by: INTERNAL MEDICINE

## 2020-11-11 PROCEDURE — 96367 TX/PROPH/DG ADDL SEQ IV INF: CPT

## 2020-11-11 PROCEDURE — 25010000002 GEMCITABINE 2 GM/52.6ML SOLUTION 52.6 ML VIAL: Performed by: INTERNAL MEDICINE

## 2020-11-11 PROCEDURE — 83735 ASSAY OF MAGNESIUM: CPT | Performed by: INTERNAL MEDICINE

## 2020-11-11 PROCEDURE — 82565 ASSAY OF CREATININE: CPT

## 2020-11-11 PROCEDURE — 25010000003 HEPARIN LOCK FLUSH PER 10 UNITS: Performed by: INTERNAL MEDICINE

## 2020-11-11 RX ORDER — AMLODIPINE BESYLATE 10 MG/1
10 TABLET ORAL DAILY
COMMUNITY
End: 2021-02-11 | Stop reason: HOSPADM

## 2020-11-11 RX ORDER — HEPARIN SODIUM (PORCINE) LOCK FLUSH IV SOLN 100 UNIT/ML 100 UNIT/ML
500 SOLUTION INTRAVENOUS AS NEEDED
Status: DISCONTINUED | OUTPATIENT
Start: 2020-11-11 | End: 2020-11-12 | Stop reason: HOSPADM

## 2020-11-11 RX ORDER — DEXAMETHASONE 4 MG/1
TABLET ORAL
Qty: 12 TABLET | Refills: 5 | Status: CANCELLED | OUTPATIENT
Start: 2020-11-11

## 2020-11-11 RX ORDER — SODIUM CHLORIDE 0.9 % (FLUSH) 0.9 %
20 SYRINGE (ML) INJECTION AS NEEDED
Status: CANCELLED | OUTPATIENT
Start: 2020-11-11

## 2020-11-11 RX ORDER — SIMVASTATIN 20 MG
20 TABLET ORAL NIGHTLY
COMMUNITY
End: 2021-02-03

## 2020-11-11 RX ORDER — ONDANSETRON HYDROCHLORIDE 8 MG/1
8 TABLET, FILM COATED ORAL 3 TIMES DAILY PRN
Qty: 30 TABLET | Refills: 5 | Status: SHIPPED | OUTPATIENT
Start: 2020-11-11 | End: 2020-11-11 | Stop reason: SDUPTHER

## 2020-11-11 RX ORDER — SODIUM CHLORIDE 9 MG/ML
250 INJECTION, SOLUTION INTRAVENOUS ONCE
Status: COMPLETED | OUTPATIENT
Start: 2020-11-11 | End: 2020-11-11

## 2020-11-11 RX ORDER — DEXAMETHASONE 4 MG/1
TABLET ORAL
Qty: 12 TABLET | Refills: 5 | Status: SHIPPED | OUTPATIENT
Start: 2020-11-11 | End: 2021-01-27

## 2020-11-11 RX ORDER — LOSARTAN POTASSIUM 100 MG/1
100 TABLET ORAL DAILY
COMMUNITY
End: 2021-02-11 | Stop reason: HOSPADM

## 2020-11-11 RX ORDER — ONDANSETRON HYDROCHLORIDE 8 MG/1
8 TABLET, FILM COATED ORAL 3 TIMES DAILY PRN
Qty: 30 TABLET | Refills: 5 | Status: SHIPPED | OUTPATIENT
Start: 2020-11-11 | End: 2021-01-27

## 2020-11-11 RX ORDER — ONDANSETRON HYDROCHLORIDE 8 MG/1
8 TABLET, FILM COATED ORAL 3 TIMES DAILY PRN
Qty: 30 TABLET | Refills: 5 | Status: CANCELLED | OUTPATIENT
Start: 2020-11-11

## 2020-11-11 RX ORDER — SODIUM CHLORIDE 0.9 % (FLUSH) 0.9 %
20 SYRINGE (ML) INJECTION AS NEEDED
Status: DISCONTINUED | OUTPATIENT
Start: 2020-11-11 | End: 2020-11-12 | Stop reason: HOSPADM

## 2020-11-11 RX ORDER — PALONOSETRON 0.05 MG/ML
0.25 INJECTION, SOLUTION INTRAVENOUS ONCE
Status: DISCONTINUED | OUTPATIENT
Start: 2020-11-11 | End: 2020-11-11

## 2020-11-11 RX ORDER — DEXAMETHASONE 4 MG/1
TABLET ORAL
Qty: 12 TABLET | Refills: 5 | Status: SHIPPED | OUTPATIENT
Start: 2020-11-11 | End: 2020-11-11 | Stop reason: SDUPTHER

## 2020-11-11 RX ORDER — HEPARIN SODIUM (PORCINE) LOCK FLUSH IV SOLN 100 UNIT/ML 100 UNIT/ML
500 SOLUTION INTRAVENOUS AS NEEDED
Status: CANCELLED | OUTPATIENT
Start: 2020-11-11

## 2020-11-11 RX ADMIN — POTASSIUM CHLORIDE 500 ML: 2 INJECTION, SOLUTION, CONCENTRATE INTRAVENOUS at 10:06

## 2020-11-11 RX ADMIN — GEMCITABINE 2100 MG: 38 INJECTION, SOLUTION INTRAVENOUS at 11:41

## 2020-11-11 RX ADMIN — SODIUM CHLORIDE 250 ML: 9 INJECTION, SOLUTION INTRAVENOUS at 11:20

## 2020-11-11 RX ADMIN — DEXAMETHASONE SODIUM PHOSPHATE 12 MG: 4 INJECTION, SOLUTION INTRA-ARTICULAR; INTRALESIONAL; INTRAMUSCULAR; INTRAVENOUS; SOFT TISSUE at 11:20

## 2020-11-11 RX ADMIN — HEPARIN 500 UNITS: 100 SYRINGE at 12:25

## 2020-11-11 RX ADMIN — Medication 10 ML: at 12:24

## 2020-11-11 NOTE — PROGRESS NOTES
Case Management/ Note    Patient Name: Tristen Garcia  YOB: 1935  MRN #: [id number]    OSW met with patient and his son. He is alert and oriented to person, place and time. He is pleasant and appropriately dressed. He spoke of his diagnosis and said while he was willing to do treatments he feels he is done with his life. He further explained that he feels he has done all he can do in life and that he is no longer able to contribute like he once did. We discussed this, he feels that if he is alive he still has a purpose but is unable to identify what that may be today. He has no suicidal ideation. He spoke about being a [employer] and being proud of his work. He spoke of his family. He said his family is small but supportive. He denies having any psychiatric history. He denies any mood changes or anxiety. He does not take any psychotropic medications.     He lives alone. Currently, his son is staying with him as they feel he is not safe to stay by himself due to falls. He continues to have home health with [facility]. He has home equipment to help with safety, including a walker but refuses to use this in the home. He said a cane is easier; he does use this everywhere in the home. Basic needs are met. [facility] and he said he was over the 250% [employer]. He has no amaury that he follows and does not belong to a amaury / [employer] group. He does not have a living will or healthcare representative and was encouraged to complete this.     He was given the [facility], port pillow, small pillow, community, transportation and oncology resources. OSW will remain available.     Electronically signed by:   Cecille Mcguire LCSW, OSW-C  11/11/20, 11:13 EST

## 2020-11-11 NOTE — PROGRESS NOTES
PT here today for first Chemo cycle of Gemzar and Cisplatin. His creat is 1.6 today orders from Dr. Garcia to hold cisplatin today and give next week. Notified Britt of new orders as well as pharmacy.PT and son  VU

## 2020-11-11 NOTE — TELEPHONE ENCOUNTER
Is he needing a current Rx sent?     Electronically signed by CAREY Reynoso, 11/11/20, 11:37 AM EST.

## 2020-11-11 NOTE — PROGRESS NOTES
"                 Nutrition Assessment  Tristen Garcia    Anthropometrics  Height: 5'9\"  Weight: 201.4#  BMI: 29.7  Weight Hx:   (10/14/20) 208.9#  (10/29/20) 210.9#  IBW: 160# (125.8%)  UBW: 240# (83.9%)    Nutrition Questionnaire    Food Allergies: Pt denied allergies at this time    Chewing/Swallowing Problems: Pt denied chewing and swallowing problems at this time. Pt usually wears dentures, but was not today and was trying to gum through peanut butter crackers. Pt said since he's lost 40 pounds, his dentures don't fit properly anymore. He still wears them, but not consistently due to their poor fit.     Who does the cooking & shopping: Pt does the cooking and shopping, but admitted he's not much of a cook. Pt relies on convenience foods, microwave meals, and eating out.    Nausea/Vomiting/Diarrhea: Pt denied n/v. Pt said he usually has diarrhea, but recently he's been dealing with constipation.    Appetite: Pt rated his appetite a 3 on a scale of 1 to 10. (10 = excellent)    24-Hour Diet Recall:   Breakfast - 2 cups black coffee  Lunch - [place]: potato casserole, cooked carrots, steak with gravy, sweet tea  Dinner: pumpkin pie with whipped cream    Discussion: I met the pt during his infusion to provide new-pt diet education. We reviewed the various side effects associated with his treatment and ways to manage them with diet. We discussed ways to manage diarrhea, nausea, taste changes, and mouth sores. I explained to the pt that neutropenia is a possible side effect and reviewed the various food safety protocols to assure he knows how to prevent a food-borne illness, pt verbalized understanding. We discussed the importance of consuming adequate calories to prevent weight and muscle loss during throughout treatment, pt verbalized understanding. I provided the pt with information on food safety, soft/mois/high-protein foods, and how to eat with mouth sores.    Pt was pleasant and appreciated today's " guidance. All questions and concerns were addressed and answered. I provided my contact information and encouraged him to call or schedule an appointment as needed.    Melisa Rawls, MS,RD,LD-[place],CD-[place]  Registered Dietitian

## 2020-11-17 NOTE — PROGRESS NOTES
HEMATOLOGY ONCOLOGY FOLLOW UP        Patient name: Tristen Garcia  : 1935  MRN: [id number]  Primary Care Physician: Ann Marie Hodgson MD  Referring Physician: Ann Marie Hodgson MD  Reason For Consult:   Intrahepatic cholangiocarcinoma    History of Present Illness:  Tristen Garcia is a 85 y.o. male who presented to [facility] on 10/18/2020 as a transfer from [facility].  Patient went to [facility] ED on 10/17/2020 after his granddaughter found him lying on the floor between 3 AM and 9:30 AM. He has lost about 30 to 40 pounds over the last couple of months. Patient was noted to have leukocytosis with WBC of 21,000, anemia with hemoglobin of 10.2, and elevated creatinine of 2.8.  Patient was diagnosed with rhabdomyolysis, acute on chronic kidney disease, and sepsis at [facility].  He was started on antibiotics and given IV fluids.  Blood cultures were drawn.  There was concern for liver abscess and patient's family requested patient be transferred to [facility] for further evaluation.     10/19/20  Hematology/Oncology was consulted for new diagnosis of cholangiocarcinoma from liver mass biopsy on 10/14/2020.   · 2020 patient had a CT of the abdomen and pelvis completed for right upper quadrant abdominal pain, elevated WBC, and elevated alk phos.  CT of the abdomen and pelvis showed small amount of increased density with fatty soft tissues overlying the inferior tip of the right lobe of the liver of uncertain etiology might be caused by inflammation or infection or scarring.  Clinical correlation would be helpful.  This does not appear to be associated with the gallbladder or in the pancreas or the right kidney and has uncertain etiology.  This finding could be connected to the duodenum might be caused by peptic ulcer disease.  Clinical.  Correlation with pancreas enzymes to be helpful as well.  Small tiny amount of fluid in  the pelvis might be caused by the diverticulosis in the sigmoid colon.  This may be because of mild diverticulitis.  · 9/23/2020 ultrasound of the abdomen showed patchy areas of hypoechogenicity in the right lobe of the liver of uncertain etiology might be caused by liver cancer metastatic liver disease or cirrhosis of the liver possible fatty infiltrated areas liver parenchyma admixed with more normal liver parenchyma.  A CT liver with contrast MRI of the liver without and with contrast to further evaluate the abnormality was recommended.   · 10/5/2020: MRI of the abdomen was completed that showed inhomogenous ill-defined patchy areas of signal around the in the liver parenchyma in the inferior aspect of the right lobe of the liver and involved the inferior tip the right lobe of the liver of uncertain etiology.  This disease process appears to break through the liver The posterior medial aspect of the inferior tip of the right lobe of the liver.  The disease process in the liver parenchyma appears to extend into the fatty soft tissue adjacent to the inferior medial aspect of the inferior tip of the right lobe of the liver.  There Is a small amount of fluid surrounding the inferior tip of the right lobe of the liver.  · 10/14/2020 CT-guided core biopsy of the liver showed well to moderately differentiated adenocarcinoma.  The tumor is strongly positive for only CK7.  CK20, CDX2, TTF-1, Napsin A, chromogranin, synaptophysin and CD56 are negative.  Is a nonspecific staining pattern, but can be seen in tumors of pancreaticobiliary origin, including cholangiocarcinoma..  · Anemia studies: , haptoglobin 563, triglycerides 1.10%, ferritin 909.3, iron 16, iron saturation 10%, transferrin 107, TIBC 159, folate 7.29.  · 10/19/2020: CA 19-9: 3215 high, CEA 8.2  · 11/11/2020: Started chemotherapy.  Cycle 1.  Patient received gemcitabine 1000 mg/m².  WBC 10.2, hemoglobin 9.2, platelets 512, creatinine 1.6.  Cisplatin  held due to high creatinine.  · 11/19/2020 cycle 1 day 8.  WBC 11.3, hemoglobin 9.4, platelets 243, creatinine 1.3.  Received cisplatin 30 mg per metered square and gemcitabine 1000 mg per metered square    Subjective:  Patient presents for chemotherapy.  Cisplatin was omitted last week due to elevated creatinine.  Patient does not drink enough fluids.  He is tolerating treatment well.  Creatinine has improved since last week.     He has seen radiation oncologist Dr. Wilkinson in the interim..  Reports fatigue.  Does not have any nausea, vomiting, abdominal pain. .  He is using a wheelchair.  His son was there today with him.    The following portions of the patient's history were reviewed and updated as appropriate: problem list.      Past Medical History:   Diagnosis Date   • Arthritis    • Cancer (CMS/HCC)    • COPD (chronic obstructive pulmonary disease) (CMS/HCC)    • Elevated cholesterol    • GERD (gastroesophageal reflux disease)    • Hyperlipidemia    • Hypertension        Past Surgical History:   Procedure Laterality Date   • COLONOSCOPY     • EYE SURGERY     • SKIN BIOPSY           Current Outpatient Medications:   •  allopurinol (ZYLOPRIM) 100 MG tablet, Take 1 tablet by mouth Daily. (Patient taking differently: Take 300 mg by mouth Daily.), Disp:  , Rfl:   •  amLODIPine (NORVASC) 10 MG tablet, Take 10 mg by mouth Daily., Disp: , Rfl:   •  atenolol (TENORMIN) 25 MG tablet, Take 25 mg by mouth Daily., Disp: , Rfl:   •  dexamethasone (DECADRON) 4 MG tablet, Take 2 tablets in the morning daily on Days 2, 3, and 4 and Days 9, 10, and 11.  Take with food., Disp: 12 tablet, Rfl: 5  •  furosemide (LASIX) 40 MG tablet, Take 1 tablet by mouth Daily., Disp:  , Rfl:   •  Ipratropium-Albuterol (DUONEB IN), 3 (Three) Times a Day. 0.083 3ml, Disp: , Rfl:   •  losartan (COZAAR) 100 MG tablet, Take 100 mg by mouth Daily., Disp: , Rfl:   •  omeprazole (priLOSEC) 20 MG capsule, Take 20 mg by mouth Daily., Disp: , Rfl:   •   ondansetron (ZOFRAN) 8 MG tablet, Take 1 tablet by mouth 3 (Three) Times a Day As Needed for Nausea or Vomiting., Disp: 30 tablet, Rfl: 5  •  simvastatin (Zocor) 20 MG tablet, Take 20 mg by mouth Every Night., Disp: , Rfl:   •  sodium bicarbonate 650 MG tablet, Take 650 mg by mouth 4 (Four) Times a Day., Disp: , Rfl:   No current facility-administered medications for this visit.     Facility-Administered Medications Ordered in Other Visits:   •  heparin injection 500 Units, 500 Units, Intravenous, PRN, Aamir Garcia MD, 500 Units at 11/19/20 1652  •  sodium chloride 0.9 % flush 20 mL, 20 mL, Intravenous, PRN, Aamir Garcia MD, 20 mL at 11/19/20 1652  •  sodium chloride 0.9 % infusion 250 mL, 250 mL, Intravenous, Once, Aamir Garcia MD    No Known Allergies    Family History   Problem Relation Age of Onset   • Kidney failure Father        Cancer-related family history is not on file.    Social History     Tobacco Use   • Smoking status: Former Smoker   • Smokeless tobacco: Never Used   Substance Use Topics   • Alcohol use: Not Currently   • Drug use: Never     Social History     Social History Narrative   • Not on file        I have reviewed the history of present illness, past medical history, family history, social history, lab results, all notes and other records since the patient was last seen on  10/26/2020.    ROS:   Review of Systems   Constitutional: Positive for fatigue. Negative for activity change, chills and fever.   HENT: Negative for drooling, ear discharge, mouth sores, nosebleeds and sore throat.    Eyes: Negative for photophobia, pain, redness and visual disturbance.   Respiratory: Negative for cough, choking, shortness of breath and wheezing.    Cardiovascular: Negative for chest pain and palpitations.   Gastrointestinal: Negative for abdominal pain, diarrhea, nausea and vomiting.   Genitourinary: Negative for dysuria.   Musculoskeletal: Positive for gait problem (Patient is in wheelchair). Negative  "for neck stiffness.   Skin: Negative for rash.   Neurological: Negative for seizures, syncope and speech difficulty.   Psychiatric/Behavioral: Negative for agitation, confusion and hallucinations.       I have reviewed and confirmed the accuracy of the ROS as documented by the MA/LPN/RN Aamir Garcia MD    Objective:  Vital signs:  Vitals:    11/19/20 1129   BP: 156/73   Pulse: 65   Resp: 18   Temp: 97.3 °F (36.3 °C)   Weight: 88.9 kg (196 lb)   Height: 175.3 cm (69\")   PainSc: 0-No pain     Body mass index is 28.94 kg/m².  ECOG  (2) Ambulatory and capable of self care, unable to carry out work activity, up and about > 50% or waking hours    Physical Exam:   Physical Exam  Vitals signs reviewed.   Constitutional:       Appearance: Normal appearance. He is well-developed. He is obese. He is ill-appearing.   HENT:      Head: Normocephalic and atraumatic.      Nose: Nose normal.      Mouth/Throat:      Pharynx: No oropharyngeal exudate.   Eyes:      General: No scleral icterus.     Conjunctiva/sclera: Conjunctivae normal.   Neck:      Musculoskeletal: Normal range of motion.      Thyroid: No thyromegaly.      Trachea: No tracheal deviation.   Cardiovascular:      Rate and Rhythm: Regular rhythm.      Pulses: Normal pulses.      Heart sounds: Normal heart sounds.   Pulmonary:      Effort: Pulmonary effort is normal.      Breath sounds: Normal breath sounds. No stridor.   Abdominal:      General: Bowel sounds are normal. There is distension.      Palpations: Abdomen is soft. There is no mass.      Tenderness: There is no abdominal tenderness.   Musculoskeletal: Normal range of motion.         General: No deformity.   Lymphadenopathy:      Cervical: No cervical adenopathy.   Skin:     General: Skin is warm.   Neurological:      General: No focal deficit present.      Mental Status: He is alert and oriented to person, place, and time.      Sensory: No sensory deficit.   Psychiatric:         Mood and Affect: Mood normal.    "      Behavior: Behavior normal.         Thought Content: Thought content normal.         I have reexamined the patient and the results are consistent with the previously documented exam. Aamir Garcia MD     Lab Results - Last 18 Months   Lab Units 11/19/20  1107 11/11/20  0903 10/29/20  1136   WBC 10*3/mm3 11.31* 10.21 16.66*   HEMOGLOBIN g/dL 9.4* 9.2* 9.7*   HEMATOCRIT % 31.3* 30.8* 32.9*   PLATELETS 10*3/mm3 243 512* 525*   MCV fL 83.5 85.1 87.5     Lab Results - Last 18 Months   Lab Units 11/19/20  1120 11/19/20  1107 11/11/20  0929 11/11/20  0903 10/29/20  1136   SODIUM mmol/L  --  137  --  137 140   POTASSIUM mmol/L  --  4.3  --  3.9 4.5   CHLORIDE mmol/L  --  97*  --  96* 98   CO2 mmol/L  --  30.0*  --  30.0* 33.0*   BUN mg/dL  --  15  --  19 14   CREATININE mg/dL 1.30 1.19 1.60* 1.43* 0.97   CALCIUM mg/dL  --  8.6  --  8.9 9.4   BILIRUBIN mg/dL  --  0.3  --  0.2 0.2   ALK PHOS U/L  --  222*  --  211* 216*   ALT (SGPT) U/L  --  36  --  19 20   AST (SGOT) U/L  --  31  --  36 32   GLUCOSE mg/dL  --  85  --  107* 87       Lab Results   Component Value Date    GLUCOSE 85 11/19/2020    BUN 15 11/19/2020    CREATININE 1.30 11/19/2020    EGFRIFNONA 58 (L) 11/19/2020    BCR 12.6 11/19/2020    K 4.3 11/19/2020    CO2 30.0 (H) 11/19/2020    CALCIUM 8.6 11/19/2020    PROTENTOTREF 5.6 (L) 10/19/2020    ALBUMIN 3.30 (L) 11/19/2020    LABIL2 0.6 (L) 10/19/2020    AST 31 11/19/2020    ALT 36 11/19/2020       Lab Results - Last 18 Months   Lab Units 10/14/20  0813   INR  0.98   APTT seconds 26.1       Lab Results   Component Value Date    IRON 16 (L) 10/19/2020    TIBC 159 (L) 10/19/2020    FERRITIN 909.30 (H) 10/19/2020       Lab Results   Component Value Date    FOLATE 7.29 10/19/2020       No results found for: OCCULTBLD    Lab Results   Component Value Date    RETICCTPCT 1.17 10/19/2020     Lab Results   Component Value Date    JJTRVSSW25 905 10/19/2020     No results found for: SPEP, UPEP  LDH   Date Value Ref Range  Status   10/19/2020 401 (H) 135 - 225 U/L Final     No results found for: DIMITRIS, RF, SEDRATE  Lab Results   Component Value Date    HAPTOGLOBIN 563 (H) 10/19/2020     Lab Results   Component Value Date    PTT 26.1 10/14/2020    INR 0.98 10/14/2020     No results found for:   Lab Results   Component Value Date    CEA 8.27 10/19/2020     No components found for: CA-19-9  No results found for: PSA      Assessment/Plan       Assessment:  1. Liver mass resulting well to moderately differentiated adenocarcinoma: [id number] S/p liver biopsy 10/14/2020. Cholangiocarcinoma favored.   CEA 8.27, AFP 32.60, CA 19-9 3,215. GI consulted.    His tumor is not operable, and patient not a surgical candidate.   Patient is high risk for surgery.  Patient was seen and evaluated by radiation oncology who has recommended palliative/neoadjuvant chemotherapy.  We have proceeded with cisplatin/gemcitabine combination due to increased chances of response.  2.   3. CKD: Patient has CKD stage III.  4. Anemia: Likely from malignancy/chronic disease.  Stool heme is positive.  But no gross bleeding.  5. GERD/COPD: Managed per primary team     PLAN:  1. Discussed treatment with chemotherapy  His disease is inoperable/nonsurgical..  Goal is more or less palliative.  We will try to achieve maximal response to chemotherapy and follow this with the radiation.  2. Started on cisplatin/gemcitabine.  Continue.  3. Advised hydration to minimize cisplatin-induced SHANEL.  4. Will monitor CA 19-9  5. Pending NGS testing ..  6. Pending NGS molecular studies to identify somatic mutations including FGF R2, PD-L1, IDH 1, NTRK  7. Appreciate radiation oncology input  8. Arrange Port-A-Cath placement  9. Follow-up in 3 to 4 weeks             I have reviewed and confirmed the accuracy of the patient's history: Chief complaint, HPI, ROS and Subjective as entered by the MA/LPN/RN. Aamir Garcia MD 11/19/20         Electronically signed by Aamir Garcia MD, 11/19/20, 12:08  PM EST.

## 2020-11-19 ENCOUNTER — OFFICE VISIT (OUTPATIENT)
Dept: ONCOLOGY | Facility: CLINIC | Age: 85
End: 2020-11-19

## 2020-11-19 ENCOUNTER — APPOINTMENT (OUTPATIENT)
Dept: LAB | Facility: HOSPITAL | Age: 85
End: 2020-11-19

## 2020-11-19 ENCOUNTER — HOSPITAL ENCOUNTER (OUTPATIENT)
Dept: ONCOLOGY | Facility: HOSPITAL | Age: 85
Setting detail: INFUSION SERIES
Discharge: HOME | End: 2020-11-19

## 2020-11-19 VITALS
RESPIRATION RATE: 18 BRPM | HEART RATE: 65 BPM | SYSTOLIC BLOOD PRESSURE: 156 MMHG | WEIGHT: 196 LBS | TEMPERATURE: 97.3 F | BODY MASS INDEX: 29.03 KG/M2 | DIASTOLIC BLOOD PRESSURE: 73 MMHG | HEIGHT: 69 IN

## 2020-11-19 VITALS
BODY MASS INDEX: 29.12 KG/M2 | SYSTOLIC BLOOD PRESSURE: 156 MMHG | HEIGHT: 69 IN | DIASTOLIC BLOOD PRESSURE: 73 MMHG | HEART RATE: 65 BPM | TEMPERATURE: 97.3 F | WEIGHT: 196.6 LBS | RESPIRATION RATE: 18 BRPM

## 2020-11-19 DIAGNOSIS — C22.1 CHOLANGIOCARCINOMA (HCC): Primary | ICD-10-CM

## 2020-11-19 DIAGNOSIS — Z45.2 FITTING AND ADJUSTMENT OF VASCULAR CATHETER: ICD-10-CM

## 2020-11-19 LAB
ALBUMIN SERPL-MCNC: 3.3 G/DL (ref 3.5–5.2)
ALBUMIN/GLOB SERPL: 1.3 G/DL
ALP SERPL-CCNC: 222 U/L (ref 39–117)
ALT SERPL W P-5'-P-CCNC: 36 U/L (ref 1–41)
ANION GAP SERPL CALCULATED.3IONS-SCNC: 10 MMOL/L (ref 5–15)
AST SERPL-CCNC: 31 U/L (ref 1–40)
BASOPHILS # BLD AUTO: 0.01 10*3/MM3 (ref 0–0.2)
BASOPHILS NFR BLD AUTO: 0.1 % (ref 0–1.5)
BILIRUB SERPL-MCNC: 0.3 MG/DL (ref 0–1.2)
BUN SERPL-MCNC: 15 MG/DL (ref 8–23)
BUN/CREAT SERPL: 12.6 (ref 7–25)
CALCIUM SPEC-SCNC: 8.6 MG/DL (ref 8.6–10.5)
CHLORIDE SERPL-SCNC: 97 MMOL/L (ref 98–107)
CO2 SERPL-SCNC: 30 MMOL/L (ref 22–29)
CREAT BLDA-MCNC: 1.3 MG/DL (ref 0.6–1.3)
CREAT SERPL-MCNC: 1.19 MG/DL (ref 0.76–1.27)
DEPRECATED RDW RBC AUTO: 52.4 FL (ref 37–54)
EOSINOPHIL # BLD AUTO: 0.15 10*3/MM3 (ref 0–0.4)
EOSINOPHIL NFR BLD AUTO: 1.3 % (ref 0.3–6.2)
ERYTHROCYTE [DISTWIDTH] IN BLOOD BY AUTOMATED COUNT: 17.9 % (ref 12.3–15.4)
GFR SERPL CREATININE-BSD FRML MDRD: 58 ML/MIN/1.73
GLOBULIN UR ELPH-MCNC: 2.5 GM/DL
GLUCOSE SERPL-MCNC: 85 MG/DL (ref 65–99)
HCT VFR BLD AUTO: 31.3 % (ref 37.5–51)
HGB BLD-MCNC: 9.4 G/DL (ref 13–17.7)
LYMPHOCYTES # BLD AUTO: 2.34 10*3/MM3 (ref 0.7–3.1)
LYMPHOCYTES NFR BLD AUTO: 20.7 % (ref 19.6–45.3)
MAGNESIUM SERPL-MCNC: 2.2 MG/DL (ref 1.6–2.4)
MCH RBC QN AUTO: 25.1 PG (ref 26.6–33)
MCHC RBC AUTO-ENTMCNC: 30 G/DL (ref 31.5–35.7)
MCV RBC AUTO: 83.5 FL (ref 79–97)
MONOCYTES # BLD AUTO: 1.33 10*3/MM3 (ref 0.1–0.9)
MONOCYTES NFR BLD AUTO: 11.8 % (ref 5–12)
NEUTROPHILS NFR BLD AUTO: 66.1 % (ref 42.7–76)
NEUTROPHILS NFR BLD AUTO: 7.48 10*3/MM3 (ref 1.7–7)
PLATELET # BLD AUTO: 243 10*3/MM3 (ref 140–450)
PMV BLD AUTO: 8.9 FL (ref 6–12)
POTASSIUM SERPL-SCNC: 4.3 MMOL/L (ref 3.5–5.2)
PROT SERPL-MCNC: 5.8 G/DL (ref 6–8.5)
RBC # BLD AUTO: 3.75 10*6/MM3 (ref 4.14–5.8)
SODIUM SERPL-SCNC: 137 MMOL/L (ref 136–145)
WBC # BLD AUTO: 11.31 10*3/MM3 (ref 3.4–10.8)

## 2020-11-19 PROCEDURE — 96413 CHEMO IV INFUSION 1 HR: CPT

## 2020-11-19 PROCEDURE — 25010000002 DEXAMETHASONE SODIUM PHOSPHATE 120 MG/30ML SOLUTION: Performed by: INTERNAL MEDICINE

## 2020-11-19 PROCEDURE — 25010000002 MAGNESIUM SULFATE PER 500 MG OF MAGNESIUM: Performed by: INTERNAL MEDICINE

## 2020-11-19 PROCEDURE — 96375 TX/PRO/DX INJ NEW DRUG ADDON: CPT

## 2020-11-19 PROCEDURE — 96361 HYDRATE IV INFUSION ADD-ON: CPT

## 2020-11-19 PROCEDURE — 96368 THER/DIAG CONCURRENT INF: CPT

## 2020-11-19 PROCEDURE — 96367 TX/PROPH/DG ADDL SEQ IV INF: CPT

## 2020-11-19 PROCEDURE — 83735 ASSAY OF MAGNESIUM: CPT | Performed by: INTERNAL MEDICINE

## 2020-11-19 PROCEDURE — 96417 CHEMO IV INFUS EACH ADDL SEQ: CPT

## 2020-11-19 PROCEDURE — 99215 OFFICE O/P EST HI 40 MIN: CPT | Performed by: INTERNAL MEDICINE

## 2020-11-19 PROCEDURE — 80053 COMPREHEN METABOLIC PANEL: CPT | Performed by: INTERNAL MEDICINE

## 2020-11-19 PROCEDURE — 85025 COMPLETE CBC W/AUTO DIFF WBC: CPT | Performed by: INTERNAL MEDICINE

## 2020-11-19 PROCEDURE — 25010000002 PALONOSETRON 0.25 MG/5ML SOLUTION PREFILLED SYRINGE: Performed by: INTERNAL MEDICINE

## 2020-11-19 PROCEDURE — 25010000003 HEPARIN LOCK FLUSH PER 10 UNITS: Performed by: INTERNAL MEDICINE

## 2020-11-19 PROCEDURE — 82565 ASSAY OF CREATININE: CPT

## 2020-11-19 PROCEDURE — 25010000002 CISPLATIN PER 50 MG: Performed by: INTERNAL MEDICINE

## 2020-11-19 PROCEDURE — 36591 DRAW BLOOD OFF VENOUS DEVICE: CPT

## 2020-11-19 PROCEDURE — 96360 HYDRATION IV INFUSION INIT: CPT

## 2020-11-19 PROCEDURE — 25010000002 POTASSIUM CHLORIDE PER 2 MEQ OF POTASSIUM: Performed by: INTERNAL MEDICINE

## 2020-11-19 PROCEDURE — 25010000002 FOSAPREPITANT PER 1 MG: Performed by: INTERNAL MEDICINE

## 2020-11-19 PROCEDURE — 25010000002 GEMCITABINE 2 GM/52.6ML SOLUTION 52.6 ML VIAL: Performed by: INTERNAL MEDICINE

## 2020-11-19 RX ORDER — HEPARIN SODIUM (PORCINE) LOCK FLUSH IV SOLN 100 UNIT/ML 100 UNIT/ML
500 SOLUTION INTRAVENOUS AS NEEDED
Status: CANCELLED | OUTPATIENT
Start: 2020-11-19

## 2020-11-19 RX ORDER — SODIUM CHLORIDE 9 MG/ML
250 INJECTION, SOLUTION INTRAVENOUS ONCE
Status: DISCONTINUED | OUTPATIENT
Start: 2020-11-19 | End: 2020-11-20 | Stop reason: HOSPADM

## 2020-11-19 RX ORDER — HEPARIN SODIUM (PORCINE) LOCK FLUSH IV SOLN 100 UNIT/ML 100 UNIT/ML
500 SOLUTION INTRAVENOUS AS NEEDED
Status: DISCONTINUED | OUTPATIENT
Start: 2020-11-19 | End: 2020-11-20 | Stop reason: HOSPADM

## 2020-11-19 RX ORDER — PALONOSETRON 0.05 MG/ML
0.25 INJECTION, SOLUTION INTRAVENOUS ONCE
Status: COMPLETED | OUTPATIENT
Start: 2020-11-19 | End: 2020-11-19

## 2020-11-19 RX ORDER — SODIUM CHLORIDE 0.9 % (FLUSH) 0.9 %
20 SYRINGE (ML) INJECTION AS NEEDED
Status: DISCONTINUED | OUTPATIENT
Start: 2020-11-19 | End: 2020-11-20 | Stop reason: HOSPADM

## 2020-11-19 RX ORDER — SODIUM CHLORIDE 0.9 % (FLUSH) 0.9 %
20 SYRINGE (ML) INJECTION AS NEEDED
Status: CANCELLED | OUTPATIENT
Start: 2020-11-19

## 2020-11-19 RX ADMIN — HEPARIN 500 UNITS: 100 SYRINGE at 16:52

## 2020-11-19 RX ADMIN — POTASSIUM CHLORIDE 500 ML: 2 INJECTION, SOLUTION, CONCENTRATE INTRAVENOUS at 12:37

## 2020-11-19 RX ADMIN — GEMCITABINE 2000 MG: 38 INJECTION, SOLUTION INTRAVENOUS at 14:43

## 2020-11-19 RX ADMIN — Medication 20 ML: at 16:52

## 2020-11-19 RX ADMIN — POTASSIUM CHLORIDE 500 ML: 2 INJECTION, SOLUTION, CONCENTRATE INTRAVENOUS at 15:28

## 2020-11-19 RX ADMIN — DEXAMETHASONE SODIUM PHOSPHATE 12 MG: 4 INJECTION, SOLUTION INTRA-ARTICULAR; INTRALESIONAL; INTRAMUSCULAR; INTRAVENOUS; SOFT TISSUE at 13:29

## 2020-11-19 RX ADMIN — SODIUM CHLORIDE 100 ML: 900 INJECTION, SOLUTION INTRAVENOUS at 12:50

## 2020-11-19 RX ADMIN — PALONOSETRON 0.25 MG: 0.25 INJECTION, SOLUTION INTRAVENOUS at 12:50

## 2020-11-19 RX ADMIN — CISPLATIN 62 MG: 1 INJECTION, SOLUTION INTRAVENOUS at 15:27

## 2020-11-19 NOTE — PROGRESS NOTES
Pt. Here at clinic for C1D8 Cisplatin, Gemzar  Pt. Has no complaints today, Pt's lab results creat 1.30, HGB 9.4, CrCL 45.9, MD notified of pt's lab results  Orders given OK to continue with treatment today per Dr. Garcia

## 2020-11-20 ENCOUNTER — TELEPHONE (OUTPATIENT)
Dept: ONCOLOGY | Facility: CLINIC | Age: 85
End: 2020-11-20

## 2020-11-20 NOTE — TELEPHONE ENCOUNTER
I called and spoke to this patient to have him schedule follow up in 4 weeks with Dr Garcia. The patient has been scheduled for 12/21/20 and verbalized understanding. Per Dr Garcia we should inquire about a port, patient stated that he already has a port. No further patient needs at this time.

## 2020-11-20 NOTE — TELEPHONE ENCOUNTER
I called [facility] and spoke to Phyllis in regards to NGS testing. Phyllis stated that there should be a result today or Monday at the latest. I provided Phyllis with my direct contact phone and fax numbers. She read back and verified contacts.

## 2020-11-23 LAB
LAB AP CARIS, ADDENDUM: NORMAL
LAB AP CASE REPORT: NORMAL
LAB AP DIAGNOSIS COMMENT: NORMAL
Lab: NORMAL
PATH REPORT.FINAL DX SPEC: NORMAL
PATH REPORT.GROSS SPEC: NORMAL

## 2020-12-02 DIAGNOSIS — C22.1 CHOLANGIOCARCINOMA (HCC): Primary | ICD-10-CM

## 2020-12-02 RX ORDER — PALONOSETRON 0.05 MG/ML
0.25 INJECTION, SOLUTION INTRAVENOUS ONCE
Status: CANCELLED | OUTPATIENT
Start: 2020-12-03

## 2020-12-02 RX ORDER — SODIUM CHLORIDE 9 MG/ML
250 INJECTION, SOLUTION INTRAVENOUS ONCE
Status: CANCELLED | OUTPATIENT
Start: 2020-12-17

## 2020-12-02 RX ORDER — PALONOSETRON 0.05 MG/ML
0.25 INJECTION, SOLUTION INTRAVENOUS ONCE
Status: CANCELLED | OUTPATIENT
Start: 2020-12-17

## 2020-12-02 RX ORDER — SODIUM CHLORIDE 9 MG/ML
250 INJECTION, SOLUTION INTRAVENOUS ONCE
Status: CANCELLED | OUTPATIENT
Start: 2020-12-03

## 2020-12-03 ENCOUNTER — HOSPITAL ENCOUNTER (OUTPATIENT)
Dept: ONCOLOGY | Facility: HOSPITAL | Age: 85
Setting detail: INFUSION SERIES
Discharge: HOME | End: 2020-12-03

## 2020-12-03 VITALS
TEMPERATURE: 97.5 F | RESPIRATION RATE: 18 BRPM | BODY MASS INDEX: 28.9 KG/M2 | HEIGHT: 69 IN | DIASTOLIC BLOOD PRESSURE: 68 MMHG | SYSTOLIC BLOOD PRESSURE: 115 MMHG | HEART RATE: 61 BPM | WEIGHT: 195.1 LBS

## 2020-12-03 DIAGNOSIS — Z45.2 FITTING AND ADJUSTMENT OF VASCULAR CATHETER: ICD-10-CM

## 2020-12-03 DIAGNOSIS — C22.1 CHOLANGIOCARCINOMA (HCC): Primary | ICD-10-CM

## 2020-12-03 LAB
ALBUMIN SERPL-MCNC: 3.3 G/DL (ref 3.5–5.2)
ALBUMIN/GLOB SERPL: 1.4 G/DL
ALP SERPL-CCNC: 255 U/L (ref 39–117)
ALT SERPL W P-5'-P-CCNC: 36 U/L (ref 1–41)
ANION GAP SERPL CALCULATED.3IONS-SCNC: 10 MMOL/L (ref 5–15)
AST SERPL-CCNC: 27 U/L (ref 1–40)
BASOPHILS # BLD AUTO: 0.05 10*3/MM3 (ref 0–0.2)
BASOPHILS NFR BLD AUTO: 1.5 % (ref 0–1.5)
BILIRUB SERPL-MCNC: 0.2 MG/DL (ref 0–1.2)
BUN SERPL-MCNC: 22 MG/DL (ref 8–23)
BUN/CREAT SERPL: 15.7 (ref 7–25)
CALCIUM SPEC-SCNC: 8.5 MG/DL (ref 8.6–10.5)
CANCER AG19-9 SERPL-ACNC: 6639 U/ML
CHLORIDE SERPL-SCNC: 103 MMOL/L (ref 98–107)
CO2 SERPL-SCNC: 25 MMOL/L (ref 22–29)
CREAT BLDA-MCNC: 1.6 MG/DL (ref 0.6–1.3)
CREAT SERPL-MCNC: 1.4 MG/DL (ref 0.76–1.27)
DEPRECATED RDW RBC AUTO: 53.3 FL (ref 37–54)
EOSINOPHIL # BLD AUTO: 0.12 10*3/MM3 (ref 0–0.4)
EOSINOPHIL NFR BLD AUTO: 3.6 % (ref 0.3–6.2)
ERYTHROCYTE [DISTWIDTH] IN BLOOD BY AUTOMATED COUNT: 19.4 % (ref 12.3–15.4)
GFR SERPL CREATININE-BSD FRML MDRD: 48 ML/MIN/1.73
GLOBULIN UR ELPH-MCNC: 2.3 GM/DL
GLUCOSE SERPL-MCNC: 86 MG/DL (ref 65–99)
HCT VFR BLD AUTO: 28.5 % (ref 37.5–51)
HGB BLD-MCNC: 8.6 G/DL (ref 13–17.7)
LYMPHOCYTES # BLD AUTO: 1.42 10*3/MM3 (ref 0.7–3.1)
LYMPHOCYTES NFR BLD AUTO: 42.4 % (ref 19.6–45.3)
MAGNESIUM SERPL-MCNC: 1.5 MG/DL (ref 1.6–2.4)
MCH RBC QN AUTO: 25.2 PG (ref 26.6–33)
MCHC RBC AUTO-ENTMCNC: 30.2 G/DL (ref 31.5–35.7)
MCV RBC AUTO: 83.6 FL (ref 79–97)
MONOCYTES # BLD AUTO: 0.52 10*3/MM3 (ref 0.1–0.9)
MONOCYTES NFR BLD AUTO: 15.5 % (ref 5–12)
NEUTROPHILS NFR BLD AUTO: 1.24 10*3/MM3 (ref 1.7–7)
NEUTROPHILS NFR BLD AUTO: 37 % (ref 42.7–76)
PLATELET # BLD AUTO: 296 10*3/MM3 (ref 140–450)
PMV BLD AUTO: 10.3 FL (ref 6–12)
POTASSIUM SERPL-SCNC: 4.5 MMOL/L (ref 3.5–5.2)
PROT SERPL-MCNC: 5.6 G/DL (ref 6–8.5)
RBC # BLD AUTO: 3.41 10*6/MM3 (ref 4.14–5.8)
SODIUM SERPL-SCNC: 138 MMOL/L (ref 136–145)
WBC # BLD AUTO: 3.35 10*3/MM3 (ref 3.4–10.8)

## 2020-12-03 PROCEDURE — 96368 THER/DIAG CONCURRENT INF: CPT

## 2020-12-03 PROCEDURE — 80053 COMPREHEN METABOLIC PANEL: CPT | Performed by: INTERNAL MEDICINE

## 2020-12-03 PROCEDURE — 85025 COMPLETE CBC W/AUTO DIFF WBC: CPT | Performed by: INTERNAL MEDICINE

## 2020-12-03 PROCEDURE — 96375 TX/PRO/DX INJ NEW DRUG ADDON: CPT

## 2020-12-03 PROCEDURE — 25010000002 FOSAPREPITANT PER 1 MG: Performed by: INTERNAL MEDICINE

## 2020-12-03 PROCEDURE — 36591 DRAW BLOOD OFF VENOUS DEVICE: CPT

## 2020-12-03 PROCEDURE — 82565 ASSAY OF CREATININE: CPT

## 2020-12-03 PROCEDURE — 25010000002 MAGNESIUM SULFATE PER 500 MG OF MAGNESIUM: Performed by: INTERNAL MEDICINE

## 2020-12-03 PROCEDURE — 96366 THER/PROPH/DIAG IV INF ADDON: CPT

## 2020-12-03 PROCEDURE — 25010000002 GEMCITABINE 2 GM/52.6ML SOLUTION 52.6 ML VIAL: Performed by: INTERNAL MEDICINE

## 2020-12-03 PROCEDURE — 25010000003 HEPARIN LOCK FLUSH PER 10 UNITS: Performed by: INTERNAL MEDICINE

## 2020-12-03 PROCEDURE — 25010000002 CISPLATIN PER 50 MG: Performed by: INTERNAL MEDICINE

## 2020-12-03 PROCEDURE — 25010000002 PALONOSETRON 0.25 MG/5ML SOLUTION PREFILLED SYRINGE: Performed by: INTERNAL MEDICINE

## 2020-12-03 PROCEDURE — 25010000002 DEXAMETHASONE SODIUM PHOSPHATE 120 MG/30ML SOLUTION: Performed by: INTERNAL MEDICINE

## 2020-12-03 PROCEDURE — 96367 TX/PROPH/DG ADDL SEQ IV INF: CPT

## 2020-12-03 PROCEDURE — 96361 HYDRATE IV INFUSION ADD-ON: CPT

## 2020-12-03 PROCEDURE — 96413 CHEMO IV INFUSION 1 HR: CPT

## 2020-12-03 PROCEDURE — 83735 ASSAY OF MAGNESIUM: CPT | Performed by: INTERNAL MEDICINE

## 2020-12-03 PROCEDURE — 25010000002 POTASSIUM CHLORIDE PER 2 MEQ OF POTASSIUM: Performed by: INTERNAL MEDICINE

## 2020-12-03 PROCEDURE — 96360 HYDRATION IV INFUSION INIT: CPT

## 2020-12-03 PROCEDURE — 96417 CHEMO IV INFUS EACH ADDL SEQ: CPT

## 2020-12-03 PROCEDURE — 86301 IMMUNOASSAY TUMOR CA 19-9: CPT | Performed by: INTERNAL MEDICINE

## 2020-12-03 RX ORDER — SODIUM CHLORIDE 0.9 % (FLUSH) 0.9 %
20 SYRINGE (ML) INJECTION AS NEEDED
Status: CANCELLED | OUTPATIENT
Start: 2020-12-03

## 2020-12-03 RX ORDER — SODIUM CHLORIDE 0.9 % (FLUSH) 0.9 %
20 SYRINGE (ML) INJECTION AS NEEDED
Status: DISCONTINUED | OUTPATIENT
Start: 2020-12-03 | End: 2020-12-04 | Stop reason: HOSPADM

## 2020-12-03 RX ORDER — PALONOSETRON 0.05 MG/ML
0.25 INJECTION, SOLUTION INTRAVENOUS ONCE
Status: COMPLETED | OUTPATIENT
Start: 2020-12-03 | End: 2020-12-03

## 2020-12-03 RX ORDER — HEPARIN SODIUM (PORCINE) LOCK FLUSH IV SOLN 100 UNIT/ML 100 UNIT/ML
500 SOLUTION INTRAVENOUS AS NEEDED
Status: DISCONTINUED | OUTPATIENT
Start: 2020-12-03 | End: 2020-12-04 | Stop reason: HOSPADM

## 2020-12-03 RX ORDER — URSODIOL 500 MG/1
500 TABLET, FILM COATED ORAL 2 TIMES DAILY
COMMUNITY
Start: 2020-12-01

## 2020-12-03 RX ORDER — FERROUS FUMARATE 324(106)MG
1 TABLET ORAL DAILY
COMMUNITY
Start: 2020-10-29

## 2020-12-03 RX ORDER — HEPARIN SODIUM (PORCINE) LOCK FLUSH IV SOLN 100 UNIT/ML 100 UNIT/ML
500 SOLUTION INTRAVENOUS AS NEEDED
Status: CANCELLED | OUTPATIENT
Start: 2020-12-03

## 2020-12-03 RX ORDER — SODIUM CHLORIDE 9 MG/ML
250 INJECTION, SOLUTION INTRAVENOUS ONCE
Status: COMPLETED | OUTPATIENT
Start: 2020-12-03 | End: 2020-12-03

## 2020-12-03 RX ORDER — HYDROCODONE BITARTRATE AND ACETAMINOPHEN 5; 325 MG/1; MG/1
1 TABLET ORAL 2 TIMES DAILY PRN
COMMUNITY
Start: 2020-11-10 | End: 2021-02-03

## 2020-12-03 RX ORDER — ALLOPURINOL 300 MG/1
150 TABLET ORAL DAILY
COMMUNITY
Start: 2020-11-22

## 2020-12-03 RX ADMIN — HEPARIN 500 UNITS: 100 SYRINGE at 16:15

## 2020-12-03 RX ADMIN — POTASSIUM CHLORIDE 500 ML: 2 INJECTION, SOLUTION, CONCENTRATE INTRAVENOUS at 11:56

## 2020-12-03 RX ADMIN — CISPLATIN 46 MG: 1 INJECTION, SOLUTION INTRAVENOUS at 14:11

## 2020-12-03 RX ADMIN — SODIUM CHLORIDE 100 ML: 900 INJECTION, SOLUTION INTRAVENOUS at 11:56

## 2020-12-03 RX ADMIN — PALONOSETRON 0.25 MG: 0.25 INJECTION, SOLUTION INTRAVENOUS at 11:56

## 2020-12-03 RX ADMIN — SODIUM CHLORIDE 250 ML: 9 INJECTION, SOLUTION INTRAVENOUS at 11:56

## 2020-12-03 RX ADMIN — POTASSIUM CHLORIDE 500 ML: 2 INJECTION, SOLUTION, CONCENTRATE INTRAVENOUS at 15:20

## 2020-12-03 RX ADMIN — DEXAMETHASONE SODIUM PHOSPHATE 12 MG: 4 INJECTION, SOLUTION INTRA-ARTICULAR; INTRALESIONAL; INTRAMUSCULAR; INTRAVENOUS; SOFT TISSUE at 12:52

## 2020-12-03 RX ADMIN — GEMCITABINE 2000 MG: 38 INJECTION, SOLUTION INTRAVENOUS at 13:33

## 2020-12-03 RX ADMIN — Medication 20 ML: at 16:16

## 2020-12-03 NOTE — PROGRESS NOTES
Pt here for C2D1 of Cisplatin and gemzar. He denies having any complaints.  He reports that the SOA has improved since his last visit.  Labs showed Cr is 1.60, Hgb 8.6. Provider notified.

## 2020-12-10 ENCOUNTER — OFFICE VISIT (OUTPATIENT)
Dept: ONCOLOGY | Facility: CLINIC | Age: 85
End: 2020-12-10

## 2020-12-10 ENCOUNTER — HOSPITAL ENCOUNTER (OUTPATIENT)
Dept: ONCOLOGY | Facility: HOSPITAL | Age: 85
Setting detail: INFUSION SERIES
Discharge: HOME | End: 2020-12-10

## 2020-12-10 ENCOUNTER — HOSPITAL ENCOUNTER (OUTPATIENT)
Dept: CT IMAGING | Facility: HOSPITAL | Age: 85
Discharge: HOME | End: 2020-12-10
Admitting: NURSE PRACTITIONER

## 2020-12-10 ENCOUNTER — TELEPHONE (OUTPATIENT)
Dept: ONCOLOGY | Facility: CLINIC | Age: 85
End: 2020-12-10

## 2020-12-10 VITALS
DIASTOLIC BLOOD PRESSURE: 53 MMHG | HEART RATE: 60 BPM | TEMPERATURE: 97.6 F | SYSTOLIC BLOOD PRESSURE: 88 MMHG | WEIGHT: 190.1 LBS | BODY MASS INDEX: 28.07 KG/M2 | OXYGEN SATURATION: 95 %

## 2020-12-10 DIAGNOSIS — Z45.2 FITTING AND ADJUSTMENT OF VASCULAR CATHETER: ICD-10-CM

## 2020-12-10 DIAGNOSIS — E86.0 DEHYDRATION: ICD-10-CM

## 2020-12-10 DIAGNOSIS — R42 DIZZINESS: ICD-10-CM
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
## 2020-12-10 DIAGNOSIS — S00.11XA PERIORBITAL ECCHYMOSIS OF RIGHT EYE, INITIAL ENCOUNTER: ICD-10-CM

## 2020-12-10 DIAGNOSIS — C22.1 CHOLANGIOCARCINOMA (HCC): Primary | ICD-10-CM
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
## 2020-12-10 DIAGNOSIS — C22.1 CHOLANGIOCARCINOMA (HCC): ICD-10-CM

## 2020-12-10 LAB
ALBUMIN SERPL-MCNC: 3.3 G/DL (ref 3.5–5.2)
ALBUMIN/GLOB SERPL: 1.4 G/DL
ALP SERPL-CCNC: 203 U/L (ref 39–117)
ALT SERPL W P-5'-P-CCNC: 39 U/L (ref 1–41)
ANION GAP SERPL CALCULATED.3IONS-SCNC: 9 MMOL/L (ref 5–15)
AST SERPL-CCNC: 22 U/L (ref 1–40)
BASOPHILS # BLD AUTO: 0 10*3/MM3 (ref 0–0.2)
BASOPHILS NFR BLD AUTO: 0 % (ref 0–1.5)
BILIRUB SERPL-MCNC: 0.2 MG/DL (ref 0–1.2)
BUN SERPL-MCNC: 34 MG/DL (ref 8–23)
BUN/CREAT SERPL: 23.9 (ref 7–25)
CALCIUM SPEC-SCNC: 8.4 MG/DL (ref 8.6–10.5)
CHLORIDE SERPL-SCNC: 99 MMOL/L (ref 98–107)
CO2 SERPL-SCNC: 26 MMOL/L (ref 22–29)
CREAT BLDA-MCNC: 1.6 MG/DL (ref 0.6–1.3)
CREAT SERPL-MCNC: 1.42 MG/DL (ref 0.76–1.27)
DEPRECATED RDW RBC AUTO: 53.1 FL (ref 37–54)
EOSINOPHIL # BLD AUTO: 0.03 10*3/MM3 (ref 0–0.4)
EOSINOPHIL NFR BLD AUTO: 0.7 % (ref 0.3–6.2)
ERYTHROCYTE [DISTWIDTH] IN BLOOD BY AUTOMATED COUNT: 20.4 % (ref 12.3–15.4)
GFR SERPL CREATININE-BSD FRML MDRD: 47 ML/MIN/1.73
GLOBULIN UR ELPH-MCNC: 2.4 GM/DL
GLUCOSE SERPL-MCNC: 92 MG/DL (ref 65–99)
HCT VFR BLD AUTO: 27.2 % (ref 37.5–51)
HGB BLD-MCNC: 8.3 G/DL (ref 13–17.7)
LYMPHOCYTES # BLD AUTO: 1.55 10*3/MM3 (ref 0.7–3.1)
LYMPHOCYTES NFR BLD AUTO: 34.4 % (ref 19.6–45.3)
MAGNESIUM SERPL-MCNC: 1.5 MG/DL (ref 1.6–2.4)
MCH RBC QN AUTO: 25.2 PG (ref 26.6–33)
MCHC RBC AUTO-ENTMCNC: 30.5 G/DL (ref 31.5–35.7)
MCV RBC AUTO: 82.7 FL (ref 79–97)
MONOCYTES # BLD AUTO: 0.42 10*3/MM3 (ref 0.1–0.9)
MONOCYTES NFR BLD AUTO: 9.3 % (ref 5–12)
NEUTROPHILS NFR BLD AUTO: 2.5 10*3/MM3 (ref 1.7–7)
NEUTROPHILS NFR BLD AUTO: 55.6 % (ref 42.7–76)
PLATELET # BLD AUTO: 333 10*3/MM3 (ref 140–450)
PMV BLD AUTO: 9.6 FL (ref 6–12)
POTASSIUM SERPL-SCNC: 4.6 MMOL/L (ref 3.5–5.2)
PROT SERPL-MCNC: 5.7 G/DL (ref 6–8.5)
RBC # BLD AUTO: 3.29 10*6/MM3 (ref 4.14–5.8)
SODIUM SERPL-SCNC: 134 MMOL/L (ref 136–145)
WBC # BLD AUTO: 4.5 10*3/MM3 (ref 3.4–10.8)

## 2020-12-10 PROCEDURE — 36591 DRAW BLOOD OFF VENOUS DEVICE: CPT

## 2020-12-10 PROCEDURE — 25010000003 HEPARIN LOCK FLUSH PER 10 UNITS: Performed by: INTERNAL MEDICINE

## 2020-12-10 PROCEDURE — 80053 COMPREHEN METABOLIC PANEL: CPT | Performed by: INTERNAL MEDICINE

## 2020-12-10 PROCEDURE — 70450 CT HEAD/BRAIN W/O DYE: CPT

## 2020-12-10 PROCEDURE — 85025 COMPLETE CBC W/AUTO DIFF WBC: CPT | Performed by: INTERNAL MEDICINE

## 2020-12-10 PROCEDURE — 99214 OFFICE O/P EST MOD 30 MIN: CPT | Performed by: NURSE PRACTITIONER

## 2020-12-10 PROCEDURE — 82565 ASSAY OF CREATININE: CPT

## 2020-12-10 PROCEDURE — 83735 ASSAY OF MAGNESIUM: CPT | Performed by: INTERNAL MEDICINE

## 2020-12-10 PROCEDURE — 96360 HYDRATION IV INFUSION INIT: CPT

## 2020-12-10 RX ORDER — SODIUM CHLORIDE 0.9 % (FLUSH) 0.9 %
20 SYRINGE (ML) INJECTION AS NEEDED
Status: CANCELLED | OUTPATIENT
Start: 2020-12-10

## 2020-12-10 RX ORDER — SODIUM CHLORIDE 0.9 % (FLUSH) 0.9 %
20 SYRINGE (ML) INJECTION AS NEEDED
Status: DISCONTINUED | OUTPATIENT
Start: 2020-12-10 | End: 2020-12-11 | Stop reason: HOSPADM

## 2020-12-10 RX ORDER — HEPARIN SODIUM (PORCINE) LOCK FLUSH IV SOLN 100 UNIT/ML 100 UNIT/ML
500 SOLUTION INTRAVENOUS AS NEEDED
Status: DISCONTINUED | OUTPATIENT
Start: 2020-12-10 | End: 2020-12-11 | Stop reason: HOSPADM

## 2020-12-10 RX ORDER — SODIUM CHLORIDE 9 MG/ML
1000 INJECTION, SOLUTION INTRAVENOUS ONCE
Status: COMPLETED | OUTPATIENT
Start: 2020-12-10 | End: 2020-12-10

## 2020-12-10 RX ORDER — SODIUM CHLORIDE 9 MG/ML
1000 INJECTION, SOLUTION INTRAVENOUS ONCE
Status: CANCELLED
Start: 2020-12-17

## 2020-12-10 RX ORDER — HEPARIN SODIUM (PORCINE) LOCK FLUSH IV SOLN 100 UNIT/ML 100 UNIT/ML
500 SOLUTION INTRAVENOUS AS NEEDED
Status: CANCELLED | OUTPATIENT
Start: 2020-12-10

## 2020-12-10 RX ADMIN — SODIUM CHLORIDE 1000 ML: 900 INJECTION, SOLUTION INTRAVENOUS at 10:04

## 2020-12-10 RX ADMIN — Medication 20 ML: at 11:11

## 2020-12-10 RX ADMIN — HEPARIN 500 UNITS: 100 SYRINGE at 11:11

## 2020-12-10 NOTE — TELEPHONE ENCOUNTER
----- Message from CAREY Bailey sent at 12/10/2020  1:23 PM EST -----  Please let patient know his CT scan showed no acute findings.     Electronically signed by CAREY Cho, 12/10/20, 1:23 PM EST.

## 2020-12-10 NOTE — PROGRESS NOTES
Patient in today for chemo and upon arrival I noticed his right eye was blacked and he had an abrasion above it.  He stated that he fell and at first wasn't quite sure but after questioning patient he determined it was Monday morning.  He stated he was having dizziness.  Obtained BP orthos on patient and he dropped to 88/53 when he stood.  Also patient has no appetite or thirst,  Advised Vashti BATISTA and she saw patient.   Chemo was held for one week and IVFs given to patient. He was advised of the CT ordered and VU.

## 2020-12-10 NOTE — PROGRESS NOTES
HEMATOLOGY ONCOLOGY FOLLOW UP        Patient name: Tristen Garcia  : 1935  MRN: [id number]  Primary Care Physician: Ann Marie Hodgson MD  Referring Physician: Ann Marie Hodgson MD  Reason For Consult:   Intrahepatic cholangiocarcinoma    Chief Complaint   Patient presents with   • Appointment     Fall, bruising to right eye        History of Present Illness:  Tristen Garcia is a 85 y.o.  male who presented to [facility] on 10/18/2020 as a transfer from [facility].  Patient went to [facility] ED on 10/17/2020 after his granddaughter found him lying on the floor between 3 AM and 9:30 AM. He has lost about 30 to 40 pounds over the last couple of months. Patient was noted to have leukocytosis with WBC of 21,000, anemia with hemoglobin of 10.2, and elevated creatinine of 2.8.  Patient was diagnosed with rhabdomyolysis, acute on chronic kidney disease, and sepsis at [facility].  He was started on antibiotics and given IV fluids.  Blood cultures were drawn.  There was concern for liver abscess and patient's family requested patient be transferred to [facility] for further evaluation.     10/19/20  Hematology/Oncology was consulted for new diagnosis of cholangiocarcinoma from liver mass biopsy on 10/14/2020.   · 2020 patient had a CT of the abdomen and pelvis completed for right upper quadrant abdominal pain, elevated WBC, and elevated alk phos.  CT of the abdomen and pelvis showed small amount of increased density with fatty soft tissues overlying the inferior tip of the right lobe of the liver of uncertain etiology might be caused by inflammation or infection or scarring.  Clinical correlation would be helpful.  This does not appear to be associated with the gallbladder or in the pancreas or the right kidney and has uncertain etiology.  This finding could be connected to the duodenum might be caused by peptic ulcer disease.  Clinical.  Correlation  with pancreas enzymes to be helpful as well.  Small tiny amount of fluid in the pelvis might be caused by the diverticulosis in the sigmoid colon.  This may be because of mild diverticulitis.  · 9/23/2020 ultrasound of the abdomen showed patchy areas of hypoechogenicity in the right lobe of the liver of uncertain etiology might be caused by liver cancer metastatic liver disease or cirrhosis of the liver possible fatty infiltrated areas liver parenchyma admixed with more normal liver parenchyma.  A CT liver with contrast MRI of the liver without and with contrast to further evaluate the abnormality was recommended.   · 10/5/2020: MRI of the abdomen was completed that showed inhomogenous ill-defined patchy areas of signal around the in the liver parenchyma in the inferior aspect of the right lobe of the liver and involved the inferior tip the right lobe of the liver of uncertain etiology.  This disease process appears to break through the liver The posterior medial aspect of the inferior tip of the right lobe of the liver.  The disease process in the liver parenchyma appears to extend into the fatty soft tissue adjacent to the inferior medial aspect of the inferior tip of the right lobe of the liver.  There Is a small amount of fluid surrounding the inferior tip of the right lobe of the liver.  · 10/14/2020 CT-guided core biopsy of the liver showed well to moderately differentiated adenocarcinoma.  The tumor is strongly positive for only CK7.  CK20, CDX2, TTF-1, Napsin A, chromogranin, synaptophysin and CD56 are negative.  Is a nonspecific staining pattern, but can be seen in tumors of pancreaticobiliary origin, including cholangiocarcinoma..  · Anemia studies: , haptoglobin 563, triglycerides 1.10%, ferritin 909.3, iron 16, iron saturation 10%, transferrin 107, TIBC 159, folate 7.29.  · 10/19/2020: CA 19-9: 3215 high, CEA 8.2  · 11/11/2020: Started chemotherapy.  Cycle 1.  Patient received gemcitabine 1000  mg/m².  WBC 10.2, hemoglobin 9.2, platelets 512, creatinine 1.6.  Cisplatin held due to high creatinine.  · 11/19/2020 cycle 1 day 8.  WBC 11.3, hemoglobin 9.4, platelets 243, creatinine 1.3.  Received cisplatin 30 mg per metered square and gemcitabine 1000 mg per metered square  · 12/3/2020: Patient received cycle 2-day 1 Gemzar and cisplatin.  Creatinine 1.60, WBC 3.35, hemoglobin 8.6 g/dL, platelets 296,000  · 12/7/2020: Patient fell at home hitting right side of face    HPI, ROS and PFSH have been reviewed and confirmed on 12/10/2020.     Past Medical History:   Diagnosis Date   • Arthritis    • Cancer (CMS/HCC)    • COPD (chronic obstructive pulmonary disease) (CMS/HCC)    • Elevated cholesterol    • GERD (gastroesophageal reflux disease)    • Hyperlipidemia    • Hypertension        Past Surgical History:   Procedure Laterality Date   • COLONOSCOPY     • EYE SURGERY     • SKIN BIOPSY           Current Outpatient Medications:   •  allopurinol (ZYLOPRIM) 300 MG tablet, Pt takes a half a pill, Disp: , Rfl:   •  amLODIPine (NORVASC) 10 MG tablet, Take 10 mg by mouth Daily., Disp: , Rfl:   •  atenolol (TENORMIN) 25 MG tablet, Take 25 mg by mouth Daily., Disp: , Rfl:   •  dexamethasone (DECADRON) 4 MG tablet, Take 2 tablets in the morning daily on Days 2, 3, and 4 and Days 9, 10, and 11.  Take with food., Disp: 12 tablet, Rfl: 5  •  Ferretts 325 (106 Fe) MG tablet, Take 1 tablet by mouth Daily., Disp: , Rfl:   •  furosemide (LASIX) 40 MG tablet, Take 1 tablet by mouth Daily., Disp:  , Rfl:   •  HYDROcodone-acetaminophen (NORCO) 5-325 MG per tablet, Take 1 tablet by mouth 2 (Two) Times a Day As Needed. for pain, Disp: , Rfl:   •  Ipratropium-Albuterol (DUONEB IN), 3 (Three) Times a Day. 0.083 3ml, Disp: , Rfl:   •  losartan (COZAAR) 100 MG tablet, Take 100 mg by mouth Daily., Disp: , Rfl:   •  omeprazole (priLOSEC) 20 MG capsule, Take 20 mg by mouth Daily., Disp: , Rfl:   •  ondansetron (ZOFRAN) 8 MG tablet, Take 1  "tablet by mouth 3 (Three) Times a Day As Needed for Nausea or Vomiting., Disp: 30 tablet, Rfl: 5  •  simvastatin (Zocor) 20 MG tablet, Take 20 mg by mouth Every Night., Disp: , Rfl:   •  sodium bicarbonate 650 MG tablet, Take 650 mg by mouth 4 (Four) Times a Day., Disp: , Rfl:   •  ursodiol (ACTIGALL) 500 MG tablet, , Disp: , Rfl:   No current facility-administered medications for this visit.     Facility-Administered Medications Ordered in Other Visits:   •  heparin injection 500 Units, 500 Units, Intravenous, PRNRadha Ajay, MD, 500 Units at 12/10/20 1111  •  sodium chloride 0.9 % flush 20 mL, 20 mL, Intravenous, PRN, Aamir Garcia MD, 20 mL at 12/10/20 1111    No Known Allergies    Family History   Problem Relation Age of Onset   • Kidney failure Father        Cancer-related family history is not on file.    Social History     Tobacco Use   • Smoking status: Former Smoker   • Smokeless tobacco: Never Used   Substance Use Topics   • Alcohol use: Not Currently   • Drug use: Never     Social History     Social History Narrative   • Not on file      HPI, ROS and PFSH have been reviewed and confirmed on 12/10/2020.     Subjective:  Patient presents to the clinic today with his son for cycle 2 day 8 of cisplatin and gemcitabine.  Patient reports he fell at home early morning Monday, 12/7/2020.  He got up to get a drink of water and reports he got choked up.  He thinks his bad left knee gave out on him and he fell.  He hit the right side of his face/head on the edge of the cabinet.  He is unsure if he passed out.  He denied hitting any other area of his body besides his head. He remembers seeing \"black spots\" before he fell. Today he presents with right eye bruising/swelling. He denies headache or pain to the right side of his face/eye. Patient reports he experiences intermittent dizziness, but was experiencing this before his fall.  He ambulates using a cane. The son also reports the patient is intermittently " confused, but states this was going on before he fell. The patient related his confusion to his age. The patient and son deny that the confusion/dizziness has worsened.  Patient denies tremors and headaches.  Patient's son also reports the patient has not been eating or drinking well and has lost 5 pounds in the past week.  Patient refuses to drink water if he is not thirsty.  Patient is also on 3 blood pressure medications.  His atenolol dosage was recently decreased by his PCP.  He plans to follow-up with his PCP this week or next week for blood pressure medication management.    ROS:   Review of Systems   Constitutional: Negative for chills, fatigue and fever.   HENT: Positive for facial swelling.    Respiratory: Positive for cough (Chronic and related to COPD) and shortness of breath (Chronic and related to COPD).    Cardiovascular: Negative for chest pain and palpitations.   Gastrointestinal: Negative for blood in stool, nausea and vomiting.   Genitourinary: Negative for hematuria.   Musculoskeletal: Positive for gait problem. Negative for arthralgias, myalgias and neck pain.   Skin: Positive for color change.        Bruising around right eye   Neurological: Positive for dizziness, weakness and light-headedness. Negative for speech difficulty and numbness.   Psychiatric/Behavioral: Positive for confusion (Intermittent per son, not currently confused). Negative for decreased concentration. The patient is not nervous/anxious.      Objective:  Vital signs:  Temperature: 97.6° F  Pulse: 70  Blood pressure sittin/85  Blood pressure standin/53  SPO2: 95% on room air  BMI: 28.1 kg/m²    ECOG  (2) Ambulatory and capable of self care, unable to carry out work activity, up and about > 50% or waking hours    Physical Exam:   Physical Exam  Constitutional:       General: He is not in acute distress.     Appearance: He is obese. He is not ill-appearing or toxic-appearing.   HENT:      Head: Normocephalic.  Abrasion present.     Eyes:      General: No scleral icterus.        Right eye: No discharge.         Left eye: No discharge.      Extraocular Movements: Extraocular movements intact.      Conjunctiva/sclera: Conjunctivae normal.      Pupils: Pupils are equal, round, and reactive to light.   Neck:      Musculoskeletal: Normal range of motion and neck supple. No neck rigidity or muscular tenderness.   Cardiovascular:      Rate and Rhythm: Normal rate.      Heart sounds: No friction rub.   Pulmonary:      Effort: Pulmonary effort is normal. No respiratory distress.      Breath sounds: Normal breath sounds. No wheezing.   Abdominal:      General: Bowel sounds are normal. There is no distension.      Palpations: Abdomen is soft.      Tenderness: There is no abdominal tenderness.   Musculoskeletal:      Comments: Mild swelling to bilateral feet   Lymphadenopathy:      Cervical: No cervical adenopathy.   Skin:     General: Skin is warm.      Findings: Ecchymosis (periorbital, right eye) present.   Neurological:      General: No focal deficit present.      Mental Status: He is alert and oriented to person, place, and time.      GCS: GCS eye subscore is 4. GCS verbal subscore is 5. GCS motor subscore is 6.      Cranial Nerves: No facial asymmetry.      Motor: Weakness present. No tremor, abnormal muscle tone or pronator drift.      Gait: Gait abnormal.   Psychiatric:         Mood and Affect: Mood normal.         Behavior: Behavior normal.        Lab Results - Last 18 Months   Lab Units 12/10/20  0805 12/03/20  1036 11/19/20  1107   WBC 10*3/mm3 4.50 3.35* 11.31*   HEMOGLOBIN g/dL 8.3* 8.6* 9.4*   HEMATOCRIT % 27.2* 28.5* 31.3*   PLATELETS 10*3/mm3 333 296 243   MCV fL 82.7 83.6 83.5     Lab Results - Last 18 Months   Lab Units 12/10/20  0823 12/10/20  0805 12/03/20  1047 12/03/20  1036  11/19/20  1107   SODIUM mmol/L  --  134*  --  138  --  137   POTASSIUM mmol/L  --  4.6  --  4.5  --  4.3   CHLORIDE mmol/L  --  99  --  103   --  97*   CO2 mmol/L  --  26.0  --  25.0  --  30.0*   BUN mg/dL  --  34*  --  22  --  15   CREATININE mg/dL 1.60* 1.42* 1.60* 1.40*   < > 1.19   CALCIUM mg/dL  --  8.4*  --  8.5*  --  8.6   BILIRUBIN mg/dL  --  0.2  --  0.2  --  0.3   ALK PHOS U/L  --  203*  --  255*  --  222*   ALT (SGPT) U/L  --  39  --  36  --  36   AST (SGOT) U/L  --  22  --  27  --  31   GLUCOSE mg/dL  --  92  --  86  --  85    < > = values in this interval not displayed.       Lab Results   Component Value Date    GLUCOSE 92 12/10/2020    BUN 34 (H) 12/10/2020    CREATININE 1.60 (H) 12/10/2020    EGFRIFNONA 47 (L) 12/10/2020    BCR 23.9 12/10/2020    K 4.6 12/10/2020    CO2 26.0 12/10/2020    CALCIUM 8.4 (L) 12/10/2020    PROTENTOTREF 5.6 (L) 10/19/2020    ALBUMIN 3.30 (L) 12/10/2020    LABIL2 0.6 (L) 10/19/2020    AST 22 12/10/2020    ALT 39 12/10/2020       Lab Results - Last 18 Months   Lab Units 10/14/20  0813   INR  0.98   APTT seconds 26.1       Lab Results   Component Value Date    IRON 16 (L) 10/19/2020    TIBC 159 (L) 10/19/2020    FERRITIN 909.30 (H) 10/19/2020       Lab Results   Component Value Date    FOLATE 7.29 10/19/2020       No results found for: OCCULTBLD    Lab Results   Component Value Date    RETICCTPCT 1.17 10/19/2020     Lab Results   Component Value Date    BLMRBUJH51 905 10/19/2020     No results found for: SPEP, UPEP  LDH   Date Value Ref Range Status   10/19/2020 401 (H) 135 - 225 U/L Final     No results found for: DIMITRIS, RF, SEDRATE  Lab Results   Component Value Date    HAPTOGLOBIN 563 (H) 10/19/2020     Lab Results   Component Value Date    PTT 26.1 10/14/2020    INR 0.98 10/14/2020     No results found for:   Lab Results   Component Value Date    CEA 8.27 10/19/2020     No components found for: CA-19-9  No results found for: PSA     Diagnosis Plan   1. Fall at home, initial encounter  CT Head Without Contrast   2. Periorbital ecchymosis of right eye, initial encounter  CT Head Without Contrast   3.  Cholangiocarcinoma (CMS/HCC)     4. Dehydration     5. Dizziness       Assessment/Plan     Assessment:  1. Liver mass resulting well to moderately differentiated adenocarcinoma: [id number] S/p liver biopsy 10/14/2020. Cholangiocarcinoma favored.   CEA 8.27, AFP 32.60, CA 19-9 3,215. GI consulted.    His tumor is not operable, and patient not a surgical candidate.   Patient is high risk for surgery.  Patient was seen and evaluated by radiation oncology who has recommended palliative/neoadjuvant chemotherapy.  We have proceeded with cisplatin/gemcitabine combination due to increased chances of response.  2. CKD: Patient has CKD stage III. Serum creatinine 1.6 today.   3. Anemia: Likely from malignancy/chronic disease.  Stool heme is positive.  But no gross bleeding.  4. GERD/COPD: Managed per primary team  5. New Fall 12/7/2020: Hit right side of face. Likely related to dizziness/orthostatic hypotension  6. New Periorbital ecchymosis to right eye: Related to fall  7. Dizziness  8. Orthostatic hypotension  9. Intermittent confusion    Assessment has been reviewed, confirmed, and updated on 12/10/20      PLAN:  1. CBC, CMP, POC creatinine ordered today  2. CBC and creatinine reviewed with patient and son  3. Ordered STAT CT head   4. Hold cisplatin/gemcitabine chemotherapy today- discussed with Dr. Garcia  5. Administer 1 L normal saline over 2 hours for dehydration and elevated serum creatinine  6. Educated patient to hold atenolol and Norvasc and re-check blood pressure tonight  7. Encouraged patient to follow-up with primary care provider this week to discuss blood pressure medication management  8. Encouraged increased p.o. fluid intake  9. Would like to refer patient to cardiology   10. Schedule patient for cycle 2-day 8 infusion in 1 week  11. Follow-up appointment with Dr. Garcia scheduled for 12/21/2020    DISCUSSION:   Per patient and patient's son, patient has been having dizziness and intermittent confusion. The  patient and his son related the confusion to patient's age.  Patient is on 3 blood pressure medications for hypertension prescribed by his PCP.  Educated the patient that his blood pressure was low today and he should hold his blood pressure medication tonight.  Patient plans to follow-up with his PCP this week or next week for blood pressure medication management.  I educated the patient he should be referred to cardiologist. Patient plans to follow with PCP first.     Electronically signed by CAREY Cho, 12/10/20, 12:46 PM EST.

## 2020-12-10 NOTE — TELEPHONE ENCOUNTER
NOTIFIED PATIENT THAT THERE WERE NO ACUTE ABNORMAL FINDINGS.   Patient questioned when his infusion appt are. Went over his appts. Pt repeated back and v/u.

## 2020-12-15 NOTE — PROGRESS NOTES
HEMATOLOGY ONCOLOGY FOLLOW UP        Patient name: Tristen Garcia  : 1935  MRN: [id number]  Primary Care Physician: Ann Marie Hodgson MD  Referring Physician: Ann Marie Hodgson MD  Reason For Consult:   Intrahepatic cholangiocarcinoma    Chief Complaint   Patient presents with   • Follow-up     cholangiocarcinoma        History of Present Illness:  Tristen Garcia is a 85 y.o.  male who presented to [facility] on 10/18/2020 as a transfer from [facility].  Patient went to [facility] ED on 10/17/2020 after his granddaughter found him lying on the floor between 3 AM and 9:30 AM. He has lost about 30 to 40 pounds over the last couple of months. Patient was noted to have leukocytosis with WBC of 21,000, anemia with hemoglobin of 10.2, and elevated creatinine of 2.8.  Patient was diagnosed with rhabdomyolysis, acute on chronic kidney disease, and sepsis at [facility].  He was started on antibiotics and given IV fluids.  Blood cultures were drawn.  There was concern for liver abscess and patient's family requested patient be transferred to [facility] for further evaluation.     10/19/20  Hematology/Oncology was consulted for new diagnosis of cholangiocarcinoma from liver mass biopsy on 10/14/2020.   · 2020 patient had a CT of the abdomen and pelvis completed for right upper quadrant abdominal pain, elevated WBC, and elevated alk phos.  CT of the abdomen and pelvis showed small amount of increased density with fatty soft tissues overlying the inferior tip of the right lobe of the liver of uncertain etiology might be caused by inflammation or infection or scarring.  Clinical correlation would be helpful.  This does not appear to be associated with the gallbladder or in the pancreas or the right kidney and has uncertain etiology.  This finding could be connected to the duodenum might be caused by peptic ulcer disease.  Clinical.  Correlation with  pancreas enzymes to be helpful as well.  Small tiny amount of fluid in the pelvis might be caused by the diverticulosis in the sigmoid colon.  This may be because of mild diverticulitis.  · 9/23/2020 ultrasound of the abdomen showed patchy areas of hypoechogenicity in the right lobe of the liver of uncertain etiology might be caused by liver cancer metastatic liver disease or cirrhosis of the liver possible fatty infiltrated areas liver parenchyma admixed with more normal liver parenchyma.  A CT liver with contrast MRI of the liver without and with contrast to further evaluate the abnormality was recommended.   · 10/5/2020: MRI of the abdomen was completed that showed inhomogenous ill-defined patchy areas of signal around the in the liver parenchyma in the inferior aspect of the right lobe of the liver and involved the inferior tip the right lobe of the liver of uncertain etiology.  This disease process appears to break through the liver The posterior medial aspect of the inferior tip of the right lobe of the liver.  The disease process in the liver parenchyma appears to extend into the fatty soft tissue adjacent to the inferior medial aspect of the inferior tip of the right lobe of the liver.  There Is a small amount of fluid surrounding the inferior tip of the right lobe of the liver.  · 10/14/2020 CT-guided core biopsy of the liver showed well to moderately differentiated adenocarcinoma.  The tumor is strongly positive for only CK7.  CK20, CDX2, TTF-1, Napsin A, chromogranin, synaptophysin and CD56 are negative.  Is a nonspecific staining pattern, but can be seen in tumors of pancreaticobiliary origin, including cholangiocarcinoma..  · Anemia studies: , haptoglobin 563, triglycerides 1.10%, ferritin 909.3, iron 16, iron saturation 10%, transferrin 107, TIBC 159, folate 7.29.  · 10/19/2020: CA 19-9: 3215 high, CEA 8.2  · 11/11/2020: Started chemotherapy.  Cycle 1.  Patient received gemcitabine 1000 mg/m².   WBC 10.2, hemoglobin 9.2, platelets 512, creatinine 1.6.  Cisplatin held due to high creatinine.  · 11/19/2020 cycle 1 day 8.  WBC 11.3, hemoglobin 9.4, platelets 243, creatinine 1.3.  Received cisplatin 30 mg per metered square and gemcitabine 1000 mg per metered square  · 12/3/2020: Patient received cycle 2-day 1 Gemzar and cisplatin.  Creatinine 1.60, WBC 3.35, hemoglobin 8.6 g/dL, platelets 296,000, CA 19-9 6639  · 12/7/2020: Patient fell at home hitting right side of face  · 12/10/2020: Hemoglobin 8.3, platelets 333, creatinine 1.6  · 12/17/2020 patient received gemcitabine 2000 mg dose 2, cycle 2.  WBC 12.0, hemoglobin 7.3, platelets 259, creatinine 1.6,      Subjective:    Patient presents for follow-up.  He is recovering well from the recent fall.  Denies any other falls.  He reports receiving 1 unit of packed red blood cell transfusion in the interim.  Does not report any bleeding per rectum.  Tolerating chemo okay.  Patient states he is feeling better than last week.  Denies any abdominal pain.  He is accompanied by his son today.          HPI, ROS and PFSH have been reviewed and confirmed on 12/21/2020.     Past Medical History:   Diagnosis Date   • Arthritis    • Cancer (CMS/HCC)    • COPD (chronic obstructive pulmonary disease) (CMS/HCC)    • Elevated cholesterol    • GERD (gastroesophageal reflux disease)    • Hyperlipidemia    • Hypertension        Past Surgical History:   Procedure Laterality Date   • COLONOSCOPY     • EYE SURGERY     • SKIN BIOPSY         No current facility-administered medications for this visit.   No current outpatient medications on file.    Facility-Administered Medications Ordered in Other Visits:   •  acetaminophen (TYLENOL) tablet 325 mg, 325 mg, Oral, Q4H PRN **OR** acetaminophen (TYLENOL) 160 MG/5ML solution 325 mg, 325 mg, Oral, Q4H PRN **OR** acetaminophen (TYLENOL) suppository 650 mg, 650 mg, Rectal, Q4H PRN, Enrike Sherwood MD  •  allopurinol (ZYLOPRIM) tablet 150 mg, 150  mg, Oral, Daily, Enrike Sherwood MD, 150 mg at 02/09/21 0915  •  aspirin tablet 325 mg, 325 mg, Oral, Once, Enrike Sherwood MD  •  epoetin lan-epbx (RETACRIT) injection 40,000 Units, 40,000 Units, Subcutaneous, Weekly, Vashti Royal, APRN, Stopped at 02/06/21 0951  •  Magnesium Sulfate 2 gram infusion - Mg less than or equal to 1.5 mg/dL, 2 g, Intravenous, PRN, Last Rate: 25 mL/hr at 02/08/21 0520, 2 g at 02/08/21 0520 **OR** Magnesium Sulfate 1 gram infusion - Mg 1.6-1.9 mg/dL, 1 g, Intravenous, PRN, Carmela Rubin FNP  •  magnesium sulfate 2g/50 mL (PREMIX) infusion, 2 g, Intravenous, Once, Mitch Stone MD  •  metoprolol tartrate (LOPRESSOR) half tablet 12.5 mg, 12.5 mg, Oral, Q12H, Mitch Stone MD, 12.5 mg at 02/09/21 0915  •  ondansetron (ZOFRAN) tablet 4 mg, 4 mg, Oral, Q6H PRN **OR** ondansetron (ZOFRAN) injection 4 mg, 4 mg, Intravenous, Q6H PRN, Enrike Sherwood MD  •  pantoprazole (PROTONIX) EC tablet 40 mg, 40 mg, Oral, QAM, Enrike Sherwood MD, 40 mg at 02/09/21 0605  •  piperacillin-tazobactam (ZOSYN) IVPB 3.375 g in 100 mL NS (CD), 3.375 g, Intravenous, Q8H, Enrike Sherwood MD, 3.375 g at 02/09/21 0915  •  potassium chloride (K-DUR,KLOR-CON) CR tablet 40 mEq, 40 mEq, Oral, PRN, 40 mEq at 02/08/21 0914 **OR** potassium chloride (KLOR-CON) packet 40 mEq, 40 mEq, Oral, PRN **OR** potassium chloride 10 mEq in 100 mL IVPB, 10 mEq, Intravenous, Q1H PRN, Carmela Rubin FNP  •  rivaroxaban (XARELTO) tablet 15 mg, 15 mg, Oral, BID, Enrike Sherwood MD, 15 mg at 02/09/21 0915  •  [START ON 2/12/2021] rivaroxaban (XARELTO) tablet 20 mg, 20 mg, Oral, Daily With Dinner, Daniela Ulloa APRN  •  sodium chloride 0.9 % flush 10 mL, 10 mL, Intravenous, PRN, Enrike Sherwood MD, 10 mL at 02/03/21 1540  •  sodium chloride 0.9 % flush 10 mL, 10 mL, Intravenous, Q12H, Enrike Sherwood MD, 10 mL at 02/08/21 2124  •  sodium chloride 0.9 % flush 10 mL, 10 mL, Intravenous, PRN, Enrike Sherwood MD  •  ursodiol (ACTIGALL) tablet 500 mg, 500 mg, Oral, BID, Enrike Sherwood MD,  500 mg at 21 0915    No Known Allergies    Family History   Problem Relation Age of Onset   • Kidney failure Father        Cancer-related family history is not on file.    Social History     Tobacco Use   • Smoking status: Former Smoker   • Smokeless tobacco: Never Used   Substance Use Topics   • Alcohol use: Not Currently   • Drug use: Never     Social History     Social History Narrative    Lives mostly by himself.  Son checks in on him.      HPI, ROS and PFSH have been reviewed and confirmed on 2020.         ROS:   Review of Systems   Constitutional: Positive for fatigue. Negative for chills, fever and unexpected weight change.   HENT: Negative for ear pain, mouth sores, nosebleeds and sore throat.    Eyes: Negative for photophobia and visual disturbance.   Respiratory: Negative for wheezing and stridor.    Cardiovascular: Negative for chest pain and palpitations.   Gastrointestinal: Negative for abdominal pain, diarrhea, nausea and vomiting.   Endocrine: Negative for cold intolerance and heat intolerance.   Genitourinary: Negative for dysuria, hematuria and penile pain.   Musculoskeletal: Negative for joint swelling and neck stiffness.   Skin: Negative for color change and rash.   Neurological: Negative for seizures and syncope.   Hematological: Negative for adenopathy.        No obvious bleeding   Psychiatric/Behavioral: Negative for agitation, behavioral problems, confusion and hallucinations. The patient is not hyperactive.      Objective:  Vital signs:  Temperature: 97.6° F  Pulse: 70  Blood pressure sittin/85  Blood pressure standin/53  SPO2: 95% on room air  BMI: 28.1 kg/m²    ECOG  (2) Ambulatory and capable of self care, unable to carry out work activity, up and about > 50% or waking hours    Physical Exam:   Physical Exam  Constitutional:       General: He is not in acute distress.     Appearance: Normal appearance. He is obese. He is not ill-appearing or toxic-appearing.   HENT:       Head: Normocephalic. Abrasion present.     Eyes:      General: No scleral icterus.        Right eye: No discharge.         Left eye: No discharge.      Extraocular Movements: Extraocular movements intact.      Conjunctiva/sclera: Conjunctivae normal.      Pupils: Pupils are equal, round, and reactive to light.   Neck:      Musculoskeletal: Normal range of motion and neck supple. No neck rigidity or muscular tenderness.   Cardiovascular:      Rate and Rhythm: Normal rate.      Heart sounds: No friction rub.   Pulmonary:      Effort: Pulmonary effort is normal. No respiratory distress.      Breath sounds: Normal breath sounds. No wheezing.   Abdominal:      General: Bowel sounds are normal. There is no distension.      Palpations: Abdomen is soft.      Tenderness: There is no abdominal tenderness.   Musculoskeletal:      Comments: Mild swelling to bilateral feet   Lymphadenopathy:      Cervical: No cervical adenopathy.   Skin:     General: Skin is warm.      Findings: Ecchymosis (periorbital, right eye) present.   Neurological:      General: No focal deficit present.      Mental Status: He is alert and oriented to person, place, and time.      GCS: GCS eye subscore is 4. GCS verbal subscore is 5. GCS motor subscore is 6.      Cranial Nerves: No facial asymmetry.      Motor: Weakness present. No tremor, abnormal muscle tone or pronator drift.      Gait: Gait abnormal.   Psychiatric:         Mood and Affect: Mood normal.         Behavior: Behavior normal.         Thought Content: Thought content normal.        Lab Results - Last 18 Months   Lab Units 02/09/21 0311 02/08/21 0329 02/07/21  0336   WBC 10*3/mm3 12.70* 12.30* 11.90*   HEMOGLOBIN g/dL 8.1* 8.4* 8.1*   HEMATOCRIT % 25.2* 25.8* 24.8*   PLATELETS 10*3/mm3 134* 144 142   MCV fL 88.3 87.9 88.0     Lab Results - Last 18 Months   Lab Units 02/09/21  0311 02/08/21  2153 02/08/21 0329 02/07/21  0336   SODIUM mmol/L 143  --  143 142   POTASSIUM mmol/L 3.6 3.9  3.2* 3.5   CHLORIDE mmol/L 106  --  106 104   CO2 mmol/L 30.0*  --  31.0* 31.0*   BUN mg/dL 11  --  13 16   CREATININE mg/dL 1.28*  --  1.12 1.25   CALCIUM mg/dL 7.4*  --  7.2* 7.2*   BILIRUBIN mg/dL 0.2  --  0.2 0.2   ALK PHOS U/L 92  --  96 97   ALT (SGPT) U/L 10  --  10 10   AST (SGOT) U/L 21  --  24 21   GLUCOSE mg/dL 98  --  89 86       Lab Results   Component Value Date    GLUCOSE 98 02/09/2021    BUN 11 02/09/2021    CREATININE 1.28 (H) 02/09/2021    EGFRIFNONA 53 (L) 02/09/2021    BCR 8.6 02/09/2021    K 3.6 02/09/2021    CO2 30.0 (H) 02/09/2021    CALCIUM 7.4 (L) 02/09/2021    PROTENTOTREF 5.6 (L) 10/19/2020    ALBUMIN 2.60 (L) 02/09/2021    LABIL2 0.6 (L) 10/19/2020    AST 21 02/09/2021    ALT 10 02/09/2021       Lab Results - Last 18 Months   Lab Units 02/03/21  1422 10/14/20  0813   INR  1.32* 0.98   APTT seconds 28.0 26.1       Lab Results   Component Value Date    IRON 75 02/04/2021    TIBC 188 (L) 02/04/2021    FERRITIN 2,298.00 (H) 02/04/2021       Lab Results   Component Value Date    FOLATE >20.00 02/04/2021       No results found for: OCCULTBLD    Lab Results   Component Value Date    RETICCTPCT 1.24 02/04/2021     Lab Results   Component Value Date    TBWFYTIR90 1,393 (H) 02/04/2021     No results found for: SPEP, UPEP  LDH   Date Value Ref Range Status   02/04/2021 400 (H) 135 - 225 U/L Final     No results found for: DIMITRIS, RF, SEDRATE  Lab Results   Component Value Date    HAPTOGLOBIN 305 (H) 02/04/2021     Lab Results   Component Value Date    PTT 28.0 02/03/2021    INR 1.32 (H) 02/03/2021     No results found for:   Lab Results   Component Value Date    CEA 8.27 10/19/2020     No components found for: CA-19-9  No results found for: PSA     Diagnosis Plan   1. Anemia, unspecified type  CBC & Differential    Ferritin    Iron Profile    Erythropoietin   2. Cholangiocarcinoma (CMS/HCC)  CT chest wo contrast    CT abdomen pelvis wo contrast    Cancer Antigen 19-9    Cancer Antigen 19-9      Assessment/Plan     Assessment:  1. Liver mass resulting well to moderately differentiated adenocarcinoma: [id number] S/p liver biopsy 10/14/2020. Cholangiocarcinoma favored.   CEA 8.27, AFP 32.60, CA 19-9 3,215. GI consulted.    His tumor is not operable, and patient not a surgical candidate.   Patient is high risk for surgery.  Patient was seen and evaluated by radiation oncology who has recommended palliative/neoadjuvant chemotherapy.  We have proceeded with cisplatin/gemcitabine combination due to increased chances of response.  Status post 2 cycles of cisplatin and gemcitabine so far  2. Hypotension: Status post IV fluids on 12/10/2020 with improvement.  3. Severe anemia: Likely from chronic kidney disease/chemotherapy/malignancy/.    4. CKD: Patient has CKD stage III. Serum creatinine 1.6 today.   5. Anemia: Multifactorial likely from chemotherapy/malignancy/chronic disease.  Stool heme is positive.  But no gross bleeding.  6. GERD/COPD: Managed per primary team  7. New Fall 12/7/2020: Hit right side of face. Likely related to dizziness/orthostatic hypotension  8. New Periorbital ecchymosis to right eye: Related to fall  9. Dizziness      Assessment has been reviewed, confirmed, and updated on 02/09/21      PLAN:  1. Chemo induced anemia:: Check CBC today.  We will start the patient on Retacrit.  2. Check tumor marker level today and again in 3 to 4 weeks.  3. Restaging CT scan in 3 to 4 weeks, no IV contrast due to CKD.  4. We will continue cisplatin/gemcitabine.  5. CBC through fingerstick now   6. Change chemotherapy timings according to patient's convenience   7. Follow patient in 3 to 4 weeks after scans.    I have reviewed and confirmed the accuracy of the patient's history: Chief complaint, HPI, ROS and Subjective as entered by the MA/LPN/RN. Aamir Garcia MD 02/09/21       Electronically signed by Aamir Garcia MD, 12/21/20, 2:46 PM EST.

## 2020-12-17 ENCOUNTER — HOSPITAL ENCOUNTER (OUTPATIENT)
Dept: ONCOLOGY | Facility: HOSPITAL | Age: 85
Setting detail: INFUSION SERIES
Discharge: HOME | End: 2020-12-17

## 2020-12-17 VITALS
DIASTOLIC BLOOD PRESSURE: 54 MMHG | BODY MASS INDEX: 28.24 KG/M2 | TEMPERATURE: 97.1 F | HEART RATE: 67 BPM | HEIGHT: 69 IN | WEIGHT: 190.7 LBS | SYSTOLIC BLOOD PRESSURE: 113 MMHG | OXYGEN SATURATION: 97 % | RESPIRATION RATE: 18 BRPM

## 2020-12-17 DIAGNOSIS — C22.1 CHOLANGIOCARCINOMA (HCC): ICD-10-CM

## 2020-12-17 DIAGNOSIS — Z45.2 FITTING AND ADJUSTMENT OF VASCULAR CATHETER: ICD-10-CM

## 2020-12-17 DIAGNOSIS — R42 DIZZINESS: ICD-10-CM

## 2020-12-17 DIAGNOSIS — D64.9 ANEMIA, UNSPECIFIED TYPE: Primary | ICD-10-CM

## 2020-12-17 DIAGNOSIS — E86.0 DEHYDRATION: Primary | ICD-10-CM

## 2020-12-17 LAB
ALBUMIN SERPL-MCNC: 3.2 G/DL (ref 3.5–5.2)
ALBUMIN/GLOB SERPL: 1.2 G/DL
ALP SERPL-CCNC: 195 U/L (ref 39–117)
ALT SERPL W P-5'-P-CCNC: 13 U/L (ref 1–41)
ANION GAP SERPL CALCULATED.3IONS-SCNC: 12 MMOL/L (ref 5–15)
AST SERPL-CCNC: 18 U/L (ref 1–40)
BASOPHILS # BLD AUTO: 0.1 10*3/MM3 (ref 0–0.2)
BASOPHILS NFR BLD AUTO: 0.8 % (ref 0–1.5)
BILIRUB SERPL-MCNC: 0.2 MG/DL (ref 0–1.2)
BUN SERPL-MCNC: 20 MG/DL (ref 8–23)
BUN/CREAT SERPL: 13.7 (ref 7–25)
CALCIUM SPEC-SCNC: 7.6 MG/DL (ref 8.6–10.5)
CHLORIDE SERPL-SCNC: 102 MMOL/L (ref 98–107)
CO2 SERPL-SCNC: 24 MMOL/L (ref 22–29)
CREAT BLDA-MCNC: 1.6 MG/DL (ref 0.6–1.3)
CREAT SERPL-MCNC: 1.46 MG/DL (ref 0.76–1.27)
DEPRECATED RDW RBC AUTO: 52.7 FL (ref 37–54)
EOSINOPHIL # BLD AUTO: 0.03 10*3/MM3 (ref 0–0.4)
EOSINOPHIL NFR BLD AUTO: 0.2 % (ref 0.3–6.2)
ERYTHROCYTE [DISTWIDTH] IN BLOOD BY AUTOMATED COUNT: 20.9 % (ref 12.3–15.4)
GFR SERPL CREATININE-BSD FRML MDRD: 46 ML/MIN/1.73
GLOBULIN UR ELPH-MCNC: 2.6 GM/DL
GLUCOSE SERPL-MCNC: 94 MG/DL (ref 65–99)
HCT VFR BLD AUTO: 23.7 % (ref 37.5–51)
HGB BLD-MCNC: 7.3 G/DL (ref 13–17.7)
LYMPHOCYTES # BLD AUTO: 1.76 10*3/MM3 (ref 0.7–3.1)
LYMPHOCYTES NFR BLD AUTO: 14.6 % (ref 19.6–45.3)
MAGNESIUM SERPL-MCNC: 1.2 MG/DL (ref 1.6–2.4)
MCH RBC QN AUTO: 25.5 PG (ref 26.6–33)
MCHC RBC AUTO-ENTMCNC: 30.8 G/DL (ref 31.5–35.7)
MCV RBC AUTO: 82.9 FL (ref 79–97)
MONOCYTES # BLD AUTO: 1.86 10*3/MM3 (ref 0.1–0.9)
MONOCYTES NFR BLD AUTO: 15.4 % (ref 5–12)
NEUTROPHILS NFR BLD AUTO: 69 % (ref 42.7–76)
NEUTROPHILS NFR BLD AUTO: 8.34 10*3/MM3 (ref 1.7–7)
PLATELET # BLD AUTO: 259 10*3/MM3 (ref 140–450)
PMV BLD AUTO: 10.1 FL (ref 6–12)
POTASSIUM SERPL-SCNC: 4.1 MMOL/L (ref 3.5–5.2)
PROT SERPL-MCNC: 5.8 G/DL (ref 6–8.5)
RBC # BLD AUTO: 2.86 10*6/MM3 (ref 4.14–5.8)
SODIUM SERPL-SCNC: 138 MMOL/L (ref 136–145)
WBC # BLD AUTO: 12.09 10*3/MM3 (ref 3.4–10.8)

## 2020-12-17 PROCEDURE — 25010000002 POTASSIUM CHLORIDE PER 2 MEQ OF POTASSIUM: Performed by: INTERNAL MEDICINE

## 2020-12-17 PROCEDURE — 96368 THER/DIAG CONCURRENT INF: CPT

## 2020-12-17 PROCEDURE — 80053 COMPREHEN METABOLIC PANEL: CPT | Performed by: INTERNAL MEDICINE

## 2020-12-17 PROCEDURE — 96360 HYDRATION IV INFUSION INIT: CPT

## 2020-12-17 PROCEDURE — 96367 TX/PROPH/DG ADDL SEQ IV INF: CPT

## 2020-12-17 PROCEDURE — 85025 COMPLETE CBC W/AUTO DIFF WBC: CPT | Performed by: INTERNAL MEDICINE

## 2020-12-17 PROCEDURE — 36591 DRAW BLOOD OFF VENOUS DEVICE: CPT

## 2020-12-17 PROCEDURE — 25010000002 GEMCITABINE 2 GM/52.6ML SOLUTION 52.6 ML VIAL: Performed by: INTERNAL MEDICINE

## 2020-12-17 PROCEDURE — 83735 ASSAY OF MAGNESIUM: CPT | Performed by: INTERNAL MEDICINE

## 2020-12-17 PROCEDURE — 25010000002 CISPLATIN PER 50 MG: Performed by: INTERNAL MEDICINE

## 2020-12-17 PROCEDURE — 25010000002 DEXAMETHASONE SODIUM PHOSPHATE 120 MG/30ML SOLUTION: Performed by: INTERNAL MEDICINE

## 2020-12-17 PROCEDURE — 25010000003 HEPARIN LOCK FLUSH PER 10 UNITS: Performed by: INTERNAL MEDICINE

## 2020-12-17 PROCEDURE — 25010000002 FOSAPREPITANT PER 1 MG: Performed by: INTERNAL MEDICINE

## 2020-12-17 PROCEDURE — 82565 ASSAY OF CREATININE: CPT

## 2020-12-17 PROCEDURE — 96366 THER/PROPH/DIAG IV INF ADDON: CPT

## 2020-12-17 PROCEDURE — 96417 CHEMO IV INFUS EACH ADDL SEQ: CPT

## 2020-12-17 PROCEDURE — 25010000002 MAGNESIUM SULFATE PER 500 MG OF MAGNESIUM: Performed by: INTERNAL MEDICINE

## 2020-12-17 PROCEDURE — 96413 CHEMO IV INFUSION 1 HR: CPT

## 2020-12-17 PROCEDURE — 96361 HYDRATE IV INFUSION ADD-ON: CPT

## 2020-12-17 PROCEDURE — 25010000002 PALONOSETRON 0.25 MG/5ML SOLUTION PREFILLED SYRINGE: Performed by: INTERNAL MEDICINE

## 2020-12-17 PROCEDURE — 96375 TX/PRO/DX INJ NEW DRUG ADDON: CPT

## 2020-12-17 RX ORDER — SODIUM CHLORIDE 9 MG/ML
250 INJECTION, SOLUTION INTRAVENOUS AS NEEDED
Status: CANCELLED | OUTPATIENT
Start: 2020-12-17

## 2020-12-17 RX ORDER — PALONOSETRON 0.05 MG/ML
0.25 INJECTION, SOLUTION INTRAVENOUS ONCE
Status: COMPLETED | OUTPATIENT
Start: 2020-12-17 | End: 2020-12-17

## 2020-12-17 RX ORDER — SODIUM CHLORIDE 0.9 % (FLUSH) 0.9 %
20 SYRINGE (ML) INJECTION AS NEEDED
Status: CANCELLED | OUTPATIENT
Start: 2020-12-17

## 2020-12-17 RX ORDER — SODIUM CHLORIDE 0.9 % (FLUSH) 0.9 %
20 SYRINGE (ML) INJECTION AS NEEDED
Status: DISCONTINUED | OUTPATIENT
Start: 2020-12-17 | End: 2020-12-18 | Stop reason: HOSPADM

## 2020-12-17 RX ORDER — SODIUM CHLORIDE 9 MG/ML
250 INJECTION, SOLUTION INTRAVENOUS ONCE
Status: COMPLETED | OUTPATIENT
Start: 2020-12-17 | End: 2020-12-17

## 2020-12-17 RX ORDER — HEPARIN SODIUM (PORCINE) LOCK FLUSH IV SOLN 100 UNIT/ML 100 UNIT/ML
500 SOLUTION INTRAVENOUS AS NEEDED
Status: DISCONTINUED | OUTPATIENT
Start: 2020-12-17 | End: 2020-12-18 | Stop reason: HOSPADM

## 2020-12-17 RX ORDER — HEPARIN SODIUM (PORCINE) LOCK FLUSH IV SOLN 100 UNIT/ML 100 UNIT/ML
500 SOLUTION INTRAVENOUS AS NEEDED
Status: CANCELLED | OUTPATIENT
Start: 2020-12-17

## 2020-12-17 RX ADMIN — GEMCITABINE 2000 MG: 38 INJECTION, SOLUTION INTRAVENOUS at 11:35

## 2020-12-17 RX ADMIN — SODIUM CHLORIDE 250 ML: 900 INJECTION, SOLUTION INTRAVENOUS at 11:33

## 2020-12-17 RX ADMIN — DEXAMETHASONE SODIUM PHOSPHATE 12 MG: 4 INJECTION, SOLUTION INTRA-ARTICULAR; INTRALESIONAL; INTRAMUSCULAR; INTRAVENOUS; SOFT TISSUE at 11:14

## 2020-12-17 RX ADMIN — PALONOSETRON 0.25 MG: 0.25 INJECTION, SOLUTION INTRAVENOUS at 10:42

## 2020-12-17 RX ADMIN — CISPLATIN 46 MG: 1 INJECTION, SOLUTION INTRAVENOUS at 12:14

## 2020-12-17 RX ADMIN — POTASSIUM CHLORIDE 500 ML: 2 INJECTION, SOLUTION, CONCENTRATE INTRAVENOUS at 13:30

## 2020-12-17 RX ADMIN — HEPARIN 500 UNITS: 100 SYRINGE at 14:38

## 2020-12-17 RX ADMIN — SODIUM CHLORIDE 100 ML: 900 INJECTION, SOLUTION INTRAVENOUS at 10:42

## 2020-12-17 RX ADMIN — Medication 20 ML: at 14:37

## 2020-12-17 RX ADMIN — SODIUM CHLORIDE 500 ML: 9 INJECTION, SOLUTION INTRAVENOUS at 09:09

## 2020-12-17 RX ADMIN — POTASSIUM CHLORIDE 500 ML: 2 INJECTION, SOLUTION, CONCENTRATE INTRAVENOUS at 10:19

## 2020-12-17 NOTE — PROGRESS NOTES
Pt reports to feeling okay other than having a headache this morning.   He typically goes not have headache. He reports that he was taking off his blood pressure medications d/t low BP, however the BP started coming up at home so he restarted Atenolol 25mg in the AM. He is still taking Lasix 40mg daily.

## 2020-12-17 NOTE — PROGRESS NOTES
Pt here for C2D8 of Cisplatin and gemzar.  Pt reports to having a headache today. He states that his BP has been low at home and was told to stop his BP medications.  However he is still taking the Atenolol 25mg daily.  BP today was 113/54. He states that occasionally he will feel dizzy when standing. He will be seeing his PCP in the morning and was planning on speaking to him about the medications.  Blood counts today showed the Cr 1.6, and hgb 7.3. Discussed with Genny BHAKTA NP.  Orders given to proceed with chemo as planned. Give 500ml of NS today in addition to the pre and post hydration. Arrange for 1 unit of blood to be given. Notified the patient of the orders. He and his son v/u.  Orders for blood sent to [facility]. Port needle left in place for blood transfusion tomorrow as requested by the pt. Advised pt to  the oral steroids for post treatment care

## 2020-12-17 NOTE — PROGRESS NOTES
12/17/20 His Scr is 1.6mg/dl, eCrCl is 41.2ml/min has to be reduced by 25%. As per secured chat with phil Royal, Reduce today's dose to 22.5mg/m2.

## 2020-12-17 NOTE — PROGRESS NOTES
Trini nurse from [facility] is calling asking for us to send hgb level from today over so the pt can receive blood. Order was electronically faxed to [phone].

## 2020-12-21 ENCOUNTER — OFFICE VISIT (OUTPATIENT)
Dept: ONCOLOGY | Facility: CLINIC | Age: 85
End: 2020-12-21

## 2020-12-21 ENCOUNTER — OFFICE VISIT (OUTPATIENT)
Dept: LAB | Facility: HOSPITAL | Age: 85
End: 2020-12-21

## 2020-12-21 VITALS
WEIGHT: 194 LBS | SYSTOLIC BLOOD PRESSURE: 119 MMHG | RESPIRATION RATE: 18 BRPM | HEIGHT: 69 IN | DIASTOLIC BLOOD PRESSURE: 62 MMHG | TEMPERATURE: 97.1 F | BODY MASS INDEX: 28.73 KG/M2 | HEART RATE: 57 BPM

## 2020-12-21 DIAGNOSIS — C22.1 CHOLANGIOCARCINOMA (HCC): ICD-10-CM

## 2020-12-21 DIAGNOSIS — D64.9 ANEMIA, UNSPECIFIED TYPE: ICD-10-CM

## 2020-12-21 DIAGNOSIS — D64.9 ANEMIA, UNSPECIFIED TYPE: Primary | ICD-10-CM

## 2020-12-21 LAB
BASOPHILS # BLD AUTO: 0.05 10*3/MM3 (ref 0–0.2)
BASOPHILS NFR BLD AUTO: 0.8 % (ref 0–1.5)
CANCER AG19-9 SERPL-ACNC: 7462 U/ML
DEPRECATED RDW RBC AUTO: 54.7 FL (ref 37–54)
EOSINOPHIL # BLD AUTO: 0.09 10*3/MM3 (ref 0–0.4)
EOSINOPHIL NFR BLD AUTO: 1.4 % (ref 0.3–6.2)
ERYTHROCYTE [DISTWIDTH] IN BLOOD BY AUTOMATED COUNT: 22.2 % (ref 12.3–15.4)
FERRITIN SERPL-MCNC: 1380 NG/ML (ref 30–400)
HCT VFR BLD AUTO: 30.9 % (ref 37.5–51)
HGB BLD-MCNC: 9.5 G/DL (ref 13–17.7)
IRON 24H UR-MRATE: 189 MCG/DL (ref 59–158)
IRON SATN MFR SERPL: 71 % (ref 20–50)
LYMPHOCYTES # BLD AUTO: 1.22 10*3/MM3 (ref 0.7–3.1)
LYMPHOCYTES NFR BLD AUTO: 18.3 % (ref 19.6–45.3)
MCH RBC QN AUTO: 26.2 PG (ref 26.6–33)
MCHC RBC AUTO-ENTMCNC: 30.7 G/DL (ref 31.5–35.7)
MCV RBC AUTO: 85.4 FL (ref 79–97)
MONOCYTES # BLD AUTO: 0.09 10*3/MM3 (ref 0.1–0.9)
MONOCYTES NFR BLD AUTO: 1.4 % (ref 5–12)
NEUTROPHILS NFR BLD AUTO: 5.2 10*3/MM3 (ref 1.7–7)
NEUTROPHILS NFR BLD AUTO: 78.1 % (ref 42.7–76)
PLATELET # BLD AUTO: 352 10*3/MM3 (ref 140–450)
PMV BLD AUTO: 10.4 FL (ref 6–12)
RBC # BLD AUTO: 3.62 10*6/MM3 (ref 4.14–5.8)
TIBC SERPL-MCNC: 265 MCG/DL (ref 298–536)
TRANSFERRIN SERPL-MCNC: 178 MG/DL (ref 200–360)
WBC # BLD AUTO: 6.65 10*3/MM3 (ref 3.4–10.8)

## 2020-12-21 PROCEDURE — 84466 ASSAY OF TRANSFERRIN: CPT

## 2020-12-21 PROCEDURE — 99215 OFFICE O/P EST HI 40 MIN: CPT | Performed by: INTERNAL MEDICINE

## 2020-12-21 PROCEDURE — 36415 COLL VENOUS BLD VENIPUNCTURE: CPT

## 2020-12-21 PROCEDURE — 86301 IMMUNOASSAY TUMOR CA 19-9: CPT

## 2020-12-21 PROCEDURE — 82728 ASSAY OF FERRITIN: CPT

## 2020-12-21 PROCEDURE — 85025 COMPLETE CBC W/AUTO DIFF WBC: CPT

## 2020-12-21 PROCEDURE — 83540 ASSAY OF IRON: CPT

## 2020-12-22 PROBLEM — N18.9 CKD (CHRONIC KIDNEY DISEASE): Status: ACTIVE | Noted: 2020-12-22

## 2020-12-22 LAB — EPO SERPL-ACNC: 147.7 MIU/ML (ref 2.6–18.5)

## 2020-12-29 DIAGNOSIS — C22.1 CHOLANGIOCARCINOMA (HCC): ICD-10-CM

## 2020-12-29 DIAGNOSIS — R42 DIZZINESS: ICD-10-CM

## 2020-12-29 DIAGNOSIS — E86.0 DEHYDRATION: Primary | ICD-10-CM

## 2020-12-29 RX ORDER — PALONOSETRON 0.05 MG/ML
0.25 INJECTION, SOLUTION INTRAVENOUS ONCE
Status: CANCELLED | OUTPATIENT
Start: 2021-01-06

## 2020-12-29 RX ORDER — SODIUM CHLORIDE 9 MG/ML
250 INJECTION, SOLUTION INTRAVENOUS ONCE
Status: CANCELLED | OUTPATIENT
Start: 2021-01-06

## 2020-12-29 RX ORDER — SODIUM CHLORIDE 9 MG/ML
250 INJECTION, SOLUTION INTRAVENOUS ONCE
Status: CANCELLED | OUTPATIENT
Start: 2020-12-30

## 2020-12-29 RX ORDER — PALONOSETRON 0.05 MG/ML
0.25 INJECTION, SOLUTION INTRAVENOUS ONCE
Status: CANCELLED | OUTPATIENT
Start: 2020-12-30

## 2020-12-30 ENCOUNTER — HOSPITAL ENCOUNTER (OUTPATIENT)
Dept: ONCOLOGY | Facility: HOSPITAL | Age: 85
Setting detail: INFUSION SERIES
Discharge: HOME | End: 2020-12-30
Admitting: INTERNAL MEDICINE

## 2020-12-30 ENCOUNTER — TELEPHONE (OUTPATIENT)
Dept: ONCOLOGY | Facility: CLINIC | Age: 85
End: 2020-12-30

## 2020-12-30 ENCOUNTER — TELEPHONE (OUTPATIENT)
Dept: ONCOLOGY | Facility: HOSPITAL | Age: 85
End: 2020-12-30

## 2020-12-30 ENCOUNTER — HOSPITAL ENCOUNTER (OUTPATIENT)
Dept: INFUSION THERAPY | Facility: HOSPITAL | Age: 85
Setting detail: INFUSION SERIES
Discharge: HOME | End: 2020-12-30

## 2020-12-30 VITALS
WEIGHT: 195 LBS | BODY MASS INDEX: 28.88 KG/M2 | HEART RATE: 85 BPM | DIASTOLIC BLOOD PRESSURE: 72 MMHG | HEIGHT: 69 IN | RESPIRATION RATE: 18 BRPM | TEMPERATURE: 97.1 F | SYSTOLIC BLOOD PRESSURE: 127 MMHG

## 2020-12-30 VITALS
DIASTOLIC BLOOD PRESSURE: 71 MMHG | RESPIRATION RATE: 22 BRPM | OXYGEN SATURATION: 94 % | SYSTOLIC BLOOD PRESSURE: 140 MMHG | HEART RATE: 89 BPM

## 2020-12-30 DIAGNOSIS — D64.9 ANEMIA, UNSPECIFIED TYPE: ICD-10-CM

## 2020-12-30 DIAGNOSIS — E86.0 DEHYDRATION: Primary | ICD-10-CM

## 2020-12-30 DIAGNOSIS — Z45.2 FITTING AND ADJUSTMENT OF VASCULAR CATHETER: ICD-10-CM

## 2020-12-30 DIAGNOSIS — E83.51 HYPOCALCEMIA: Primary | ICD-10-CM

## 2020-12-30 DIAGNOSIS — C22.1 CHOLANGIOCARCINOMA (HCC): ICD-10-CM

## 2020-12-30 DIAGNOSIS — E83.42 HYPOMAGNESEMIA: Primary | ICD-10-CM

## 2020-12-30 DIAGNOSIS — E86.0 DEHYDRATION: ICD-10-CM

## 2020-12-30 DIAGNOSIS — D64.9 ANEMIA, UNSPECIFIED TYPE: Primary | ICD-10-CM

## 2020-12-30 DIAGNOSIS — N18.30 STAGE 3 CHRONIC KIDNEY DISEASE, UNSPECIFIED WHETHER STAGE 3A OR 3B CKD (HCC): Primary | ICD-10-CM

## 2020-12-30 DIAGNOSIS — Z71.9 ENCOUNTER FOR EDUCATION: ICD-10-CM

## 2020-12-30 DIAGNOSIS — R42 DIZZINESS: ICD-10-CM

## 2020-12-30 DIAGNOSIS — N18.30 STAGE 3 CHRONIC KIDNEY DISEASE, UNSPECIFIED WHETHER STAGE 3A OR 3B CKD (HCC): ICD-10-CM

## 2020-12-30 LAB
ALBUMIN SERPL-MCNC: 3.1 G/DL (ref 3.5–5.2)
ALBUMIN/GLOB SERPL: 1.4 G/DL
ALP SERPL-CCNC: 158 U/L (ref 39–117)
ALT SERPL W P-5'-P-CCNC: 13 U/L (ref 1–41)
ANION GAP SERPL CALCULATED.3IONS-SCNC: 11 MMOL/L (ref 5–15)
AST SERPL-CCNC: 17 U/L (ref 1–40)
BASOPHILS # BLD AUTO: 0.05 10*3/MM3 (ref 0–0.2)
BASOPHILS NFR BLD AUTO: 0.6 % (ref 0–1.5)
BILIRUB SERPL-MCNC: 0.2 MG/DL (ref 0–1.2)
BUN SERPL-MCNC: 15 MG/DL (ref 8–23)
BUN/CREAT SERPL: 14.3 (ref 7–25)
CALCIUM SPEC-SCNC: 7.6 MG/DL (ref 8.6–10.5)
CANCER AG19-9 SERPL-ACNC: 6364 U/ML
CHLORIDE SERPL-SCNC: 106 MMOL/L (ref 98–107)
CO2 SERPL-SCNC: 24 MMOL/L (ref 22–29)
CREAT BLDA-MCNC: 1.3 MG/DL (ref 0.6–1.3)
CREAT SERPL-MCNC: 1.05 MG/DL (ref 0.76–1.27)
DEPRECATED RDW RBC AUTO: 55.6 FL (ref 37–54)
DEPRECATED RDW RBC AUTO: 56.4 FL (ref 37–54)
EOSINOPHIL # BLD AUTO: 0.12 10*3/MM3 (ref 0–0.4)
EOSINOPHIL NFR BLD AUTO: 1.4 % (ref 0.3–6.2)
ERYTHROCYTE [DISTWIDTH] IN BLOOD BY AUTOMATED COUNT: 20.1 % (ref 12.3–15.4)
ERYTHROCYTE [DISTWIDTH] IN BLOOD BY AUTOMATED COUNT: 21.9 % (ref 12.3–15.4)
GFR SERPL CREATININE-BSD FRML MDRD: 67 ML/MIN/1.73
GLOBULIN UR ELPH-MCNC: 2.2 GM/DL
GLUCOSE SERPL-MCNC: 86 MG/DL (ref 65–99)
HCT VFR BLD AUTO: 24.9 % (ref 37.5–51)
HCT VFR BLD AUTO: 25.3 % (ref 37.5–51)
HGB BLD-MCNC: 7.5 G/DL (ref 13–17.7)
HGB BLD-MCNC: 7.9 G/DL (ref 13–17.7)
LYMPHOCYTES # BLD AUTO: 1.68 10*3/MM3 (ref 0.7–3.1)
LYMPHOCYTES NFR BLD AUTO: 19.6 % (ref 19.6–45.3)
MAGNESIUM SERPL-MCNC: 1.1 MG/DL (ref 1.6–2.4)
MCH RBC QN AUTO: 26.1 PG (ref 26.6–33)
MCH RBC QN AUTO: 26.2 PG (ref 26.6–33)
MCHC RBC AUTO-ENTMCNC: 30.1 G/DL (ref 31.5–35.7)
MCHC RBC AUTO-ENTMCNC: 31.3 G/DL (ref 31.5–35.7)
MCV RBC AUTO: 83.7 FL (ref 79–97)
MCV RBC AUTO: 86.8 FL (ref 79–97)
MONOCYTES # BLD AUTO: 1.04 10*3/MM3 (ref 0.1–0.9)
MONOCYTES NFR BLD AUTO: 12.1 % (ref 5–12)
NEUTROPHILS NFR BLD AUTO: 5.68 10*3/MM3 (ref 1.7–7)
NEUTROPHILS NFR BLD AUTO: 66.3 % (ref 42.7–76)
PLATELET # BLD AUTO: 165 10*3/MM3 (ref 140–450)
PLATELET # BLD AUTO: 175 10*3/MM3 (ref 140–450)
PMV BLD AUTO: 7.9 FL (ref 6–12)
PMV BLD AUTO: 9.9 FL (ref 6–12)
POTASSIUM SERPL-SCNC: 4.3 MMOL/L (ref 3.5–5.2)
PROT SERPL-MCNC: 5.3 G/DL (ref 6–8.5)
RBC # BLD AUTO: 2.87 10*6/MM3 (ref 4.14–5.8)
RBC # BLD AUTO: 3.02 10*6/MM3 (ref 4.14–5.8)
SODIUM SERPL-SCNC: 141 MMOL/L (ref 136–145)
WBC # BLD AUTO: 6.2 10*3/MM3 (ref 3.4–10.8)
WBC # BLD AUTO: 8.57 10*3/MM3 (ref 3.4–10.8)

## 2020-12-30 PROCEDURE — 25010000002 GEMCITABINE 2 GM/52.6ML SOLUTION 52.6 ML VIAL: Performed by: INTERNAL MEDICINE

## 2020-12-30 PROCEDURE — 96375 TX/PRO/DX INJ NEW DRUG ADDON: CPT

## 2020-12-30 PROCEDURE — 96365 THER/PROPH/DIAG IV INF INIT: CPT

## 2020-12-30 PROCEDURE — 80053 COMPREHEN METABOLIC PANEL: CPT | Performed by: INTERNAL MEDICINE

## 2020-12-30 PROCEDURE — 25010000002 PALONOSETRON 0.25 MG/5ML SOLUTION PREFILLED SYRINGE: Performed by: INTERNAL MEDICINE

## 2020-12-30 PROCEDURE — 83735 ASSAY OF MAGNESIUM: CPT | Performed by: INTERNAL MEDICINE

## 2020-12-30 PROCEDURE — 85027 COMPLETE CBC AUTOMATED: CPT

## 2020-12-30 PROCEDURE — 96413 CHEMO IV INFUSION 1 HR: CPT

## 2020-12-30 PROCEDURE — 85025 COMPLETE CBC W/AUTO DIFF WBC: CPT | Performed by: INTERNAL MEDICINE

## 2020-12-30 PROCEDURE — 86301 IMMUNOASSAY TUMOR CA 19-9: CPT | Performed by: INTERNAL MEDICINE

## 2020-12-30 PROCEDURE — 25010000002 MAGNESIUM SULFATE 2 GM/50ML SOLUTION: Performed by: NURSE PRACTITIONER

## 2020-12-30 PROCEDURE — 96417 CHEMO IV INFUS EACH ADDL SEQ: CPT

## 2020-12-30 PROCEDURE — 96368 THER/DIAG CONCURRENT INF: CPT

## 2020-12-30 PROCEDURE — 96367 TX/PROPH/DG ADDL SEQ IV INF: CPT

## 2020-12-30 PROCEDURE — 82565 ASSAY OF CREATININE: CPT

## 2020-12-30 PROCEDURE — 96366 THER/PROPH/DIAG IV INF ADDON: CPT

## 2020-12-30 PROCEDURE — 36591 DRAW BLOOD OFF VENOUS DEVICE: CPT

## 2020-12-30 PROCEDURE — 96360 HYDRATION IV INFUSION INIT: CPT

## 2020-12-30 PROCEDURE — 96361 HYDRATE IV INFUSION ADD-ON: CPT

## 2020-12-30 PROCEDURE — 25010000002 CISPLATIN PER 50 MG: Performed by: INTERNAL MEDICINE

## 2020-12-30 PROCEDURE — 25010000002 MAGNESIUM SULFATE PER 500 MG OF MAGNESIUM: Performed by: INTERNAL MEDICINE

## 2020-12-30 PROCEDURE — 25010000002 POTASSIUM CHLORIDE PER 2 MEQ OF POTASSIUM: Performed by: INTERNAL MEDICINE

## 2020-12-30 PROCEDURE — 96372 THER/PROPH/DIAG INJ SC/IM: CPT

## 2020-12-30 PROCEDURE — 25010000002 DEXAMETHASONE SODIUM PHOSPHATE 120 MG/30ML SOLUTION: Performed by: INTERNAL MEDICINE

## 2020-12-30 PROCEDURE — 25010000002 FOSAPREPITANT PER 1 MG: Performed by: INTERNAL MEDICINE

## 2020-12-30 PROCEDURE — 25010000002 EPOETIN ALFA-EPBX 40000 UNIT/ML SOLUTION: Performed by: INTERNAL MEDICINE

## 2020-12-30 RX ORDER — SODIUM CHLORIDE 0.9 % (FLUSH) 0.9 %
20 SYRINGE (ML) INJECTION AS NEEDED
Status: CANCELLED | OUTPATIENT
Start: 2020-12-30

## 2020-12-30 RX ORDER — SODIUM CHLORIDE 9 MG/ML
250 INJECTION, SOLUTION INTRAVENOUS ONCE
Status: CANCELLED | OUTPATIENT
Start: 2020-12-30

## 2020-12-30 RX ORDER — MAGNESIUM OXIDE 400 MG/1
400 TABLET ORAL 2 TIMES DAILY
Qty: 60 TABLET | Refills: 1 | Status: SHIPPED | OUTPATIENT
Start: 2020-12-30

## 2020-12-30 RX ORDER — MAGNESIUM SULFATE HEPTAHYDRATE 40 MG/ML
2 INJECTION, SOLUTION INTRAVENOUS ONCE
Status: COMPLETED | OUTPATIENT
Start: 2020-12-30 | End: 2020-12-30

## 2020-12-30 RX ORDER — SODIUM CHLORIDE 0.9 % (FLUSH) 0.9 %
20 SYRINGE (ML) INJECTION AS NEEDED
Status: DISCONTINUED | OUTPATIENT
Start: 2020-12-30 | End: 2020-12-31 | Stop reason: HOSPADM

## 2020-12-30 RX ORDER — HEPARIN SODIUM (PORCINE) LOCK FLUSH IV SOLN 100 UNIT/ML 100 UNIT/ML
500 SOLUTION INTRAVENOUS AS NEEDED
Status: DISCONTINUED | OUTPATIENT
Start: 2020-12-30 | End: 2020-12-31 | Stop reason: HOSPADM

## 2020-12-30 RX ORDER — PALONOSETRON 0.05 MG/ML
0.25 INJECTION, SOLUTION INTRAVENOUS ONCE
Status: COMPLETED | OUTPATIENT
Start: 2020-12-30 | End: 2020-12-30

## 2020-12-30 RX ORDER — SODIUM CHLORIDE 9 MG/ML
250 INJECTION, SOLUTION INTRAVENOUS AS NEEDED
Status: CANCELLED | OUTPATIENT
Start: 2020-12-30

## 2020-12-30 RX ORDER — MAGNESIUM SULFATE HEPTAHYDRATE 40 MG/ML
2 INJECTION, SOLUTION INTRAVENOUS ONCE
Status: CANCELLED | OUTPATIENT
Start: 2020-12-30

## 2020-12-30 RX ORDER — HEPARIN SODIUM (PORCINE) LOCK FLUSH IV SOLN 100 UNIT/ML 100 UNIT/ML
500 SOLUTION INTRAVENOUS AS NEEDED
Status: CANCELLED | OUTPATIENT
Start: 2020-12-30

## 2020-12-30 RX ADMIN — POTASSIUM CHLORIDE 500 ML: 2 INJECTION, SOLUTION, CONCENTRATE INTRAVENOUS at 13:31

## 2020-12-30 RX ADMIN — SODIUM CHLORIDE 100 ML: 900 INJECTION, SOLUTION INTRAVENOUS at 10:21

## 2020-12-30 RX ADMIN — PALONOSETRON 0.25 MG: 0.25 INJECTION, SOLUTION INTRAVENOUS at 10:20

## 2020-12-30 RX ADMIN — POTASSIUM CHLORIDE 500 ML: 2 INJECTION, SOLUTION, CONCENTRATE INTRAVENOUS at 10:00

## 2020-12-30 RX ADMIN — MAGNESIUM SULFATE 2 G: 2 INJECTION INTRAVENOUS at 15:56

## 2020-12-30 RX ADMIN — DEXAMETHASONE SODIUM PHOSPHATE 12 MG: 4 INJECTION, SOLUTION INTRA-ARTICULAR; INTRALESIONAL; INTRAMUSCULAR; INTRAVENOUS; SOFT TISSUE at 10:47

## 2020-12-30 RX ADMIN — CISPLATIN 60 MG: 1 INJECTION, SOLUTION INTRAVENOUS at 11:56

## 2020-12-30 RX ADMIN — GEMCITABINE 2000 MG: 38 INJECTION, SOLUTION INTRAVENOUS at 11:13

## 2020-12-30 RX ADMIN — EPOETIN ALFA-EPBX 40000 UNITS: 40000 INJECTION, SOLUTION INTRAVENOUS; SUBCUTANEOUS at 09:48

## 2020-12-30 NOTE — TELEPHONE ENCOUNTER
----- Message from CAREY Reynoso sent at 12/30/2020  3:56 PM EST -----  Afsaneh,     Corrected calcium is 8.3.  Please have him start Oscal 1 tablet po daily. Escribed new prescription,     Electronically signed by CAREY Reynoso, 12/30/20, 3:55 PM EST.

## 2020-12-30 NOTE — TELEPHONE ENCOUNTER
Pt is currently over at [facility] receiving magnesium and per Macey Harrison pt needs to start oscal daily for corrected calcium 8.3. I called [facility] and spoke to Susanne at [facility] with this information. She stated she will let the patient know.

## 2020-12-30 NOTE — TELEPHONE ENCOUNTER
[facility] called regarding order for blood today. Let them know he would need that blood as his hgb is 7.5. Let them know to expect a call from that patients nurse on timing. Nurse notified. [facility] verbalized understanding.

## 2020-12-30 NOTE — PROGRESS NOTES
Patient was sent to [facility] to have one unit of PRBC Wilda called from [facility] and patient requested to go to [facility] to have PRBC infusion, [facility] and also sent order to [facility] order to be given to patient so he could take with him to the lab at [facility].

## 2020-12-31 ENCOUNTER — TELEPHONE (OUTPATIENT)
Dept: ONCOLOGY | Facility: CLINIC | Age: 85
End: 2020-12-31

## 2021-01-01 ENCOUNTER — INPATIENT HOSPITAL (OUTPATIENT)
Dept: URBAN - METROPOLITAN AREA HOSPITAL 84 | Facility: HOSPITAL | Age: 86
End: 2021-01-01
Payer: COMMERCIAL

## 2021-01-01 DIAGNOSIS — R53.1 WEAKNESS: ICD-10-CM

## 2021-01-01 DIAGNOSIS — C78.7 SECONDARY MALIGNANT NEOPLASM OF LIVER AND INTRAHEPATIC BILE: ICD-10-CM

## 2021-01-01 DIAGNOSIS — R16.0 HEPATOMEGALY, NOT ELSEWHERE CLASSIFIED: ICD-10-CM

## 2021-01-01 DIAGNOSIS — R59.0 LOCALIZED ENLARGED LYMPH NODES: ICD-10-CM

## 2021-01-01 DIAGNOSIS — D50.9 IRON DEFICIENCY ANEMIA, UNSPECIFIED: ICD-10-CM

## 2021-01-01 DIAGNOSIS — D63.8 ANEMIA IN OTHER CHRONIC DISEASES CLASSIFIED ELSEWHERE: ICD-10-CM

## 2021-01-01 DIAGNOSIS — R41.82 ALTERED MENTAL STATUS, UNSPECIFIED: ICD-10-CM

## 2021-01-01 DIAGNOSIS — N17.9 ACUTE KIDNEY FAILURE, UNSPECIFIED: ICD-10-CM

## 2021-01-01 DIAGNOSIS — R77.8 OTHER SPECIFIED ABNORMALITIES OF PLASMA PROTEINS: ICD-10-CM

## 2021-01-01 DIAGNOSIS — C22.1 INTRAHEPATIC BILE DUCT CARCINOMA: ICD-10-CM

## 2021-01-01 PROCEDURE — 99232 SBSQ HOSP IP/OBS MODERATE 35: CPT | Performed by: NURSE PRACTITIONER

## 2021-01-01 PROCEDURE — 99222 1ST HOSP IP/OBS MODERATE 55: CPT | Performed by: NURSE PRACTITIONER

## 2021-01-06 ENCOUNTER — HOSPITAL ENCOUNTER (OUTPATIENT)
Dept: ONCOLOGY | Facility: HOSPITAL | Age: 86
Discharge: HOME | End: 2021-01-06
Admitting: INTERNAL MEDICINE

## 2021-01-06 VITALS
DIASTOLIC BLOOD PRESSURE: 88 MMHG | WEIGHT: 192 LBS | BODY MASS INDEX: 28.34 KG/M2 | TEMPERATURE: 97.7 F | SYSTOLIC BLOOD PRESSURE: 152 MMHG | HEART RATE: 80 BPM

## 2021-01-06 DIAGNOSIS — Z45.2 FITTING AND ADJUSTMENT OF VASCULAR CATHETER: ICD-10-CM

## 2021-01-06 DIAGNOSIS — E86.0 DEHYDRATION: Primary | ICD-10-CM

## 2021-01-06 DIAGNOSIS — Z71.9 ENCOUNTER FOR EDUCATION: ICD-10-CM

## 2021-01-06 DIAGNOSIS — N18.30 STAGE 3 CHRONIC KIDNEY DISEASE, UNSPECIFIED WHETHER STAGE 3A OR 3B CKD (HCC): ICD-10-CM

## 2021-01-06 DIAGNOSIS — C22.1 CHOLANGIOCARCINOMA (HCC): ICD-10-CM

## 2021-01-06 DIAGNOSIS — D64.9 ANEMIA, UNSPECIFIED TYPE: ICD-10-CM

## 2021-01-06 DIAGNOSIS — R42 DIZZINESS: ICD-10-CM

## 2021-01-06 LAB
ALBUMIN SERPL-MCNC: 3.4 G/DL (ref 3.5–5.2)
ALBUMIN/GLOB SERPL: 1.5 G/DL
ALP SERPL-CCNC: 136 U/L (ref 39–117)
ALT SERPL W P-5'-P-CCNC: 15 U/L (ref 1–41)
ANION GAP SERPL CALCULATED.3IONS-SCNC: 8 MMOL/L (ref 5–15)
AST SERPL-CCNC: 14 U/L (ref 1–40)
BASOPHILS # BLD AUTO: 0.01 10*3/MM3 (ref 0–0.2)
BASOPHILS NFR BLD AUTO: 0.4 % (ref 0–1.5)
BILIRUB SERPL-MCNC: 0.3 MG/DL (ref 0–1.2)
BUN SERPL-MCNC: 28 MG/DL (ref 8–23)
BUN/CREAT SERPL: 23.1 (ref 7–25)
CALCIUM SPEC-SCNC: 8.3 MG/DL (ref 8.6–10.5)
CHLORIDE SERPL-SCNC: 105 MMOL/L (ref 98–107)
CO2 SERPL-SCNC: 26 MMOL/L (ref 22–29)
CREAT BLDA-MCNC: 1.4 MG/DL (ref 0.6–1.3)
CREAT SERPL-MCNC: 1.21 MG/DL (ref 0.76–1.27)
DEPRECATED RDW RBC AUTO: 62.1 FL (ref 37–54)
EOSINOPHIL # BLD AUTO: 0.03 10*3/MM3 (ref 0–0.4)
EOSINOPHIL NFR BLD AUTO: 1.2 % (ref 0.3–6.2)
ERYTHROCYTE [DISTWIDTH] IN BLOOD BY AUTOMATED COUNT: 23.4 % (ref 12.3–15.4)
GFR SERPL CREATININE-BSD FRML MDRD: 57 ML/MIN/1.73
GLOBULIN UR ELPH-MCNC: 2.2 GM/DL
GLUCOSE SERPL-MCNC: 93 MG/DL (ref 65–99)
HCT VFR BLD AUTO: 26.8 % (ref 37.5–51)
HGB BLD-MCNC: 8.3 G/DL (ref 13–17.7)
LYMPHOCYTES # BLD AUTO: 1.01 10*3/MM3 (ref 0.7–3.1)
LYMPHOCYTES NFR BLD AUTO: 41.6 % (ref 19.6–45.3)
MAGNESIUM SERPL-MCNC: 1.6 MG/DL (ref 1.6–2.4)
MCH RBC QN AUTO: 27.2 PG (ref 26.6–33)
MCHC RBC AUTO-ENTMCNC: 31 G/DL (ref 31.5–35.7)
MCV RBC AUTO: 87.9 FL (ref 79–97)
MONOCYTES # BLD AUTO: 0.26 10*3/MM3 (ref 0.1–0.9)
MONOCYTES NFR BLD AUTO: 10.7 % (ref 5–12)
NEUTROPHILS NFR BLD AUTO: 1.12 10*3/MM3 (ref 1.7–7)
NEUTROPHILS NFR BLD AUTO: 46.1 % (ref 42.7–76)
PLATELET # BLD AUTO: 152 10*3/MM3 (ref 140–450)
PMV BLD AUTO: 9.5 FL (ref 6–12)
POTASSIUM SERPL-SCNC: 4.8 MMOL/L (ref 3.5–5.2)
PROT SERPL-MCNC: 5.6 G/DL (ref 6–8.5)
RBC # BLD AUTO: 3.05 10*6/MM3 (ref 4.14–5.8)
SODIUM SERPL-SCNC: 139 MMOL/L (ref 136–145)
WBC # BLD AUTO: 2.43 10*3/MM3 (ref 3.4–10.8)

## 2021-01-06 PROCEDURE — 82565 ASSAY OF CREATININE: CPT

## 2021-01-06 PROCEDURE — 25010000002 POTASSIUM CHLORIDE PER 2 MEQ OF POTASSIUM: Performed by: INTERNAL MEDICINE

## 2021-01-06 PROCEDURE — 96367 TX/PROPH/DG ADDL SEQ IV INF: CPT

## 2021-01-06 PROCEDURE — 25010000002 FOSAPREPITANT PER 1 MG: Performed by: INTERNAL MEDICINE

## 2021-01-06 PROCEDURE — 80053 COMPREHEN METABOLIC PANEL: CPT | Performed by: INTERNAL MEDICINE

## 2021-01-06 PROCEDURE — 96361 HYDRATE IV INFUSION ADD-ON: CPT

## 2021-01-06 PROCEDURE — 25010000002 DEXAMETHASONE SODIUM PHOSPHATE 120 MG/30ML SOLUTION: Performed by: INTERNAL MEDICINE

## 2021-01-06 PROCEDURE — 85025 COMPLETE CBC W/AUTO DIFF WBC: CPT | Performed by: INTERNAL MEDICINE

## 2021-01-06 PROCEDURE — 96417 CHEMO IV INFUS EACH ADDL SEQ: CPT

## 2021-01-06 PROCEDURE — 96372 THER/PROPH/DIAG INJ SC/IM: CPT

## 2021-01-06 PROCEDURE — 25010000002 GEMCITABINE 2 GM/52.6ML SOLUTION 52.6 ML VIAL: Performed by: INTERNAL MEDICINE

## 2021-01-06 PROCEDURE — 96366 THER/PROPH/DIAG IV INF ADDON: CPT

## 2021-01-06 PROCEDURE — 83735 ASSAY OF MAGNESIUM: CPT | Performed by: INTERNAL MEDICINE

## 2021-01-06 PROCEDURE — 25010000003 HEPARIN LOCK FLUSH PER 10 UNITS: Performed by: INTERNAL MEDICINE

## 2021-01-06 PROCEDURE — 96413 CHEMO IV INFUSION 1 HR: CPT

## 2021-01-06 PROCEDURE — 96375 TX/PRO/DX INJ NEW DRUG ADDON: CPT

## 2021-01-06 PROCEDURE — 96360 HYDRATION IV INFUSION INIT: CPT

## 2021-01-06 PROCEDURE — 25010000002 PALONOSETRON 0.25 MG/5ML SOLUTION PREFILLED SYRINGE: Performed by: INTERNAL MEDICINE

## 2021-01-06 PROCEDURE — 25010000002 EPOETIN ALFA-EPBX 40000 UNIT/ML SOLUTION: Performed by: INTERNAL MEDICINE

## 2021-01-06 PROCEDURE — 36591 DRAW BLOOD OFF VENOUS DEVICE: CPT

## 2021-01-06 PROCEDURE — 25010000002 CISPLATIN PER 50 MG: Performed by: INTERNAL MEDICINE

## 2021-01-06 PROCEDURE — 25010000002 MAGNESIUM SULFATE PER 500 MG OF MAGNESIUM: Performed by: INTERNAL MEDICINE

## 2021-01-06 RX ORDER — HEPARIN SODIUM (PORCINE) LOCK FLUSH IV SOLN 100 UNIT/ML 100 UNIT/ML
500 SOLUTION INTRAVENOUS AS NEEDED
Status: DISCONTINUED | OUTPATIENT
Start: 2021-01-06 | End: 2021-01-07 | Stop reason: HOSPADM

## 2021-01-06 RX ORDER — SODIUM CHLORIDE 0.9 % (FLUSH) 0.9 %
20 SYRINGE (ML) INJECTION AS NEEDED
OUTPATIENT
Start: 2021-01-06

## 2021-01-06 RX ORDER — PALONOSETRON 0.05 MG/ML
0.25 INJECTION, SOLUTION INTRAVENOUS ONCE
Status: COMPLETED | OUTPATIENT
Start: 2021-01-06 | End: 2021-01-06

## 2021-01-06 RX ORDER — SODIUM CHLORIDE 9 MG/ML
250 INJECTION, SOLUTION INTRAVENOUS ONCE
Status: COMPLETED | OUTPATIENT
Start: 2021-01-06 | End: 2021-01-06

## 2021-01-06 RX ORDER — HEPARIN SODIUM (PORCINE) LOCK FLUSH IV SOLN 100 UNIT/ML 100 UNIT/ML
500 SOLUTION INTRAVENOUS AS NEEDED
OUTPATIENT
Start: 2021-01-06

## 2021-01-06 RX ORDER — SODIUM CHLORIDE 0.9 % (FLUSH) 0.9 %
20 SYRINGE (ML) INJECTION AS NEEDED
Status: DISCONTINUED | OUTPATIENT
Start: 2021-01-06 | End: 2021-01-07 | Stop reason: HOSPADM

## 2021-01-06 RX ADMIN — PALONOSETRON 0.25 MG: 0.25 INJECTION, SOLUTION INTRAVENOUS at 10:06

## 2021-01-06 RX ADMIN — SODIUM CHLORIDE 100 ML: 900 INJECTION, SOLUTION INTRAVENOUS at 10:06

## 2021-01-06 RX ADMIN — HEPARIN 500 UNITS: 100 SYRINGE at 13:46

## 2021-01-06 RX ADMIN — SODIUM CHLORIDE 250 ML: 9 INJECTION, SOLUTION INTRAVENOUS at 10:06

## 2021-01-06 RX ADMIN — POTASSIUM CHLORIDE 500 ML: 2 INJECTION, SOLUTION, CONCENTRATE INTRAVENOUS at 12:41

## 2021-01-06 RX ADMIN — DEXAMETHASONE SODIUM PHOSPHATE 12 MG: 4 INJECTION, SOLUTION INTRA-ARTICULAR; INTRALESIONAL; INTRAMUSCULAR; INTRAVENOUS; SOFT TISSUE at 10:34

## 2021-01-06 RX ADMIN — GEMCITABINE 2000 MG: 38 INJECTION, SOLUTION INTRAVENOUS at 10:55

## 2021-01-06 RX ADMIN — POTASSIUM CHLORIDE 500 ML: 2 INJECTION, SOLUTION, CONCENTRATE INTRAVENOUS at 08:56

## 2021-01-06 RX ADMIN — CISPLATIN 60 MG: 1 INJECTION, SOLUTION INTRAVENOUS at 11:34

## 2021-01-06 RX ADMIN — EPOETIN ALFA-EPBX 40000 UNITS: 40000 INJECTION, SOLUTION INTRAVENOUS; SUBCUTANEOUS at 09:02

## 2021-01-06 RX ADMIN — Medication 20 ML: at 13:45

## 2021-01-06 NOTE — PROGRESS NOTES
Pt. Here at clinic for C3D8 Gemzar, Cisplatin,   Pt. Has no complaints today. Pt's lab results Creat 1.40, CrCl 42.2, WBC 2.43, ANC 1120, HGB 8.3, NP notified of pt's lab results  Orders given OK to continue with treatment today, encourage pt. To increase PO fluids per Vashti PATINO.   Pt. Verbalized understanding of all orders/instructions.

## 2021-01-14 ENCOUNTER — LAB (OUTPATIENT)
Dept: LAB | Facility: HOSPITAL | Age: 86
End: 2021-01-14

## 2021-01-14 ENCOUNTER — APPOINTMENT (OUTPATIENT)
Dept: PET IMAGING | Facility: HOSPITAL | Age: 86
End: 2021-01-14

## 2021-01-14 ENCOUNTER — HOSPITAL ENCOUNTER (OUTPATIENT)
Dept: PET IMAGING | Facility: HOSPITAL | Age: 86
Discharge: HOME | End: 2021-01-14
Admitting: INTERNAL MEDICINE

## 2021-01-14 ENCOUNTER — HOSPITAL ENCOUNTER (OUTPATIENT)
Dept: ONCOLOGY | Facility: HOSPITAL | Age: 86
Discharge: HOME | End: 2021-01-14
Admitting: NURSE PRACTITIONER

## 2021-01-14 DIAGNOSIS — N18.30 STAGE 3 CHRONIC KIDNEY DISEASE, UNSPECIFIED WHETHER STAGE 3A OR 3B CKD (HCC): ICD-10-CM

## 2021-01-14 DIAGNOSIS — D64.9 ANEMIA, UNSPECIFIED TYPE: ICD-10-CM

## 2021-01-14 DIAGNOSIS — N18.30 STAGE 3 CHRONIC KIDNEY DISEASE, UNSPECIFIED WHETHER STAGE 3A OR 3B CKD (HCC): Primary | ICD-10-CM

## 2021-01-14 DIAGNOSIS — C22.1 CHOLANGIOCARCINOMA (HCC): ICD-10-CM

## 2021-01-14 DIAGNOSIS — Z71.9 ENCOUNTER FOR EDUCATION: ICD-10-CM

## 2021-01-14 LAB
BASOPHILS # BLD AUTO: 0.04 10*3/MM3 (ref 0–0.2)
BASOPHILS NFR BLD AUTO: 2 % (ref 0–1.5)
CANCER AG19-9 SERPL-ACNC: 5407 U/ML
DEPRECATED RDW RBC AUTO: 69.1 FL (ref 37–54)
EOSINOPHIL # BLD AUTO: 0.01 10*3/MM3 (ref 0–0.4)
EOSINOPHIL NFR BLD AUTO: 0.5 % (ref 0.3–6.2)
ERYTHROCYTE [DISTWIDTH] IN BLOOD BY AUTOMATED COUNT: 23.2 % (ref 12.3–15.4)
HCT VFR BLD AUTO: 24.9 % (ref 37.5–51)
HGB BLD-MCNC: 7.4 G/DL (ref 13–17.7)
LYMPHOCYTES # BLD AUTO: 0.92 10*3/MM3 (ref 0.7–3.1)
LYMPHOCYTES NFR BLD AUTO: 45.8 % (ref 19.6–45.3)
MCH RBC QN AUTO: 26.9 PG (ref 26.6–33)
MCHC RBC AUTO-ENTMCNC: 29.7 G/DL (ref 31.5–35.7)
MCV RBC AUTO: 90.5 FL (ref 79–97)
MONOCYTES # BLD AUTO: 0.14 10*3/MM3 (ref 0.1–0.9)
MONOCYTES NFR BLD AUTO: 7 % (ref 5–12)
NEUTROPHILS NFR BLD AUTO: 0.9 10*3/MM3 (ref 1.7–7)
NEUTROPHILS NFR BLD AUTO: 44.7 % (ref 42.7–76)
PLATELET # BLD AUTO: 16 10*3/MM3 (ref 140–450)
RBC # BLD AUTO: 2.75 10*6/MM3 (ref 4.14–5.8)
WBC # BLD AUTO: 2.01 10*3/MM3 (ref 3.4–10.8)

## 2021-01-14 PROCEDURE — 25010000002 EPOETIN ALFA-EPBX 40000 UNIT/ML SOLUTION: Performed by: NURSE PRACTITIONER

## 2021-01-14 PROCEDURE — 86301 IMMUNOASSAY TUMOR CA 19-9: CPT

## 2021-01-14 PROCEDURE — 96372 THER/PROPH/DIAG INJ SC/IM: CPT

## 2021-01-14 PROCEDURE — 71250 CT THORAX DX C-: CPT

## 2021-01-14 PROCEDURE — 74176 CT ABD & PELVIS W/O CONTRAST: CPT

## 2021-01-14 PROCEDURE — 85025 COMPLETE CBC W/AUTO DIFF WBC: CPT | Performed by: INTERNAL MEDICINE

## 2021-01-14 PROCEDURE — 36415 COLL VENOUS BLD VENIPUNCTURE: CPT

## 2021-01-14 RX ADMIN — EPOETIN ALFA-EPBX 40000 UNITS: 40000 INJECTION, SOLUTION INTRAVENOUS; SUBCUTANEOUS at 11:15

## 2021-01-14 NOTE — PROGRESS NOTES
Pt here for lab collection and possible procrit injection. Hgb = 7.4, wbc 2.01, anc 0.90 today. Informed Dr Garcia and no new orders received at this time. MD to decrease gemzar dose on future chemotherapy treatment plan. Informed pt and his son and they both verbalized understanding.

## 2021-01-15 ENCOUNTER — TELEPHONE (OUTPATIENT)
Dept: ONCOLOGY | Facility: CLINIC | Age: 86
End: 2021-01-15

## 2021-01-15 DIAGNOSIS — N18.30 STAGE 3 CHRONIC KIDNEY DISEASE, UNSPECIFIED WHETHER STAGE 3A OR 3B CKD (HCC): ICD-10-CM

## 2021-01-15 DIAGNOSIS — D64.9 ANEMIA, UNSPECIFIED TYPE: Primary | ICD-10-CM

## 2021-01-15 NOTE — TELEPHONE ENCOUNTER
Patient notified that he will need a blood transfusion. Patient requests to go to [facility] to have transfusion.   [facility] [phone] spoke to Terrie.   Order, demographics, and  Labs faxed to [facility] at [phone].      Gemzar only was reduced by 20% per Dr. Garcia's verbal order.

## 2021-01-15 NOTE — TELEPHONE ENCOUNTER
----- Message from Aamir Garcia MD sent at 1/14/2021  5:54 PM EST -----  Needs transfusion.  Also needs dose reduction for his chemo.

## 2021-01-21 ENCOUNTER — HOSPITAL ENCOUNTER (OUTPATIENT)
Dept: ONCOLOGY | Facility: HOSPITAL | Age: 86
Setting detail: INFUSION SERIES
Discharge: HOME | End: 2021-01-21

## 2021-01-21 ENCOUNTER — HOSPITAL ENCOUNTER (OUTPATIENT)
Dept: CT IMAGING | Facility: HOSPITAL | Age: 86
Discharge: HOME | End: 2021-01-21

## 2021-01-21 ENCOUNTER — TELEPHONE (OUTPATIENT)
Dept: ONCOLOGY | Facility: CLINIC | Age: 86
End: 2021-01-21

## 2021-01-21 ENCOUNTER — OFFICE VISIT (OUTPATIENT)
Dept: ONCOLOGY | Facility: CLINIC | Age: 86
End: 2021-01-21

## 2021-01-21 ENCOUNTER — HOSPITAL ENCOUNTER (OUTPATIENT)
Dept: CARDIOLOGY | Facility: HOSPITAL | Age: 86
Discharge: HOME | End: 2021-01-21

## 2021-01-21 VITALS
WEIGHT: 196.3 LBS | TEMPERATURE: 96.9 F | HEART RATE: 96 BPM | DIASTOLIC BLOOD PRESSURE: 73 MMHG | HEIGHT: 69 IN | SYSTOLIC BLOOD PRESSURE: 129 MMHG | BODY MASS INDEX: 29.07 KG/M2 | OXYGEN SATURATION: 93 %

## 2021-01-21 DIAGNOSIS — R60.0 LOCALIZED EDEMA: ICD-10-CM

## 2021-01-21 DIAGNOSIS — I82.402 ACUTE DEEP VEIN THROMBOSIS (DVT) OF LEFT LOWER EXTREMITY, UNSPECIFIED VEIN (HCC): ICD-10-CM

## 2021-01-21 DIAGNOSIS — E86.0 DEHYDRATION: Primary | ICD-10-CM

## 2021-01-21 DIAGNOSIS — R06.02 SOB (SHORTNESS OF BREATH): ICD-10-CM

## 2021-01-21 DIAGNOSIS — R42 DIZZINESS: ICD-10-CM

## 2021-01-21 DIAGNOSIS — C22.1 CHOLANGIOCARCINOMA (HCC): ICD-10-CM

## 2021-01-21 DIAGNOSIS — Z53.21 PATIENT LEFT WITHOUT BEING SEEN: ICD-10-CM

## 2021-01-21 DIAGNOSIS — I82.402 ACUTE DEEP VEIN THROMBOSIS (DVT) OF LEFT LOWER EXTREMITY, UNSPECIFIED VEIN (HCC): Primary | ICD-10-CM

## 2021-01-21 DIAGNOSIS — Z01.818 PRE-OP TESTING: ICD-10-CM

## 2021-01-21 LAB
ALBUMIN SERPL-MCNC: 3.2 G/DL (ref 3.5–5.2)
ALBUMIN/GLOB SERPL: 1.5 G/DL
ALP SERPL-CCNC: 140 U/L (ref 39–117)
ALT SERPL W P-5'-P-CCNC: 9 U/L (ref 1–41)
ANION GAP SERPL CALCULATED.3IONS-SCNC: 11 MMOL/L (ref 5–15)
AST SERPL-CCNC: 13 U/L (ref 1–40)
BASOPHILS # BLD AUTO: 0.03 10*3/MM3 (ref 0–0.2)
BASOPHILS NFR BLD AUTO: 0.5 % (ref 0–1.5)
BH CV LOW VAS LEFT COMMON FEMORAL SPONT: 1
BH CV LOW VAS LEFT DISTAL FEMORAL SPONT: 1
BH CV LOW VAS LEFT GASTRONEMIUS VESSEL: 1
BH CV LOW VAS LEFT LESSER SAPH VESSEL: 1
BH CV LOW VAS LEFT MID FEMORAL SPONT: 1
BH CV LOW VAS LEFT POPLITEAL SPONT: 1
BH CV LOW VAS LEFT POSTERIOR TIBIAL VESSEL: 1
BH CV LOW VAS LEFT PROXIMAL FEMORAL SPONT: 1
BH CV LOWER VASCULAR LEFT COMMON FEMORAL AUGMENT: NORMAL
BH CV LOWER VASCULAR LEFT COMMON FEMORAL COMPETENT: NORMAL
BH CV LOWER VASCULAR LEFT COMMON FEMORAL COMPRESS: NORMAL
BH CV LOWER VASCULAR LEFT COMMON FEMORAL PHASIC: NORMAL
BH CV LOWER VASCULAR LEFT COMMON FEMORAL SPONT: NORMAL
BH CV LOWER VASCULAR LEFT COMMON FEMORAL THROMBUS: NORMAL
BH CV LOWER VASCULAR LEFT DISTAL FEMORAL AUGMENT: NORMAL
BH CV LOWER VASCULAR LEFT DISTAL FEMORAL COMPETENT: NORMAL
BH CV LOWER VASCULAR LEFT DISTAL FEMORAL COMPRESS: NORMAL
BH CV LOWER VASCULAR LEFT DISTAL FEMORAL PHASIC: NORMAL
BH CV LOWER VASCULAR LEFT DISTAL FEMORAL SPONT: NORMAL
BH CV LOWER VASCULAR LEFT DISTAL FEMORAL THROMBUS: NORMAL
BH CV LOWER VASCULAR LEFT GASTRONEMIUS COMPRESS: NORMAL
BH CV LOWER VASCULAR LEFT GASTRONEMIUS THROMBUS: NORMAL
BH CV LOWER VASCULAR LEFT GREATER SAPH AK COMPRESS: NORMAL
BH CV LOWER VASCULAR LEFT GREATER SAPH BK COMPRESS: NORMAL
BH CV LOWER VASCULAR LEFT LESSER SAPH COMPRESS: NORMAL
BH CV LOWER VASCULAR LEFT LESSER SAPH THROMBUS: NORMAL
BH CV LOWER VASCULAR LEFT MID FEMORAL AUGMENT: NORMAL
BH CV LOWER VASCULAR LEFT MID FEMORAL COMPETENT: NORMAL
BH CV LOWER VASCULAR LEFT MID FEMORAL COMPRESS: NORMAL
BH CV LOWER VASCULAR LEFT MID FEMORAL PHASIC: NORMAL
BH CV LOWER VASCULAR LEFT MID FEMORAL SPONT: NORMAL
BH CV LOWER VASCULAR LEFT MID FEMORAL THROMBUS: NORMAL
BH CV LOWER VASCULAR LEFT PERONEAL COMPRESS: NORMAL
BH CV LOWER VASCULAR LEFT POPLITEAL AUGMENT: NORMAL
BH CV LOWER VASCULAR LEFT POPLITEAL COMPETENT: NORMAL
BH CV LOWER VASCULAR LEFT POPLITEAL COMPRESS: NORMAL
BH CV LOWER VASCULAR LEFT POPLITEAL PHASIC: NORMAL
BH CV LOWER VASCULAR LEFT POPLITEAL SPONT: NORMAL
BH CV LOWER VASCULAR LEFT POPLITEAL THROMBUS: NORMAL
BH CV LOWER VASCULAR LEFT POSTERIOR TIBIAL COMPRESS: NORMAL
BH CV LOWER VASCULAR LEFT POSTERIOR TIBIAL THROMBUS: NORMAL
BH CV LOWER VASCULAR LEFT PROXIMAL FEMORAL AUGMENT: NORMAL
BH CV LOWER VASCULAR LEFT PROXIMAL FEMORAL COMPETENT: NORMAL
BH CV LOWER VASCULAR LEFT PROXIMAL FEMORAL COMPRESS: NORMAL
BH CV LOWER VASCULAR LEFT PROXIMAL FEMORAL PHASIC: NORMAL
BH CV LOWER VASCULAR LEFT PROXIMAL FEMORAL SPONT: NORMAL
BH CV LOWER VASCULAR LEFT PROXIMAL FEMORAL THROMBUS: NORMAL
BH CV LOWER VASCULAR LEFT SAPHENOFEMORAL JUNCTION COMPRESS: NORMAL
BH CV LOWER VASCULAR RIGHT COMMON FEMORAL AUGMENT: NORMAL
BH CV LOWER VASCULAR RIGHT COMMON FEMORAL COMPETENT: NORMAL
BH CV LOWER VASCULAR RIGHT COMMON FEMORAL COMPRESS: NORMAL
BH CV LOWER VASCULAR RIGHT COMMON FEMORAL PHASIC: NORMAL
BH CV LOWER VASCULAR RIGHT COMMON FEMORAL SPONT: NORMAL
BILIRUB SERPL-MCNC: 0.3 MG/DL (ref 0–1.2)
BUN SERPL-MCNC: 20 MG/DL (ref 8–23)
BUN/CREAT SERPL: 12 (ref 7–25)
CALCIUM SPEC-SCNC: 7.8 MG/DL (ref 8.6–10.5)
CANCER AG19-9 SERPL-ACNC: 4842 U/ML
CHLORIDE SERPL-SCNC: 102 MMOL/L (ref 98–107)
CO2 SERPL-SCNC: 24 MMOL/L (ref 22–29)
CREAT SERPL-MCNC: 1.67 MG/DL (ref 0.76–1.27)
D DIMER PPP FEU-MCNC: 3.79 MG/L (FEU) (ref 0–0.59)
DEPRECATED RDW RBC AUTO: 62.9 FL (ref 37–54)
EOSINOPHIL # BLD AUTO: 0.02 10*3/MM3 (ref 0–0.4)
EOSINOPHIL NFR BLD AUTO: 0.3 % (ref 0.3–6.2)
ERYTHROCYTE [DISTWIDTH] IN BLOOD BY AUTOMATED COUNT: 24.3 % (ref 12.3–15.4)
GFR SERPL CREATININE-BSD FRML MDRD: 39 ML/MIN/1.73
GLOBULIN UR ELPH-MCNC: 2.1 GM/DL
GLUCOSE SERPL-MCNC: 101 MG/DL (ref 65–99)
HCT VFR BLD AUTO: 25.1 % (ref 37.5–51)
HGB BLD-MCNC: 7.8 G/DL (ref 13–17.7)
LYMPHOCYTES # BLD AUTO: 1.04 10*3/MM3 (ref 0.7–3.1)
LYMPHOCYTES NFR BLD AUTO: 17 % (ref 19.6–45.3)
MAGNESIUM SERPL-MCNC: 1.2 MG/DL (ref 1.6–2.4)
MCH RBC QN AUTO: 27.7 PG (ref 26.6–33)
MCHC RBC AUTO-ENTMCNC: 31.1 G/DL (ref 31.5–35.7)
MCV RBC AUTO: 89 FL (ref 79–97)
MONOCYTES # BLD AUTO: 3.46 10*3/MM3 (ref 0.1–0.9)
MONOCYTES NFR BLD AUTO: 56.6 % (ref 5–12)
NEUTROPHILS NFR BLD AUTO: 1.56 10*3/MM3 (ref 1.7–7)
NEUTROPHILS NFR BLD AUTO: 25.6 % (ref 42.7–76)
PLATELET # BLD AUTO: 435 10*3/MM3 (ref 140–450)
PMV BLD AUTO: 10.2 FL (ref 6–12)
POTASSIUM SERPL-SCNC: 5.3 MMOL/L (ref 3.5–5.2)
PROT SERPL-MCNC: 5.3 G/DL (ref 6–8.5)
RBC # BLD AUTO: 2.82 10*6/MM3 (ref 4.14–5.8)
SODIUM SERPL-SCNC: 137 MMOL/L (ref 136–145)
WBC # BLD AUTO: 6.11 10*3/MM3 (ref 3.4–10.8)

## 2021-01-21 PROCEDURE — 0 IOPAMIDOL PER 1 ML: Performed by: INTERNAL MEDICINE

## 2021-01-21 PROCEDURE — 80053 COMPREHEN METABOLIC PANEL: CPT | Performed by: INTERNAL MEDICINE

## 2021-01-21 PROCEDURE — 36591 DRAW BLOOD OFF VENOUS DEVICE: CPT

## 2021-01-21 PROCEDURE — 85025 COMPLETE CBC W/AUTO DIFF WBC: CPT | Performed by: INTERNAL MEDICINE

## 2021-01-21 PROCEDURE — 93971 EXTREMITY STUDY: CPT

## 2021-01-21 PROCEDURE — 85379 FIBRIN DEGRADATION QUANT: CPT | Performed by: INTERNAL MEDICINE

## 2021-01-21 PROCEDURE — 25010000003 HEPARIN LOCK FLUSH PER 10 UNITS: Performed by: INTERNAL MEDICINE

## 2021-01-21 PROCEDURE — G0463 HOSPITAL OUTPT CLINIC VISIT: HCPCS

## 2021-01-21 PROCEDURE — 83735 ASSAY OF MAGNESIUM: CPT | Performed by: INTERNAL MEDICINE

## 2021-01-21 PROCEDURE — 86301 IMMUNOASSAY TUMOR CA 19-9: CPT | Performed by: INTERNAL MEDICINE

## 2021-01-21 PROCEDURE — 71275 CT ANGIOGRAPHY CHEST: CPT

## 2021-01-21 RX ORDER — HEPARIN SODIUM (PORCINE) LOCK FLUSH IV SOLN 100 UNIT/ML 100 UNIT/ML
SOLUTION INTRAVENOUS
Status: DISPENSED
Start: 2021-01-21 | End: 2021-01-22

## 2021-01-21 RX ORDER — SODIUM CHLORIDE 9 MG/ML
250 INJECTION, SOLUTION INTRAVENOUS ONCE
Status: CANCELLED | OUTPATIENT
Start: 2021-01-21

## 2021-01-21 RX ORDER — HEPARIN SODIUM (PORCINE) LOCK FLUSH IV SOLN 100 UNIT/ML 100 UNIT/ML
500 SOLUTION INTRAVENOUS ONCE
Status: COMPLETED | OUTPATIENT
Start: 2021-01-21 | End: 2021-01-21

## 2021-01-21 RX ORDER — SODIUM CHLORIDE 9 MG/ML
250 INJECTION, SOLUTION INTRAVENOUS ONCE
Status: CANCELLED | OUTPATIENT
Start: 2021-01-28

## 2021-01-21 RX ORDER — PALONOSETRON 0.05 MG/ML
0.25 INJECTION, SOLUTION INTRAVENOUS ONCE
Status: CANCELLED | OUTPATIENT
Start: 2021-01-21

## 2021-01-21 RX ORDER — PALONOSETRON 0.05 MG/ML
0.25 INJECTION, SOLUTION INTRAVENOUS ONCE
Status: CANCELLED | OUTPATIENT
Start: 2021-01-28

## 2021-01-21 RX ADMIN — IOPAMIDOL 68 ML: 755 INJECTION, SOLUTION INTRAVENOUS at 13:15

## 2021-01-21 RX ADMIN — HEPARIN SODIUM (PORCINE) LOCK FLUSH IV SOLN 100 UNIT/ML 500 UNITS: 100 SOLUTION at 14:30

## 2021-01-21 NOTE — PROGRESS NOTES
Pt is here for Gemzar/Cisplatin. Port accessed and flushed well but blood return was sluggish.  He c/o of swelling and pain in the left knee down to the right ankle.  This is new and is causing difficulty walking.  He is more short of air on exertion since the knee pain started as well. Discussed with Dr. Garcia.  Orders given to hold chemo today draw d-dimer, and send pt for a stat doppler of the lef leg. Port needle left in place for testing at the hospital.  I advised to RTC if the hospital does not de-access port.  Pt v/u and was sent to [facility] for testing.

## 2021-01-21 NOTE — TELEPHONE ENCOUNTER
Caller: lamin    Relationship to patient: grand daughter    Best call back number: [phone]    Lamin would like the results of the ct scans done on both 1/14/21 and 1/21/21.

## 2021-01-25 NOTE — PROGRESS NOTES
HEMATOLOGY ONCOLOGY FOLLOW UP        Patient name: Tristen Garcia  : 1935  MRN: [id number]  Primary Care Physician: Ann Marie Hodgson MD  Referring Physician: Aamir Garcia MD  Reason For Consult:   Intrahepatic cholangiocarcinoma    Chief Complaint   Patient presents with   • Follow-up     Acute deep vein thrombosis (DVT) of left lower extremity, unspecified vein        History of Present Illness:  Tristen Garcia is a 85 y.o.  male who presented to [facility] on 10/18/2020 as a transfer from [facility].  Patient went to [facility] ED on 10/17/2020 after his granddaughter found him lying on the floor between 3 AM and 9:30 AM. He has lost about 30 to 40 pounds over the last couple of months. Patient was noted to have leukocytosis with WBC of 21,000, anemia with hemoglobin of 10.2, and elevated creatinine of 2.8.  Patient was diagnosed with rhabdomyolysis, acute on chronic kidney disease, and sepsis at [facility].  He was started on antibiotics and given IV fluids.  Blood cultures were drawn.  There was concern for liver abscess and patient's family requested patient be transferred to [facility] for further evaluation.     10/19/20  Hematology/Oncology was consulted for new diagnosis of cholangiocarcinoma from liver mass biopsy on 10/14/2020.   · 2020 patient had a CT of the abdomen and pelvis completed for right upper quadrant abdominal pain, elevated WBC, and elevated alk phos.  CT of the abdomen and pelvis showed small amount of increased density with fatty soft tissues overlying the inferior tip of the right lobe of the liver of uncertain etiology might be caused by inflammation or infection or scarring.  Clinical correlation would be helpful.  This does not appear to be associated with the gallbladder or in the pancreas or the right kidney and has uncertain etiology.  This finding could be connected to the duodenum might be caused by peptic  ulcer disease.  Clinical.  Correlation with pancreas enzymes to be helpful as well.  Small tiny amount of fluid in the pelvis might be caused by the diverticulosis in the sigmoid colon.  This may be because of mild diverticulitis.  · 9/23/2020 ultrasound of the abdomen showed patchy areas of hypoechogenicity in the right lobe of the liver of uncertain etiology might be caused by liver cancer metastatic liver disease or cirrhosis of the liver possible fatty infiltrated areas liver parenchyma admixed with more normal liver parenchyma.  A CT liver with contrast MRI of the liver without and with contrast to further evaluate the abnormality was recommended.   · 10/5/2020: MRI of the abdomen was completed that showed inhomogenous ill-defined patchy areas of signal around the in the liver parenchyma in the inferior aspect of the right lobe of the liver and involved the inferior tip the right lobe of the liver of uncertain etiology.  This disease process appears to break through the liver The posterior medial aspect of the inferior tip of the right lobe of the liver.  The disease process in the liver parenchyma appears to extend into the fatty soft tissue adjacent to the inferior medial aspect of the inferior tip of the right lobe of the liver.  There Is a small amount of fluid surrounding the inferior tip of the right lobe of the liver.  · 10/14/2020 CT-guided core biopsy of the liver showed well to moderately differentiated adenocarcinoma.  The tumor is strongly positive for only CK7.  CK20, CDX2, TTF-1, Napsin A, chromogranin, synaptophysin and CD56 are negative.  Is a nonspecific staining pattern, but can be seen in tumors of pancreaticobiliary origin, including cholangiocarcinoma..  · Anemia studies: , haptoglobin 563, triglycerides 1.10%, ferritin 909.3, iron 16, iron saturation 10%, transferrin 107, TIBC 159, folate 7.29.  · 10/19/2020: CA 19-9: 3215 high, CEA 8.2  · 11/11/2020: Started chemotherapy.  Cycle 1.   Patient received gemcitabine 1000 mg/m².  WBC 10.2, hemoglobin 9.2, platelets 512, creatinine 1.6.  Cisplatin held due to high creatinine.  · 11/19/2020 cycle 1 day 8.  WBC 11.3, hemoglobin 9.4, platelets 243, creatinine 1.3.  Received cisplatin 30 mg per metered square and gemcitabine 1000 mg per metered square  · 12/3/2020: Patient received cycle 2-day 1 Gemzar and cisplatin.  Creatinine 1.60, WBC 3.35, hemoglobin 8.6 g/dL, platelets 296,000, CA 19-9 6639  · 12/7/2020: Patient fell at home hitting right side of face  · 12/10/2020: Hemoglobin 8.3, platelets 333, creatinine 1.6  · 12/10/2020: CT head without contrast: No acute intracranial findings.  · 12/17/2020 patient received gemcitabine 2000 mg dose 2, cycle 2.  WBC 12.0, hemoglobin 7.3, platelets 259, creatinine 1.6,  · 12/30/2020: Patient is cycle 3 of gemcitabine and cisplatin day 1.  · 1/6/2021: Patient received cisplatin and gemcitabine day 8 cycle 3  · 1/14/2021: WBC 2.01, hemoglobin 7.4, platelets 16,000  · 1/14/2021: Patient received erythropoietin 40,000 units, CA 19-9 5407 high  · 1/14/2021: CT abdomen pelvis without contrast: Large right hepatic mass consistent with known cholangiocarcinoma.  Appears stable..  New low-density lesions are developed within the right and left hepatic lobes consistent with new hepatic metastatic disease.  Nonspecific 4 mm or less noncalcified bilateral pulmonary nodules.  2.3 cm lucent lesion with peripheral sclerosis in the L2 vertebral body.  1.8 cm low-density left thyroid nodule is nonspecific.  · 1/21/2021: CT chest PE protocol: There are findings of pulmonary emboli with most clot burden in the right lung.  Severe coronary artery calcification.  Small hiatal hernia.  Hepatic mass and cholelithiasis.  No change in the pulmonary nodules.  · 1/21/2021: Doppler lower extremities: Acute left lower extremity DVT in the common femoral, proximal femoral, mid femoral and distal femoral and popliteal and posterior tibial  and gastrocnemius.  Also acute left lower extremity superficial thrombophlebitis.  · 1/21/2021: WBC 6.1, hemoglobin 7.8, platelets 435, creatinine 1.67, CA 19-9 4842  · 1/27/2021: WBC 16.5, hemoglobin 8.4, platelets 873.  Decision made to change chemotherapy to FOLFOX      Subjective:    Patient presents for follow-up.  In the interim patient was diagnosed with a deep venous thrombosis and pulmonary embolism.  He is on anticoagulant Xarelto.  He is tolerating it well.  He is on a wheelchair.  He is accompanied by his son today.  Discussed about changing treatment considering slight progression noted on the recent CT scan.  His appetite has been good.  He does have swelling in the left lower leg          HPI, ROS and PFSH have been reviewed and confirmed on 1/27/2021.     Past Medical History:   Diagnosis Date   • Arthritis    • Cancer (CMS/HCC)    • COPD (chronic obstructive pulmonary disease) (CMS/HCC)    • Elevated cholesterol    • GERD (gastroesophageal reflux disease)    • Hyperlipidemia    • Hypertension        Past Surgical History:   Procedure Laterality Date   • COLONOSCOPY     • EYE SURGERY     • SKIN BIOPSY           Current Outpatient Medications:   •  allopurinol (ZYLOPRIM) 300 MG tablet, Pt takes a half a pill, Disp: , Rfl:   •  amLODIPine (NORVASC) 10 MG tablet, Take 10 mg by mouth Daily., Disp: , Rfl:   •  atenolol (TENORMIN) 25 MG tablet, Take 25 mg by mouth Daily., Disp: , Rfl:   •  calcium-vitamin D (Oscal 500/200 D-3) 500-200 MG-UNIT per tablet, Take 1 tablet by mouth Daily., Disp: 30 tablet, Rfl: 5  •  dexamethasone (DECADRON) 4 MG tablet, Take 2 tablets in the morning daily on Days 2, 3, and 4 and Days 9, 10, and 11.  Take with food., Disp: 12 tablet, Rfl: 5  •  Ferretts 325 (106 Fe) MG tablet, Take 1 tablet by mouth Daily., Disp: , Rfl:   •  furosemide (LASIX) 40 MG tablet, Take 1 tablet by mouth Daily., Disp:  , Rfl:   •  HYDROcodone-acetaminophen (NORCO) 5-325 MG per tablet, Take 1 tablet by  "mouth 2 (Two) Times a Day As Needed. for pain, Disp: , Rfl:   •  Ipratropium-Albuterol (DUONEB IN), 3 (Three) Times a Day. 0.083 3ml, Disp: , Rfl:   •  losartan (COZAAR) 100 MG tablet, Take 100 mg by mouth Daily., Disp: , Rfl:   •  magnesium oxide (MAG-OX) 400 MG tablet, Take 1 tablet by mouth 2 (Two) Times a Day., Disp: 60 tablet, Rfl: 1  •  omeprazole (priLOSEC) 20 MG capsule, Take 20 mg by mouth Daily., Disp: , Rfl:   •  ondansetron (ZOFRAN) 8 MG tablet, Take 1 tablet by mouth 3 (Three) Times a Day As Needed for Nausea or Vomiting., Disp: 30 tablet, Rfl: 5  •  rivaroxaban (XARELTO) 15 MG tablet, Take 1 tablet by mouth 2 (Two) Times a Day With Meals., Disp: 60 tablet, Rfl: 3  •  simvastatin (Zocor) 20 MG tablet, Take 20 mg by mouth Every Night., Disp: , Rfl:   •  ursodiol (ACTIGALL) 500 MG tablet, , Disp: , Rfl:     No Known Allergies    Family History   Problem Relation Age of Onset   • Kidney failure Father        Cancer-related family history is not on file.    Social History     Tobacco Use   • Smoking status: Former Smoker   • Smokeless tobacco: Never Used   Substance Use Topics   • Alcohol use: Not Currently   • Drug use: Never     Social History     Social History Narrative   • Not on file      HPI, ROS and PFSH have been reviewed and confirmed on 1/27/2021.         ROS:     Objective:  Alert oriented x3 patient appears weak.  On wheelchair  3+ edema left leg.  Port-A-Cath on the chest.    Vital signs:  Vitals:    01/27/21 1021   BP: 128/76   Pulse: 78   Resp: 18   Temp: 97.3 °F (36.3 °C)   Weight: 88.9 kg (196 lb)   Height: 175.3 cm (69\")   PainSc: 0-No pain       ECOG  (2) Ambulatory and capable of self care, unable to carry out work activity, up and about > 50% or waking hours    Physical Exam:      Lab Results - Last 18 Months   Lab Units 01/27/21  1009 01/21/21  0840 01/14/21  1010   WBC 10*3/mm3 16.52* 6.11 2.01*   HEMOGLOBIN g/dL 8.4* 7.8* 7.4*   HEMATOCRIT % 29.4* 25.1* 24.9*   PLATELETS 10*3/mm3 " 873* 435 16*   MCV fL 100.3* 89.0 90.5     Lab Results - Last 18 Months   Lab Units 01/21/21  0840 01/06/21  0811 01/06/21  0804  12/30/20  0825   SODIUM mmol/L 137  --  139  --  141   POTASSIUM mmol/L 5.3*  --  4.8  --  4.3   CHLORIDE mmol/L 102  --  105  --  106   CO2 mmol/L 24.0  --  26.0  --  24.0   BUN mg/dL 20  --  28*  --  15   CREATININE mg/dL 1.67* 1.40* 1.21   < > 1.05   CALCIUM mg/dL 7.8*  --  8.3*  --  7.6*   BILIRUBIN mg/dL 0.3  --  0.3  --  0.2   ALK PHOS U/L 140*  --  136*  --  158*   ALT (SGPT) U/L 9  --  15  --  13   AST (SGOT) U/L 13  --  14  --  17   GLUCOSE mg/dL 101*  --  93  --  86    < > = values in this interval not displayed.       Lab Results   Component Value Date    GLUCOSE 101 (H) 01/21/2021    BUN 20 01/21/2021    CREATININE 1.67 (H) 01/21/2021    EGFRIFNONA 39 (L) 01/21/2021    BCR 12.0 01/21/2021    K 5.3 (H) 01/21/2021    CO2 24.0 01/21/2021    CALCIUM 7.8 (L) 01/21/2021    PROTENTOTREF 5.6 (L) 10/19/2020    ALBUMIN 3.20 (L) 01/21/2021    LABIL2 0.6 (L) 10/19/2020    AST 13 01/21/2021    ALT 9 01/21/2021       Lab Results - Last 18 Months   Lab Units 10/14/20  0813   INR  0.98   APTT seconds 26.1       Lab Results   Component Value Date    IRON 189 (H) 12/21/2020    TIBC 265 (L) 12/21/2020    FERRITIN 1,380.00 (H) 12/21/2020       Lab Results   Component Value Date    FOLATE 7.29 10/19/2020       No results found for: OCCULTBLD    Lab Results   Component Value Date    RETICCTPCT 1.17 10/19/2020     Lab Results   Component Value Date    JTCFCPMX93 905 10/19/2020     No results found for: SPEP, UPEP  LDH   Date Value Ref Range Status   10/19/2020 401 (H) 135 - 225 U/L Final     No results found for: DIMITRIS, RF, SEDRATE  Lab Results   Component Value Date    HAPTOGLOBIN 563 (H) 10/19/2020     Lab Results   Component Value Date    PTT 26.1 10/14/2020    INR 0.98 10/14/2020     No results found for:   Lab Results   Component Value Date    CEA 8.27 10/19/2020     No components found for:  CA-19-9  No results found for: PSA    No diagnosis found.  Assessment/Plan     Assessment:  1. Liver mass resulting well to moderately differentiated adenocarcinoma: FGFR2, FGFR 3 Negative PD L1 negative - T2N0M0 S/p liver biopsy 10/14/2020. Cholangiocarcinoma favored.   CEA 8.27, AFP 32.60, CA 19-9 3,215. GI consulted.    His tumor is not operable, and patient not a surgical candidate.   Patient is high risk for surgery.  Patient was seen and evaluated by radiation oncology who has recommended palliative/neoadjuvant chemotherapy.  We have proceeded with cisplatin/gemcitabine combination due to increased chances of response.  Status post 3 cycles of cisplatin and gemcitabine so far.  Restaging CT scans 1/14/2021 shows evidence of slight disease progression.  2. Pulmonary embolism and deep venous thrombosis: Patient started on Xarelto and is tolerating well.  3. Left lower extremity edema: Probably from venous insufficiency due to thrombosis  4. Chemo induced anemia  5. Reactive thrombocytosis  6. Severe anemia: Likely from chronic kidney disease/chemotherapy/malignancy/.  On erythropoietin  7. CKD: Patient has CKD stage III. Serum creatinine 1.6 today.   8. Anemia: Likely from malignancy/chronic disease.  Stool heme is positive.  But no gross bleeding.  9. GERD/COPD: Managed per primary team  10. New Fall 12/7/2020: Hit right side of face. Likely related to dizziness/orthostatic hypotension      Assessment has been reviewed, confirmed, and updated on 01/27/21        PLAN:  1. Recommend switching his chemotherapy from cisplatin gemcitabine to FOLFOX considering slight disease progression.  Risks and benefits explained.  He is not a candidate for FGFR inhibitors.  2. Continue on Retacrit.  3. Left leg edema: Recommend compression stockings  4. Continue Xarelto.  Change to 20 mg daily after 3 weeks of therapy  5. Follow-up with me in 1 month    I have reviewed and confirmed the accuracy of the patient's history: Chief  complaint, HPI, ROS and Subjective as entered by the MA/LPN/RN. Aamir Garcia MD 01/27/21       Electronically signed by Aamir Garcia MD, 01/27/21, 10:49 AM EST.

## 2021-01-27 ENCOUNTER — LAB (OUTPATIENT)
Dept: LAB | Facility: HOSPITAL | Age: 86
End: 2021-01-27

## 2021-01-27 ENCOUNTER — OFFICE VISIT (OUTPATIENT)
Dept: ONCOLOGY | Facility: CLINIC | Age: 86
End: 2021-01-27

## 2021-01-27 VITALS
RESPIRATION RATE: 18 BRPM | HEIGHT: 69 IN | HEART RATE: 78 BPM | BODY MASS INDEX: 29.03 KG/M2 | WEIGHT: 196 LBS | TEMPERATURE: 97.3 F | SYSTOLIC BLOOD PRESSURE: 128 MMHG | DIASTOLIC BLOOD PRESSURE: 76 MMHG

## 2021-01-27 DIAGNOSIS — I82.90 DEEP VEIN THROMBOSIS (DVT) ASSOCIATED WITH COVID-19: ICD-10-CM

## 2021-01-27 DIAGNOSIS — C22.1 CHOLANGIOCARCINOMA (HCC): Primary | ICD-10-CM

## 2021-01-27 DIAGNOSIS — U07.1 DEEP VEIN THROMBOSIS (DVT) ASSOCIATED WITH COVID-19: ICD-10-CM

## 2021-01-27 DIAGNOSIS — E86.0 DEHYDRATION: ICD-10-CM

## 2021-01-27 DIAGNOSIS — C22.1 CHOLANGIOCARCINOMA (HCC): ICD-10-CM

## 2021-01-27 DIAGNOSIS — R42 DIZZINESS: ICD-10-CM

## 2021-01-27 LAB
BASOPHILS NFR BLD AUTO: ABNORMAL %
DEPRECATED RDW RBC AUTO: 103.7 FL (ref 37–54)
EOSINOPHIL # BLD AUTO: 0.15 10*3/MM3 (ref 0–0.4)
EOSINOPHIL NFR BLD AUTO: 0.9 % (ref 0.3–6.2)
ERYTHROCYTE [DISTWIDTH] IN BLOOD BY AUTOMATED COUNT: 30.9 % (ref 12.3–15.4)
HCT VFR BLD AUTO: 29.4 % (ref 37.5–51)
HGB BLD-MCNC: 8.4 G/DL (ref 13–17.7)
LYMPHOCYTES NFR BLD AUTO: ABNORMAL %
MCH RBC QN AUTO: 28.7 PG (ref 26.6–33)
MCHC RBC AUTO-ENTMCNC: 28.6 G/DL (ref 31.5–35.7)
MCV RBC AUTO: 100.3 FL (ref 79–97)
MONOCYTES # BLD AUTO: 2.99 10*3/MM3 (ref 0.1–0.9)
MONOCYTES NFR BLD AUTO: 18.1 % (ref 5–12)
NEUTROPHILS NFR BLD AUTO: ABNORMAL %
PLATELET # BLD AUTO: 873 10*3/MM3 (ref 140–450)
PMV BLD AUTO: 10.4 FL (ref 6–12)
RBC # BLD AUTO: 2.93 10*6/MM3 (ref 4.14–5.8)
WBC # BLD AUTO: 16.52 10*3/MM3 (ref 3.4–10.8)

## 2021-01-27 PROCEDURE — 85025 COMPLETE CBC W/AUTO DIFF WBC: CPT

## 2021-01-27 PROCEDURE — 36415 COLL VENOUS BLD VENIPUNCTURE: CPT

## 2021-01-27 PROCEDURE — 99215 OFFICE O/P EST HI 40 MIN: CPT | Performed by: INTERNAL MEDICINE

## 2021-01-27 NOTE — PROGRESS NOTES
Treatment plan entered per this message in Dr. Dick note.       PLAN:  1. Recommend switching his chemotherapy from cisplatin gemcitabine to FOLFOX considering slight disease progression.  Risks and benefits explained.  He is not a candidate for FGFR inhibitors

## 2021-01-28 PROBLEM — K90.9 MALABSORPTION OF IRON: Status: ACTIVE | Noted: 2021-01-28

## 2021-01-29 DIAGNOSIS — C22.1 CHOLANGIOCARCINOMA (HCC): Primary | ICD-10-CM

## 2021-01-29 RX ORDER — DIPHENHYDRAMINE HYDROCHLORIDE 50 MG/ML
50 INJECTION INTRAMUSCULAR; INTRAVENOUS AS NEEDED
Status: CANCELLED | OUTPATIENT
Start: 2021-02-01

## 2021-01-29 RX ORDER — PALONOSETRON 0.05 MG/ML
0.25 INJECTION, SOLUTION INTRAVENOUS ONCE
Status: CANCELLED | OUTPATIENT
Start: 2021-02-01

## 2021-01-29 RX ORDER — FAMOTIDINE 10 MG/ML
20 INJECTION, SOLUTION INTRAVENOUS AS NEEDED
Status: CANCELLED | OUTPATIENT
Start: 2021-02-01

## 2021-01-29 RX ORDER — DEXTROSE MONOHYDRATE 50 MG/ML
250 INJECTION, SOLUTION INTRAVENOUS ONCE
Status: CANCELLED | OUTPATIENT
Start: 2021-02-01

## 2021-02-01 ENCOUNTER — HOSPITAL ENCOUNTER (OUTPATIENT)
Dept: ONCOLOGY | Facility: HOSPITAL | Age: 86
Setting detail: INFUSION SERIES
Discharge: HOME | End: 2021-02-01

## 2021-02-01 VITALS
HEART RATE: 76 BPM | SYSTOLIC BLOOD PRESSURE: 138 MMHG | WEIGHT: 196 LBS | TEMPERATURE: 96.9 F | BODY MASS INDEX: 29.03 KG/M2 | HEIGHT: 69 IN | RESPIRATION RATE: 18 BRPM | DIASTOLIC BLOOD PRESSURE: 80 MMHG

## 2021-02-01 DIAGNOSIS — D63.0 ANEMIA IN NEOPLASTIC DISEASE: ICD-10-CM

## 2021-02-01 DIAGNOSIS — C22.1 CHOLANGIOCARCINOMA (HCC): Primary | ICD-10-CM

## 2021-02-01 LAB
ALBUMIN SERPL-MCNC: 2.8 G/DL (ref 3.5–5.2)
ALBUMIN/GLOB SERPL: 0.9 G/DL
ALP SERPL-CCNC: 150 U/L (ref 39–117)
ALT SERPL W P-5'-P-CCNC: 7 U/L (ref 1–41)
ANION GAP SERPL CALCULATED.3IONS-SCNC: 10 MMOL/L (ref 5–15)
AST SERPL-CCNC: 19 U/L (ref 1–40)
BASOPHILS # BLD AUTO: 0.18 10*3/MM3 (ref 0–0.2)
BASOPHILS NFR BLD AUTO: 1.1 % (ref 0–1.5)
BILIRUB SERPL-MCNC: 0.2 MG/DL (ref 0–1.2)
BUN SERPL-MCNC: 18 MG/DL (ref 8–23)
BUN/CREAT SERPL: 12.6 (ref 7–25)
CALCIUM SPEC-SCNC: 8.1 MG/DL (ref 8.6–10.5)
CHLORIDE SERPL-SCNC: 101 MMOL/L (ref 98–107)
CO2 SERPL-SCNC: 27 MMOL/L (ref 22–29)
CREAT SERPL-MCNC: 1.43 MG/DL (ref 0.76–1.27)
DEPRECATED RDW RBC AUTO: 85.3 FL (ref 37–54)
DEPRECATED RDW RBC AUTO: 86 FL (ref 37–54)
EOSINOPHIL # BLD AUTO: 0.1 10*3/MM3 (ref 0–0.4)
EOSINOPHIL NFR BLD AUTO: 0.6 % (ref 0.3–6.2)
ERYTHROCYTE [DISTWIDTH] IN BLOOD BY AUTOMATED COUNT: 26.7 % (ref 12.3–15.4)
ERYTHROCYTE [DISTWIDTH] IN BLOOD BY AUTOMATED COUNT: 28.6 % (ref 12.3–15.4)
GFR SERPL CREATININE-BSD FRML MDRD: 47 ML/MIN/1.73
GLOBULIN UR ELPH-MCNC: 3.1 GM/DL
GLUCOSE SERPL-MCNC: 82 MG/DL (ref 65–99)
HCT VFR BLD AUTO: 25.7 % (ref 37.5–51)
HCT VFR BLD AUTO: 28.6 % (ref 37.5–51)
HGB BLD-MCNC: 7.5 G/DL (ref 13–17.7)
HGB BLD-MCNC: 8.9 G/DL (ref 13–17.7)
LYMPHOCYTES # BLD AUTO: 1.77 10*3/MM3 (ref 0.7–3.1)
LYMPHOCYTES NFR BLD AUTO: 10.5 % (ref 19.6–45.3)
MCH RBC QN AUTO: 27.3 PG (ref 26.6–33)
MCH RBC QN AUTO: 27.9 PG (ref 26.6–33)
MCHC RBC AUTO-ENTMCNC: 29.2 G/DL (ref 31.5–35.7)
MCHC RBC AUTO-ENTMCNC: 31.1 G/DL (ref 31.5–35.7)
MCV RBC AUTO: 89.5 FL (ref 79–97)
MCV RBC AUTO: 93.5 FL (ref 79–97)
MONOCYTES # BLD AUTO: 2.11 10*3/MM3 (ref 0.1–0.9)
MONOCYTES NFR BLD AUTO: 12.5 % (ref 5–12)
NEUTROPHILS NFR BLD AUTO: 12.76 10*3/MM3 (ref 1.7–7)
NEUTROPHILS NFR BLD AUTO: 75.3 % (ref 42.7–76)
PLATELET # BLD AUTO: 440 10*3/MM3 (ref 140–450)
PLATELET # BLD AUTO: 456 10*3/MM3 (ref 140–450)
PMV BLD AUTO: 7.8 FL (ref 6–12)
PMV BLD AUTO: 9.6 FL (ref 6–12)
POTASSIUM SERPL-SCNC: 4.7 MMOL/L (ref 3.5–5.2)
PROT SERPL-MCNC: 5.9 G/DL (ref 6–8.5)
RBC # BLD AUTO: 2.75 10*6/MM3 (ref 4.14–5.8)
RBC # BLD AUTO: 3.2 10*6/MM3 (ref 4.14–5.8)
SODIUM SERPL-SCNC: 138 MMOL/L (ref 136–145)
WBC # BLD AUTO: 16.92 10*3/MM3 (ref 3.4–10.8)
WBC # BLD AUTO: 18 10*3/MM3 (ref 3.4–10.8)

## 2021-02-01 PROCEDURE — 25010000002 FOSAPREPITANT PER 1 MG: Performed by: INTERNAL MEDICINE

## 2021-02-01 PROCEDURE — 80053 COMPREHEN METABOLIC PANEL: CPT | Performed by: INTERNAL MEDICINE

## 2021-02-01 PROCEDURE — 25010000002 LEUCOVORIN CALCIUM PER 50 MG: Performed by: INTERNAL MEDICINE

## 2021-02-01 PROCEDURE — 96368 THER/DIAG CONCURRENT INF: CPT

## 2021-02-01 PROCEDURE — 96375 TX/PRO/DX INJ NEW DRUG ADDON: CPT

## 2021-02-01 PROCEDURE — 96367 TX/PROPH/DG ADDL SEQ IV INF: CPT

## 2021-02-01 PROCEDURE — 25010000002 PALONOSETRON 0.25 MG/5ML SOLUTION PREFILLED SYRINGE: Performed by: INTERNAL MEDICINE

## 2021-02-01 PROCEDURE — 25010000002 LEUCOVORIN 500 MG RECONSTITUTED SOLUTION 1 EACH VIAL: Performed by: INTERNAL MEDICINE

## 2021-02-01 PROCEDURE — 96416 CHEMO PROLONG INFUSE W/PUMP: CPT

## 2021-02-01 PROCEDURE — 86900 BLOOD TYPING SEROLOGIC ABO: CPT | Performed by: INTERNAL MEDICINE

## 2021-02-01 PROCEDURE — 85025 COMPLETE CBC W/AUTO DIFF WBC: CPT | Performed by: INTERNAL MEDICINE

## 2021-02-01 PROCEDURE — 25010000002 OXALIPLATIN PER 0.5 MG: Performed by: INTERNAL MEDICINE

## 2021-02-01 PROCEDURE — 96413 CHEMO IV INFUSION 1 HR: CPT

## 2021-02-01 PROCEDURE — 25010000002 DEXAMETHASONE SODIUM PHOSPHATE 120 MG/30ML SOLUTION: Performed by: INTERNAL MEDICINE

## 2021-02-01 PROCEDURE — 86850 RBC ANTIBODY SCREEN: CPT | Performed by: INTERNAL MEDICINE

## 2021-02-01 PROCEDURE — 96415 CHEMO IV INFUSION ADDL HR: CPT

## 2021-02-01 PROCEDURE — 85027 COMPLETE CBC AUTOMATED: CPT

## 2021-02-01 PROCEDURE — 25010000002 FLUOROURACIL PER 500 MG: Performed by: INTERNAL MEDICINE

## 2021-02-01 PROCEDURE — 86901 BLOOD TYPING SEROLOGIC RH(D): CPT | Performed by: INTERNAL MEDICINE

## 2021-02-01 PROCEDURE — 36591 DRAW BLOOD OFF VENOUS DEVICE: CPT

## 2021-02-01 RX ORDER — PALONOSETRON 0.05 MG/ML
0.25 INJECTION, SOLUTION INTRAVENOUS ONCE
Status: COMPLETED | OUTPATIENT
Start: 2021-02-01 | End: 2021-02-01

## 2021-02-01 RX ORDER — DEXTROSE MONOHYDRATE 50 MG/ML
250 INJECTION, SOLUTION INTRAVENOUS ONCE
Status: COMPLETED | OUTPATIENT
Start: 2021-02-01 | End: 2021-02-01

## 2021-02-01 RX ORDER — SODIUM CHLORIDE 9 MG/ML
250 INJECTION, SOLUTION INTRAVENOUS AS NEEDED
Status: CANCELLED | OUTPATIENT
Start: 2021-02-01

## 2021-02-01 RX ADMIN — SODIUM CHLORIDE 100 ML: 9 INJECTION, SOLUTION INTRAVENOUS at 12:20

## 2021-02-01 RX ADMIN — FLUOROURACIL 4920 MG: 50 INJECTION, SOLUTION INTRAVENOUS at 15:42

## 2021-02-01 RX ADMIN — PALONOSETRON 0.25 MG: 0.25 INJECTION, SOLUTION INTRAVENOUS at 12:19

## 2021-02-01 RX ADMIN — OXALIPLATIN 175 MG: 5 INJECTION, SOLUTION INTRAVENOUS at 13:18

## 2021-02-01 RX ADMIN — DEXTROSE MONOHYDRATE 250 ML: 50 INJECTION, SOLUTION INTRAVENOUS at 12:17

## 2021-02-01 RX ADMIN — DEXAMETHASONE SODIUM PHOSPHATE 12 MG: 4 INJECTION, SOLUTION INTRA-ARTICULAR; INTRALESIONAL; INTRAMUSCULAR; INTRAVENOUS; SOFT TISSUE at 12:55

## 2021-02-01 RX ADMIN — LEUCOVORIN CALCIUM 820 MG: 500 INJECTION, POWDER, LYOPHILIZED, FOR SOLUTION INTRAMUSCULAR; INTRAVENOUS at 13:17

## 2021-02-01 NOTE — PROGRESS NOTES
Hgb 8.9 at [facility].  Called and left message for Inga at Dr. Dick office.  Pt has appt for Wednesday.

## 2021-02-01 NOTE — PROGRESS NOTES
[facility] Medical Oncology     Education for Administration of Chemotherapy and/or Biotherapy     02/01/21    Tristen Garcia  [id number]    Mr.Robert Garcia is here today for education on their upcoming Chemotherapy and/or Biotherapy.     I will be going over their treatment options, obtain signed consent and answer any questions that they may have in regards to the administration of Oxaliplatin, Leucovorin, Fluorouracil.     Tristen Garcia has already consulted with Dr. Aamir Garcia for the treatment of Cholangiocarcinoma. The provider has gone over the same treatment options with the patient and answered their question prior to today's visit.   The goal of the treatment is to:    [] Cure my cancer - means treatment that kills cancer cells to the point my doctor                                     cannot find them in my body and they will not grow back.    [] Control my cancer - means treatment that keeps cancer from spreading or growing.    [x] Relieve my cancer symptoms - means treatment that helps problems such as pain or                                     pressure.     This treatment has been explained to Tristen Jose. Alternative methods of treatment, if any, have been explained to Tristen Garcia as have the benefits and risks of each. Based on the physician's explanation of the benefits and risks of this treatment and any alternatives available, The patient agrees that the potential benefit's out weighs the risks involved. I have explained to the patient the most likely complications that might occur from this treatment. The patient understands that along with the treatment additional medications may be necessary to lesson the side effects. Possible side effect may include but are not limited to, any of the following, or a combination of the following:      Allergic Reaction Vision/Eye Changes Sexual Effects    []  High Blood Pressure  [x]  Skin and nail changes  []  Menopausal symptoms   []  Hearing Loss  []  Ulceration at injection site []  Menstrual irregularities   [x]  Fatigue [x]  Skin rash   [x]  Fertility effects   [x]  Constipation  [x]  Diarrhea []  Hair loss  []  Light/temperature sensitivity []  Heart damage  []  Liver damage   [x]  Loss of appetite []  Dizziness []  Lung damage   [x]  Mouth Sores [x]  Muscle aching or weakness []  Kidney damage   [x]  Taste Changes []  Forgetfulness [x]  Nerve damage   [x]  Nausea or Vomiting [x]  Risk of blood clots []  Weight gain/loss   []  Secondary malignancies [x]  Risk of anemia  []  Risk of bleeding/bruising      While receiving treatment, it has been explained to the patient with regards to their blood counts. This may include but not limited to CBC, Neutropenia ,Anemia, Thrombocytopenia. This handout has been explained and given to the patient.     It was explained to the patient about nutrition and how important it is while undergoing Chemotherapy and/or Biotherapy. Certain medications will be prescribed during the treatment which may change the way foods taste or smell. These changes may cause poor or no appetite. Food is fuel for your body, and if it does not get the fuel it needs, your body may become mal-nourished, which can lead to sever fatigue.It was discussed with the patient about calories and how to add high-calorie foods to their diet.  Protein was also mentioned in regards to how this will help make new cells for the body. Information was given to Tristen Garcia in regards to some good protein sources.   We also discussed with the patient how important it was to drink/eat every 2-3 hours while awake. We discussed fluid intake of at least 6 8 ounce glassed of liquids per day to stay hydrated. Some of those are listed below:     Water  Juice (fruit or vegetable)  Soda Sport Drinks Soup   Milk  Ensure, Boost, Glucerna Ice Cream Popsicles Jello   Milkshakes Pudding  Gatorade Sherbert Yogurt     It has been discussed with the patient the risks of becoming  pregnant while receiving Chemotherapy and/or Biotherapy. We also discussed the importance of using reliable barrier methods while participating in intimate activities as this may expose their partners to a potentially harmful drug.     Further home instructions were given to the patient in regards to symptoms, treatment and how to handle those situations as well as when to contact the treatment team or the providers office.     Tristen Garcia was given handouts on:   1. Complete Blood Counts and terminology  2. Nutrition during Cancer Therapy   3. Home Instructions  4. Oxaliplatin, Leucovorin, Fluorouracil from [email or web address]  5. Oxaliplatin related cold sensitivity and numbness  6. Patient instructions for home pump    I have discussed and gone over the full consent with the patient and answered all their questions regarding the medication they are to receive.  Written information has been provided and reviewed with Tristen Garcia. The patient and their family had a chance to ask any questions about the treatment medications and are satisfied with the information that was provided to them.     The patient has read and completed the consent form. They understand the possible risks and benefits of the recommended treatment plan and voluntarily agree to undergo the planned treatment. Should they change their mind and decide to stop treatment at any time, they will notify the providers office.       Jaqcueline Monge RN  02/01/2021   13:13 EST

## 2021-02-01 NOTE — PROGRESS NOTES
Patient here for C1D1 FOLFOX today. His Hgb was 7.5 but patient denies being symptomatic. He states he does not feel anymore SOA or weak/fatigue then he has been. I discussed this with Dr. Garcia and he ordered to proceed with chemo and to transfuse 1 unit pRBCs tomorrow. Patient wants to get his blood Wednesday instead so he doesn't havee to make an extra trip here. Dr. Garcia said that would be fine for him to get it prior to getting his pump off.

## 2021-02-02 ENCOUNTER — TELEPHONE (OUTPATIENT)
Dept: ONCOLOGY | Facility: HOSPITAL | Age: 86
End: 2021-02-02

## 2021-02-02 NOTE — TELEPHONE ENCOUNTER
I returned call to pt to let him know that he won't need a transfusion tomorrow due to hgb at ambulatory care being 8.9 per Wilda MARTINS. Pt verbalized understanding to still come to appointment to get civ pump dc'd here at cancer center.

## 2021-02-03 ENCOUNTER — APPOINTMENT (OUTPATIENT)
Dept: GENERAL RADIOLOGY | Facility: HOSPITAL | Age: 86
End: 2021-02-03

## 2021-02-03 ENCOUNTER — HOSPITAL ENCOUNTER (OUTPATIENT)
Dept: INFUSION THERAPY | Facility: HOSPITAL | Age: 86
Discharge: HOME | End: 2021-02-03
Admitting: INTERNAL MEDICINE

## 2021-02-03 ENCOUNTER — HOSPITAL ENCOUNTER (INPATIENT)
Facility: HOSPITAL | Age: 86
LOS: 8 days | Discharge: NURSING, REHAB OR LONG-TERM CARE | End: 2021-02-11
Attending: EMERGENCY MEDICINE | Admitting: INTERNAL MEDICINE

## 2021-02-03 ENCOUNTER — HOSPITAL ENCOUNTER (OUTPATIENT)
Dept: ONCOLOGY | Facility: HOSPITAL | Age: 86
Setting detail: INFUSION SERIES
Discharge: HOME | End: 2021-02-03

## 2021-02-03 ENCOUNTER — APPOINTMENT (OUTPATIENT)
Dept: CT IMAGING | Facility: HOSPITAL | Age: 86
End: 2021-02-03

## 2021-02-03 ENCOUNTER — TELEPHONE (OUTPATIENT)
Dept: ONCOLOGY | Facility: HOSPITAL | Age: 86
End: 2021-02-03

## 2021-02-03 DIAGNOSIS — R77.8 ELEVATED TROPONIN: ICD-10-CM

## 2021-02-03 DIAGNOSIS — M89.8X8 MASS OF SPINE: ICD-10-CM

## 2021-02-03 DIAGNOSIS — R55 SYNCOPE AND COLLAPSE: Primary | ICD-10-CM

## 2021-02-03 DIAGNOSIS — E86.0 DEHYDRATION: ICD-10-CM

## 2021-02-03 DIAGNOSIS — C22.1 CHOLANGIOCARCINOMA (HCC): ICD-10-CM

## 2021-02-03 DIAGNOSIS — R59.0 RETROPERITONEAL LYMPHADENOPATHY: ICD-10-CM

## 2021-02-03 DIAGNOSIS — D63.0 ANEMIA IN NEOPLASTIC DISEASE: Primary | ICD-10-CM

## 2021-02-03 DIAGNOSIS — N39.0 ACUTE URINARY TRACT INFECTION: ICD-10-CM

## 2021-02-03 DIAGNOSIS — R16.0 LIVER MASS: ICD-10-CM

## 2021-02-03 PROBLEM — D64.81 ANTINEOPLASTIC CHEMOTHERAPY INDUCED ANEMIA: Status: ACTIVE | Noted: 2021-02-03

## 2021-02-03 PROBLEM — T45.1X5A ANTINEOPLASTIC CHEMOTHERAPY INDUCED ANEMIA: Status: ACTIVE | Noted: 2021-02-03

## 2021-02-03 PROBLEM — K83.09 ACUTE CHOLANGITIS: Status: ACTIVE | Noted: 2021-02-03

## 2021-02-03 PROBLEM — N17.0 ACUTE KIDNEY INJURY (AKI) WITH ACUTE TUBULAR NECROSIS (ATN): Status: ACTIVE | Noted: 2021-02-03

## 2021-02-03 LAB
ALBUMIN SERPL-MCNC: 3.2 G/DL (ref 3.5–5.2)
ALBUMIN/GLOB SERPL: 1.1 G/DL
ALP SERPL-CCNC: 166 U/L (ref 39–117)
ALT SERPL W P-5'-P-CCNC: 14 U/L (ref 1–41)
ANION GAP SERPL CALCULATED.3IONS-SCNC: 18 MMOL/L (ref 5–15)
APTT PPP: 28 SECONDS (ref 24–31)
AST SERPL-CCNC: 37 U/L (ref 1–40)
BACTERIA UR QL AUTO: ABNORMAL /HPF
BASOPHILS # BLD AUTO: 0.1 10*3/MM3 (ref 0–0.2)
BASOPHILS NFR BLD AUTO: 0.3 % (ref 0–1.5)
BILIRUB SERPL-MCNC: 0.2 MG/DL (ref 0–1.2)
BILIRUB UR QL STRIP: NEGATIVE
BUN SERPL-MCNC: 30 MG/DL (ref 8–23)
BUN/CREAT SERPL: 17 (ref 7–25)
CALCIUM SPEC-SCNC: 8.2 MG/DL (ref 8.6–10.5)
CHLORIDE SERPL-SCNC: 100 MMOL/L (ref 98–107)
CK SERPL-CCNC: 228 U/L (ref 20–200)
CLARITY UR: CLEAR
CO2 SERPL-SCNC: 20 MMOL/L (ref 22–29)
COLOR UR: YELLOW
CREAT SERPL-MCNC: 1.76 MG/DL (ref 0.76–1.27)
D-LACTATE SERPL-SCNC: 1 MMOL/L (ref 0.5–2)
DEPRECATED RDW RBC AUTO: 90.1 FL (ref 37–54)
EOSINOPHIL # BLD AUTO: 0 10*3/MM3 (ref 0–0.4)
EOSINOPHIL NFR BLD AUTO: 0 % (ref 0.3–6.2)
ERYTHROCYTE [DISTWIDTH] IN BLOOD BY AUTOMATED COUNT: 29.8 % (ref 12.3–15.4)
GFR SERPL CREATININE-BSD FRML MDRD: 37 ML/MIN/1.73
GLOBULIN UR ELPH-MCNC: 2.9 GM/DL
GLUCOSE BLDC GLUCOMTR-MCNC: 91 MG/DL (ref 70–105)
GLUCOSE BLDC GLUCOMTR-MCNC: 94 MG/DL (ref 70–105)
GLUCOSE SERPL-MCNC: 97 MG/DL (ref 65–99)
GLUCOSE UR STRIP-MCNC: NEGATIVE MG/DL
HCT VFR BLD AUTO: 24.9 % (ref 37.5–51)
HGB BLD-MCNC: 7.9 G/DL (ref 13–17.7)
HGB UR QL STRIP.AUTO: ABNORMAL
HOLD SPECIMEN: NORMAL
HYALINE CASTS UR QL AUTO: ABNORMAL /LPF
INR PPP: 1.32 (ref 0.93–1.1)
KETONES UR QL STRIP: NEGATIVE
LEUKOCYTE ESTERASE UR QL STRIP.AUTO: NEGATIVE
LIPASE SERPL-CCNC: 13 U/L (ref 13–60)
LYMPHOCYTES # BLD AUTO: 0.4 10*3/MM3 (ref 0.7–3.1)
LYMPHOCYTES NFR BLD AUTO: 1.8 % (ref 19.6–45.3)
MAGNESIUM SERPL-MCNC: 1.6 MG/DL (ref 1.6–2.4)
MCH RBC QN AUTO: 27.9 PG (ref 26.6–33)
MCHC RBC AUTO-ENTMCNC: 31.5 G/DL (ref 31.5–35.7)
MCV RBC AUTO: 88.4 FL (ref 79–97)
MONOCYTES # BLD AUTO: 1.9 10*3/MM3 (ref 0.1–0.9)
MONOCYTES NFR BLD AUTO: 7.6 % (ref 5–12)
NEUTROPHILS NFR BLD AUTO: 22.2 10*3/MM3 (ref 1.7–7)
NEUTROPHILS NFR BLD AUTO: 90.3 % (ref 42.7–76)
NITRITE UR QL STRIP: NEGATIVE
NRBC BLD AUTO-RTO: 0 /100 WBC (ref 0–0.2)
PH UR STRIP.AUTO: 5.5 [PH] (ref 5–8)
PHOSPHATE SERPL-MCNC: 4.6 MG/DL (ref 2.5–4.5)
PLATELET # BLD AUTO: 427 10*3/MM3 (ref 140–450)
PMV BLD AUTO: 7.1 FL (ref 6–12)
POTASSIUM SERPL-SCNC: 5.1 MMOL/L (ref 3.5–5.2)
PROCALCITONIN SERPL-MCNC: 0.73 NG/ML (ref 0–0.25)
PROT SERPL-MCNC: 6.1 G/DL (ref 6–8.5)
PROT UR QL STRIP: ABNORMAL
PROTHROMBIN TIME: 14.3 SECONDS (ref 9.6–11.7)
RBC # BLD AUTO: 2.82 10*6/MM3 (ref 4.14–5.8)
RBC # UR: ABNORMAL /HPF
REF LAB TEST METHOD: ABNORMAL
SODIUM SERPL-SCNC: 138 MMOL/L (ref 136–145)
SP GR UR STRIP: 1.01 (ref 1–1.03)
SQUAMOUS #/AREA URNS HPF: ABNORMAL /HPF
TROPONIN T SERPL-MCNC: 0.28 NG/ML (ref 0–0.03)
UROBILINOGEN UR QL STRIP: ABNORMAL
WBC # BLD AUTO: 24.6 10*3/MM3 (ref 3.4–10.8)
WBC UR QL AUTO: ABNORMAL /HPF
WHOLE BLOOD HOLD SPECIMEN: NORMAL
WHOLE BLOOD HOLD SPECIMEN: NORMAL

## 2021-02-03 PROCEDURE — 25010000002 CEFEPIME PER 500 MG: Performed by: EMERGENCY MEDICINE

## 2021-02-03 PROCEDURE — P9612 CATHETERIZE FOR URINE SPEC: HCPCS

## 2021-02-03 PROCEDURE — 99285 EMERGENCY DEPT VISIT HI MDM: CPT

## 2021-02-03 PROCEDURE — 84145 PROCALCITONIN (PCT): CPT | Performed by: EMERGENCY MEDICINE

## 2021-02-03 PROCEDURE — 84484 ASSAY OF TROPONIN QUANT: CPT | Performed by: EMERGENCY MEDICINE

## 2021-02-03 PROCEDURE — 71045 X-RAY EXAM CHEST 1 VIEW: CPT

## 2021-02-03 PROCEDURE — 70450 CT HEAD/BRAIN W/O DYE: CPT

## 2021-02-03 PROCEDURE — 85730 THROMBOPLASTIN TIME PARTIAL: CPT | Performed by: EMERGENCY MEDICINE

## 2021-02-03 PROCEDURE — 84100 ASSAY OF PHOSPHORUS: CPT | Performed by: EMERGENCY MEDICINE

## 2021-02-03 PROCEDURE — 82962 GLUCOSE BLOOD TEST: CPT

## 2021-02-03 PROCEDURE — 83690 ASSAY OF LIPASE: CPT | Performed by: EMERGENCY MEDICINE

## 2021-02-03 PROCEDURE — 80053 COMPREHEN METABOLIC PANEL: CPT | Performed by: EMERGENCY MEDICINE

## 2021-02-03 PROCEDURE — 25010000002 ONDANSETRON PER 1 MG: Performed by: EMERGENCY MEDICINE

## 2021-02-03 PROCEDURE — 83921 ORGANIC ACID SINGLE QUANT: CPT | Performed by: NURSE PRACTITIONER

## 2021-02-03 PROCEDURE — 25010000002 NALOXONE PER 1 MG: Performed by: EMERGENCY MEDICINE

## 2021-02-03 PROCEDURE — 83735 ASSAY OF MAGNESIUM: CPT | Performed by: EMERGENCY MEDICINE

## 2021-02-03 PROCEDURE — 93005 ELECTROCARDIOGRAM TRACING: CPT | Performed by: EMERGENCY MEDICINE

## 2021-02-03 PROCEDURE — 81001 URINALYSIS AUTO W/SCOPE: CPT | Performed by: EMERGENCY MEDICINE

## 2021-02-03 PROCEDURE — 99223 1ST HOSP IP/OBS HIGH 75: CPT | Performed by: INTERNAL MEDICINE

## 2021-02-03 PROCEDURE — 85610 PROTHROMBIN TIME: CPT | Performed by: EMERGENCY MEDICINE

## 2021-02-03 PROCEDURE — 87040 BLOOD CULTURE FOR BACTERIA: CPT | Performed by: EMERGENCY MEDICINE

## 2021-02-03 PROCEDURE — 25010000002 VANCOMYCIN 10 G RECONSTITUTED SOLUTION: Performed by: EMERGENCY MEDICINE

## 2021-02-03 PROCEDURE — 85025 COMPLETE CBC W/AUTO DIFF WBC: CPT | Performed by: EMERGENCY MEDICINE

## 2021-02-03 PROCEDURE — 83605 ASSAY OF LACTIC ACID: CPT

## 2021-02-03 PROCEDURE — 74176 CT ABD & PELVIS W/O CONTRAST: CPT

## 2021-02-03 PROCEDURE — 82550 ASSAY OF CK (CPK): CPT | Performed by: EMERGENCY MEDICINE

## 2021-02-03 RX ORDER — SODIUM CHLORIDE, SODIUM LACTATE, POTASSIUM CHLORIDE, CALCIUM CHLORIDE 600; 310; 30; 20 MG/100ML; MG/100ML; MG/100ML; MG/100ML
100 INJECTION, SOLUTION INTRAVENOUS CONTINUOUS
Status: DISCONTINUED | OUTPATIENT
Start: 2021-02-03 | End: 2021-02-05

## 2021-02-03 RX ORDER — SODIUM CHLORIDE 0.9 % (FLUSH) 0.9 %
10 SYRINGE (ML) INJECTION AS NEEDED
Status: DISCONTINUED | OUTPATIENT
Start: 2021-02-03 | End: 2021-02-11 | Stop reason: HOSPADM

## 2021-02-03 RX ORDER — URSODIOL 500 MG/1
500 TABLET, FILM COATED ORAL 2 TIMES DAILY
Status: DISCONTINUED | OUTPATIENT
Start: 2021-02-03 | End: 2021-02-11 | Stop reason: HOSPADM

## 2021-02-03 RX ORDER — ONDANSETRON 4 MG/1
4 TABLET, FILM COATED ORAL EVERY 6 HOURS PRN
Status: DISCONTINUED | OUTPATIENT
Start: 2021-02-03 | End: 2021-02-11 | Stop reason: HOSPADM

## 2021-02-03 RX ORDER — ALLOPURINOL 300 MG/1
150 TABLET ORAL DAILY
Status: DISCONTINUED | OUTPATIENT
Start: 2021-02-04 | End: 2021-02-11 | Stop reason: HOSPADM

## 2021-02-03 RX ORDER — ATENOLOL 25 MG/1
25 TABLET ORAL DAILY
Status: DISCONTINUED | OUTPATIENT
Start: 2021-02-04 | End: 2021-02-05

## 2021-02-03 RX ORDER — PANTOPRAZOLE SODIUM 40 MG/1
40 TABLET, DELAYED RELEASE ORAL EVERY MORNING
Status: DISCONTINUED | OUTPATIENT
Start: 2021-02-04 | End: 2021-02-11 | Stop reason: HOSPADM

## 2021-02-03 RX ORDER — NALOXONE HCL 0.4 MG/ML
0.4 VIAL (ML) INJECTION ONCE
Status: COMPLETED | OUTPATIENT
Start: 2021-02-03 | End: 2021-02-03

## 2021-02-03 RX ORDER — ACETAMINOPHEN 650 MG/1
650 SUPPOSITORY RECTAL EVERY 4 HOURS PRN
Status: DISCONTINUED | OUTPATIENT
Start: 2021-02-03 | End: 2021-02-11 | Stop reason: HOSPADM

## 2021-02-03 RX ORDER — ONDANSETRON 2 MG/ML
4 INJECTION INTRAMUSCULAR; INTRAVENOUS ONCE
Status: COMPLETED | OUTPATIENT
Start: 2021-02-03 | End: 2021-02-03

## 2021-02-03 RX ORDER — ACETAMINOPHEN 160 MG/5ML
325 SOLUTION ORAL EVERY 4 HOURS PRN
Status: DISCONTINUED | OUTPATIENT
Start: 2021-02-03 | End: 2021-02-11 | Stop reason: HOSPADM

## 2021-02-03 RX ORDER — ASPIRIN 325 MG
325 TABLET ORAL ONCE
Status: DISCONTINUED | OUTPATIENT
Start: 2021-02-03 | End: 2021-02-11 | Stop reason: HOSPADM

## 2021-02-03 RX ORDER — ONDANSETRON 2 MG/ML
4 INJECTION INTRAMUSCULAR; INTRAVENOUS EVERY 6 HOURS PRN
Status: DISCONTINUED | OUTPATIENT
Start: 2021-02-03 | End: 2021-02-11 | Stop reason: HOSPADM

## 2021-02-03 RX ORDER — SODIUM CHLORIDE 0.9 % (FLUSH) 0.9 %
10 SYRINGE (ML) INJECTION EVERY 12 HOURS SCHEDULED
Status: DISCONTINUED | OUTPATIENT
Start: 2021-02-03 | End: 2021-02-11 | Stop reason: HOSPADM

## 2021-02-03 RX ORDER — ACETAMINOPHEN 325 MG/1
325 TABLET ORAL EVERY 4 HOURS PRN
Status: DISCONTINUED | OUTPATIENT
Start: 2021-02-03 | End: 2021-02-11 | Stop reason: HOSPADM

## 2021-02-03 RX ADMIN — VANCOMYCIN HYDROCHLORIDE 1500 MG: 10 INJECTION, POWDER, LYOPHILIZED, FOR SOLUTION INTRAVENOUS at 17:16

## 2021-02-03 RX ADMIN — URSODIOL 500 MG: 500 TABLET, FILM COATED ORAL at 22:03

## 2021-02-03 RX ADMIN — Medication 10 ML: at 22:04

## 2021-02-03 RX ADMIN — RIVAROXABAN 15 MG: 15 TABLET, FILM COATED ORAL at 22:29

## 2021-02-03 RX ADMIN — Medication 10 ML: at 15:40

## 2021-02-03 RX ADMIN — NITROGLYCERIN 1 INCH: 20 OINTMENT TOPICAL at 17:10

## 2021-02-03 RX ADMIN — NALOXONE HYDROCHLORIDE 0.4 MG: 0.4 INJECTION, SOLUTION INTRAMUSCULAR; INTRAVENOUS; SUBCUTANEOUS at 14:24

## 2021-02-03 RX ADMIN — ONDANSETRON 4 MG: 2 INJECTION, SOLUTION INTRAMUSCULAR; INTRAVENOUS at 15:41

## 2021-02-03 RX ADMIN — SODIUM CHLORIDE 500 ML: 9 INJECTION, SOLUTION INTRAVENOUS at 15:40

## 2021-02-03 RX ADMIN — SODIUM CHLORIDE, POTASSIUM CHLORIDE, SODIUM LACTATE AND CALCIUM CHLORIDE 100 ML/HR: 600; 310; 30; 20 INJECTION, SOLUTION INTRAVENOUS at 22:04

## 2021-02-03 RX ADMIN — CEFEPIME 2 G: 2 INJECTION, POWDER, FOR SOLUTION INTRAVENOUS at 17:13

## 2021-02-03 NOTE — TELEPHONE ENCOUNTER
Patient has a 5FU CIV pump on and was supposed to come get it taken off this afternoon. He is now actually in the ER. I called over there and spoke with his nurse Palmira and asked her to discontinue the 5FU pump and flush his port. She said she would and document a progress note about it. She said he may end up getting admitted so they are going to lave his port accessed.

## 2021-02-03 NOTE — ED PROVIDER NOTES
Subjective   85-year-old male called EMS today for lifting assistance.  The patient had taken a bath and was going to see a physician.  He could not get out of the walk-in bathtub.  EMS got the patient up and then the patient became unresponsive.  He had a slow pulse and it was noted to be faint by EMS.  It took him several minutes to get him on the monitor the patient then slowly became more alert.  The family reports that he has been confused for several days.  They states he has been treating for a cancer around his liver.  The patient has had poor oral intake in the last 24 hours.  He is complained of extensive nausea.  There is been no vomiting or diarrhea.  There is been no reports of melena hematemesis or hematochezia.          Review of Systems   Unable to perform ROS: Mental status change       Past Medical History:   Diagnosis Date   • Arthritis    • Cancer (CMS/HCC)    • COPD (chronic obstructive pulmonary disease) (CMS/HCC)    • Elevated cholesterol    • GERD (gastroesophageal reflux disease)    • Hyperlipidemia    • Hypertension        No Known Allergies    Past Surgical History:   Procedure Laterality Date   • COLONOSCOPY     • EYE SURGERY     • SKIN BIOPSY         Family History   Problem Relation Age of Onset   • Kidney failure Father        Social History     Socioeconomic History   • Marital status: Single     Spouse name: Not on file   • Number of children: Not on file   • Years of education: Not on file   • Highest education level: Not on file   Tobacco Use   • Smoking status: Former Smoker   • Smokeless tobacco: Never Used   Substance and Sexual Activity   • Alcohol use: Not Currently   • Drug use: Never   • Sexual activity: Defer       Lives with family members    Objective   Physical Exam  Alert Daniela Coma Scale 15   HEENT: Pupils equal and reactive to light.  Sclera appeared mildly jaundiced conjunctivae are not injected. normal tympanic membranes. Oropharynx and nares are normal.   Neck:  Supple. Midline trachea. No JVD. No goiter.   Chest: Rhonchi are scattered bilaterally and equal breath sounds bilaterally regular rate and rhythm without murmur or rub.   Abdomen: Positive bowel sounds tender right upper quadrant.  The abdomen is obese but nondistended. No rebound or peritoneal signs. No CVA tenderness.   Extremities no clubbing cyanosis or edema motor sensory exam is normal the full range of motion is intact   skin: Warm and dry, no rashes or petechia.   Lymphatic: No regional lymphadenopathy. No calf pain, swelling or Khoa's sign    Procedures           ED Course  ED Course as of Feb 03 1704   Wed Feb 03, 2021   1610 I examined the patient using the appropriate personal protective equipment.          [TH]      ED Course User Index  [TH] Yasmani Dietrich MD                                   Labs Reviewed   COMPREHENSIVE METABOLIC PANEL - Abnormal; Notable for the following components:       Result Value    BUN 30 (*)     Creatinine 1.76 (*)     CO2 20.0 (*)     Calcium 8.2 (*)     Albumin 3.20 (*)     Alkaline Phosphatase 166 (*)     eGFR Non  Amer 37 (*)     Anion Gap 18.0 (*)     All other components within normal limits    Narrative:     GFR Normal >60  Chronic Kidney Disease <60  Kidney Failure <15     PROTIME-INR - Abnormal; Notable for the following components:    Protime 14.3 (*)     INR 1.32 (*)     All other components within normal limits   URINALYSIS W/ CULTURE IF INDICATED - Abnormal; Notable for the following components:    Blood, UA Small (1+) (*)     Protein, UA Trace (*)     All other components within normal limits   TROPONIN (IN-HOUSE) - Abnormal; Notable for the following components:    Troponin T 0.285 (*)     All other components within normal limits    Narrative:     Troponin T Reference Range:  <= 0.03 ng/mL-   Negative for AMI  >0.03 ng/mL-     Abnormal for myocardial necrosis.  Clinicians would have to utilize clinical acumen, EKG, Troponin and serial changes to  "determine if it is an Acute Myocardial Infarction or myocardial injury due to an underlying chronic condition.       Results may be falsely decreased if patient taking Biotin.     PROCALCITONIN - Abnormal; Notable for the following components:    Procalcitonin 0.73 (*)     All other components within normal limits    Narrative:     As a Marker for Sepsis (Non-Neonates):   1. <0.5 ng/mL represents a low risk of severe sepsis and/or septic shock.  1. >2 ng/mL represents a high risk of severe sepsis and/or septic shock.    As a Marker for Lower Respiratory Tract Infections that require antibiotic therapy:  PCT on Admission     Antibiotic Therapy             6-12 Hrs later  > 0.5                Strongly Recommended            >0.25 - <0.5         Recommended  0.1 - 0.25           Discouraged                   Remeasure/reassess PCT  <0.1                 Strongly Discouraged          Remeasure/reassess PCT      As 28 day mortality risk marker: \"Change in Procalcitonin Result\" (> 80 % or <=80 %) if Day 0 (or Day 1) and Day 4 values are available. Refer to [email or web address]   Change in PCT <=80 %   A decrease of PCT levels below or equal to 80 % defines a positive change in PCT test result representing a higher risk for 28-day all-cause mortality of patients diagnosed with severe sepsis or septic shock.  Change in PCT > 80 %   A decrease of PCT levels of more than 80 % defines a negative change in PCT result representing a lower risk for 28-day all-cause mortality of patients diagnosed with severe sepsis or septic shock.                Results may be falsely decreased if patient taking Biotin.    PHOSPHORUS - Abnormal; Notable for the following components:    Phosphorus 4.6 (*)     All other components within normal limits   CK - Abnormal; Notable for the following components:    Creatine Kinase 228 (*)     All other components within normal limits   CBC WITH AUTO DIFFERENTIAL - Abnormal; Notable for " the following components:    WBC 24.60 (*)     RBC 2.82 (*)     Hemoglobin 7.9 (*)     Hematocrit 24.9 (*)     RDW 29.8 (*)     RDW-SD 90.1 (*)     Neutrophil % 90.3 (*)     Lymphocyte % 1.8 (*)     Eosinophil % 0.0 (*)     Neutrophils, Absolute 22.20 (*)     Lymphocytes, Absolute 0.40 (*)     Monocytes, Absolute 1.90 (*)     All other components within normal limits   URINALYSIS, MICROSCOPIC ONLY - Abnormal; Notable for the following components:    RBC, UA 0-2 (*)     WBC, UA 3-5 (*)     All other components within normal limits   APTT - Normal   LIPASE - Normal   MAGNESIUM - Normal   POCT GLUCOSE FINGERSTICK - Normal   POC LACTATE - Normal   BLOOD CULTURE   BLOOD CULTURE   RAINBOW DRAW    Narrative:     The following orders were created for panel order [place] Draw.  Procedure                               Abnormality         Status                     ---------                               -----------         ------                     Light Blue Top[[id number]]                                   Final result               Green Top (Gel)[[id number]]                                  Final result               Lavender Top[[id number]]                                     Final result               Gold Top - SST[[id number]]                                   Final result                 Please view results for these tests on the individual orders.   POC LACTATE   LIGHT BLUE TOP   GREEN TOP   LAVENDER TOP   GOLD TOP - SST   CBC AND DIFFERENTIAL    Narrative:     The following orders were created for panel order CBC & Differential.  Procedure                               Abnormality         Status                     ---------                               -----------         ------                     CBC Auto Differential[[id number]]        Abnormal            Final result                 Please view results for these tests on the individual orders.   EXTRA TUBES    Narrative:     The following orders were created for  panel order Extra Tubes.  Procedure                               Abnormality         Status                     ---------                               -----------         ------                     Green Top (Gel)[[id number]]                                  Final result                 Please view results for these tests on the individual orders.   GREEN TOP     Medications   sodium chloride 0.9 % flush 10 mL (10 mL Intravenous Given 2/3/21 1540)   aspirin tablet 325 mg (has no administration in time range)   nitroglycerin (NITROSTAT) ointment 1 inch (has no administration in time range)   ceFEPime (MAXIPIME) in SWFI 2g/10ml IV PUSH syringe (has no administration in time range)   vancomycin 1500 mg/500 mL 0.9% NS IVPB ([facility]) (has no administration in time range)   naloxone (NARCAN) injection 0.4 mg (0.4 mg Intravenous Given 2/3/21 1424)   sodium chloride 0.9 % bolus 500 mL (0 mL Intravenous Stopped 2/3/21 1649)   ondansetron (ZOFRAN) injection 4 mg (4 mg Intravenous Given 2/3/21 1541)     Ct Abdomen Pelvis Without Contrast    Result Date: 2/3/2021   1. With the exception of very slight interval enlargement of a portacaval lymph node, the findings appear stable since 01/14/2021. Heterogeneous right hepatic lobe mass, but appear to be smaller low-density nodules elsewhere in the liver, thought to be unchanged, and remain highly suspicious for malignancy. 2. Borderline enlarged aortocaval lymph node in the retroperitoneum, unchanged. 3. 2.3 cm lucent lesion in L2 is unchanged from prior examination. Osseous metastatic disease not excluded. No new osseous lesions.  4. No acute findings in the abdomen.   Electronically Signed By-Rosa Fox MD On:2/3/2021 3:07 PM This report was finalized on [id number] by  Rosa Fox MD.    Ct Head Without Contrast    Result Date: 2/3/2021  Age-appropriate atrophy, unchanged from the patient's recent head CT of 12/10/2020. No acute intracranial findings.  Electronically  Signed By-Pramod Soria MD On:2/3/2021 3:07 PM This report was finalized on [id number] by  Pramod Soria MD.    Xr Chest 1 View    Result Date: 2/3/2021  No acute cardiopulmonary process.  Electronically Signed By-Landon Paredes MD On:2/3/2021 3:34 PM This report was finalized on [id number] by  Landon Paredes MD.            MDM  Number of Diagnoses or Management Options     Amount and/or Complexity of Data Reviewed  Clinical lab tests: ordered and reviewed  Tests in the radiology section of CPT®: ordered and reviewed  Obtain history from someone other than the patient: yes  Review and summarize past medical records: yes  Discuss the patient with other providers: yes  Independent visualization of images, tracings, or specimens: yes    Risk of Complications, Morbidity, and/or Mortality  Presenting problems: high  Diagnostic procedures: high  Management options: high  General comments: We were able to obtain limited information from the patient's son and also from EMS.  The patient had a white count 2 days ago of 18.7 and 2 days prior to that had been in the 16,000 range.  The patient's creatinine was elevated 1.76 having been 1.6 713 days ago.  Troponin was elevated today at 0.285.  The patient had aspirin and Nitropaste was applied he was cautiously hydrated cultures were obtained lactic was not elevated the patient was started on broad-spectrum antibiotics.  The case discussed the hospitalist service and we will consult oncology the patient was agreeable to this plan of treatment as well as the patient's        Final diagnoses:   Syncope and collapse   Cholangiocarcinoma (CMS/HCC)   Dehydration   Acute urinary tract infection   Elevated troponin   Liver mass   Mass of spine   Retroperitoneal lymphadenopathy            Yasmani Dietrich MD  02/03/21 [id number]

## 2021-02-03 NOTE — ED NOTES
Family reports that patient has been having some confusion recently .  Today son was supposed to go get patient to take him to get his chemo disconnected.  Son found patient in bathtub unable to get up .  Swelling to Bilateral lower extremities. Pulses intact. Feet are cold to touch. Son reports patient has been getting weaker and having more trouble.  Son reports patient lives alone and son has been staying with him some.      Vandana Sharp RN  02/03/21 1450

## 2021-02-03 NOTE — H&P
[facility] HOSPITALIST     Ann Marie Hodgson MD    TIFFANIE/TIFFANIE  Patient Care Team:  Ann Marie Hodgson MD as PCP - General (Family Medicine)    Patient was examined with relevant and adequate PPE keeping in mind the current coronavirus pandemic. Minimum of 10 minutes to don and doff PPE.    CHIEF COMPLAINT:     Chief Complaint   Patient presents with   • Altered Mental Status   • Syncope       HISTORY OF PRESENT ILLNESS:    85-year-old male called EMS today for lifting assistance.  The patient had taken a bath and was going to see a physician.  He could not get out of the walk-in bathtub.  EMS got the patient up and then the patient became unresponsive.  He had a slow pulse and it was noted to be faint by EMS.  It took him several minutes to get him on the monitor the patient then slowly became more alert.  The family reports that he has been confused for several days.  They states he has been treating for a cancer around his liver.  The patient has had poor oral intake in the last 24 hours.  He is complained of extensive nausea.  There is been no vomiting or diarrhea.  There is been no reports of melena hematemesis or hematochezia.       Patient was admitted here on 21 January for acute PE and DVT.  Since then he has been on anticoagulation.  Per the son he went into check on his father today and he was slumped in the bathtub.  Conscious but confused.  He was subsequently transferred to the hospital.  Patient apparently lives mostly by himself with his son checking in on him.  Sees Dr. Pope      PCP  Ann Marie Hodgson MD (General)     Past Medical History:   Diagnosis Date   • Arthritis    • Cancer (CMS/HCC)    • COPD (chronic obstructive pulmonary disease) (CMS/HCC)    • Elevated cholesterol    • GERD (gastroesophageal reflux disease)    • Hyperlipidemia    • Hypertension      Past Surgical History:   Procedure Laterality Date   • COLONOSCOPY     • EYE SURGERY     • SKIN BIOPSY       Family  "History   Problem Relation Age of Onset   • Kidney failure Father      Social History     Tobacco Use   • Smoking status: Former Smoker   • Smokeless tobacco: Never Used   Substance Use Topics   • Alcohol use: Not Currently   • Drug use: Never     (Not in a hospital admission)    Allergies:  Patient has no known allergies.      There is no immunization history on file for this patient.        REVIEW OF SYSTEMS:     Review of Systems   Unable to perform ROS: mental status change             Vital Signs  Temp:  [98.6 °F (37 °C)] 98.6 °F (37 °C)  Heart Rate:  [] 97  Resp:  [18-28] 18  BP: (116-127)/(59-77) 116/59    Flowsheet Rows      First Filed Value   Admission Height  170.2 cm (67\") Documented at 02/03/2021 1417   Admission Weight  90 kg (198 lb 6.6 oz) Documented at 02/03/2021 1417           Physical Exam:    Physical Exam  Vitals signs and nursing note reviewed.   Constitutional:       General: He is not in acute distress.     Appearance: Normal appearance. He is well-developed. He is not ill-appearing, toxic-appearing or diaphoretic.   HENT:      Head: Normocephalic and atraumatic.      Right Ear: Ear canal and external ear normal.      Left Ear: Ear canal and external ear normal.      Nose: Nose normal. No congestion or rhinorrhea.      Mouth/Throat:      Mouth: Mucous membranes are dry.      Pharynx: No oropharyngeal exudate.      Comments: Edentulous  Eyes:      General: No scleral icterus.        Right eye: No discharge.         Left eye: No discharge.      Extraocular Movements: Extraocular movements intact.      Conjunctiva/sclera: Conjunctivae normal.      Pupils: Pupils are equal, round, and reactive to light.   Neck:      Musculoskeletal: Normal range of motion and neck supple. No neck rigidity or muscular tenderness.      Thyroid: No thyromegaly.      Vascular: No carotid bruit or JVD.      Trachea: No tracheal deviation.   Cardiovascular:      Rate and Rhythm: Normal rate and regular rhythm.    "   Heart sounds: Normal heart sounds. No murmur. No friction rub. No gallop.       Comments: Absent pedal pulses  Pulmonary:      Effort: Pulmonary effort is normal. No respiratory distress.      Breath sounds: Normal breath sounds. No stridor. No wheezing, rhonchi or rales.      Comments: Right chest Mmumvs-[id number]-Othx present  Chest:      Chest wall: No tenderness.   Abdominal:      General: Bowel sounds are normal. There is distension.      Palpations: There is mass.      Tenderness: There is no abdominal tenderness. There is no guarding or rebound.      Hernia: No hernia is present.      Comments: Very large hepatomegaly firm.  Reaching out 6 fingerbreadth below the costal margin in the right midclavicular line.  Flanks are full indicating some degree of ascites.   Musculoskeletal: Normal range of motion.         General: No swelling, tenderness, deformity or signs of injury.      Right lower leg: Edema (+2) present.      Left lower leg: Edema (+2) present.   Lymphadenopathy:      Cervical: No cervical adenopathy.   Skin:     General: Skin is warm and dry.      Coloration: Skin is not jaundiced or pale.      Findings: No bruising, erythema or rash.   Neurological:      General: No focal deficit present.      Mental Status: He is alert and oriented to person, place, and time. Mental status is at baseline.      Cranial Nerves: No cranial nerve deficit.      Sensory: No sensory deficit.      Motor: No weakness or abnormal muscle tone.      Coordination: Coordination normal.   Psychiatric:         Mood and Affect: Mood normal.         Behavior: Behavior normal.         Thought Content: Thought content normal.         Judgment: Judgment normal.               Lab Results (most recent)     Procedure Component Value Units Date/Time    POC Lactate [[id number]]  (Normal) Collected: 02/03/21 1619    Specimen: Blood Updated: 02/03/21 1627     Lactate 1.0 mmol/L      Comment: Serial Number: [id number]:  [id number]       Blood  Culture - Blood, Blood, Central Line [[id number]] Collected: 02/03/21 1618    Specimen: Blood, Central Line Updated: 02/03/21 1626    Troponin [[id number]]  (Abnormal) Collected: 02/03/21 1422    Specimen: Blood Updated: 02/03/21 1539     Troponin T 0.285 ng/mL     Narrative:      Troponin T Reference Range:  <= 0.03 ng/mL-   Negative for AMI  >0.03 ng/mL-     Abnormal for myocardial necrosis.  Clinicians would have to utilize clinical acumen, EKG, Troponin and serial changes to determine if it is an Acute Myocardial Infarction or myocardial injury due to an underlying chronic condition.       Results may be falsely decreased if patient taking Biotin.      Comprehensive Metabolic Panel [[id number]]  (Abnormal) Collected: 02/03/21 1422    Specimen: Blood Updated: 02/03/21 1538     Glucose 97 mg/dL      BUN 30 mg/dL      Creatinine 1.76 mg/dL      Sodium 138 mmol/L      Potassium 5.1 mmol/L      Chloride 100 mmol/L      CO2 20.0 mmol/L      Calcium 8.2 mg/dL      Total Protein 6.1 g/dL      Albumin 3.20 g/dL      ALT (SGPT) 14 U/L      AST (SGOT) 37 U/L      Alkaline Phosphatase 166 U/L      Total Bilirubin 0.2 mg/dL      eGFR Non African Amer 37 mL/min/1.73      Globulin 2.9 gm/dL      A/G Ratio 1.1 g/dL      BUN/Creatinine Ratio 17.0     Anion Gap 18.0 mmol/L     Narrative:      GFR Normal >60  Chronic Kidney Disease <60  Kidney Failure <15      Urinalysis With Culture If Indicated - Urine, Catheter [[id number]]  (Abnormal) Collected: 02/03/21 1527    Specimen: Urine, Catheter Updated: 02/03/21 1538     Color, UA Yellow     Appearance, UA Clear     pH, UA 5.5     Specific Gravity, UA 1.015     Glucose, UA Negative     Ketones, UA Negative     Bilirubin, UA Negative     Blood, UA Small (1+)     Protein, UA Trace     Leuk Esterase, UA Negative     Nitrite, UA Negative     Urobilinogen, UA 0.2 E.U./dL    Urinalysis, Microscopic Only - Urine, Catheter [[id number]]  (Abnormal) Collected: 02/03/21 1527    Specimen: Urine,  "Catheter Updated: 02/03/21 1538     RBC, UA 0-2 /HPF      WBC, UA 3-5 /HPF      Bacteria, UA None Seen /HPF      Squamous Epithelial Cells, UA 0-2 /HPF      Hyaline Casts, UA 3-6 /LPF      Methodology Automated Microscopy    Lipase [[id number]]  (Normal) Collected: 02/03/21 1422    Specimen: Blood Updated: 02/03/21 1537     Lipase 13 U/L     Magnesium [[id number]]  (Normal) Collected: 02/03/21 1422    Specimen: Blood Updated: 02/03/21 1537     Magnesium 1.6 mg/dL     CK [[id number]]  (Abnormal) Collected: 02/03/21 1422    Specimen: Blood Updated: 02/03/21 1537     Creatine Kinase 228 U/L     Phosphorus [[id number]]  (Abnormal) Collected: 02/03/21 1422    Specimen: Blood Updated: 02/03/21 1537     Phosphorus 4.6 mg/dL     Procalcitonin [[id number]]  (Abnormal) Collected: 02/03/21 1422    Specimen: Blood Updated: 02/03/21 1532     Procalcitonin 0.73 ng/mL     Narrative:      As a Marker for Sepsis (Non-Neonates):   1. <0.5 ng/mL represents a low risk of severe sepsis and/or septic shock.  1. >2 ng/mL represents a high risk of severe sepsis and/or septic shock.    As a Marker for Lower Respiratory Tract Infections that require antibiotic therapy:  PCT on Admission     Antibiotic Therapy             6-12 Hrs later  > 0.5                Strongly Recommended            >0.25 - <0.5         Recommended  0.1 - 0.25           Discouraged                   Remeasure/reassess PCT  <0.1                 Strongly Discouraged          Remeasure/reassess PCT      As 28 day mortality risk marker: \"Change in Procalcitonin Result\" (> 80 % or <=80 %) if Day 0 (or Day 1) and Day 4 values are available. Refer to http://www.[employer]-pct-calculator.com/   Change in PCT <=80 %   A decrease of PCT levels below or equal to 80 % defines a positive change in PCT test result representing a higher risk for 28-day all-cause mortality of patients diagnosed with severe sepsis or septic shock.  Change in PCT > 80 %   A decrease of PCT levels of more than 80 " % defines a negative change in PCT result representing a lower risk for 28-day all-cause mortality of patients diagnosed with severe sepsis or septic shock.                Results may be falsely decreased if patient taking Biotin.     [place] Draw [[id number]] Collected: 02/03/21 1422    Specimen: Blood Updated: 02/03/21 1530    Narrative:      The following orders were created for panel order [place] Draw.  Procedure                               Abnormality         Status                     ---------                               -----------         ------                     Light Blue Top[[id number]]                                   Final result               Green Top (Gel)[[id number]]                                  Final result               Lavender Top[[id number]]                                     Final result               Gold Top - SST[[id number]]                                   Final result                 Please view results for these tests on the individual orders.    Lavender Top [[id number]] Collected: 02/03/21 1422    Specimen: Blood Updated: 02/03/21 1530     Extra Tube hold for add-on     Comment: Auto resulted       Gold Top - SST [[id number]] Collected: 02/03/21 1422    Specimen: Blood Updated: 02/03/21 1530     Extra Tube Hold for add-ons.     Comment: Auto resulted.       Extra Tubes [[id number]] Collected: 02/03/21 1422    Specimen: Blood, Venous Line Updated: 02/03/21 1530    Narrative:      The following orders were created for panel order Extra Tubes.  Procedure                               Abnormality         Status                     ---------                               -----------         ------                     Green Top (Gel)[[id number]]                                  Final result                 Please view results for these tests on the individual orders.    Green Top (Gel) [[id number]] Collected: 02/03/21 1422    Specimen: Blood Updated: 02/03/21 1530     Extra Tube Hold  for add-ons.     Comment: Auto resulted.       Light Blue Top [[id number]] Collected: 02/03/21 1422    Specimen: Blood Updated: 02/03/21 1530     Extra Tube hold for add-on     Comment: Auto resulted       Green Top (Gel) [[id number]] Collected: 02/03/21 1422    Specimen: Blood Updated: 02/03/21 1530     Extra Tube Hold for add-ons.     Comment: Auto resulted.       Blood Culture - Blood, Chest, Right [[id number]] Collected: 02/03/21 1509    Specimen: Blood from Chest, Right Updated: 02/03/21 1512    Protime-INR [[id number]]  (Abnormal) Collected: 02/03/21 1422    Specimen: Blood Updated: 02/03/21 1455     Protime 14.3 Seconds      INR 1.32    aPTT [[id number]]  (Normal) Collected: 02/03/21 1422    Specimen: Blood Updated: 02/03/21 1455     PTT 28.0 seconds     CBC & Differential [[id number]]  (Abnormal) Collected: 02/03/21 1422    Specimen: Blood Updated: 02/03/21 1443    Narrative:      The following orders were created for panel order CBC & Differential.  Procedure                               Abnormality         Status                     ---------                               -----------         ------                     CBC Auto Differential[[id number]]        Abnormal            Final result                 Please view results for these tests on the individual orders.    CBC Auto Differential [[id number]]  (Abnormal) Collected: 02/03/21 1422    Specimen: Blood Updated: 02/03/21 1443     WBC 24.60 10*3/mm3      RBC 2.82 10*6/mm3      Hemoglobin 7.9 g/dL      Hematocrit 24.9 %      MCV 88.4 fL      MCH 27.9 pg      MCHC 31.5 g/dL      RDW 29.8 %      RDW-SD 90.1 fl      MPV 7.1 fL      Platelets 427 10*3/mm3      Neutrophil % 90.3 %      Lymphocyte % 1.8 %      Monocyte % 7.6 %      Eosinophil % 0.0 %      Basophil % 0.3 %      Neutrophils, Absolute 22.20 10*3/mm3      Lymphocytes, Absolute 0.40 10*3/mm3      Monocytes, Absolute 1.90 10*3/mm3      Eosinophils, Absolute 0.00 10*3/mm3      Basophils, Absolute 0.10  10*3/mm3      nRBC 0.0 /100 WBC     POC Glucose Once [[id number]]  (Normal) Collected: 02/03/21 1424    Specimen: Blood Updated: 02/03/21 1425     Glucose 91 mg/dL      Comment: Serial Number: [id number]:  [id number]            Results from last 7 days   Lab Units 02/03/21  1422 02/01/21  1614 02/01/21  1133   WBC 10*3/mm3 24.60* 18.00* 16.92*   HEMOGLOBIN g/dL 7.9* 8.9* 7.5*   HEMATOCRIT % 24.9* 28.6* 25.7*   MCV fL 88.4 89.5 93.5   MCH pg 27.9 27.9 27.3   PLATELETS 10*3/mm3 427 440 456*     Results from last 7 days   Lab Units 02/03/21  1422 02/01/21  1133   SODIUM mmol/L 138 138   POTASSIUM mmol/L 5.1 4.7   CHLORIDE mmol/L 100 101   CO2 mmol/L 20.0* 27.0   BUN mg/dL 30* 18   CREATININE mg/dL 1.76* 1.43*   CALCIUM mg/dL 8.2* 8.1*   MAGNESIUM mg/dL 1.6  --    BILIRUBIN mg/dL 0.2 0.2   ALK PHOS U/L 166* 150*   ALT (SGPT) U/L 14 7   AST (SGOT) U/L 37 19   GLUCOSE mg/dL 97 82     Lab Results   Component Value Date    CALCIUM 8.2 (L) 02/03/2021    PHOS 4.6 (H) 02/03/2021     No results found for: HGBA1C  No results found for: CHOL, CHLPL, TRIG, HDL, LDL, LDLDIRECT  Lab Results   Component Value Date    LIPASE 13 02/03/2021         Pathology  Lab Results   Lab Value Date/Time    INTRAOP  10/14/2020 1009     TP: Positive for malignancy.     SHIRA/sms       FINALDX  10/14/2020 1009     Liver, CT-guided core biopsy:    Well to moderately differentiated adenocarcinoma, see COMMENT     JPR/sms       COMDX  10/14/2020 1009     The biopsy shows a well to moderately differentiated adenocarcinoma. Immunohistochemistry was performed utilizing appropriate controls and the tumor is strongly positive for only CK7. The CK20, CDX2, TTF-1, napsin A, chromogranin, synaptophysin and CD56 are all negative. This is a nonspecific staining pattern but can be seen in tumors of pancreatobiliary origin, including cholangiocarcinoma which is favored in this case. Adenocarcinomas of the upper gastrointestinal tract can also display only CK7  positivity and should be excluded clinically. This staining pattern argues against, but does not completely exclude a metastasis from a primary lung adenocarcinoma as approximately 10 to 15% of lung adenocarcinomas are negative for TTF-1 and napsin A. This staining pattern does exclude a neuroendocrine tumor and adenocarcinoma of colonic origin. Further clinical workup with imaging studies and serum tumor markers is recommended.        Inflammatory Biomarkers        Invalid input(s): ESR, D-DIMER QUANTITATIVE,  PROCALCITONIN  SARS-CoV-2 URMILA   Date Value Ref Range Status   10/20/2020 Not Detected Not Detected Final        Microbiology Results (last 10 days)     ** No results found for the last 240 hours. **          ECG/EMG Results (most recent)     Procedure Component Value Units Date/Time    ECG 12 Lead [[id number]] Collected: 02/03/21 1429     Updated: 02/03/21 1432     QT Interval 342 ms     Narrative:      HEART RATE= 111  bpm  RR Interval= 540  ms  TX Interval= 150  ms  P Horizontal Axis= 23  deg  P Front Axis= 96  deg  QRSD Interval= 103  ms  QT Interval= 342  ms  QRS Axis= 87  deg  T Wave Axis= 117  deg  - ABNORMAL ECG -  Sinus tachycardia  Low voltage, extremity leads  Nonspecific repol abnormality, lateral leads  Electronically Signed By:   Date and Time of Study: 2021-02-03 14:29:53          Results for orders placed during the hospital encounter of 01/21/21   Duplex Venous Lower Extremity - Left CAR    Narrative · Acute left lower extremity deep vein thrombosis noted in the common   femoral, proximal femoral, mid femoral, distal femoral, popliteal,   posterial tibial and gastrocnemius.  · Acute left lower extremity superficial thrombophlebitis noted in the   small saphenous.  · All other left sided veins appeared normal.               Ct Abdomen Pelvis Without Contrast    Result Date: 2/3/2021   1. With the exception of very slight interval enlargement of a portacaval lymph node, the findings appear stable  since 01/14/2021. Heterogeneous right hepatic lobe mass, but appear to be smaller low-density nodules elsewhere in the liver, thought to be unchanged, and remain highly suspicious for malignancy. 2. Borderline enlarged aortocaval lymph node in the retroperitoneum, unchanged. 3. 2.3 cm lucent lesion in L2 is unchanged from prior examination. Osseous metastatic disease not excluded. No new osseous lesions.  4. No acute findings in the abdomen.   Electronically Signed By-Rosa Fox MD On:2/3/2021 3:07 PM This report was finalized on [id number] by  Rosa Fox MD.    Ct Head Without Contrast    Result Date: 2/3/2021  Age-appropriate atrophy, unchanged from the patient's recent head CT of 12/10/2020. No acute intracranial findings.  Electronically Signed By-Pramod Soria MD On:2/3/2021 3:07 PM This report was finalized on [id number] by  Pramod Soria MD.    Xr Chest 1 View    Result Date: 2/3/2021  No acute cardiopulmonary process.  Electronically Signed By-Landon Paredes MD On:2/3/2021 3:34 PM This report was finalized on [id number] by  Landon Paredes MD.      Results Review:    I reviewed the patient's new clinical results.    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        MEDICAL DECISION MAKING COMPLEXITY BY PROBLEM:     Acute ascending cholangitis  Empiric antibiotics  Await cultures    Cholangiocarcinoma  Consult oncology  Supportive treatment      Renal failure/insufficiency/injury:  Hydration  Supportive treatment  Cut down or hold ACE inhibitors  Avoid nephrotoxic medications   Address  diuretics            Care coordination with nursing and emergency room for current care 02/03/21 6:59 PM EST    Estimated Creatinine Clearance: 32.9 mL/min (A) (by C-G formula based on SCr of 1.76 mg/dL (H)).    Code Status and Medical Interventions:   Ordered at: 02/03/21 8972     Code Status:    CPR     Medical Interventions (Level of Support Prior to Arrest):    Full       DVT prophylaxis  Mechanical Order History:     None      Pharmalogical Order History:      Ordered     Dose Route Frequency Stop    Signed and Held  rivaroxaban (XARELTO) tablet 15 mg     Question:  Are you ordering rivaroxaban for the prevention of blood clots in an acutely ill medical patient?  Answer:  No    15 mg PO Daily With Dinner --                I personally reviewed patient's x-ray films and my findings are: 0    I personally reviewed patient's EKG strip and my findings are: 0        Disposition        Continued Care and Services - Admitted Since 2/3/2021    Coordination has not been started for this encounter.         Enrike Sherwood MD  02/03/21  19:08 EST

## 2021-02-04 LAB
ABO GROUP BLD: NORMAL
ANION GAP SERPL CALCULATED.3IONS-SCNC: 11 MMOL/L (ref 5–15)
BASOPHILS # BLD AUTO: 0 10*3/MM3 (ref 0–0.2)
BASOPHILS NFR BLD AUTO: 0.3 % (ref 0–1.5)
BLD GP AB SCN SERPL QL: NEGATIVE
BUN SERPL-MCNC: 26 MG/DL (ref 8–23)
BUN/CREAT SERPL: 15.9 (ref 7–25)
CALCIUM SPEC-SCNC: 7.8 MG/DL (ref 8.6–10.5)
CHLORIDE SERPL-SCNC: 103 MMOL/L (ref 98–107)
CO2 SERPL-SCNC: 26 MMOL/L (ref 22–29)
CREAT SERPL-MCNC: 1.64 MG/DL (ref 0.76–1.27)
DEPRECATED RDW RBC AUTO: 86.6 FL (ref 37–54)
EOSINOPHIL # BLD AUTO: 0 10*3/MM3 (ref 0–0.4)
EOSINOPHIL NFR BLD AUTO: 0 % (ref 0.3–6.2)
ERYTHROCYTE [DISTWIDTH] IN BLOOD BY AUTOMATED COUNT: 29 % (ref 12.3–15.4)
FERRITIN SERPL-MCNC: 2298 NG/ML (ref 30–400)
GFR SERPL CREATININE-BSD FRML MDRD: 40 ML/MIN/1.73
GLUCOSE BLDC GLUCOMTR-MCNC: 102 MG/DL (ref 70–105)
GLUCOSE BLDC GLUCOMTR-MCNC: 109 MG/DL (ref 70–105)
GLUCOSE BLDC GLUCOMTR-MCNC: 82 MG/DL (ref 70–105)
GLUCOSE SERPL-MCNC: 93 MG/DL (ref 65–99)
HAPTOGLOB SERPL-MCNC: 305 MG/DL (ref 30–200)
HCT VFR BLD AUTO: 22.6 % (ref 37.5–51)
HGB BLD-MCNC: 7.1 G/DL (ref 13–17.7)
IRON 24H UR-MRATE: 75 MCG/DL (ref 59–158)
IRON SATN MFR SERPL: 40 % (ref 20–50)
LDH SERPL-CCNC: 400 U/L (ref 135–225)
LYMPHOCYTES # BLD AUTO: 1.1 10*3/MM3 (ref 0.7–3.1)
LYMPHOCYTES NFR BLD AUTO: 8.7 % (ref 19.6–45.3)
MCH RBC QN AUTO: 27.9 PG (ref 26.6–33)
MCHC RBC AUTO-ENTMCNC: 31.3 G/DL (ref 31.5–35.7)
MCV RBC AUTO: 89 FL (ref 79–97)
MONOCYTES # BLD AUTO: 0.9 10*3/MM3 (ref 0.1–0.9)
MONOCYTES NFR BLD AUTO: 7.5 % (ref 5–12)
NEUTROPHILS NFR BLD AUTO: 10.4 10*3/MM3 (ref 1.7–7)
NEUTROPHILS NFR BLD AUTO: 83.5 % (ref 42.7–76)
NRBC BLD AUTO-RTO: 0 /100 WBC (ref 0–0.2)
PLATELET # BLD AUTO: 282 10*3/MM3 (ref 140–450)
PMV BLD AUTO: 8 FL (ref 6–12)
POTASSIUM SERPL-SCNC: 4.6 MMOL/L (ref 3.5–5.2)
QT INTERVAL: 342 MS
RBC # BLD AUTO: 2.54 10*6/MM3 (ref 4.14–5.8)
RETICS # AUTO: 0.03 10*6/MM3 (ref 0.02–0.13)
RETICS/RBC NFR AUTO: 1.24 % (ref 0.7–1.9)
RH BLD: NEGATIVE
SARS-COV-2 ORF1AB RESP QL NAA+PROBE: NOT DETECTED
SODIUM SERPL-SCNC: 140 MMOL/L (ref 136–145)
T&S EXPIRATION DATE: NORMAL
TIBC SERPL-MCNC: 188 MCG/DL (ref 298–536)
TRANSFERRIN SERPL-MCNC: 126 MG/DL (ref 200–360)
WBC # BLD AUTO: 12.5 10*3/MM3 (ref 3.4–10.8)

## 2021-02-04 PROCEDURE — 86923 COMPATIBILITY TEST ELECTRIC: CPT

## 2021-02-04 PROCEDURE — 85025 COMPLETE CBC W/AUTO DIFF WBC: CPT | Performed by: INTERNAL MEDICINE

## 2021-02-04 PROCEDURE — 85045 AUTOMATED RETICULOCYTE COUNT: CPT | Performed by: NURSE PRACTITIONER

## 2021-02-04 PROCEDURE — 99233 SBSQ HOSP IP/OBS HIGH 50: CPT | Performed by: INTERNAL MEDICINE

## 2021-02-04 PROCEDURE — 83010 ASSAY OF HAPTOGLOBIN QUANT: CPT | Performed by: NURSE PRACTITIONER

## 2021-02-04 PROCEDURE — 84466 ASSAY OF TRANSFERRIN: CPT | Performed by: NURSE PRACTITIONER

## 2021-02-04 PROCEDURE — 80048 BASIC METABOLIC PNL TOTAL CA: CPT | Performed by: INTERNAL MEDICINE

## 2021-02-04 PROCEDURE — 86901 BLOOD TYPING SEROLOGIC RH(D): CPT

## 2021-02-04 PROCEDURE — 99222 1ST HOSP IP/OBS MODERATE 55: CPT | Performed by: INTERNAL MEDICINE

## 2021-02-04 PROCEDURE — 86900 BLOOD TYPING SEROLOGIC ABO: CPT

## 2021-02-04 PROCEDURE — 83615 LACTATE (LD) (LDH) ENZYME: CPT | Performed by: NURSE PRACTITIONER

## 2021-02-04 PROCEDURE — 83540 ASSAY OF IRON: CPT | Performed by: NURSE PRACTITIONER

## 2021-02-04 PROCEDURE — 82728 ASSAY OF FERRITIN: CPT | Performed by: NURSE PRACTITIONER

## 2021-02-04 PROCEDURE — U0004 COV-19 TEST NON-CDC HGH THRU: HCPCS | Performed by: EMERGENCY MEDICINE

## 2021-02-04 PROCEDURE — 82607 VITAMIN B-12: CPT | Performed by: NURSE PRACTITIONER

## 2021-02-04 PROCEDURE — 82746 ASSAY OF FOLIC ACID SERUM: CPT | Performed by: NURSE PRACTITIONER

## 2021-02-04 PROCEDURE — 97162 PT EVAL MOD COMPLEX 30 MIN: CPT

## 2021-02-04 PROCEDURE — 25010000002 PIPERACILLIN SOD-TAZOBACTAM PER 1 G: Performed by: INTERNAL MEDICINE

## 2021-02-04 PROCEDURE — 82962 GLUCOSE BLOOD TEST: CPT

## 2021-02-04 RX ADMIN — SODIUM CHLORIDE, POTASSIUM CHLORIDE, SODIUM LACTATE AND CALCIUM CHLORIDE 100 ML/HR: 600; 310; 30; 20 INJECTION, SOLUTION INTRAVENOUS at 23:42

## 2021-02-04 RX ADMIN — PANTOPRAZOLE SODIUM 40 MG: 40 TABLET, DELAYED RELEASE ORAL at 08:40

## 2021-02-04 RX ADMIN — URSODIOL 500 MG: 500 TABLET, FILM COATED ORAL at 08:40

## 2021-02-04 RX ADMIN — ALLOPURINOL 150 MG: 300 TABLET ORAL at 08:40

## 2021-02-04 RX ADMIN — PIPERACILLIN AND TAZOBACTAM 3.38 G: 3; .375 INJECTION, POWDER, LYOPHILIZED, FOR SOLUTION INTRAVENOUS at 18:16

## 2021-02-04 RX ADMIN — ATENOLOL 25 MG: 25 TABLET ORAL at 08:40

## 2021-02-04 RX ADMIN — Medication 10 ML: at 20:51

## 2021-02-04 RX ADMIN — PIPERACILLIN AND TAZOBACTAM 3.38 G: 3; .375 INJECTION, POWDER, LYOPHILIZED, FOR SOLUTION INTRAVENOUS at 00:51

## 2021-02-04 RX ADMIN — RIVAROXABAN 15 MG: 15 TABLET, FILM COATED ORAL at 08:40

## 2021-02-04 RX ADMIN — PIPERACILLIN AND TAZOBACTAM 3.38 G: 3; .375 INJECTION, POWDER, LYOPHILIZED, FOR SOLUTION INTRAVENOUS at 10:12

## 2021-02-04 RX ADMIN — URSODIOL 500 MG: 500 TABLET, FILM COATED ORAL at 20:51

## 2021-02-04 RX ADMIN — RIVAROXABAN 15 MG: 15 TABLET, FILM COATED ORAL at 20:51

## 2021-02-04 RX ADMIN — SODIUM CHLORIDE, POTASSIUM CHLORIDE, SODIUM LACTATE AND CALCIUM CHLORIDE 100 ML/HR: 600; 310; 30; 20 INJECTION, SOLUTION INTRAVENOUS at 08:40

## 2021-02-04 NOTE — ED NOTES
Received call from [facility] stating that patient chemo needed to be disconnected from  Port.  Chemo disconnected.Container was empty.      Vandana Sharp RN  02/03/21 1921

## 2021-02-04 NOTE — PROGRESS NOTES
Discharge Planning Assessment   [facility]hao     Patient Name: Tristen Garcia  MRN: [id number]  Today's Date: 2/4/2021    Admit Date: 2/3/2021    Discharge Needs Assessment     Row Name 02/04/21 1332       Living Environment    Lives With  alone    Unique Family Situation  son Josue has been staying with him    Current Living Arrangements  home/apartment/condo    Quality of Family Relationships  supportive    Able to Return to Prior Arrangements  yes       Resource/Environmental Concerns    Resource/Environmental Concerns  none    Transportation Concerns  car, none       Transition Planning    Patient/Family Anticipates Transition to  home    Patient/Family Anticipated Services at Transition  hospice care    Transportation Anticipated  family or friend will provide       Discharge Needs Assessment    Readmission Within the Last 30 Days  no previous admission in last 30 days        Discharge Plan     Row Name 02/04/21 0349       Plan    Plan  D/C PLAN : Pending PT eval and Palliative has been consulted.    Patient/Family in Agreement with Plan  yes    Plan Comments  Barrier to D/C ; Pt was found down at home and went unresponsive in transport . Pt has cholangiocarcinoma with mets and is currently getting Chemo . WBC 24.6 , HGB  7.9 and Trop. is . .285        Continued Care and Services - Admitted Since 2/3/2021    Coordination has not been started for this encounter.         Demographic Summary     Row Name 02/04/21 1336       General Information    Admission Type  inpatient    Arrived From  emergency department    Required Notices Provided  Important Message from Medicare    Preferred Language  English     Used During This Interaction  no        Functional Status     Row Name 02/04/21 1338       Functional Status    Usual Activity Tolerance  fair    Current Activity Tolerance  poor       Mental Status    General Appearance WDL  WDL       Mental Status Summary    Recent Changes in Mental Status/Cognitive  Functioning  unable to assess pt is very lethargic        Phone communication or documentation only - no physical contact with patient or family.        Rita Stephens RN

## 2021-02-04 NOTE — PLAN OF CARE
Pt rested throughout the night with no distress noted; will continue to monitor     Problem: Adult Inpatient Plan of Care  Goal: Plan of Care Review  Outcome: Ongoing, Progressing  Goal: Patient-Specific Goal (Individualized)  Outcome: Ongoing, Progressing  Goal: Absence of Hospital-Acquired Illness or Injury  Outcome: Ongoing, Progressing  Intervention: Identify and Manage Fall Risk  Recent Flowsheet Documentation  Taken 2/4/2021 0400 by Shira Xie RN  Safety Promotion/Fall Prevention:   activity supervised   clutter free environment maintained   assistive device/personal items within reach   safety round/check completed  Taken 2/4/2021 0000 by Shira Xie RN  Safety Promotion/Fall Prevention:   activity supervised   assistive device/personal items within reach   clutter free environment maintained   safety round/check completed  Taken 2/3/2021 2200 by Shira Xie RN  Safety Promotion/Fall Prevention:   activity supervised   assistive device/personal items within reach   clutter free environment maintained   safety round/check completed  Taken 2/3/2021 1910 by Shira Xie RN  Safety Promotion/Fall Prevention:   activity supervised   assistive device/personal items within reach   clutter free environment maintained   safety round/check completed  Goal: Optimal Comfort and Wellbeing  Outcome: Ongoing, Progressing  Intervention: Provide Person-Centered Care  Recent Flowsheet Documentation  Taken 2/3/2021 1910 by Shira Xie RN  Trust Relationship/Rapport:   care explained   choices provided   emotional support provided  Goal: Readiness for Transition of Care  Outcome: Ongoing, Progressing  Intervention: Mutually Develop Transition Plan  Recent Flowsheet Documentation  Taken 2/3/2021 2004 by Shira Xie RN  Transportation Anticipated: family or friend will provide  Patient/Family Anticipated Services at Transition:   Patient/Family Anticipates Transition to: (son lives close) home     Problem:  Fall Injury Risk  Goal: Absence of Fall and Fall-Related Injury  Outcome: Ongoing, Progressing  Intervention: Identify and Manage Contributors to Fall Injury Risk  Recent Flowsheet Documentation  Taken 2/3/2021 1910 by Shira Xie RN  Medication Review/Management: medications reviewed  Intervention: Promote Injury-Free Environment  Recent Flowsheet Documentation  Taken 2/4/2021 0400 by Shira Xie RN  Safety Promotion/Fall Prevention:   activity supervised   clutter free environment maintained   assistive device/personal items within reach   safety round/check completed  Taken 2/4/2021 0000 by Shira Xie RN  Safety Promotion/Fall Prevention:   activity supervised   assistive device/personal items within reach   clutter free environment maintained   safety round/check completed  Taken 2/3/2021 2200 by Shira Xie RN  Safety Promotion/Fall Prevention:   activity supervised   assistive device/personal items within reach   clutter free environment maintained   safety round/check completed  Taken 2/3/2021 1910 by Shira Xie RN  Safety Promotion/Fall Prevention:   activity supervised   assistive device/personal items within reach   clutter free environment maintained   safety round/check completed     Problem: Skin Injury Risk Increased  Goal: Skin Health and Integrity  Outcome: Ongoing, Progressing   Goal Outcome Evaluation:

## 2021-02-04 NOTE — THERAPY EVALUATION
Patient Name: Tristen Garcia  : 1935    MRN: [id number]                              Today's Date: 2021       Admit Date: 2/3/2021    Visit Dx:     ICD-10-CM ICD-9-CM   1. Syncope and collapse  R55 780.2   2. Cholangiocarcinoma (CMS/HCC)  C22.1 155.1   3. Dehydration  E86.0 276.51   4. Acute urinary tract infection  N39.0 599.0   5. Elevated troponin  R77.8 790.6   6. Liver mass  R16.0 573.8   7. Mass of spine  M89.8X8 733.99   8. Retroperitoneal lymphadenopathy  R59.0 785.6     Patient Active Problem List   Diagnosis   • Rhabdomyolysis   • Cholangiocarcinoma (CMS/HCC)   • Encounter for medication management and education of chemotherapy/biotherapy     • Fitting and adjustment of vascular catheter   • Dehydration   • Dizziness   • Anemia   • CKD (chronic kidney disease)   • Malabsorption of iron   • Antineoplastic chemotherapy induced anemia   • Acute cholangitis   • Acute kidney injury (SHANEL) with acute tubular necrosis (ATN) (CMS/HCC)   • Syncope and collapse     Past Medical History:   Diagnosis Date   • Arthritis    • Cancer (CMS/HCC)    • COPD (chronic obstructive pulmonary disease) (CMS/HCC)    • Elevated cholesterol    • GERD (gastroesophageal reflux disease)    • Hyperlipidemia    • Hypertension      Past Surgical History:   Procedure Laterality Date   • COLONOSCOPY     • EYE SURGERY     • SKIN BIOPSY       General Information     Row Name 21 1606          Physical Therapy Time and Intention    Document Type  evaluation  -EL     Mode of Treatment  individual therapy;physical therapy  -     Row Name 21 1606          General Information    Patient Profile Reviewed  yes  -EL     Prior Level of Function  independent:;all household mobility;ADL's  -EL     Row Name 21 1606          Living Environment    Lives With  alone But sons been staying with him part time  -EL     Row Name 21 1606          Home Main Entrance    Number of Stairs, Main Entrance  none  -     Row Name  02/04/21 1606          Stairs Within Home, Primary    Number of Stairs, Within Home, Primary  none  -EL     Row Name 02/04/21 1606          Cognition    Orientation Status (Cognition)  disoriented to;person;place;situation;time Answered with 'Okay' and 'alright'  -EL       User Key  (r) = Recorded By, (t) = Taken By, (c) = Cosigned By    Initials Name Provider Type    James Smith PT Physical Therapist        Mobility     Row Name 02/04/21 1608          Bed Mobility    Comment (Bed Mobility)  Unable to follow commands, would be dependent for bed moblity  -EL       User Key  (r) = Recorded By, (t) = Taken By, (c) = Cosigned By    Initials Name Provider Type    James Smith PT Physical Therapist        Obj/Interventions     Row Name 02/04/21 1609          Range of Motion Comprehensive    General Range of Motion  bilateral lower extremity ROM WFL  -EL     Comment, General Range of Motion  Able to move legs through full ROM unprompted in bed  -EL     Row Name 02/04/21 1609          Strength Comprehensive (MMT)    General Manual Muscle Testing (MMT) Assessment  lower extremity strength deficits identified  -EL     Comment, General Manual Muscle Testing (MMT) Assessment  functionally 3/5  -EL       User Key  (r) = Recorded By, (t) = Taken By, (c) = Cosigned By    Initials Name Provider Type    James Smith, MARIA D Physical Therapist        Goals/Plan    No documentation.       Clinical Impression     Row Name 02/04/21 1610          Plan of Care Review    Plan of Care Reviewed With  patient;son  -     Outcome Summary  Pt is an 86 YO M s/p fall at home. Pt son in room and provided PLOF. Pt lives alone, has recently had chemo, son has been staying with him on and off for the last few weeks. Pt typically ambulates with cane, and son handles all driving etc. Son reports pt fell in tub and set there in tub for a few hours. Pt this date very confused (son reports is not typical) and unable to follow commands. Pt is not  appropriate for therapy this date, will d/c and if status changes PT will require new orders.  -     Row Name 02/04/21 1610          Therapy Assessment/Plan (PT)    Criteria for Skilled Interventions Met (PT)  yes  -     Row Name 02/04/21 1610          Vital Signs    O2 Delivery Pre Treatment  room air  -EL     O2 Delivery Intra Treatment  room air  -EL     O2 Delivery Post Treatment  room air  -EL     Pre Patient Position  Supine  -EL     Intra Patient Position  Supine  -EL     Post Patient Position  Supine  -EL     Row Name 02/04/21 1610          Positioning and Restraints    Pre-Treatment Position  in bed  -EL     Post Treatment Position  bed  -EL     In Bed  notified nsg;supine;call light within reach;encouraged to call for assist;exit alarm on;with family/caregiver  -       User Key  (r) = Recorded By, (t) = Taken By, (c) = Cosigned By    Initials Name Provider Type    James Smith PT Physical Therapist        Outcome Measures    No documentation.       Physical Therapy Education                 Title: PT OT SLP Therapies (In Progress)     Topic: Physical Therapy (In Progress)     Point: Mobility training (In Progress)     Learning Progress Summary           Patient Acceptance, E,TB, NL by  at 2/4/2021 1615                   Point: Precautions (In Progress)     Learning Progress Summary           Patient Acceptance, E,TB, NL by  at 2/4/2021 1615                               User Key     Initials Effective Dates Name Provider Type Discipline     06/23/20 -  James Miranda PT Physical Therapist PT              PT Recommendation and Plan     Plan of Care Reviewed With: patient, son  Outcome Summary: Pt is an 86 YO M s/p fall at home. Pt son in room and provided PLOF. Pt lives alone, has recently had chemo, son has been staying with him on and off for the last few weeks. Pt typically ambulates with cane, and son handles all driving etc. Son reports pt fell in tub and set there in tub for a few hours. Pt  this date very confused (son reports is not typical) and unable to follow commands. Pt is not appropriate for therapy this date, will d/c and if status changes PT will require new orders.     Time Calculation:   PT Charges     Row Name 02/04/21 1616             Time Calculation    Start Time  1458  -EL      Stop Time  1513  -EL      Time Calculation (min)  15 min  -EL      PT Received On  02/04/21  -EL        User Key  (r) = Recorded By, (t) = Taken By, (c) = Cosigned By    Initials Name Provider Type    James Smith PT Physical Therapist        Therapy Charges for Today     Code Description Service Date Service Provider Modifiers Qty    [id number] HC PT EVAL MOD COMPLEXITY 3 2/4/2021 James Miranda PT GP 1               James Miranda PT  2/4/2021

## 2021-02-04 NOTE — PROGRESS NOTES
[facility] Medicine Services  INPATIENT PROGRESS NOTE  2105/1   Hospitalist Team  LOS 1 days      Patient Care Team:  Ann Marie Hodgson MD as PCP - General (Family Medicine)      Patient was examined with relevant and adequate PPE keeping in mind the current coronavirus pandemic. Minimum of 10 minutes to don and doff PPE.    Chief Complaint / Subjective  Chief Complaint   Patient presents with   • Altered Mental Status   • Syncope       HPI (4 hpi elements or status of 3 chronic)  85-year-old male called EMS today for lifting assistance.  The patient had taken a bath and was going to see a physician.  He could not get out of the walk-in bathtub.  EMS got the patient up and then the patient became unresponsive.  He had a slow pulse and it was noted to be faint by EMS.  It took him several minutes to get him on the monitor the patient then slowly became more alert.  The family reports that he has been confused for several days.  They states he has been treating for a cancer around his liver.  The patient has had poor oral intake in the last 24 hours.  He is complained of extensive nausea.  There is been no vomiting or diarrhea.  There is been no reports of melena hematemesis or hematochezia.        Patient was admitted here on 21 January for acute PE and DVT.  Since then he has been on anticoagulation.  Per the son he went into check on his father today and he was slumped in the bathtub.  Conscious but confused.  He was subsequently transferred to the hospital.  Patient apparently lives mostly by himself with his son checking in on him.  Sees Dr. Pope    Not making much sense today as well.  No history obtainable from him.   02/04/21, 5:04 PM EST          History taken from: chart RN    Review of Systems   Unable to perform ROS: mental status change              Family History   Problem Relation Age of Onset   • Kidney failure Father        Past Medical History:   Diagnosis Date   • Arthritis     • Cancer (CMS/[facility])    • COPD (chronic obstructive pulmonary disease) (CMS/[facility])    • Elevated cholesterol    • GERD (gastroesophageal reflux disease)    • Hyperlipidemia    • Hypertension        Social History     Socioeconomic History   • Marital status: Single     Spouse name: Not on file   • Number of children: Not on file   • Years of education: Not on file   • Highest education level: Not on file   Tobacco Use   • Smoking status: Former Smoker   • Smokeless tobacco: Never Used   Substance and Sexual Activity   • Alcohol use: Not Currently   • Drug use: Never   • Sexual activity: Defer   Social History Narrative    Lives mostly by himself.  Son checks in on him.       Prior to Admission medications    Medication Sig Start Date End Date Taking? Authorizing Provider   allopurinol (ZYLOPRIM) 300 MG tablet Take 150 mg by mouth Daily. 11/22/20  Yes Wendy Rivers MD   atenolol (TENORMIN) 25 MG tablet Take 25 mg by mouth Daily.   Yes Wendy Rivers MD   calcium-vitamin D (Oscal 500/200 D-3) 500-200 MG-UNIT per tablet Take 1 tablet by mouth Daily. 12/30/20 12/30/21 Yes Macey Harrison APRN   Ferretts 325 (106 Fe) MG tablet Take 1 tablet by mouth Daily. 10/29/20  Yes Wendy Rivers MD   losartan (COZAAR) 100 MG tablet Take 100 mg by mouth Daily.   Yes Wendy Rivers MD   magnesium oxide (MAG-OX) 400 MG tablet Take 1 tablet by mouth 2 (Two) Times a Day. 12/30/20  Yes Macey Harrison APRN   omeprazole (priLOSEC) 20 MG capsule Take 40 mg by mouth Daily.   Yes Wendy Rivers MD   rivaroxaban (XARELTO) 15 MG tablet Take 15 mg by mouth 2 (two) times a day. For 3 weeks.....until 02/11/21. Then start Xarelto 20mg daily 02/12/21   Yes Wendy Rivers MD   ursodiol (ACTIGALL) 500 MG tablet Take 500 mg by mouth 2 (two) times a day. 12/1/20  Yes Wendy Rivers MD   amLODIPine (NORVASC) 10 MG tablet Take 10 mg by mouth Daily.    Wendy Rivers MD   furosemide (LASIX) 40  "MG tablet Take 1 tablet by mouth Daily. 10/23/20   Jese Mason MD        Objective    Physical Exam     Vital Signs  Temp:  [98 °F (36.7 °C)-100.7 °F (38.2 °C)] 98.3 °F (36.8 °C)  Heart Rate:  [] 84  Resp:  [16-22] 18  BP: (116-154)/(55-89) 124/55    Oxygen Therapy  SpO2: 95 %  Pulse Oximetry Type: Continuous  Device (Oxygen Therapy): nasal cannula  Flow (L/min): 2  Flowsheet Rows      First Filed Value   Admission Height  170.2 cm (67\") Documented at 02/03/2021 1417   Admission Weight  90 kg (198 lb 6.6 oz) Documented at 02/03/2021 1417        Weight change:    Intake & Output (last 3 days)       02/01 0701 - 02/02 0700 02/02 0701 - 02/03 0700 02/03 0701 - 02/04 0700 02/04 0701 - 02/05 0700    P.O.   120 600    IV Piggyback   500     Total Intake(mL/kg)   620 (7.1) 600 (6.9)    Urine (mL/kg/hr)   450     Total Output   450     Net   +170 +600            Urine Unmeasured Occurrence   2 x 1 x    Stool Unmeasured Occurrence    1 x        Lines, Drains & Airways    Active LDAs     Name:   Placement date:   Placement time:   Site:   Days:    Peripheral IV 02/03/21 1550 Left Hand   02/03/21    1550    Hand   1    Single Lumen Implantable Port Right Subclavian   --    --    Subclavian                   Physical Exam:    Physical Exam  Constitutional:       General: He is not in acute distress.     Appearance: Normal appearance. He is well-developed. He is not ill-appearing, toxic-appearing or diaphoretic.   HENT:      Head: Normocephalic and atraumatic.      Right Ear: Ear canal and external ear normal.      Left Ear: Ear canal and external ear normal.      Nose: Nose normal. No congestion or rhinorrhea.      Mouth/Throat:      Mouth: Mucous membranes are dry.      Pharynx: No oropharyngeal exudate.      Comments: Edentulous  Eyes:      General: No scleral icterus.        Right eye: No discharge.         Left eye: No discharge.      Extraocular Movements: Extraocular movements intact.      " Conjunctiva/sclera: Conjunctivae normal.      Pupils: Pupils are equal, round, and reactive to light.   Neck:      Musculoskeletal: Normal range of motion and neck supple. No neck rigidity or muscular tenderness.      Thyroid: No thyromegaly.      Vascular: No carotid bruit or JVD.      Trachea: No tracheal deviation.   Cardiovascular:      Rate and Rhythm: Normal rate and regular rhythm.      Heart sounds: Normal heart sounds. No murmur. No friction rub. No gallop.       Comments: Absent pedal pulses  Pulmonary:      Effort: Pulmonary effort is normal. No respiratory distress.      Breath sounds: Normal breath sounds. No stridor. No wheezing, rhonchi or rales.      Comments: Right chest Oxrqho-x-Sbfw present  Chest:      Chest wall: No tenderness.   Abdominal:      General: Bowel sounds are normal. There is distension.      Palpations: There is mass.      Tenderness: There is no abdominal tenderness. There is no guarding or rebound.      Hernia: No hernia is present.      Comments: Very large hepatomegaly firm.  Reaching out 6 fingerbreadth below the costal margin in the right midclavicular line.  Flanks are full indicating some degree of ascites.   Musculoskeletal: Normal range of motion.         General: No swelling, tenderness, deformity or signs of injury.      Right lower leg: Edema (+2) present.      Left lower leg: Edema (+2) present.   Lymphadenopathy:      Cervical: No cervical adenopathy.   Skin:     General: Skin is warm and dry.      Coloration: Skin is not jaundiced or pale.      Findings: No bruising, erythema or rash.   Neurological:      General: No focal deficit present.      Mental Status: He is alert and oriented to person, place, and time. Mental status is at baseline.      Cranial Nerves: No cranial nerve deficit.      Sensory: No sensory deficit.      Motor: No weakness or abnormal muscle tone.      Coordination: Coordination normal.   Psychiatric:         Mood and Affect: Mood normal.          Behavior: Behavior normal.         Thought Content: Thought content normal.         Judgment: Judgment normal.   No change from last night.  Patient continues to be confused.        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        Estimated Creatinine Clearance: 34.7 mL/min (A) (by C-G formula based on SCr of 1.64 mg/dL (H)).    Code Status and Medical Interventions:   Ordered at: 02/03/21 5716     Code Status:    CPR     Medical Interventions (Level of Support Prior to Arrest):    Full       MEDICAL DECISION MAKING COMPLEXITY BY PROBLEM:     Acute ascending cholangitis  Empiric antibiotics  Await cultures     Cholangiocarcinoma  Consult oncology  Supportive treatment        Renal failure/insufficiency/injury:  Hydration  Supportive treatment  Cut down or hold ACE inhibitors  Avoid nephrotoxic medications   Address diuretics    Cultures negative but patient responding to antibiotics.  Prognosis very poor.  The patient should be hospice.  Family refusing blood prescribed by nephrology  Care coordination with nursing for current care 02/04/21 4:54 PM EST.    Plan for disposition:            Continued Care and Services - Admitted Since 2/3/2021    Coordination has not been started for this encounter.        Historical & Objective Data     Results Review:    I reviewed the patient's new lab and radiology results. 02/04/21     Results from last 7 days   Lab Units 02/04/21  0413  02/03/21  1422 02/01/21  1614   WBC 10*3/mm3 12.50* 24.60* 18.00*   HEMOGLOBIN g/dL 7.1* 7.9* 8.9*   HEMATOCRIT % 22.6* 24.9* 28.6*   MCV fL 89.0 88.4 89.5   MCH pg 27.9 27.9 27.9   MPV fL 8.0 7.1 7.8   RDW % 29.0* 29.8* 28.6*   PLATELETS 10*3/mm3 282 427 440     Results from last 7 days   Lab Units 02/04/21  0302 02/03/21  1422 02/01/21  1133   SODIUM mmol/L 140 138 138   POTASSIUM mmol/L 4.6 5.1 4.7   CHLORIDE mmol/L 103 100 101   CO2 mmol/L 26.0 20.0* 27.0   BUN mg/dL 26* 30* 18   CREATININE mg/dL 1.64* 1.76* 1.43*   CALCIUM mg/dL 7.8* 8.2* 8.1*   MAGNESIUM mg/dL  --  1.6  --    BILIRUBIN mg/dL  --  0.2 0.2   ALK PHOS U/L  --  166* 150*   ALT (SGPT) U/L  --  14 7   AST (SGOT) U/L  --  37 19   GLUCOSE mg/dL 93 97 82     Inflammatory Biomarkers  Results from last 7 days   Lab Units 02/04/21  1522   FERRITIN ng/mL 2,298.00*   LDH U/L 400*     Lab Results   Component Value Date    PHOS 4.6 (H) 02/03/2021     No results found for: HGBA1C  No results found for: CHOL, CHLPL, TRIG, HDL, LDL, LDLDIRECT  Lab Results   Component Value Date    LIPASE 13 02/03/2021     Results from last 7 days   Lab Units 02/03/21  1422   INR  1.32*           Pathology  Lab Results   Lab Value Date/Time    INTRAOP  10/14/2020 1009     TP: Positive for malignancy.     SHIRA/sms       FINALDX  10/14/2020 1009     Liver, CT-guided core biopsy:    Well to moderately differentiated adenocarcinoma, see COMMENT     JPR/sms       COMDX  10/14/2020 1009     The biopsy shows a well to moderately differentiated adenocarcinoma. Immunohistochemistry was performed utilizing appropriate controls and the tumor is strongly positive for only CK7. The CK20, CDX2, TTF-1, napsin A, chromogranin, synaptophysin and CD56 are all negative. This is a nonspecific staining pattern but can be seen in tumors of pancreatobiliary origin, including cholangiocarcinoma which is favored in this case. Adenocarcinomas of the upper gastrointestinal tract can also display only CK7  positivity and should be excluded clinically. This staining pattern argues against, but does not completely exclude a metastasis from a primary lung adenocarcinoma as approximately 10 to 15% of lung adenocarcinomas are negative for TTF-1 and napsin A. This staining pattern does exclude a neuroendocrine tumor and adenocarcinoma of colonic origin. Further clinical workup with imaging studies and serum tumor markers is recommended.        SARS-CoV-2 URMILA   Date Value Ref Range Status   10/20/2020 Not Detected Not Detected Final        Microbiology Results (last 10 days)     Procedure Component Value - Date/Time    Blood Culture - Blood, Blood, Central Line [[id number]] Collected: 02/03/21 1618    Lab Status: Preliminary result Specimen: Blood, Central Line Updated: 02/04/21 1630     Blood Culture No growth at 24 hours    Blood Culture - Blood, Chest, Right [[id number]] Collected: 02/03/21 1509    Lab Status: Preliminary result Specimen: Blood from Chest, Right Updated: 02/04/21 1515     Blood Culture No growth at 24 hours          ECG/EMG Results (most recent)     Procedure Component Value Units Date/Time    ECG 12 Lead [[id number]] Collected: 02/03/21 1429     Updated: 02/04/21 1443     QT Interval 342 ms     Narrative:      HEART RATE= 111  bpm  RR Interval= 540  ms  HI Interval= 150  ms  P Horizontal Axis= 23  deg  P Front Axis= 96  deg  QRSD Interval= 103  ms  QT Interval= 342  ms  QRS Axis= 87  deg  T Wave Axis= 117  deg  - ABNORMAL ECG -  Sinus tachycardia  Low voltage, extremity leads  Nonspecific repol abnormality, lateral leads  When compared with ECG of 18-Oct-2020 18:41:11,  Significant rate increase  Significant axis, voltage or hypertrophy change  Electronically Signed By: Miky Mena) 04-Feb-2021 14:40:17  Date and Time of Study: 2021-02-03 14:29:53          Results for orders placed during the hospital encounter of 01/21/21   Duplex Venous Lower Extremity - Left CAR    Narrative · Acute left lower  extremity deep vein thrombosis noted in the common   femoral, proximal femoral, mid femoral, distal femoral, popliteal,   posterial tibial and gastrocnemius.  · Acute left lower extremity superficial thrombophlebitis noted in the   small saphenous.  · All other left sided veins appeared normal.               Ct Abdomen Pelvis Without Contrast    Result Date: 2/3/2021   1. With the exception of very slight interval enlargement of a portacaval lymph node, the findings appear stable since 01/14/2021. Heterogeneous right hepatic lobe mass, but appear to be smaller low-density nodules elsewhere in the liver, thought to be unchanged, and remain highly suspicious for malignancy. 2. Borderline enlarged aortocaval lymph node in the retroperitoneum, unchanged. 3. 2.3 cm lucent lesion in L2 is unchanged from prior examination. Osseous metastatic disease not excluded. No new osseous lesions.  4. No acute findings in the abdomen.   Electronically Signed By-Rosa Fox MD On:2/3/2021 3:07 PM This report was finalized on [id number] by  Rosa Fox MD.    Ct Head Without Contrast    Result Date: 2/3/2021  Age-appropriate atrophy, unchanged from the patient's recent head CT of 12/10/2020. No acute intracranial findings.  Electronically Signed By-Pramod Soria MD On:2/3/2021 3:07 PM This report was finalized on [id number] by  Pramod Soria MD.    Xr Chest 1 View    Result Date: 2/3/2021  No acute cardiopulmonary process.  Electronically Signed By-Landon Paredes MD On:2/3/2021 3:34 PM This report was finalized on [id number] by  Landon Paredes MD.       I personally reviewed patient's x-ray films and my findings are: 0    I personally reviewed patient's EKG strip and my findings are: 0    Medication Review:   I have reviewed the patient's current medication list 02/04/21     Scheduled Meds  allopurinol, 150 mg, Oral, Daily  aspirin, 325 mg, Oral, Once  atenolol, 25 mg, Oral, Daily  pantoprazole, 40 mg, Oral,  QAM  piperacillin-tazobactam, 3.375 g, Intravenous, Q8H  rivaroxaban, 15 mg, Oral, BID  [START ON 2/12/2021] rivaroxaban, 20 mg, Oral, Daily With Dinner  sodium chloride, 10 mL, Intravenous, Q12H  ursodiol, 500 mg, Oral, BID        Meds Infusions  lactated ringers, 100 mL/hr, Last Rate: 100 mL/hr (02/04/21 0840)        DVT prophylaxis  Mechanical Order History:     None      Pharmalogical Order History:      Ordered     Dose Route Frequency Stop    02/03/21 2212  rivaroxaban (XARELTO) tablet 20 mg     Question:  Are you ordering rivaroxaban for the prevention of blood clots in an acutely ill medical patient?  Answer:  No    20 mg PO Daily With Dinner --    02/03/21 2009  rivaroxaban (XARELTO) tablet 15 mg     Question:  Are you ordering rivaroxaban for the prevention of blood clots in an acutely ill medical patient?  Answer:  No    15 mg PO 2 Times Daily 02/11/21 2059                Meds PRN  •  acetaminophen **OR** acetaminophen **OR** acetaminophen  •  ondansetron **OR** ondansetron  •  sodium chloride  •  sodium chloride      Enrike Sherwood MD  02/04/21  16:54 EST

## 2021-02-04 NOTE — CONSULTS
Hematology/Oncology Inpatient Consultation    Patient name: Tristen Garcia  : 1935  MRN: [id number]  Referring Provider: Yasmani Dietrich MD   Reason for Consultation: Consult / Admission    Chief complaint: Altered mental status    History of present illness:    Tristen Garcia is a 85 y.o. male who presented to [facility] on 2/3/2021 via EMS.  Patient reportedly called EMS for lifting assistance at home.  The patient had been taking a bath and was unable to get out of his bathtub.  On EMS arrival, the patient was reportedly unresponsive with a slow heart rate.  Patient slowly became more alert.  Per family, patient has reportedely been confused for several days. The family reported poor oral intake in the last 24 hours and extensive nausea. Patient denied diarrhea and vomiting. Labs in the ED were significant for WBC 24.60, hemoglobin 7.9, BUN 30, creatinine 1.76, EGFR 37, alkaline phosphatase 166, and troponin 0.285.  CT of the head showed age-appropriate atrophy, unchanged from patient's recent CT performed 12/10/2020.  No acute intracranial findings.  Patient was admitted for further evaluation and management.    21  Hematology/Oncology was consulted as patient is known to our service and followed by Dr. 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  Cholangiocarcinoma favored.  Patient was started on chemotherapy with cisplatin and gemcitabine combination on 2020.  On 2021, CT of the abdomen and pelvis was performed that resulted new low-density lesions within the right and left hepatic lobes consistent with new hepatic metastatic disease.  It was recommended for patient to switch his chemotherapy from cisplatin/gemcitabine to FOLFOX.  Patient was started on FOLFOX on 2020.    Of note, patient was recently diagnosed with pulmonary emboli and acute left lower extremity DVT in the common femoral, proximal femoral, mid femoral,  distal femoral, popliteal, posterior tibial, and gastrocnemius.  Patient was started on Xarelto.    He  has a past medical history of Arthritis, Cancer (CMS/HCC), COPD (chronic obstructive pulmonary disease) (CMS/HCC), Elevated cholesterol, GERD (gastroesophageal reflux disease), Hyperlipidemia, and Hypertension.    PCP: Ann Marie Hodgson MD    History:  Past Medical History:   Diagnosis Date   • Arthritis    • Cancer (CMS/HCC)    • COPD (chronic obstructive pulmonary disease) (CMS/HCC)    • Elevated cholesterol    • GERD (gastroesophageal reflux disease)    • Hyperlipidemia    • Hypertension    ,   Past Surgical History:   Procedure Laterality Date   • COLONOSCOPY     • EYE SURGERY     • SKIN BIOPSY     ,   Family History   Problem Relation Age of Onset   • Kidney failure Father    ,   Social History     Tobacco Use   • Smoking status: Former Smoker   • Smokeless tobacco: Never Used   Substance Use Topics   • Alcohol use: Not Currently   • Drug use: Never   ,   Medications Prior to Admission   Medication Sig Dispense Refill Last Dose   • allopurinol (ZYLOPRIM) 300 MG tablet Take 150 mg by mouth Daily.      • atenolol (TENORMIN) 25 MG tablet Take 25 mg by mouth Daily.      • calcium-vitamin D (Oscal 500/200 D-3) 500-200 MG-UNIT per tablet Take 1 tablet by mouth Daily. 30 tablet 5    • Ferretts 325 (106 Fe) MG tablet Take 1 tablet by mouth Daily.      • losartan (COZAAR) 100 MG tablet Take 100 mg by mouth Daily.      • magnesium oxide (MAG-OX) 400 MG tablet Take 1 tablet by mouth 2 (Two) Times a Day. 60 tablet 1    • omeprazole (priLOSEC) 20 MG capsule Take 40 mg by mouth Daily.      • rivaroxaban (XARELTO) 15 MG tablet Take 15 mg by mouth 2 (two) times a day. For 3 weeks.....until 02/11/21. Then start Xarelto 20mg daily 02/12/21      • ursodiol (ACTIGALL) 500 MG tablet Take 500 mg by mouth 2 (two) times a day.      • amLODIPine (NORVASC) 10 MG tablet Take 10 mg by mouth Daily.   Unknown at Unknown time   •  "furosemide (LASIX) 40 MG tablet Take 1 tablet by mouth Daily.   Unknown at Unknown time   , Scheduled Meds:  allopurinol, 150 mg, Oral, Daily  aspirin, 325 mg, Oral, Once  atenolol, 25 mg, Oral, Daily  pantoprazole, 40 mg, Oral, QAM  piperacillin-tazobactam, 3.375 g, Intravenous, Q8H  rivaroxaban, 15 mg, Oral, BID  [START ON 2/12/2021] rivaroxaban, 20 mg, Oral, Daily With Dinner  sodium chloride, 10 mL, Intravenous, Q12H  ursodiol, 500 mg, Oral, BID    , Continuous Infusions:  lactated ringers, 100 mL/hr, Last Rate: 100 mL/hr (02/04/21 0840)    , PRN Meds:  •  acetaminophen **OR** acetaminophen **OR** acetaminophen  •  ondansetron **OR** ondansetron  •  sodium chloride  •  sodium chloride   Allergies:  Patient has no known allergies.    Subjective     ROS:  Review of Systems   Unable to perform ROS: Mental status change      Objective   Vital Signs:   /89 (BP Location: Right arm, Patient Position: Lying)   Pulse 115   Temp 98.6 °F (37 °C) (Oral)   Resp 22   Ht 170.2 cm (67\")   Wt 87.3 kg (192 lb 7.4 oz)   SpO2 97%   BMI 30.14 kg/m²     Physical Exam: (performed by MD)  Physical Exam  Vitals signs and nursing note reviewed.   Constitutional:       General: He is not in acute distress.     Appearance: He is not diaphoretic.   HENT:      Head: Normocephalic and atraumatic.   Eyes:      General: No scleral icterus.        Right eye: No discharge.         Left eye: No discharge.      Conjunctiva/sclera: Conjunctivae normal.   Neck:      Musculoskeletal: Normal range of motion and neck supple.      Thyroid: No thyromegaly.   Cardiovascular:      Rate and Rhythm: Normal rate and regular rhythm.      Heart sounds: Normal heart sounds. No friction rub. No gallop.       Comments: Port-A-Cath chest.  Pulmonary:      Effort: Pulmonary effort is normal. No respiratory distress.      Breath sounds: No stridor. No wheezing.   Abdominal:      General: Bowel sounds are normal.      Palpations: Abdomen is soft. There is " no mass.      Tenderness: There is no abdominal tenderness. There is no guarding or rebound.   Musculoskeletal: Normal range of motion.         General: No tenderness.   Lymphadenopathy:      Cervical: No cervical adenopathy.   Skin:     General: Skin is warm.      Findings: No erythema or rash.   Neurological:      Mental Status: He is alert.      Motor: No abnormal muscle tone.      Comments: Patient is confused.  He keeps repeating okay to every question.         Results Review:  Lab Results (last 48 hours)     Procedure Component Value Units Date/Time    POC Glucose Once [[id number]]  (Normal) Collected: 02/04/21 1121    Specimen: Blood Updated: 02/04/21 1136     Glucose 102 mg/dL      Comment: Serial Number: [id number]:  [id number]       POC Glucose Once [[id number]]  (Normal) Collected: 02/04/21 0706    Specimen: Blood Updated: 02/04/21 1135     Glucose 82 mg/dL      Comment: Serial Number: [id number]:  [id number]       CBC & Differential [[id number]]  (Abnormal) Collected: 02/04/21 0413    Specimen: Blood Updated: 02/04/21 0458    Narrative:      The following orders were created for panel order CBC & Differential.  Procedure                               Abnormality         Status                     ---------                               -----------         ------                     CBC Auto Differential[[id number]]        Abnormal            Final result                 Please view results for these tests on the individual orders.    CBC Auto Differential [[id number]]  (Abnormal) Collected: 02/04/21 0413    Specimen: Blood Updated: 02/04/21 0458     WBC 12.50 10*3/mm3      RBC 2.54 10*6/mm3      Hemoglobin 7.1 g/dL      Hematocrit 22.6 %      MCV 89.0 fL      MCH 27.9 pg      MCHC 31.3 g/dL      RDW 29.0 %      RDW-SD 86.6 fl      MPV 8.0 fL      Platelets 282 10*3/mm3      Neutrophil % 83.5 %      Lymphocyte % 8.7 %      Monocyte % 7.5 %      Eosinophil % 0.0 %      Basophil % 0.3 %       Neutrophils, Absolute 10.40 10*3/mm3      Lymphocytes, Absolute 1.10 10*3/mm3      Monocytes, Absolute 0.90 10*3/mm3      Eosinophils, Absolute 0.00 10*3/mm3      Basophils, Absolute 0.00 10*3/mm3      nRBC 0.0 /100 WBC     Basic Metabolic Panel [[id number]]  (Abnormal) Collected: 02/04/21 0302    Specimen: Blood Updated: 02/04/21 0346     Glucose 93 mg/dL      BUN 26 mg/dL      Creatinine 1.64 mg/dL      Sodium 140 mmol/L      Potassium 4.6 mmol/L      Chloride 103 mmol/L      CO2 26.0 mmol/L      Calcium 7.8 mg/dL      eGFR Non African Amer 40 mL/min/1.73      BUN/Creatinine Ratio 15.9     Anion Gap 11.0 mmol/L     Narrative:      GFR Normal >60  Chronic Kidney Disease <60  Kidney Failure <15      POC Glucose Once [[id number]]  (Normal) Collected: 02/03/21 1930    Specimen: Blood Updated: 02/03/21 2356     Glucose 94 mg/dL      Comment: Serial Number: [id number]:  [id number]       POC Lactate [[id number]]  (Normal) Collected: 02/03/21 1619    Specimen: Blood Updated: 02/03/21 1627     Lactate 1.0 mmol/L      Comment: Serial Number: [id number]:  [id number]       Blood Culture - Blood, Blood, Central Line [[id number]] Collected: 02/03/21 1618    Specimen: Blood, Central Line Updated: 02/03/21 1626    Troponin [[id number]]  (Abnormal) Collected: 02/03/21 1422    Specimen: Blood Updated: 02/03/21 1539     Troponin T 0.285 ng/mL     Narrative:      Troponin T Reference Range:  <= 0.03 ng/mL-   Negative for AMI  >0.03 ng/mL-     Abnormal for myocardial necrosis.  Clinicians would have to utilize clinical acumen, EKG, Troponin and serial changes to determine if it is an Acute Myocardial Infarction or myocardial injury due to an underlying chronic condition.       Results may be falsely decreased if patient taking Biotin.      Comprehensive Metabolic Panel [[id number]]  (Abnormal) Collected: 02/03/21 1422    Specimen: Blood Updated: 02/03/21 1538     Glucose 97 mg/dL      BUN 30 mg/dL      Creatinine 1.76 mg/dL       Sodium 138 mmol/L      Potassium 5.1 mmol/L      Chloride 100 mmol/L      CO2 20.0 mmol/L      Calcium 8.2 mg/dL      Total Protein 6.1 g/dL      Albumin 3.20 g/dL      ALT (SGPT) 14 U/L      AST (SGOT) 37 U/L      Alkaline Phosphatase 166 U/L      Total Bilirubin 0.2 mg/dL      eGFR Non African Amer 37 mL/min/1.73      Globulin 2.9 gm/dL      A/G Ratio 1.1 g/dL      BUN/Creatinine Ratio 17.0     Anion Gap 18.0 mmol/L     Narrative:      GFR Normal >60  Chronic Kidney Disease <60  Kidney Failure <15      Urinalysis With Culture If Indicated - Urine, Catheter [[id number]]  (Abnormal) Collected: 02/03/21 1527    Specimen: Urine, Catheter Updated: 02/03/21 1538     Color, UA Yellow     Appearance, UA Clear     pH, UA 5.5     Specific Gravity, UA 1.015     Glucose, UA Negative     Ketones, UA Negative     Bilirubin, UA Negative     Blood, UA Small (1+)     Protein, UA Trace     Leuk Esterase, UA Negative     Nitrite, UA Negative     Urobilinogen, UA 0.2 E.U./dL    Urinalysis, Microscopic Only - Urine, Catheter [[id number]]  (Abnormal) Collected: 02/03/21 1527    Specimen: Urine, Catheter Updated: 02/03/21 1538     RBC, UA 0-2 /HPF      WBC, UA 3-5 /HPF      Bacteria, UA None Seen /HPF      Squamous Epithelial Cells, UA 0-2 /HPF      Hyaline Casts, UA 3-6 /LPF      Methodology Automated Microscopy    Lipase [[id number]]  (Normal) Collected: 02/03/21 1422    Specimen: Blood Updated: 02/03/21 1537     Lipase 13 U/L     Magnesium [[id number]]  (Normal) Collected: 02/03/21 1422    Specimen: Blood Updated: 02/03/21 1537     Magnesium 1.6 mg/dL     CK [[id number]]  (Abnormal) Collected: 02/03/21 1422    Specimen: Blood Updated: 02/03/21 1537     Creatine Kinase 228 U/L     Phosphorus [[id number]]  (Abnormal) Collected: 02/03/21 1422    Specimen: Blood Updated: 02/03/21 1537     Phosphorus 4.6 mg/dL     Procalcitonin [[id number]]  (Abnormal) Collected: 02/03/21 1422    Specimen: Blood Updated: 02/03/21 1532     Procalcitonin 0.73  "ng/mL     Narrative:      As a Marker for Sepsis (Non-Neonates):   1. <0.5 ng/mL represents a low risk of severe sepsis and/or septic shock.  1. >2 ng/mL represents a high risk of severe sepsis and/or septic shock.    As a Marker for Lower Respiratory Tract Infections that require antibiotic therapy:  PCT on Admission     Antibiotic Therapy             6-12 Hrs later  > 0.5                Strongly Recommended            >0.25 - <0.5         Recommended  0.1 - 0.25           Discouraged                   Remeasure/reassess PCT  <0.1                 Strongly Discouraged          Remeasure/reassess PCT      As 28 day mortality risk marker: \"Change in Procalcitonin Result\" (> 80 % or <=80 %) if Day 0 (or Day 1) and Day 4 values are available. Refer to http://www.[facility] MiamiLiquid Environmental Solutionspct-calculator.com/   Change in PCT <=80 %   A decrease of PCT levels below or equal to 80 % defines a positive change in PCT test result representing a higher risk for 28-day all-cause mortality of patients diagnosed with severe sepsis or septic shock.  Change in PCT > 80 %   A decrease of PCT levels of more than 80 % defines a negative change in PCT result representing a lower risk for 28-day all-cause mortality of patients diagnosed with severe sepsis or septic shock.                Results may be falsely decreased if patient taking Biotin.     Fonda Draw [[id number]] Collected: 02/03/21 1422    Specimen: Blood Updated: 02/03/21 1530    Narrative:      The following orders were created for panel order Fonda Draw.  Procedure                               Abnormality         Status                     ---------                               -----------         ------                     Light Blue Top[[id number]]                                   Final result               Green Top (Gel)[[id number]]                                  Final result               Lavender Top[[id number]]                                     Final result               Gold Top " - SST[[id number]]                                   Final result                 Please view results for these tests on the individual orders.    Lavender Top [[id number]] Collected: 02/03/21 1422    Specimen: Blood Updated: 02/03/21 1530     Extra Tube hold for add-on     Comment: Auto resulted       Gold Top - SST [[id number]] Collected: 02/03/21 1422    Specimen: Blood Updated: 02/03/21 1530     Extra Tube Hold for add-ons.     Comment: Auto resulted.       Extra Tubes [[id number]] Collected: 02/03/21 1422    Specimen: Blood, Venous Line Updated: 02/03/21 1530    Narrative:      The following orders were created for panel order Extra Tubes.  Procedure                               Abnormality         Status                     ---------                               -----------         ------                     Green Top (Gel)[[id number]]                                  Final result                 Please view results for these tests on the individual orders.    Green Top (Gel) [[id number]] Collected: 02/03/21 1422    Specimen: Blood Updated: 02/03/21 1530     Extra Tube Hold for add-ons.     Comment: Auto resulted.       Light Blue Top [[id number]] Collected: 02/03/21 1422    Specimen: Blood Updated: 02/03/21 1530     Extra Tube hold for add-on     Comment: Auto resulted       Green Top (Gel) [[id number]] Collected: 02/03/21 1422    Specimen: Blood Updated: 02/03/21 1530     Extra Tube Hold for add-ons.     Comment: Auto resulted.       Blood Culture - Blood, Chest, Right [[id number]] Collected: 02/03/21 1509    Specimen: Blood from Chest, Right Updated: 02/03/21 1512    Protime-INR [[id number]]  (Abnormal) Collected: 02/03/21 1422    Specimen: Blood Updated: 02/03/21 1455     Protime 14.3 Seconds      INR 1.32    aPTT [[id number]]  (Normal) Collected: 02/03/21 1422    Specimen: Blood Updated: 02/03/21 1455     PTT 28.0 seconds     CBC & Differential [[id number]]  (Abnormal) Collected: 02/03/21 1422    Specimen: Blood  Updated: 02/03/21 1443    Narrative:      The following orders were created for panel order CBC & Differential.  Procedure                               Abnormality         Status                     ---------                               -----------         ------                     CBC Auto Differential[[id number]]        Abnormal            Final result                 Please view results for these tests on the individual orders.    CBC Auto Differential [[id number]]  (Abnormal) Collected: 02/03/21 1422    Specimen: Blood Updated: 02/03/21 1443     WBC 24.60 10*3/mm3      RBC 2.82 10*6/mm3      Hemoglobin 7.9 g/dL      Hematocrit 24.9 %      MCV 88.4 fL      MCH 27.9 pg      MCHC 31.5 g/dL      RDW 29.8 %      RDW-SD 90.1 fl      MPV 7.1 fL      Platelets 427 10*3/mm3      Neutrophil % 90.3 %      Lymphocyte % 1.8 %      Monocyte % 7.6 %      Eosinophil % 0.0 %      Basophil % 0.3 %      Neutrophils, Absolute 22.20 10*3/mm3      Lymphocytes, Absolute 0.40 10*3/mm3      Monocytes, Absolute 1.90 10*3/mm3      Eosinophils, Absolute 0.00 10*3/mm3      Basophils, Absolute 0.10 10*3/mm3      nRBC 0.0 /100 WBC     POC Glucose Once [[id number]]  (Normal) Collected: 02/03/21 1424    Specimen: Blood Updated: 02/03/21 1425     Glucose 91 mg/dL      Comment: Serial Number: [id number]:  [id number]            Pending Results: Reticulocyte, haptoglobin, LDH, folate, ferritin, iron profile, vitamin B12, MMA    Imaging Reviewed:   Ct Abdomen Pelvis Without Contrast    Result Date: 2/3/2021   1. With the exception of very slight interval enlargement of a portacaval lymph node, the findings appear stable since 01/14/2021. Heterogeneous right hepatic lobe mass, but appear to be smaller low-density nodules elsewhere in the liver, thought to be unchanged, and remain highly suspicious for malignancy. 2. Borderline enlarged aortocaval lymph node in the retroperitoneum, unchanged. 3. 2.3 cm lucent lesion in L2 is unchanged from  prior examination. Osseous metastatic disease not excluded. No new osseous lesions.  4. No acute findings in the abdomen.   Electronically Signed By-Rosa Fox MD On:2/3/2021 3:07 PM This report was finalized on [id number] by  Rosa Fox MD.    Ct Head Without Contrast    Result Date: 2/3/2021  Age-appropriate atrophy, unchanged from the patient's recent head CT of 12/10/2020. No acute intracranial findings.  Electronically Signed By-Pramod Soria MD On:2/3/2021 3:07 PM This report was finalized on [id number] by  Pramod Soria MD.    Xr Chest 1 View    Result Date: 2/3/2021  No acute cardiopulmonary process.  Electronically Signed By-Landon Paredes MD On:2/3/2021 3:34 PM This report was finalized on [id number] by  Landon Paredes MD.           Assessment/Plan   ASSESSMENT  1. 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  Switched to FOLFOX due to evidence of disease progression.  FOLFOX last received 2/1/2020.  2. Normocytic normochromic anemia: Consider relation to chemotherapy or anemia of chronic disease.  Receiving Retacrit weekly outpatient for hemoglobin <10. Will repeat anemia studies.  3. Leukocytosis: Urinalysis negative for leukocytes or nitrites.  Blood cultures obtained.  Chest x-ray showed no acute cardiopulmonary process. On IV antibiotics. Continue to monitor.   4. Acute kidney injury: Creatinine 1.76 on admission.  On IV fluids.  Nephrology consulted.  5. Confusion: CT head showed no acute intracranial process.  6. Weakness: PT/OT consulted  7. History of pulmonary emboli and DVT: On Xarelto    PLAN  1. CBC daily  2. Transfuse 1 unit pRBC as needed for hemoglobin <7.5- 1 unit today  3. Continue Xarelto  4. Continue IV antibiotics  5. Continue IV fluids  6. Resume FOLFOX outpatient- due 2/15/2021  7. Further recommendations and assessment per     Electronically signed by CAREY Cho, 02/04/21, 4:17 PM EST.      I personally reviewed all pertinent labs,  related documentation and the  imaging. ROS performed and physical exam done during face to face enounter with patient. I agree with  nurse practitioner's doumentation, assessment and plan.  Patient with a metastatic cholangiocarcinoma who is receiving cisplatin/gemcitabine, was recently switched to FOLFOX due to evidence of disease progression.  Started on FOLFOX 2/1/2020.  He is now admitted with the confusion.  CT head negative.    Unclear as to why he is having confusion.  Receiving IV antibiotics.  Chest x-ray unremarkable.  .  UA negative.  LFTs appear reasonable and therefore unlikely to be hepatic encephalopathy.  It would be very unusual for FOLFOX to cause significant confusion.  If confusion continues will consult neurology    Thank you for this consult. We will be happy to follow along with you.       Electronically signed by Aamir Garcia MD, 02/04/21, 9:24 PM EST.

## 2021-02-04 NOTE — NURSING NOTE
Patient son refusing blood transfusion until patient seen by hemoc. MD notified. Blood bank notified.

## 2021-02-04 NOTE — NURSING NOTE
RN called Dr. Stone's office # for routine consult three times. Each time the phone line would continue ringing. Will try again in the AM.

## 2021-02-04 NOTE — CONSULTS
Referring Provider: Dr ROSEMARY Sherwood  Reason for Consultation: Renal insufficiency    Chief complaint .  Syncope, change in mental status    History of present illness:    Patient is 85 years old  male with history of hypertension, PE, intrahepatic cholangiocarcinoma was brought by EMS.  As per report he felt very weak and could not get out of the walk-in bathtub and he slowly became unresponsive.  But he woke up in few minutes.  As per report he has been tired and confused for few days.  Patient also had nausea, decreased appetite and oral intake.  No reported fever, chills, vomiting, diarrhea, hematemesis or hematochezia.  His creatinine was 1.7 on presentation.  Patient is drowsy but responds to verbal stimulus and is confused.    History  Past Medical History:   Diagnosis Date   • Arthritis    • Cancer (CMS/HCC)    • COPD (chronic obstructive pulmonary disease) (CMS/HCC)    • Elevated cholesterol    • GERD (gastroesophageal reflux disease)    • Hyperlipidemia    • Hypertension      Past Surgical History:   Procedure Laterality Date   • COLONOSCOPY     • EYE SURGERY     • SKIN BIOPSY       Social History     Tobacco Use   • Smoking status: Former Smoker   • Smokeless tobacco: Never Used   Substance Use Topics   • Alcohol use: Not Currently   • Drug use: Never     Family History   Problem Relation Age of Onset   • Kidney failure Father        Review of Systems  ROS  All system reviewed, negative except as mentioned in HPI  Objective     Vital Signs  Temp:  [98.6 °F (37 °C)-100.7 °F (38.2 °C)] 98.6 °F (37 °C)  Heart Rate:  [] 115  Resp:  [16-28] 22  BP: (116-154)/(58-89) 154/89    I/O this shift:  In: 360 [P.O.:360]  Out: -   I/O last 3 completed shifts:  In: 620 [P.O.:120; IV Piggyback:500]  Out: 450 [Urine:450]    Physical Exam:  Physical Exam  General.  Drowsy, confused  Head.  Atraumatic, normocephalic  Neck.  Supple.  No JVD  ENT.  Mucosa dry  Respiratory.  Decreased breath sounds  Cardiovascular.   Tachycardic  Abdominal.  Obese, soft, nontender  Extremities edema +. Lt > Rt  Results Review:   I reviewed the patient's new clinical results.    Lab Results   Component Value Date    CALCIUM 7.8 (L) 02/04/2021    PHOS 4.6 (H) 02/03/2021     Results from last 7 days   Lab Units 02/04/21  0413 02/04/21  0302 02/03/21  1422 02/01/21  1614 02/01/21  1133   MAGNESIUM mg/dL  --   --  1.6  --   --    SODIUM mmol/L  --  140 138  --  138   POTASSIUM mmol/L  --  4.6 5.1  --  4.7   CHLORIDE mmol/L  --  103 100  --  101   CO2 mmol/L  --  26.0 20.0*  --  27.0   BUN mg/dL  --  26* 30*  --  18   CREATININE mg/dL  --  1.64* 1.76*  --  1.43*   GLUCOSE mg/dL  --  93 97  --  82   CALCIUM mg/dL  --  7.8* 8.2*  --  8.1*   WBC 10*3/mm3 12.50*  --  24.60* 18.00* 16.92*   HEMOGLOBIN g/dL 7.1*  --  7.9* 8.9* 7.5*   PLATELETS 10*3/mm3 282  --  427 440 456*   ALT (SGPT) U/L  --   --  14  --  7   AST (SGOT) U/L  --   --  37  --  19     Lab Results   Component Value Date    CKTOTAL 228 (H) 02/03/2021    TROPONINT 0.285 (C) 02/03/2021     Estimated Creatinine Clearance: 34.7 mL/min (A) (by C-G formula based on SCr of 1.64 mg/dL (H)).No results found for: URICACID    Brief Urine Lab Results  (Last result in the past 365 days)      Color   Clarity   Blood   Leuk Est   Nitrite   Protein   CREAT   Urine HCG        02/03/21 1527 Yellow Clear Small (1+) Negative Negative Trace               Prior to Admission medications    Medication Sig Start Date End Date Taking? Authorizing Provider   allopurinol (ZYLOPRIM) 300 MG tablet Take 150 mg by mouth Daily. 11/22/20  Yes Provider, MD Wendy   atenolol (TENORMIN) 25 MG tablet Take 25 mg by mouth Daily.   Yes Provider, MD Wendy   calcium-vitamin D (Oscal 500/200 D-3) 500-200 MG-UNIT per tablet Take 1 tablet by mouth Daily. 12/30/20 12/30/21 Yes Macey Harrison APRN Ferretts 325 (106 Fe) MG tablet Take 1 tablet by mouth Daily. 10/29/20  Yes Provider, MD Wendy   losartan (COZAAR) 100 MG  tablet Take 100 mg by mouth Daily.   Yes ProviderWendy MD   magnesium oxide (MAG-OX) 400 MG tablet Take 1 tablet by mouth 2 (Two) Times a Day. 12/30/20  Yes Macey Harrison APRN   omeprazole (priLOSEC) 20 MG capsule Take 40 mg by mouth Daily.   Yes ProviderWendy MD   rivaroxaban (XARELTO) 15 MG tablet Take 15 mg by mouth 2 (two) times a day. For 3 weeks.....until 02/11/21. Then start Xarelto 20mg daily 02/12/21   Yes ProviderWendy MD   ursodiol (ACTIGALL) 500 MG tablet Take 500 mg by mouth 2 (two) times a day. 12/1/20  Yes ProviderWendy MD   amLODIPine (NORVASC) 10 MG tablet Take 10 mg by mouth Daily.    Provider, MD Wendy   furosemide (LASIX) 40 MG tablet Take 1 tablet by mouth Daily. 10/23/20   Jese Mason MD       allopurinol, 150 mg, Oral, Daily  aspirin, 325 mg, Oral, Once  atenolol, 25 mg, Oral, Daily  pantoprazole, 40 mg, Oral, QAM  piperacillin-tazobactam, 3.375 g, Intravenous, Q8H  rivaroxaban, 15 mg, Oral, BID  [START ON 2/12/2021] rivaroxaban, 20 mg, Oral, Daily With Dinner  sodium chloride, 10 mL, Intravenous, Q12H  ursodiol, 500 mg, Oral, BID      lactated ringers, 100 mL/hr, Last Rate: 100 mL/hr (02/04/21 0840)        Assessment/Plan       1.  Acute kidney injury  Most likely prerenal in etiology  Patient has received cisplatin as outpatient which could be contributing  Patient is nonoliguric.  Creatinine little better this morning  Continue IV hydration  Order urine electrolytes    2.  Anemia  Probably chemotherapy-induced  PRBC transfusion today    3.  Hypertension  Switch to metoprolol and titrate as needed    4.  Cholangiocarcinoma  Oncology following      I discussed the patients findings and my recommendations with patient, nursing staff and consulting provider    Mitch Stone MD  02/04/21  11:12 EST

## 2021-02-05 LAB
ABO GROUP BLD: NORMAL
ALBUMIN SERPL-MCNC: 2.6 G/DL (ref 3.5–5.2)
ALBUMIN SERPL-MCNC: 2.7 G/DL (ref 3.5–5.2)
ALP SERPL-CCNC: 106 U/L (ref 39–117)
ALT SERPL W P-5'-P-CCNC: 13 U/L (ref 1–41)
ANION GAP SERPL CALCULATED.3IONS-SCNC: 10 MMOL/L (ref 5–15)
AST SERPL-CCNC: 37 U/L (ref 1–40)
BASOPHILS # BLD AUTO: 0 10*3/MM3 (ref 0–0.2)
BASOPHILS NFR BLD AUTO: 0.1 % (ref 0–1.5)
BH BB BLOOD EXPIRATION DATE: NORMAL
BH BB BLOOD TYPE BARCODE: 600
BH BB DISPENSE STATUS: NORMAL
BH BB PRODUCT CODE: NORMAL
BH BB UNIT NUMBER: NORMAL
BILIRUB CONJ SERPL-MCNC: 0.2 MG/DL (ref 0–0.3)
BILIRUB INDIRECT SERPL-MCNC: 0.2 MG/DL
BILIRUB SERPL-MCNC: 0.4 MG/DL (ref 0–1.2)
BLD GP AB SCN SERPL QL: NEGATIVE
BUN SERPL-MCNC: 23 MG/DL (ref 8–23)
BUN/CREAT SERPL: 16.2 (ref 7–25)
CALCIUM SPEC-SCNC: 7.5 MG/DL (ref 8.6–10.5)
CHLORIDE SERPL-SCNC: 98 MMOL/L (ref 98–107)
CO2 SERPL-SCNC: 28 MMOL/L (ref 22–29)
CREAT SERPL-MCNC: 1.42 MG/DL (ref 0.76–1.27)
CROSSMATCH INTERPRETATION: NORMAL
DEPRECATED RDW RBC AUTO: 83.6 FL (ref 37–54)
EOSINOPHIL # BLD AUTO: 0 10*3/MM3 (ref 0–0.4)
EOSINOPHIL NFR BLD AUTO: 0 % (ref 0.3–6.2)
ERYTHROCYTE [DISTWIDTH] IN BLOOD BY AUTOMATED COUNT: 28.3 % (ref 12.3–15.4)
FOLATE SERPL-MCNC: >20 NG/ML (ref 4.78–24.2)
GFR SERPL CREATININE-BSD FRML MDRD: 47 ML/MIN/1.73
GLUCOSE SERPL-MCNC: 88 MG/DL (ref 65–99)
HCT VFR BLD AUTO: 18.8 % (ref 37.5–51)
HCT VFR BLD AUTO: 22.3 % (ref 37.5–51)
HCT VFR BLD AUTO: 23.2 % (ref 37.5–51)
HGB BLD-MCNC: 6.1 G/DL (ref 13–17.7)
HGB BLD-MCNC: 7.4 G/DL (ref 13–17.7)
HGB BLD-MCNC: 7.6 G/DL (ref 13–17.7)
LYMPHOCYTES # BLD AUTO: 0.9 10*3/MM3 (ref 0.7–3.1)
LYMPHOCYTES NFR BLD AUTO: 11.4 % (ref 19.6–45.3)
MCH RBC QN AUTO: 28.7 PG (ref 26.6–33)
MCHC RBC AUTO-ENTMCNC: 32.6 G/DL (ref 31.5–35.7)
MCV RBC AUTO: 88.1 FL (ref 79–97)
MONOCYTES # BLD AUTO: 0.6 10*3/MM3 (ref 0.1–0.9)
MONOCYTES NFR BLD AUTO: 6.9 % (ref 5–12)
NEUTROPHILS NFR BLD AUTO: 6.7 10*3/MM3 (ref 1.7–7)
NEUTROPHILS NFR BLD AUTO: 81.6 % (ref 42.7–76)
NRBC BLD AUTO-RTO: 0 /100 WBC (ref 0–0.2)
PHOSPHATE SERPL-MCNC: 3.5 MG/DL (ref 2.5–4.5)
PLATELET # BLD AUTO: 194 10*3/MM3 (ref 140–450)
PMV BLD AUTO: 8 FL (ref 6–12)
POTASSIUM SERPL-SCNC: 3.5 MMOL/L (ref 3.5–5.2)
PROT SERPL-MCNC: 4.8 G/DL (ref 6–8.5)
RBC # BLD AUTO: 2.14 10*6/MM3 (ref 4.14–5.8)
RH BLD: NEGATIVE
SODIUM SERPL-SCNC: 136 MMOL/L (ref 136–145)
SODIUM UR-SCNC: 136 MMOL/L
T&S EXPIRATION DATE: NORMAL
UNIT  ABO: NORMAL
UNIT  RH: NORMAL
VIT B12 BLD-MCNC: 1393 PG/ML (ref 211–946)
WBC # BLD AUTO: 8.2 10*3/MM3 (ref 3.4–10.8)

## 2021-02-05 PROCEDURE — 85025 COMPLETE CBC W/AUTO DIFF WBC: CPT | Performed by: INTERNAL MEDICINE

## 2021-02-05 PROCEDURE — 86900 BLOOD TYPING SEROLOGIC ABO: CPT | Performed by: NURSE PRACTITIONER

## 2021-02-05 PROCEDURE — 85014 HEMATOCRIT: CPT | Performed by: NURSE PRACTITIONER

## 2021-02-05 PROCEDURE — 86901 BLOOD TYPING SEROLOGIC RH(D): CPT | Performed by: NURSE PRACTITIONER

## 2021-02-05 PROCEDURE — 25010000002 PIPERACILLIN SOD-TAZOBACTAM PER 1 G: Performed by: INTERNAL MEDICINE

## 2021-02-05 PROCEDURE — 86850 RBC ANTIBODY SCREEN: CPT | Performed by: NURSE PRACTITIONER

## 2021-02-05 PROCEDURE — 36430 TRANSFUSION BLD/BLD COMPNT: CPT

## 2021-02-05 PROCEDURE — 99233 SBSQ HOSP IP/OBS HIGH 50: CPT | Performed by: INTERNAL MEDICINE

## 2021-02-05 PROCEDURE — 80076 HEPATIC FUNCTION PANEL: CPT | Performed by: NURSE PRACTITIONER

## 2021-02-05 PROCEDURE — 86923 COMPATIBILITY TEST ELECTRIC: CPT

## 2021-02-05 PROCEDURE — P9016 RBC LEUKOCYTES REDUCED: HCPCS

## 2021-02-05 PROCEDURE — 85018 HEMOGLOBIN: CPT | Performed by: NURSE PRACTITIONER

## 2021-02-05 PROCEDURE — 85014 HEMATOCRIT: CPT | Performed by: INTERNAL MEDICINE

## 2021-02-05 PROCEDURE — 84300 ASSAY OF URINE SODIUM: CPT | Performed by: INTERNAL MEDICINE

## 2021-02-05 PROCEDURE — 86900 BLOOD TYPING SEROLOGIC ABO: CPT

## 2021-02-05 PROCEDURE — 80069 RENAL FUNCTION PANEL: CPT | Performed by: INTERNAL MEDICINE

## 2021-02-05 PROCEDURE — 85018 HEMOGLOBIN: CPT | Performed by: INTERNAL MEDICINE

## 2021-02-05 RX ORDER — SODIUM CHLORIDE, SODIUM LACTATE, POTASSIUM CHLORIDE, CALCIUM CHLORIDE 600; 310; 30; 20 MG/100ML; MG/100ML; MG/100ML; MG/100ML
75 INJECTION, SOLUTION INTRAVENOUS CONTINUOUS
Status: DISCONTINUED | OUTPATIENT
Start: 2021-02-05 | End: 2021-02-07

## 2021-02-05 RX ADMIN — METOPROLOL TARTRATE 12.5 MG: 25 TABLET, FILM COATED ORAL at 21:00

## 2021-02-05 RX ADMIN — PANTOPRAZOLE SODIUM 40 MG: 40 TABLET, DELAYED RELEASE ORAL at 08:00

## 2021-02-05 RX ADMIN — SODIUM CHLORIDE, SODIUM LACTATE, POTASSIUM CHLORIDE, AND CALCIUM CHLORIDE 75 ML/HR: .6; .31; .03; .02 INJECTION, SOLUTION INTRAVENOUS at 08:00

## 2021-02-05 RX ADMIN — ALLOPURINOL 150 MG: 300 TABLET ORAL at 08:34

## 2021-02-05 RX ADMIN — METOPROLOL TARTRATE 12.5 MG: 25 TABLET, FILM COATED ORAL at 08:32

## 2021-02-05 RX ADMIN — RIVAROXABAN 15 MG: 15 TABLET, FILM COATED ORAL at 08:32

## 2021-02-05 RX ADMIN — URSODIOL 500 MG: 500 TABLET, FILM COATED ORAL at 21:00

## 2021-02-05 RX ADMIN — URSODIOL 500 MG: 500 TABLET, FILM COATED ORAL at 08:32

## 2021-02-05 RX ADMIN — PIPERACILLIN AND TAZOBACTAM 3.38 G: 3; .375 INJECTION, POWDER, LYOPHILIZED, FOR SOLUTION INTRAVENOUS at 08:32

## 2021-02-05 RX ADMIN — PIPERACILLIN AND TAZOBACTAM 3.38 G: 3; .375 INJECTION, POWDER, LYOPHILIZED, FOR SOLUTION INTRAVENOUS at 01:21

## 2021-02-05 RX ADMIN — RIVAROXABAN 15 MG: 15 TABLET, FILM COATED ORAL at 21:00

## 2021-02-05 RX ADMIN — Medication 10 ML: at 08:32

## 2021-02-05 RX ADMIN — PIPERACILLIN AND TAZOBACTAM 3.38 G: 3; .375 INJECTION, POWDER, LYOPHILIZED, FOR SOLUTION INTRAVENOUS at 16:08

## 2021-02-05 NOTE — PROGRESS NOTES
Continued Stay Note  JORDAN Zhao     Patient Name: Tristen Garcia  MRN: [id number]  Today's Date: 2/5/2021    Admit Date: 2/3/2021    Discharge Plan     Row Name 02/05/21 1415       Plan    Plan Comments  SW attempted to complete Oncology Distress Screening. However, pt was unable to participate due to confusion. No family at bedside. SW attempted to call pt's son (Josue Garcia: [phone]) to discuss current needs and potential d/c needs. Message left requesting return call. Pt's current chemo regimen (next chemo infusion currently scheduled for 2/15) may be a barrer to placement if pt needs inpt rehab/ECF placement at d/c.        Courtney Carr, JARETTW, LSW  PRN   Phone: [phone]

## 2021-02-05 NOTE — PROGRESS NOTES
Continued Stay Note   Zhao     Patient Name: Tristen Garcia  MRN: [id number]  Today's Date: 2/5/2021    Admit Date: 2/3/2021    Discharge Plan     Row Name 02/05/21 7958       Plan    Plan  DC Plan: From home alone, current IV antibiotics, home O2. PT pending.  Palliative has been consulted.    Plan Comments  Remains confused, chemo due 2/15. See SW note.    Row Name 02/05/21 7138       Plan    Plan Comments  SW attempted to complete Oncology Distress Screening. However, pt was unable to participate due to confusion. No family at bedside. SW attempted to call pt's son (Josue Garcia: [phone]) to discuss current needs and potential d/c needs. Message left requesting return call. Pt's current chemo regimen (next chemo infusion currently scheduled for 2/15) may be a barrer to placement if pt needs inpt rehab/ECF placement at d/c.        Chart review only.             Darlene Patricia RN

## 2021-02-05 NOTE — PLAN OF CARE
Goal Outcome Evaluation:         Patient remains confused; has slept well tonight; appears to be comfortable; has been incontinent of large amounts of urine; will continue to monitor patient.

## 2021-02-05 NOTE — CONSULTS
"Nutrition Services    Patient Name: Tristen Garcia  YOB: 1935  MRN: [id number]  Admission date: 2/3/2021    If AMS does not improve, may need to consider nutrition support. Pt with extremely minimal PO intake.     Have liberalized diet to Soft-To-Chew, 2g K+.     Have added magic cups BID and Novasource Renal 1x/day.      PPE Documentation        PPE Worn By Provider mask and eye protection   PPE Worn By Patient  None     CLINICAL NUTRITION ASSESSMENT      Reason for Assessment 2/5:  MST score 2+     H&P:      Past Medical History:   Diagnosis Date   • Arthritis    • Cancer (CMS/HCC)    • COPD (chronic obstructive pulmonary disease) (CMS/HCC)    • Elevated cholesterol    • GERD (gastroesophageal reflux disease)    • Hyperlipidemia    • Hypertension        Past Surgical History:   Procedure Laterality Date   • COLONOSCOPY     • EYE SURGERY     • SKIN BIOPSY          Current Problems:   Per hospitalist note:    Acute ascending cholangitis  Empiric antibiotics  Await cultures     Cholangiocarcinoma  Consult oncology  Supportive treatment      Renal failure/insufficiency/injury:  Hydration  Supportive treatment  Cut down or hold ACE inhibitors  Avoid nephrotoxic medications   Address diuretics       Nutrition/Diet History         Narrative     2/5: Pt assessed due to concern for possible weight loss. Pt not able to communicate with this writer or answer any questions due to degree of AMS. Per RN, pt ate no lunch today and has had very limited PO intake. Will try liberalizing diet and adding supplements. If intake does not improve, may need to consider nutrition support.      Functional Status Needs assistance with feeding right now due to severity of AMS     Food Allergies NKFA     Factors Affecting   Nutritional Intake AMS     Anthropometrics        Current Height, Weight Height: 170.2 cm (67\")  Weight: 92.6 kg (204 lb 2.3 oz) (02/05/21 0420)        Admit Height, Weight -    Flowsheet Rows      First " "Filed Value   Admission Height  170.2 cm (67\") Documented at 02/03/2021 1417   Admission Weight  90 kg (198 lb 6.6 oz) Documented at 02/03/2021 1417             Ideal Body Weight (IBW) 148 lb   % Ideal Body Weight 137%       Usual Body Weight UTD   % Usual Body Weight UTD   Wt Change Observation Based on weight from 2/4, pt has lost 4.5% weight in the last three months -- wt loss not significant for timeframe    Weight Hx    Wt Readings from Last 30 Encounters:   02/05/21 0420 92.6 kg (204 lb 2.3 oz)   02/04/21 0451 87.3 kg (192 lb 7.4 oz)   02/03/21 1906 87.3 kg (192 lb 7.4 oz)   02/03/21 1417 90 kg (198 lb 6.6 oz)   02/01/21 1036 88.9 kg (196 lb)   01/27/21 1021 88.9 kg (196 lb)   01/21/21 0802 89 kg (196 lb 4.8 oz)   01/06/21 0749 87.1 kg (192 lb)   12/30/20 0841 88.5 kg (195 lb)   12/21/20 1327 88 kg (194 lb)   12/17/20 0802 86.5 kg (190 lb 11.2 oz)   12/10/20 0800 86.2 kg (190 lb 1.6 oz)   12/03/20 1014 88.5 kg (195 lb 1.6 oz)   11/19/20 1048 89.2 kg (196 lb 9.6 oz)   11/19/20 1129 88.9 kg (196 lb)   11/11/20 0848 91.4 kg (201 lb 8 oz)   10/29/20 1016 95.7 kg (211 lb)   10/29/20 0853 95.9 kg (211 lb 6.4 oz)   10/19/20 0405 100 kg (220 lb 8 oz)   10/19/20 0030 98.8 kg (217 lb 14.4 oz)   10/18/20 1650 98.9 kg (218 lb)   10/18/20 1500 98.8 kg (217 lb 14.4 oz)   10/14/20 0822 94.8 kg (209 lb)        BMI kg/m2 Body mass index is 31.97 kg/m².       Labs/Medications         Pertinent Labs -   Results from last 7 days   Lab Units 02/05/21  1608 02/05/21  0506 02/04/21  0302 02/03/21  1422 02/01/21  1133   SODIUM mmol/L  --  136 140 138 138   POTASSIUM mmol/L  --  3.5 4.6 5.1 4.7   CHLORIDE mmol/L  --  98 103 100 101   CO2 mmol/L  --  28.0 26.0 20.0* 27.0   BUN mg/dL  --  23 26* 30* 18   CREATININE mg/dL  --  1.42* 1.64* 1.76* 1.43*   CALCIUM mg/dL  --  7.5* 7.8* 8.2* 8.1*   BILIRUBIN mg/dL 0.4  --   --  0.2 0.2   ALK PHOS U/L 106  --   --  166* 150*   ALT (SGPT) U/L 13  --   --  14 7   AST (SGOT) U/L 37  --   --  37 19 "   GLUCOSE mg/dL  --  88 93 97 82     Results from last 7 days   Lab Units 02/05/21  1608 02/05/21  0506  02/03/21  1422   MAGNESIUM mg/dL  --   --   --  1.6   PHOSPHORUS mg/dL  --  3.5  --  4.6*   HEMOGLOBIN g/dL 7.4* 6.1*   < > 7.9*   HEMATOCRIT % 22.3* 18.8*   < > 24.9*    < > = values in this interval not displayed.     COVID19   Date Value Ref Range Status   02/04/2021 Not Detected Not Detected - Ref. Range Final     No results found for: HGBA1C      Pertinent Medications Allopurinol, aspirin, lopressor, protonix, zosyn    LR @ 75mL/hour     Physical Findings        Overall Physical   Appearance, MSA 2/5: Appears well-nourished on NFPE   --  Edema  None noted      Gastrointestinal BM 2/4     Tubes No feeding tube      Oral/Mouth Cavity Missing some teeth -- pt would not open mouth very far to assess oral condition     Skin Intact      --  Current Nutrition Orders & Evaluation of Intake       Oral Nutrition     Current PO Diet Diet Renal; 2gm Na+, 2gm K+   Supplement -   PO Evaluation     % PO Intake Very poor intake -- ate no lunch today, only 25% at other meals   --  Clinical Course    Nutrition Course Details 2/3: Regular diet   2/3: Changed to Renal, 2g sodium, 2g potassium      Nutritional Risk Screening        NRS-2002 Score   -       Nutrition Diagnosis         Nutrition Dx Problem 1 Inadequate intake related to AMS interfering with appetite/intake; as evidenced by ongoing very poor intake at meals       Nutrition Dx Problem 2 -       Intervention Goal         Intervention Goal(s) Intake improving; pt tolerating least restrictive diet to allow for safe intake; accepts oral supplements     Nutrition Intervention        RD/Tech Action Will start Novasource Renal 1x/day and Magic Cup BID     Nutrition Prescription          Diet Prescription Soft To Chew, 2g potassium    Supplement Prescription Novasource Renal 1x/day, Magic Cup BID   --  Monitor/Evaluation        Monitor I&O, PO intake, Supplement intake,  Pertinent labs, Weight, Skin status, GI status     Electronically signed by:  Asia Phoenix RD  02/05/21 18:56 EST

## 2021-02-05 NOTE — PROGRESS NOTES
"   LOS: 2 days    Patient Care Team:  Ann Marie Hodgson MD as PCP - General (Family Medicine)      Subjective     Patient resting comfortably.  Drowsy, confused  Exact urine output not known.  Patient is incontinent  No acute distress overnight  Patient has poor appetite and oral intake    Objective     Vital Sign Min/Max for last 24 hours  Temp:  [97.6 °F (36.4 °C)-98.3 °F (36.8 °C)] 98 °F (36.7 °C)  Heart Rate:  [70-90] 76  Resp:  [16-18] 16  BP: (102-127)/(55-66) 114/66                       Flowsheet Rows      First Filed Value   Admission Height  170.2 cm (67\") Documented at 02/03/2021 1417   Admission Weight  90 kg (198 lb 6.6 oz) Documented at 02/03/2021 1417          No intake/output data recorded.  I/O last 3 completed shifts:  In: 1788 [P.O.:720; I.V.:1068]  Out: 450 [Urine:450]    Physical Exam:  Physical Exam    General.  Drowsy, confused  Head.  Atraumatic, normocephalic  Neck.  Supple.  No JVD  ENT.  Mucosa dry  Respiratory.  Decreased breath sounds  Cardiovascular.    Regular rhythm.  Abdominal.  Obese, soft, nontender  Extremities edema ++       LABS:  Lab Results   Component Value Date    CALCIUM 7.5 (L) 02/05/2021    PHOS 3.5 02/05/2021     Results from last 7 days   Lab Units 02/05/21  0506 02/04/21  0413 02/04/21  0302 02/03/21  1422  02/01/21  1133   MAGNESIUM mg/dL  --   --   --  1.6  --   --    SODIUM mmol/L 136  --  140 138  --  138   POTASSIUM mmol/L 3.5  --  4.6 5.1  --  4.7   CHLORIDE mmol/L 98  --  103 100  --  101   CO2 mmol/L 28.0  --  26.0 20.0*  --  27.0   BUN mg/dL 23  --  26* 30*  --  18   CREATININE mg/dL 1.42*  --  1.64* 1.76*  --  1.43*   GLUCOSE mg/dL 88  --  93 97  --  82   CALCIUM mg/dL 7.5*  --  7.8* 8.2*  --  8.1*   WBC 10*3/mm3 8.20 12.50*  --  24.60*   < > 16.92*   HEMOGLOBIN g/dL 6.1* 7.1*  --  7.9*   < > 7.5*   PLATELETS 10*3/mm3 194 282  --  427   < > 456*   ALT (SGPT) U/L  --   --   --  14  --  7   AST (SGOT) U/L  --   --   --  37  --  19    < > = values in this " interval not displayed.     Lab Results   Component Value Date    CKTOTAL 228 (H) 02/03/2021    TROPONINT 0.285 (C) 02/03/2021     Estimated Creatinine Clearance: 41.3 mL/min (A) (by C-G formula based on SCr of 1.42 mg/dL (H)).      Brief Urine Lab Results  (Last result in the past 365 days)      Color   Clarity   Blood   Leuk Est   Nitrite   Protein   CREAT   Urine HCG        02/03/21 1527 Yellow Clear Small (1+) Negative Negative Trace             WEIGHTS:     Wt Readings from Last 1 Encounters:   02/05/21 0420 92.6 kg (204 lb 2.3 oz)   02/04/21 0451 87.3 kg (192 lb 7.4 oz)   02/03/21 1906 87.3 kg (192 lb 7.4 oz)   02/03/21 1417 90 kg (198 lb 6.6 oz)       allopurinol, 150 mg, Oral, Daily  aspirin, 325 mg, Oral, Once  metoprolol tartrate, 12.5 mg, Oral, Q12H  pantoprazole, 40 mg, Oral, QAM  piperacillin-tazobactam, 3.375 g, Intravenous, Q8H  rivaroxaban, 15 mg, Oral, BID  [START ON 2/12/2021] rivaroxaban, 20 mg, Oral, Daily With Dinner  sodium chloride, 10 mL, Intravenous, Q12H  ursodiol, 500 mg, Oral, BID      lactated ringers, 100 mL/hr, Last Rate: 100 mL/hr (02/04/21 2342)        Assessment/Plan       1.  Acute kidney injury  Secondary to dehydration or cisplatin or combination  Creatinine is decreasing.  Electrolytes okay  Decrease IV fluids     2.  Anemia  Secondary to chemotherapy  PRBC transfusion by hematology     3.  Hypertension  Continue low-dose metoprolol  Avoid hypotension     4.  Cholangiocarcinoma  Oncology following      Mitch Stone MD  02/05/21  07:55 EST

## 2021-02-05 NOTE — SIGNIFICANT NOTE
"Spoke to son over the phone. He reports that PT's sister and granddaughter are also involved with his healthcare. Granddaughter is in healthcare (I believe a nurse) and helps with the decision making of the PT. Son reports PT does not have any advanced directive/living will completed. He also has no POA established. Son is hopeful that PT will become alert and orientated enough to complete forms in the future. Son reports PT is  and mentioned no other children. Son reports that he would like aggressive care and FULL code for the PT, but would be concerned about the PT being keep alive by machines long term. Son reports no concerns with communication about PT's care and feels informed.       02/05/21 0926   Coping/Psychosocial   Observed Emotional State calm;withdrawn  (asleep)   Trust Relationship/Rapport care explained;thoughts/feelings acknowledged  (spoke to son over the phone)   Family/Support Persons son;sibling;other (see comments)  (sister, granddaughter)   Involvement in Care not participating in care  (spoke to son over the phone)   Additional Documentation Spiritual Care (Group)   Spiritual Care   Spiritual Care Visit Type initial   Spiritual Care Source other (see comments)  (palliative care PT)   Receptivity to Spiritual Care other (see comments)  (phone visit, no family at bedside, PT asleep)   Spiritual Care Request decision-making support;family support;advance directives facilitation  (PT 'never when to Oriental orthodox\")   Spiritual Care Interventions decision-making facilitated;supportive conversation provided   Response to Spiritual Care engaged in conversation;receptive of support;thanks expressed   Use of Spiritual Resources non-Anglican use of spiritual care   Spiritual Care Follow-Up follow-up planned regularly for general support     "

## 2021-02-05 NOTE — CONSULTS
GI CONSULT  NOTE:    Referring Provider:  Dr. Sherwood    Chief complaint: Cholangiocarcinoma, anemia     Subjective .     History of present illness: Tristen Garcia is a 85 y.o. 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  History is extremely limited due to the patient's altered mental status.  Therefore, history is supplemented via chart review and from the bedside RN.  The patient has been receiving treatment by Dr. Garcia for liver mass favored to be cholangiocarcinoma.  He was started on chemotherapy in 11/2020 with CT on 1/14/2021 showing new low-density lesions within the right and left hepatic lobes.  He was then switched to FOLFOX on 2/1/2021.  He had recently been admitted in 1/2021 and was diagnosed with pulmonary emboli and acute left lower extremity DVT.  He was started on Xarelto at that time.  GI has been asked to consult for cholangiocarcinoma and possible cholangitis.  The patient is well-known to our practice and is normally followed by Dr. Briceno.  His initial visit with Dr. Briceno was on 9/30/2020.  At that time, CT guided liver biopsy was ordered as he had had outpatient imaging showing a focal lobulated large area of signal laterality in the liver parenchyma in the inferior tip of the right lobe of the liver which was thought to represent a primary liver cancer.  Liver biopsy was performed on 10/14/2020.  Pathology showed well to moderately differentiated adenocarcinoma consistent with possible cholangiocarcinoma.  He was last seen by Dr. Briceno in 12/2020.  The patient was started on Dior twice daily and was instructed to follow-up in 2 months.  The patient is unable to provide any substantial history.  Bedside RN reports persistent confusion with poor oral intake secondary to his altered mental status.  Bedside RN denies  any vomiting.  There has not been any evidence of overt GI bleeding.  On admission, LFTs were normal other than mild elevation in alkaline phosphatase at 166.  Total bilirubin has remained normal throughout the admission.  WBC count was noted to be markedly elevated at 24.6 on admission, but is currently normal at 8.2.  Blood cultures are pending.  UA negative on admission.  CXR showed no acute cardiopulmonary process.  CT head showed no acute abnormality.  CT abdomen/pelvis without contrast on admission showed findings that appear stable since 1/14/2021.  Patient is currently on Zosyn.    1/14/2021 CT abdomen/pelvis WO -large right hepatic lobe mass consistent with patient's known cholangiocarcinoma, new low-density lesions in the right and left hepatic lobes, nonspecific 4 mm or less bilateral pulmonary nodules, 2.3 cm lesion within the L2 vertebral body, 1.8 cm low-density left thyroid nodule  9/25/2020 MRCP -focal lobulated large area of signal laterality in the liver parenchyma in the inferior tip of the right lobe of the liver which could represent a primary liver cancer versus liver parenchymal infection, cholelithiasis without evidence of choledocholithiasis  9/23/2020 abdominal US -patchy areas of hypoechogenicity in the right lobe of the liver of uncertain etiology which could be caused by liver cancer, metastatic liver disease, or cirrhosis      Endo History:  11/2020 EGD/colonoscopy (Dr. Mojica) -3 gastric body polyps (HP), 4 colon polyps (TA), diverticulosis, grade 2 internal hemorrhoids    Past Medical History:  Past Medical History:   Diagnosis Date   • Arthritis    • Cancer (CMS/HCC)    • COPD (chronic obstructive pulmonary disease) (CMS/HCC)    • Elevated cholesterol    • GERD (gastroesophageal reflux disease)    • Hyperlipidemia    • Hypertension        Past Surgical History:  Past Surgical History:   Procedure Laterality Date   • COLONOSCOPY     • EYE SURGERY     • SKIN BIOPSY         Social  History:  Social History     Tobacco Use   • Smoking status: Former Smoker   • Smokeless tobacco: Never Used   Substance Use Topics   • Alcohol use: Not Currently   • Drug use: Never       Family History:  Family History   Problem Relation Age of Onset   • Kidney failure Father        Medications:  Medications Prior to Admission   Medication Sig Dispense Refill Last Dose   • allopurinol (ZYLOPRIM) 300 MG tablet Take 150 mg by mouth Daily.      • atenolol (TENORMIN) 25 MG tablet Take 25 mg by mouth Daily.      • calcium-vitamin D (Oscal 500/200 D-3) 500-200 MG-UNIT per tablet Take 1 tablet by mouth Daily. 30 tablet 5    • Ferretts 325 (106 Fe) MG tablet Take 1 tablet by mouth Daily.      • losartan (COZAAR) 100 MG tablet Take 100 mg by mouth Daily.      • magnesium oxide (MAG-OX) 400 MG tablet Take 1 tablet by mouth 2 (Two) Times a Day. 60 tablet 1    • omeprazole (priLOSEC) 20 MG capsule Take 40 mg by mouth Daily.      • rivaroxaban (XARELTO) 15 MG tablet Take 15 mg by mouth 2 (two) times a day. For 3 weeks.....until 02/11/21. Then start Xarelto 20mg daily 02/12/21      • ursodiol (ACTIGALL) 500 MG tablet Take 500 mg by mouth 2 (two) times a day.      • amLODIPine (NORVASC) 10 MG tablet Take 10 mg by mouth Daily.   Unknown at Unknown time   • furosemide (LASIX) 40 MG tablet Take 1 tablet by mouth Daily.   Unknown at Unknown time       Scheduled Meds:allopurinol, 150 mg, Oral, Daily  aspirin, 325 mg, Oral, Once  [START ON 2/6/2021] epoetin lan-epbx, 40,000 Units, Subcutaneous, Weekly  metoprolol tartrate, 12.5 mg, Oral, Q12H  pantoprazole, 40 mg, Oral, QAM  piperacillin-tazobactam, 3.375 g, Intravenous, Q8H  rivaroxaban, 15 mg, Oral, BID  [START ON 2/12/2021] rivaroxaban, 20 mg, Oral, Daily With Dinner  sodium chloride, 10 mL, Intravenous, Q12H  ursodiol, 500 mg, Oral, BID      Continuous Infusions:lactated ringers, 100 mL/hr, Last Rate: 100 mL/hr (02/04/21 3159)      PRN Meds:.•  acetaminophen **OR** acetaminophen  **OR** acetaminophen  •  ondansetron **OR** ondansetron  •  sodium chloride  •  sodium chloride    ALLERGIES:  Patient has no known allergies.    ROS:  Unobtainable secondary to altered mental status.    Objective     Vital Signs:   Vitals:    02/04/21 1743 02/1935 02/05/21 0420 02/05/21 1210   BP: 114/60 102/59 114/66 98/46   BP Location:  Left arm Left arm Left arm   Patient Position:  Lying Lying Lying   Pulse: 90 70 76 78   Resp: 17 16 16 18   Temp: 98 °F (36.7 °C) 97.6 °F (36.4 °C) 98 °F (36.7 °C) 99.5 °F (37.5 °C)   TempSrc:  Oral Oral Axillary   SpO2: 96% 97% 95% 92%   Weight:   92.6 kg (204 lb 2.3 oz)    Height:           Physical Exam:       General Appearance:    Awake and alert, in no acute distress, confused   Head:    Normocephalic, without obvious abnormality, atraumatic   Throat:   No oral lesions, no thrush, oral mucosa moist   Lungs:     Respirations regular, even and unlabored   Chest Wall:    No abnormalities observed   Abdomen:     Soft, non-tender, no rebound or guarding, non-distended   Rectal:     Deferred   Extremities:   Moves all extremities, no edema, no cyanosis   Pulses:   Pulses palpable and equal bilaterally   Skin:   No rash, no jaundice, normal palpation   Lymph nodes:   No cervical, supraclavicular or submandibular palpable adenopathy   Neurologic:   Cranial nerves 2 - 12 grossly intact, no asterixis       Results Review:   I reviewed the patient's labs and imaging.  CBC    Results from last 7 days   Lab Units 02/05/21  0506 02/04/21  0413 02/03/21  1422 02/01/21  1614 02/01/21  1133   WBC 10*3/mm3 8.20 12.50* 24.60* 18.00* 16.92*   HEMOGLOBIN g/dL 6.1* 7.1* 7.9* 8.9* 7.5*   PLATELETS 10*3/mm3 194 282 427 440 456*     CMP   Results from last 7 days   Lab Units 02/05/21  0506 02/04/21  0302 02/03/21  1422 02/01/21  1133   SODIUM mmol/L 136 140 138 138   POTASSIUM mmol/L 3.5 4.6 5.1 4.7   CHLORIDE mmol/L 98 103 100 101   CO2 mmol/L 28.0 26.0 20.0* 27.0   BUN mg/dL 23 26* 30* 18    CREATININE mg/dL 1.42* 1.64* 1.76* 1.43*   GLUCOSE mg/dL 88 93 97 82   ALBUMIN g/dL 2.60*  --  3.20* 2.80*   BILIRUBIN mg/dL  --   --  0.2 0.2   ALK PHOS U/L  --   --  166* 150*   AST (SGOT) U/L  --   --  37 19   ALT (SGPT) U/L  --   --  14 7   MAGNESIUM mg/dL  --   --  1.6  --    PHOSPHORUS mg/dL 3.5  --  4.6*  --    LIPASE U/L  --   --  13  --      Cr Clearance Estimated Creatinine Clearance: 41.3 mL/min (A) (by C-G formula based on SCr of 1.42 mg/dL (H)).  Coag   Results from last 7 days   Lab Units 02/03/21  1422   INR  1.32*   APTT seconds 28.0     HbA1C No results found for: HGBA1C  Blood Glucose   Glucose   Date/Time Value Ref Range Status   02/04/2021 1607 109 (H) 70 - 105 mg/dL Final     Comment:     Serial Number: [id number]:  [id number]   02/04/2021 1121 102 70 - 105 mg/dL Final     Comment:     Serial Number: [id number]:  [id number]   02/04/2021 0706 82 70 - 105 mg/dL Final     Comment:     Serial Number: [id number]:  [id number]   02/03/2021 1930 94 70 - 105 mg/dL Final     Comment:     Serial Number: [id number]:  [id number]   02/03/2021 1424 91 70 - 105 mg/dL Final     Comment:     Serial Number: [id number]:  [id number]     Infection   Results from last 7 days   Lab Units 02/03/21  1618 02/03/21  1509 02/03/21  1422   BLOODCX  No growth at 24 hours No growth at 24 hours  --    PROCALCITONIN ng/mL  --   --  0.73*     UA    Results from last 7 days   Lab Units 02/03/21  1527   NITRITE UA  Negative   WBC UA /HPF 3-5*   BACTERIA UA /HPF None Seen   SQUAM EPITHEL UA /HPF 0-2     Radiology(recent) Ct Abdomen Pelvis Without Contrast    Result Date: 2/3/2021   1. With the exception of very slight interval enlargement of a portacaval lymph node, the findings appear stable since 01/14/2021. Heterogeneous right hepatic lobe mass, but appear to be smaller low-density nodules elsewhere in the liver, thought to be unchanged, and remain highly suspicious for malignancy. 2. Borderline  enlarged aortocaval lymph node in the retroperitoneum, unchanged. 3. 2.3 cm lucent lesion in L2 is unchanged from prior examination. Osseous metastatic disease not excluded. No new osseous lesions.  4. No acute findings in the abdomen.   Electronically Signed By-Rosa Fox MD On:2/3/2021 3:07 PM This report was finalized on [id number] by  Rosa Fox MD.    Ct Head Without Contrast    Result Date: 2/3/2021  Age-appropriate atrophy, unchanged from the patient's recent head CT of 12/10/2020. No acute intracranial findings.  Electronically Signed By-Pramod Soria MD On:2/3/2021 3:07 PM This report was finalized on [id number] by  Pramod Soria MD.    Xr Chest 1 View    Result Date: 2/3/2021  No acute cardiopulmonary process.  Electronically Signed By-Landon Paredes MD On:2/3/2021 3:34 PM This report was finalized on [id number] by  Landon Paredes MD.         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  WBC count was noted to be 24.6 on admission.  Currently on Zosyn and WBC count today is 8.2.  Blood cultures pending.  CXR, UA, and CT head unremarkable.  CT abdomen/pelvis shows no acute change since 1/14/2021.  LFTs were normal on admission other than mildly elevated alkaline phos.  There has been no recheck this admission.  We will recheck hepatic function panel.  With the patient's age and comorbidities, ERCP would not be indicated unless there is significant worsening in patient's condition and labs.  Continue Dior.  Hemoglobin 6.1 noted.  No overt GI bleeding.  EGD/colonoscopy performed in 11/2020 with no source  of bleeding identified.  Patient has been receiving Retacrit as ordered by hematology/oncology.  No plans for repeat endoscopy in the absence of significant overt GI bleeding.  Continue to monitor H/H and transfuse as needed.  Diet as tolerated.  Supportive care.      I discussed the patients findings and my recommendations with the patient.  I will discuss the case with Dr. Briceno and change the plan accordingly.    We appreciate the referral.    CAREY Myles  02/05/21  12:39 EST

## 2021-02-05 NOTE — PROGRESS NOTES
[facility] Medicine Services  INPATIENT PROGRESS NOTE  223/1   Hospitalist Team  LOS 2 days      Patient Care Team:  Ann Marie Hodgson MD as PCP - General (Family Medicine)      Patient was examined with relevant and adequate PPE keeping in mind the current coronavirus pandemic. Minimum of 10 minutes to don and doff PPE.    Chief Complaint / Subjective  Chief Complaint   Patient presents with   • Altered Mental Status   • Syncope       HPI (4 hpi elements or status of 3 chronic)  85-year-old male called EMS today for lifting assistance.  The patient had taken a bath and was going to see a physician.  He could not get out of the walk-in bathtub.  EMS got the patient up and then the patient became unresponsive.  He had a slow pulse and it was noted to be faint by EMS.  It took him several minutes to get him on the monitor the patient then slowly became more alert.  The family reports that he has been confused for several days.  They states he has been treating for a cancer around his liver.  The patient has had poor oral intake in the last 24 hours.  He is complained of extensive nausea.  There is been no vomiting or diarrhea.  There is been no reports of melena hematemesis or hematochezia.        Patient was admitted here on 21 January for acute PE and DVT.  Since then he has been on anticoagulation.  Per the son he went into check on his father today and he was slumped in the bathtub.  Conscious but confused.  He was subsequently transferred to the hospital.  Patient apparently lives mostly by himself with his son checking in on him.  Sees Dr. Pope    Not making much sense today as well.  No history obtainable from him.   02/04/21, 5:04 PM EST    Continues to be confused.  Does not follow directions.  No change from prior.  Family is resistant to giving him blood. 02/05/21, 11:40 AM EST        Altered Mental Status    Syncope          History taken from: chart RN    Review of Systems   Unable  to perform ROS: mental status change   Cardiovascular: Positive for syncope.   Psychiatric/Behavioral: Positive for altered mental status.              Family History   Problem Relation Age of Onset   • Kidney failure Father        Past Medical History:   Diagnosis Date   • Arthritis    • Cancer (CMS/[facility])    • COPD (chronic obstructive pulmonary disease) (CMS/[facility])    • Elevated cholesterol    • GERD (gastroesophageal reflux disease)    • Hyperlipidemia    • Hypertension        Social History     Socioeconomic History   • Marital status: Single     Spouse name: Not on file   • Number of children: Not on file   • Years of education: Not on file   • Highest education level: Not on file   Tobacco Use   • Smoking status: Former Smoker   • Smokeless tobacco: Never Used   Substance and Sexual Activity   • Alcohol use: Not Currently   • Drug use: Never   • Sexual activity: Defer   Social History Narrative    Lives mostly by himself.  Son checks in on him.       Prior to Admission medications    Medication Sig Start Date End Date Taking? Authorizing Provider   allopurinol (ZYLOPRIM) 300 MG tablet Take 150 mg by mouth Daily. 11/22/20  Yes Wendy Rivers MD   atenolol (TENORMIN) 25 MG tablet Take 25 mg by mouth Daily.   Yes Wendy Rivers MD   calcium-vitamin D (Oscal 500/200 D-3) 500-200 MG-UNIT per tablet Take 1 tablet by mouth Daily. 12/30/20 12/30/21 Yes Macey Harrison APRN   Ferretts 325 (106 Fe) MG tablet Take 1 tablet by mouth Daily. 10/29/20  Yes Wendy Rivers MD   losartan (COZAAR) 100 MG tablet Take 100 mg by mouth Daily.   Yes Wendy Rivers MD   magnesium oxide (MAG-OX) 400 MG tablet Take 1 tablet by mouth 2 (Two) Times a Day. 12/30/20  Yes Macey Harrison APRN   omeprazole (priLOSEC) 20 MG capsule Take 40 mg by mouth Daily.   Yes Wendy Rivers MD   rivaroxaban (XARELTO) 15 MG tablet Take 15 mg by mouth 2 (two) times a day. For 3 weeks.....until 02/11/21. Then start  "Xarelto 20mg daily 02/12/21   Yes Provider, MD Wendy   ursodiol (ACTIGALL) 500 MG tablet Take 500 mg by mouth 2 (two) times a day. 12/1/20  Yes ProviderWendy MD   amLODIPine (NORVASC) 10 MG tablet Take 10 mg by mouth Daily.    Provider, MD Wendy   furosemide (LASIX) 40 MG tablet Take 1 tablet by mouth Daily. 10/23/20   Jese Mason MD        Objective    Physical Exam     Vital Signs  Temp:  [97.6 °F (36.4 °C)-98.3 °F (36.8 °C)] 98 °F (36.7 °C)  Heart Rate:  [70-90] 76  Resp:  [16-18] 16  BP: (102-124)/(55-66) 114/66    Oxygen Therapy  SpO2: 95 %  Pulse Oximetry Type: Continuous  Device (Oxygen Therapy): nasal cannula  Flow (L/min): 2  Flowsheet Rows      First Filed Value   Admission Height  170.2 cm (67\") Documented at 02/03/2021 1417   Admission Weight  90 kg (198 lb 6.6 oz) Documented at 02/03/2021 1417        Weight change: 2.6 kg (5 lb 11.7 oz)   Intake & Output (last 3 days)       02/02 0701 - 02/03 0700 02/03 0701 - 02/04 0700 02/04 0701 - 02/05 0700 02/05 0701 - 02/06 0700    P.O.  120 600 290    I.V. (mL/kg)   1068 (11.5)     IV Piggyback  500      Total Intake(mL/kg)  620 (7.1) 1668 (18) 290 (3.1)    Urine (mL/kg/hr)  450      Total Output  450      Net  +170 +1668 +290            Urine Unmeasured Occurrence  2 x 6 x 1 x    Stool Unmeasured Occurrence   2 x 1 x        Lines, Drains & Airways    Active LDAs     Name:   Placement date:   Placement time:   Site:   Days:    Peripheral IV 02/03/21 1550 Left Hand   02/03/21    1550    Hand   1    Single Lumen Implantable Port Right Subclavian   --    --    Subclavian                   Physical Exam:    Physical Exam  Constitutional:       General: He is not in acute distress.     Appearance: Normal appearance. He is well-developed. He is not ill-appearing, toxic-appearing or diaphoretic.   HENT:      Head: Normocephalic and atraumatic.      Right Ear: Ear canal and external ear normal.      Left Ear: Ear canal and " external ear normal.      Nose: Nose normal. No congestion or rhinorrhea.      Mouth/Throat:      Mouth: Mucous membranes are dry.      Pharynx: No oropharyngeal exudate.      Comments: Edentulous  Eyes:      General: No scleral icterus.        Right eye: No discharge.         Left eye: No discharge.      Extraocular Movements: Extraocular movements intact.      Conjunctiva/sclera: Conjunctivae normal.      Pupils: Pupils are equal, round, and reactive to light.   Neck:      Musculoskeletal: Normal range of motion and neck supple. No neck rigidity or muscular tenderness.      Thyroid: No thyromegaly.      Vascular: No carotid bruit or JVD.      Trachea: No tracheal deviation.   Cardiovascular:      Rate and Rhythm: Normal rate and regular rhythm.      Heart sounds: Normal heart sounds. No murmur. No friction rub. No gallop.       Comments: Absent pedal pulses  Pulmonary:      Effort: Pulmonary effort is normal. No respiratory distress.      Breath sounds: Normal breath sounds. No stridor. No wheezing, rhonchi or rales.      Comments: Right chest [id number] present  Chest:      Chest wall: No tenderness.   Abdominal:      General: Bowel sounds are normal. There is distension.      Palpations: There is mass.      Tenderness: There is no abdominal tenderness. There is no guarding or rebound.      Hernia: No hernia is present.      Comments: Very large hepatomegaly firm.  Reaching out 6 fingerbreadth below the costal margin in the right midclavicular line.  Flanks are full indicating some degree of ascites.   Musculoskeletal: Normal range of motion.         General: No swelling, tenderness, deformity or signs of injury.      Right lower leg: Edema (+2) present.      Left lower leg: Edema (+2) present.   Lymphadenopathy:      Cervical: No cervical adenopathy.   Skin:     General: Skin is warm and dry.      Coloration: Skin is not jaundiced or pale.      Findings: No bruising, erythema or rash.   Neurological:       General: No focal deficit present.      Mental Status: He is alert and oriented to person, place, and time. Mental status is at baseline.      Cranial Nerves: No cranial nerve deficit.      Sensory: No sensory deficit.      Motor: No weakness or abnormal muscle tone.      Coordination: Coordination normal.   Psychiatric:         Mood and Affect: Mood normal.         Behavior: Behavior normal.         Thought Content: Thought content normal.         Judgment: Judgment normal.   Reviewed, no change in above data from the prior day.      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        Estimated Creatinine Clearance: 41.3 mL/min (A) (by C-G formula based on SCr of 1.42 mg/dL (H)).    Code Status and Medical Interventions:   Ordered at: 02/03/21 6201     Code Status:    CPR     Medical Interventions (Level of Support Prior to Arrest):    Full       MEDICAL DECISION MAKING COMPLEXITY BY PROBLEM:     Acute ascending cholangitis  Empiric antibiotics  Await cultures     Cholangiocarcinoma  Consult oncology  Supportive treatment        Renal failure/insufficiency/injury:  Hydration  Supportive treatment  Cut down or hold ACE inhibitors  Avoid nephrotoxic medications   Address diuretics    Cultures negative but patient responding to antibiotics.  Prognosis very poor.  The patient should be hospice.  Family refusing blood prescribed by nephrology  Care coordination with nursing for current care  02/04/21 4:54 PM EST.    Cultures negative.  Renal function better.  Hemoglobin continues to drop.  Shall consult GI but not a candidate for invasive work-up.  Should be hospice  Plan for disposition:            Continued Care and Services - Admitted Since 2/3/2021    Coordination has not been started for this encounter.        Historical & Objective Data     Results Review:    I reviewed the patient's new lab and radiology results. 02/05/21     Results from last 7 days   Lab Units 02/05/21  0506 02/04/21  0413 02/03/21  1422   WBC 10*3/mm3 8.20 12.50* 24.60*   HEMOGLOBIN g/dL 6.1* 7.1* 7.9*   HEMATOCRIT % 18.8* 22.6* 24.9*   MCV fL 88.1 89.0 88.4   MCH pg 28.7 27.9 27.9   MPV fL 8.0 8.0 7.1   RDW % 28.3* 29.0* 29.8*   PLATELETS 10*3/mm3 194 282 427     Results from last 7 days   Lab Units 02/05/21  0506 02/04/21  0302 02/03/21  1422 02/01/21  1133   SODIUM mmol/L 136 140 138 138   POTASSIUM mmol/L 3.5 4.6 5.1 4.7   CHLORIDE mmol/L 98 103 100 101   CO2 mmol/L 28.0 26.0 20.0* 27.0   BUN mg/dL 23 26* 30* 18   CREATININE mg/dL 1.42* 1.64* 1.76* 1.43*   CALCIUM mg/dL 7.5* 7.8* 8.2* 8.1*   MAGNESIUM mg/dL  --   --  1.6  --    BILIRUBIN mg/dL  --   --  0.2 0.2   ALK PHOS U/L  --   --  166* 150*   ALT (SGPT) U/L  --   --  14 7   AST (SGOT) U/L  --   --  37 19   GLUCOSE mg/dL 88 93 97 82     Inflammatory Biomarkers  Results from last 7 days   Lab Units 02/04/21  1522   FERRITIN ng/mL 2,298.00*   LDH U/L 400*     Lab Results   Component Value Date    PHOS 3.5 02/05/2021     No results found for: HGBA1C  No results found for: CHOL, CHLPL, TRIG, HDL, LDL, LDLDIRECT  Lab Results   Component Value Date    LIPASE 13 02/03/2021     Results from last 7 days   Lab Units 02/03/21  1422   INR  1.32*           Pathology  Lab Results   Lab Value Date/Time    INTRAOP  10/14/2020 1009     TP: Positive for malignancy.     SHIR[facility]       FINALDX  10/14/2020 1009     Liver, CT-guided core biopsy:    Well to moderately differentiated  adenocarcinoma, see COMMENT     JPR/[facility]       COMDX  10/14/2020 1009     The biopsy shows a well to moderately differentiated adenocarcinoma. Immunohistochemistry was performed utilizing appropriate controls and the tumor is strongly positive for only CK7. The CK20, CDX2, TTF-1, napsin A, chromogranin, synaptophysin and CD56 are all negative. This is a nonspecific staining pattern but can be seen in tumors of pancreatobiliary origin, including cholangiocarcinoma which is favored in this case. Adenocarcinomas of the upper gastrointestinal tract can also display only CK7 positivity and should be excluded clinically. This staining pattern argues against, but does not completely exclude a metastasis from a primary lung adenocarcinoma as approximately 10 to 15% of lung adenocarcinomas are negative for TTF-1 and napsin A. This staining pattern does exclude a neuroendocrine tumor and adenocarcinoma of colonic origin. Further clinical workup with imaging studies and serum tumor markers is recommended.        COVID19   Date Value Ref Range Status   02/04/2021 Not Detected Not Detected - Ref. Range Final        Microbiology Results (last 10 days)     Procedure Component Value - Date/Time    COVID PRE-OP / PRE-PROCEDURE SCREENING ORDER (NO ISOLATION) - Swab, Nasopharynx [[id number]]  (Normal) Collected: 02/04/21 1522    Lab Status: Final result Specimen: Swab from Nasopharynx Updated: 02/04/21 2341    Narrative:      The following orders were created for panel order COVID PRE-OP / PRE-PROCEDURE SCREENING ORDER (NO ISOLATION) - Swab, Nasopharynx.  Procedure                               Abnormality         Status                     ---------                               -----------         ------                     COVID-19,APTIMA PANTHER,...[[id number]]  Normal              Final result                 Please view results for these tests on the individual orders.    COVID-19,APTIMA PANTHERTANNA IN-HOUSE, NP/OP SWAB IN  UTM/VTM/SALINE TRANSPORT MEDIA,24 HR TAT - Swab, Nasopharynx [[id number]]  (Normal) Collected: 02/04/21 1522    Lab Status: Final result Specimen: Swab from Nasopharynx Updated: 02/04/21 2341     COVID19 Not Detected    Narrative:      Fact sheet for providers: https://www.fda.gov/media/972629/download     Fact sheet for patients: https://www.fda.gov/media/430350/download    Test performed by RT PCR.    Blood Culture - Blood, Blood, Central Line [[id number]] Collected: 02/03/21 1618    Lab Status: Preliminary result Specimen: Blood, Central Line Updated: 02/04/21 1630     Blood Culture No growth at 24 hours    Blood Culture - Blood, Chest, Right [[id number]] Collected: 02/03/21 1509    Lab Status: Preliminary result Specimen: Blood from Chest, Right Updated: 02/04/21 1515     Blood Culture No growth at 24 hours          ECG/EMG Results (most recent)     Procedure Component Value Units Date/Time    ECG 12 Lead [[id number]] Collected: 02/03/21 1429     Updated: 02/04/21 1443     QT Interval 342 ms     Narrative:      HEART RATE= 111  bpm  RR Interval= 540  ms  DE Interval= 150  ms  P Horizontal Axis= 23  deg  P Front Axis= 96  deg  QRSD Interval= 103  ms  QT Interval= 342  ms  QRS Axis= 87  deg  T Wave Axis= 117  deg  - ABNORMAL ECG -  Sinus tachycardia  Low voltage, extremity leads  Nonspecific repol abnormality, lateral leads  When compared with ECG of 18-Oct-2020 18:41:11,  Significant rate increase  Significant axis, voltage or hypertrophy change  Electronically Signed By: Miky Mena) 04-Feb-2021 14:40:17  Date and Time of Study: 2021-02-03 14:29:53          Results for orders placed during the hospital encounter of 01/21/21   Duplex Venous Lower Extremity - Left CAR    Narrative · Acute left lower extremity deep vein thrombosis noted in the common   femoral, proximal femoral, mid femoral, distal femoral, popliteal,   posterial tibial and gastrocnemius.  · Acute left lower extremity superficial  thrombophlebitis noted in the   small saphenous.  · All other left sided veins appeared normal.               Ct Abdomen Pelvis Without Contrast    Result Date: 2/3/2021   1. With the exception of very slight interval enlargement of a portacaval lymph node, the findings appear stable since 01/14/2021. Heterogeneous right hepatic lobe mass, but appear to be smaller low-density nodules elsewhere in the liver, thought to be unchanged, and remain highly suspicious for malignancy. 2. Borderline enlarged aortocaval lymph node in the retroperitoneum, unchanged. 3. 2.3 cm lucent lesion in L2 is unchanged from prior examination. Osseous metastatic disease not excluded. No new osseous lesions.  4. No acute findings in the abdomen.   Electronically Signed By-Rosa Fox MD On:2/3/2021 3:07 PM This report was finalized on [id number] by  Rosa Fox MD.    Ct Head Without Contrast    Result Date: 2/3/2021  Age-appropriate atrophy, unchanged from the patient's recent head CT of 12/10/2020. No acute intracranial findings.  Electronically Signed By-Pramod Soria MD On:2/3/2021 3:07 PM This report was finalized on [id number] by  Pramod Soria MD.    Xr Chest 1 View    Result Date: 2/3/2021  No acute cardiopulmonary process.  Electronically Signed By-Landon Paredes MD On:2/3/2021 3:34 PM This report was finalized on [id number] by  Landon Paredes MD.       I personally reviewed patient's x-ray films and my findings are: 0    I personally reviewed patient's EKG strip and my findings are: 0    Medication Review:   I have reviewed the patient's current medication list 02/05/21     Scheduled Meds  allopurinol, 150 mg, Oral, Daily  aspirin, 325 mg, Oral, Once  [START ON 2/6/2021] epoetin lan-epbx, 40,000 Units, Subcutaneous, Weekly  metoprolol tartrate, 12.5 mg, Oral, Q12H  pantoprazole, 40 mg, Oral, QAM  piperacillin-tazobactam, 3.375 g, Intravenous, Q8H  rivaroxaban, 15 mg, Oral, BID  [START ON 2/12/2021] rivaroxaban, 20 mg,  Oral, Daily With Dinner  sodium chloride, 10 mL, Intravenous, Q12H  ursodiol, 500 mg, Oral, BID        Meds Infusions  lactated ringers, 100 mL/hr, Last Rate: 100 mL/hr (02/04/21 2342)        DVT prophylaxis  Mechanical Order History:     None      Pharmalogical Order History:      Ordered     Dose Route Frequency Stop    02/03/21 2212  rivaroxaban (XARELTO) tablet 20 mg     Question:  Are you ordering rivaroxaban for the prevention of blood clots in an acutely ill medical patient?  Answer:  No    20 mg PO Daily With Dinner --    02/03/21 2009  rivaroxaban (XARELTO) tablet 15 mg     Question:  Are you ordering rivaroxaban for the prevention of blood clots in an acutely ill medical patient?  Answer:  No    15 mg PO 2 Times Daily 02/11/21 2059                Meds PRN  •  acetaminophen **OR** acetaminophen **OR** acetaminophen  •  ondansetron **OR** ondansetron  •  sodium chloride  •  sodium chloride      Enrike Sherwood MD  02/05/21  11:37 EST

## 2021-02-05 NOTE — PROGRESS NOTES
Hematology/Oncology Inpatient Progress Note    PATIENT NAME: Tristen Garcia  : 1935  MRN: [id number]    CHIEF COMPLAINT: Altered mental status    HISTORY OF PRESENT ILLNESS:    Tristen Garcia is a 85 y.o.  male who presented to [facility] on 2/3/2021 via EMS. Patient reportedly called EMS for lifting assistance at home.  The patient had been taking a bath and was unable to get out of his bathtub.  On EMS arrival, the patient was reportedly unresponsive with a slow heart rate. Patient slowly became more alert.  Per family, patient has reportedely been confused for several days. The family reported poor oral intake in the last 24 hours and extensive nausea. Patient denied diarrhea and vomiting. Labs in the ED were significant for WBC 24.60, hemoglobin 7.9, BUN 30, creatinine 1.76, EGFR 37, alkaline phosphatase 166, and troponin 0.285.  CT of the head showed age-appropriate atrophy, unchanged from patient's recent CT performed 12/10/2020.  No acute intracranial findings. Patient was admitted for further evaluation and management.     21  Hematology/Oncology was consulted as patient is known to our service and followed by Dr. 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  Cholangiocarcinoma favored.  Patient was started on chemotherapy with cisplatin and gemcitabine combination on 2020.  On 2021, CT of the abdomen and pelvis was performed that resulted new low-density lesions within the right and left hepatic lobes consistent with new hepatic metastatic disease.  It was recommended for patient to switch his chemotherapy from cisplatin/gemcitabine to FOLFOX.  Patient was started on FOLFOX on 2020.     Of note, patient was recently diagnosed with pulmonary emboli and acute left lower extremity DVT in the common femoral, proximal femoral, mid femoral, distal femoral, popliteal, posterior tibial, and gastrocnemius.  Patient was started on  "Xarelto.     He  has a past medical history of Arthritis, Cancer (CMS/HCC), COPD (chronic obstructive pulmonary disease) (CMS/HCC), Elevated cholesterol, GERD (gastroesophageal reflux disease), Hyperlipidemia, and Hypertension.     PCP: Ann Marie Hodgson MD    History of present illness was reviewed and is unchanged from the previous visit. 02/05/21     Subjective     ROS:  Review of Systems   Unable to perform ROS: Mental status change        MEDICATIONS:    Scheduled Meds:  allopurinol, 150 mg, Oral, Daily  aspirin, 325 mg, Oral, Once  epoetin lan-epbx, 40,000 Units, Subcutaneous, Weekly  metoprolol tartrate, 12.5 mg, Oral, Q12H  pantoprazole, 40 mg, Oral, QAM  piperacillin-tazobactam, 3.375 g, Intravenous, Q8H  rivaroxaban, 15 mg, Oral, BID  [START ON 2/12/2021] rivaroxaban, 20 mg, Oral, Daily With Dinner  sodium chloride, 10 mL, Intravenous, Q12H  ursodiol, 500 mg, Oral, BID       Continuous Infusions:  lactated ringers, 75 mL/hr, Last Rate: 75 mL/hr (02/05/21 2101)       PRN Meds:  •  acetaminophen **OR** acetaminophen **OR** acetaminophen  •  ondansetron **OR** ondansetron  •  sodium chloride  •  sodium chloride     ALLERGIES:  No Known Allergies    Objective    VITALS:   /76 (BP Location: Right arm, Patient Position: Lying)   Pulse 63   Temp 97.8 °F (36.6 °C) (Oral)   Resp 17   Ht 170.2 cm (67\")   Wt 84.6 kg (186 lb 8.2 oz)   SpO2 95%   BMI 29.21 kg/m²     PHYSICAL EXAM:  Physical Exam  Constitutional:       General: He is not in acute distress.     Appearance: He is obese. He is not toxic-appearing.   HENT:      Head: Normocephalic and atraumatic.      Nose:      Comments: Nasal cannula  Eyes:      General: No scleral icterus.        Right eye: No discharge.         Left eye: No discharge.   Neck:      Musculoskeletal: Normal range of motion. No neck rigidity.   Cardiovascular:      Rate and Rhythm: Normal rate.   Pulmonary:      Effort: Pulmonary effort is normal.   Chest:      Comments: " Right chest port-a-cath  Abdominal:      General: There is no distension.      Palpations: Abdomen is soft.      Tenderness: There is no abdominal tenderness.   Musculoskeletal: Normal range of motion.      Right lower leg: Edema present.      Left lower leg: Edema present.   Skin:     General: Skin is warm.   Neurological:      Motor: Weakness present.   Psychiatric:         Mood and Affect: Mood normal.         Behavior: Behavior normal.      Comments:  Patient is confused.  He keeps repeating okay to every question.        I have reexamined the patient and the results are consistent with the previously documented exam. Katiedevang Alexis MD   RECENT LABS:  Lab Results (last 24 hours)     Procedure Component Value Units Date/Time    Blood Culture - Blood, Chest, Right [[id number]] Collected: 02/03/21 1509    Specimen: Blood from Chest, Right Updated: 02/06/21 1515     Blood Culture No growth at 3 days    Comprehensive Metabolic Panel [[id number]]  (Abnormal) Collected: 02/06/21 0240    Specimen: Blood Updated: 02/06/21 0334     Glucose 89 mg/dL      BUN 21 mg/dL      Creatinine 1.30 mg/dL      Sodium 139 mmol/L      Potassium 3.2 mmol/L      Chloride 101 mmol/L      CO2 30.0 mmol/L      Calcium 7.3 mg/dL      Total Protein 4.7 g/dL      Albumin 2.50 g/dL      ALT (SGPT) 13 U/L      AST (SGOT) 30 U/L      Alkaline Phosphatase 101 U/L      Total Bilirubin 0.4 mg/dL      eGFR Non African Amer 52 mL/min/1.73      Globulin 2.2 gm/dL      A/G Ratio 1.1 g/dL      BUN/Creatinine Ratio 16.2     Anion Gap 8.0 mmol/L     Narrative:      GFR Normal >60  Chronic Kidney Disease <60  Kidney Failure <15      CBC & Differential [[id number]]  (Abnormal) Collected: 02/06/21 0240    Specimen: Blood Updated: 02/06/21 0330    Narrative:      The following orders were created for panel order CBC & Differential.  Procedure                               Abnormality         Status                     ---------                                -----------         ------                     CBC Auto Differential[[id number]]        Abnormal            Final result                 Please view results for these tests on the individual orders.    CBC Auto Differential [[id number]]  (Abnormal) Collected: 02/06/21 0240    Specimen: Blood Updated: 02/06/21 0330     WBC 9.90 10*3/mm3      RBC 2.74 10*6/mm3      Hemoglobin 7.9 g/dL      Hematocrit 23.7 %      MCV 86.4 fL      MCH 28.9 pg      MCHC 33.5 g/dL      RDW 23.5 %      RDW-SD 70.0 fl      MPV 8.0 fL      Platelets 161 10*3/mm3      Neutrophil % 87.3 %      Lymphocyte % 7.6 %      Monocyte % 4.8 %      Eosinophil % 0.1 %      Basophil % 0.2 %      Neutrophils, Absolute 8.70 10*3/mm3      Lymphocytes, Absolute 0.80 10*3/mm3      Monocytes, Absolute 0.50 10*3/mm3      Eosinophils, Absolute 0.00 10*3/mm3      Basophils, Absolute 0.00 10*3/mm3      nRBC 0.1 /100 WBC     Hemoglobin & Hematocrit, Blood [[id number]]  (Abnormal) Collected: 02/05/21 2208    Specimen: Blood Updated: 02/05/21 2254     Hemoglobin 7.6 g/dL      Hematocrit 23.2 %     Hepatic Function Panel [[id number]]  (Abnormal) Collected: 02/05/21 1608    Specimen: Blood Updated: 02/05/21 1653     Total Protein 4.8 g/dL      Albumin 2.70 g/dL      ALT (SGPT) 13 U/L      AST (SGOT) 37 U/L      Alkaline Phosphatase 106 U/L      Total Bilirubin 0.4 mg/dL      Bilirubin, Direct 0.2 mg/dL      Bilirubin, Indirect 0.2 mg/dL     Blood Culture - Blood, Blood, Central Line [[id number]] Collected: 02/03/21 1618    Specimen: Blood, Central Line Updated: 02/05/21 1631     Blood Culture No growth at 2 days    Hemoglobin & Hematocrit, Blood [[id number]]  (Abnormal) Collected: 02/05/21 1608    Specimen: Blood Updated: 02/05/21 1626     Hemoglobin 7.4 g/dL      Hematocrit 22.3 %           PENDING RESULTS: MMA    IMAGING REVIEWED:  No radiology results for the last day    Assessment/Plan   ASSESSMENT:  1. 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   Switched to FOLFOX due to evidence of disease progression.  FOLFOX last received 2/1/2020.  Continue in the outpatient setting if improves.  Patient is followed by Dr. Garcia  2. Normocytic normochromic anemia: Consider relation to chemotherapy or anemia of chronic disease.  Receiving Retacrit weekly outpatient, last received 1/14/2021. Anemia studies show likely anemia of chronic disease.  Monitor hemoglobin  3. Leukocytosis: Urinalysis negative for leukocytes or nitrites.  Blood cultures obtained.  Chest x-ray showed no acute cardiopulmonary process. On IV antibiotics. Continue to monitor. Improving.   4. Acute kidney injury: Creatinine 1.76 on admission. Nephrology consulted. Improving.   5. Confusion: CT head showed no acute intracranial process.  Urinalysis negative. LFTs normal on admission, unlikely to be hepatic encephalopathy. Etiology unknown.   6. Weakness: PT/OT consulted  7. History of pulmonary emboli and DVT: On Xarelto  8. HTN: managed per primary team  9. Palliative care consulted to discuss goals of care     PLAN  1. CBC daily  2. Transfuse 1 unit pRBC as needed for hemoglobin <7.5- 2 units today and repeat H & H 1 hour after 2 units are completed  3. Administer Retacrit 40,000 units subcutaneous injection tomorrow if hemoglobin <10 g/dL  4. Continue Xarelto  5. Continue IV antibiotics  6. Continue IV fluids per nephrology  7. Resume FOLFOX outpatient- due 2/15/2021  8. Consider neurology consult if confusion continues    Electronically signed by CAREY Cho, 02/05/21, 11:32 AM EST.         Patient seen and examined.  Face-to-face evaluation completed.  Note reviewed and edited.  Discussed with nurse practitioner.  Agree with assessment and plan as documented above.  Monitor his hemoglobin very closely.  Multiple factorial anemia including chemotherapy induced.    Electronically signed by Katie Alexis MD, 02/06/21, 3:22 PM EST.

## 2021-02-06 LAB
ABO GROUP BLD: NORMAL
ALBUMIN SERPL-MCNC: 2.5 G/DL (ref 3.5–5.2)
ALBUMIN/GLOB SERPL: 1.1 G/DL
ALP SERPL-CCNC: 101 U/L (ref 39–117)
ALT SERPL W P-5'-P-CCNC: 13 U/L (ref 1–41)
ANION GAP SERPL CALCULATED.3IONS-SCNC: 8 MMOL/L (ref 5–15)
AST SERPL-CCNC: 30 U/L (ref 1–40)
BASOPHILS # BLD AUTO: 0 10*3/MM3 (ref 0–0.2)
BASOPHILS NFR BLD AUTO: 0.2 % (ref 0–1.5)
BILIRUB SERPL-MCNC: 0.4 MG/DL (ref 0–1.2)
BLD GP AB SCN SERPL QL: NEGATIVE
BUN SERPL-MCNC: 21 MG/DL (ref 8–23)
BUN/CREAT SERPL: 16.2 (ref 7–25)
CALCIUM SPEC-SCNC: 7.3 MG/DL (ref 8.6–10.5)
CHLORIDE SERPL-SCNC: 101 MMOL/L (ref 98–107)
CO2 SERPL-SCNC: 30 MMOL/L (ref 22–29)
CREAT SERPL-MCNC: 1.3 MG/DL (ref 0.76–1.27)
DEPRECATED RDW RBC AUTO: 70 FL (ref 37–54)
EOSINOPHIL # BLD AUTO: 0 10*3/MM3 (ref 0–0.4)
EOSINOPHIL NFR BLD AUTO: 0.1 % (ref 0.3–6.2)
ERYTHROCYTE [DISTWIDTH] IN BLOOD BY AUTOMATED COUNT: 23.5 % (ref 12.3–15.4)
GFR SERPL CREATININE-BSD FRML MDRD: 52 ML/MIN/1.73
GLOBULIN UR ELPH-MCNC: 2.2 GM/DL
GLUCOSE SERPL-MCNC: 89 MG/DL (ref 65–99)
HCT VFR BLD AUTO: 23.7 % (ref 37.5–51)
HGB BLD-MCNC: 7.9 G/DL (ref 13–17.7)
LYMPHOCYTES # BLD AUTO: 0.8 10*3/MM3 (ref 0.7–3.1)
LYMPHOCYTES NFR BLD AUTO: 7.6 % (ref 19.6–45.3)
MCH RBC QN AUTO: 28.9 PG (ref 26.6–33)
MCHC RBC AUTO-ENTMCNC: 33.5 G/DL (ref 31.5–35.7)
MCV RBC AUTO: 86.4 FL (ref 79–97)
MONOCYTES # BLD AUTO: 0.5 10*3/MM3 (ref 0.1–0.9)
MONOCYTES NFR BLD AUTO: 4.8 % (ref 5–12)
NEUTROPHILS NFR BLD AUTO: 8.7 10*3/MM3 (ref 1.7–7)
NEUTROPHILS NFR BLD AUTO: 87.3 % (ref 42.7–76)
NRBC BLD AUTO-RTO: 0.1 /100 WBC (ref 0–0.2)
PLATELET # BLD AUTO: 161 10*3/MM3 (ref 140–450)
PMV BLD AUTO: 8 FL (ref 6–12)
POTASSIUM SERPL-SCNC: 3.2 MMOL/L (ref 3.5–5.2)
PROT SERPL-MCNC: 4.7 G/DL (ref 6–8.5)
RBC # BLD AUTO: 2.74 10*6/MM3 (ref 4.14–5.8)
RH BLD: NEGATIVE
SODIUM SERPL-SCNC: 139 MMOL/L (ref 136–145)
T&S EXPIRATION DATE: NORMAL
WBC # BLD AUTO: 9.9 10*3/MM3 (ref 3.4–10.8)

## 2021-02-06 PROCEDURE — 86901 BLOOD TYPING SEROLOGIC RH(D): CPT | Performed by: INTERNAL MEDICINE

## 2021-02-06 PROCEDURE — 85025 COMPLETE CBC W/AUTO DIFF WBC: CPT | Performed by: INTERNAL MEDICINE

## 2021-02-06 PROCEDURE — 25010000002 PIPERACILLIN SOD-TAZOBACTAM PER 1 G: Performed by: INTERNAL MEDICINE

## 2021-02-06 PROCEDURE — 99233 SBSQ HOSP IP/OBS HIGH 50: CPT | Performed by: INTERNAL MEDICINE

## 2021-02-06 PROCEDURE — 80053 COMPREHEN METABOLIC PANEL: CPT | Performed by: NURSE PRACTITIONER

## 2021-02-06 PROCEDURE — 86900 BLOOD TYPING SEROLOGIC ABO: CPT | Performed by: INTERNAL MEDICINE

## 2021-02-06 PROCEDURE — 86850 RBC ANTIBODY SCREEN: CPT | Performed by: INTERNAL MEDICINE

## 2021-02-06 RX ORDER — POTASSIUM CHLORIDE 20 MEQ/1
40 TABLET, EXTENDED RELEASE ORAL ONCE
Status: COMPLETED | OUTPATIENT
Start: 2021-02-06 | End: 2021-02-06

## 2021-02-06 RX ADMIN — PIPERACILLIN AND TAZOBACTAM 3.38 G: 3; .375 INJECTION, POWDER, LYOPHILIZED, FOR SOLUTION INTRAVENOUS at 01:25

## 2021-02-06 RX ADMIN — Medication 10 ML: at 08:41

## 2021-02-06 RX ADMIN — RIVAROXABAN 15 MG: 15 TABLET, FILM COATED ORAL at 08:41

## 2021-02-06 RX ADMIN — RIVAROXABAN 15 MG: 15 TABLET, FILM COATED ORAL at 20:33

## 2021-02-06 RX ADMIN — PIPERACILLIN AND TAZOBACTAM 3.38 G: 3; .375 INJECTION, POWDER, LYOPHILIZED, FOR SOLUTION INTRAVENOUS at 17:42

## 2021-02-06 RX ADMIN — METOPROLOL TARTRATE 12.5 MG: 25 TABLET, FILM COATED ORAL at 20:33

## 2021-02-06 RX ADMIN — PANTOPRAZOLE SODIUM 40 MG: 40 TABLET, DELAYED RELEASE ORAL at 06:22

## 2021-02-06 RX ADMIN — URSODIOL 500 MG: 500 TABLET, FILM COATED ORAL at 08:41

## 2021-02-06 RX ADMIN — Medication 10 ML: at 20:33

## 2021-02-06 RX ADMIN — METOPROLOL TARTRATE 12.5 MG: 25 TABLET, FILM COATED ORAL at 08:41

## 2021-02-06 RX ADMIN — URSODIOL 500 MG: 500 TABLET, FILM COATED ORAL at 20:33

## 2021-02-06 RX ADMIN — ALLOPURINOL 150 MG: 300 TABLET ORAL at 08:41

## 2021-02-06 RX ADMIN — POTASSIUM CHLORIDE 40 MEQ: 1500 TABLET, EXTENDED RELEASE ORAL at 11:21

## 2021-02-06 RX ADMIN — PIPERACILLIN AND TAZOBACTAM 3.38 G: 3; .375 INJECTION, POWDER, LYOPHILIZED, FOR SOLUTION INTRAVENOUS at 08:48

## 2021-02-06 NOTE — PROGRESS NOTES
Hematology/Oncology Inpatient Progress Note    PATIENT NAME: Tristen Garcia  : 1935  MRN: [id number]    CHIEF COMPLAINT: Altered mental status    HISTORY OF PRESENT ILLNESS:    Tristen Garcia is a 85 y.o.  male who presented to [facility] on 2/3/2021 via EMS. Patient reportedly called EMS for lifting assistance at home.  The patient had been taking a bath and was unable to get out of his bathtub.  On EMS arrival, the patient was reportedly unresponsive with a slow heart rate. Patient slowly became more alert.  Per family, patient has reportedely been confused for several days. The family reported poor oral intake in the last 24 hours and extensive nausea. Patient denied diarrhea and vomiting. Labs in the ED were significant for WBC 24.60, hemoglobin 7.9, BUN 30, creatinine 1.76, EGFR 37, alkaline phosphatase 166, and troponin 0.285.  CT of the head showed age-appropriate atrophy, unchanged from patient's recent CT performed 12/10/2020.  No acute intracranial findings. Patient was admitted for further evaluation and management.     21  Hematology/Oncology was consulted as patient is known to our service and followed by Dr. 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  Cholangiocarcinoma favored.  Patient was started on chemotherapy with cisplatin and gemcitabine combination on 2020.  On 2021, CT of the abdomen and pelvis was performed that resulted new low-density lesions within the right and left hepatic lobes consistent with new hepatic metastatic disease.  It was recommended for patient to switch his chemotherapy from cisplatin/gemcitabine to FOLFOX.  Patient was started on FOLFOX on 2020.     Of note, patient was recently diagnosed with pulmonary emboli and acute left lower extremity DVT in the common femoral, proximal femoral, mid femoral, distal femoral, popliteal, posterior tibial, and gastrocnemius.  Patient was started on  "Xarelto.     He  has a past medical history of Arthritis, Cancer (CMS/HCC), COPD (chronic obstructive pulmonary disease) (CMS/HCC), Elevated cholesterol, GERD (gastroesophageal reflux disease), Hyperlipidemia, and Hypertension.     PCP: Ann Marie Hodgson MD    History of present illness was reviewed and is unchanged from the previous visit. 02/05/21     Subjective     ROS:  Review of Systems   Unable to perform ROS: Mental status change        MEDICATIONS:    Scheduled Meds:  allopurinol, 150 mg, Oral, Daily  aspirin, 325 mg, Oral, Once  epoetin lan-epbx, 40,000 Units, Subcutaneous, Weekly  metoprolol tartrate, 12.5 mg, Oral, Q12H  pantoprazole, 40 mg, Oral, QAM  piperacillin-tazobactam, 3.375 g, Intravenous, Q8H  rivaroxaban, 15 mg, Oral, BID  [START ON 2/12/2021] rivaroxaban, 20 mg, Oral, Daily With Dinner  sodium chloride, 10 mL, Intravenous, Q12H  ursodiol, 500 mg, Oral, BID       Continuous Infusions:  lactated ringers, 75 mL/hr, Last Rate: 75 mL/hr (02/05/21 2101)       PRN Meds:  •  acetaminophen **OR** acetaminophen **OR** acetaminophen  •  ondansetron **OR** ondansetron  •  sodium chloride  •  sodium chloride     ALLERGIES:  No Known Allergies    Objective    VITALS:   /76 (BP Location: Right arm, Patient Position: Lying)   Pulse 63   Temp 97.8 °F (36.6 °C) (Oral)   Resp 17   Ht 170.2 cm (67\")   Wt 84.6 kg (186 lb 8.2 oz)   SpO2 95%   BMI 29.21 kg/m²     PHYSICAL EXAM:  Physical Exam  Constitutional:       General: He is not in acute distress.     Appearance: He is obese. He is not toxic-appearing.   HENT:      Head: Normocephalic and atraumatic.      Nose:      Comments: Nasal cannula  Eyes:      General: No scleral icterus.        Right eye: No discharge.         Left eye: No discharge.   Neck:      Musculoskeletal: Normal range of motion. No neck rigidity.   Cardiovascular:      Rate and Rhythm: Normal rate.   Pulmonary:      Effort: Pulmonary effort is normal.   Chest:      Comments: " Right chest port-a-cath  Abdominal:      General: There is no distension.      Palpations: Abdomen is soft.      Tenderness: There is no abdominal tenderness.   Musculoskeletal: Normal range of motion.      Right lower leg: Edema present.      Left lower leg: Edema present.   Skin:     General: Skin is warm.   Neurological:      Motor: Weakness present.   Psychiatric:         Mood and Affect: Mood normal.         Behavior: Behavior normal.      Comments:  Patient is confused.  He keeps repeating okay to every question.        I have reexamined the patient and the results are consistent with the previously documented exam. Katiedevang Alexis MD   RECENT LABS:  Lab Results (last 24 hours)     Procedure Component Value Units Date/Time    Blood Culture - Blood, Chest, Right [[id number]] Collected: 02/03/21 1509    Specimen: Blood from Chest, Right Updated: 02/06/21 1515     Blood Culture No growth at 3 days    Comprehensive Metabolic Panel [[id number]]  (Abnormal) Collected: 02/06/21 0240    Specimen: Blood Updated: 02/06/21 0334     Glucose 89 mg/dL      BUN 21 mg/dL      Creatinine 1.30 mg/dL      Sodium 139 mmol/L      Potassium 3.2 mmol/L      Chloride 101 mmol/L      CO2 30.0 mmol/L      Calcium 7.3 mg/dL      Total Protein 4.7 g/dL      Albumin 2.50 g/dL      ALT (SGPT) 13 U/L      AST (SGOT) 30 U/L      Alkaline Phosphatase 101 U/L      Total Bilirubin 0.4 mg/dL      eGFR Non African Amer 52 mL/min/1.73      Globulin 2.2 gm/dL      A/G Ratio 1.1 g/dL      BUN/Creatinine Ratio 16.2     Anion Gap 8.0 mmol/L     Narrative:      GFR Normal >60  Chronic Kidney Disease <60  Kidney Failure <15      CBC & Differential [[id number]]  (Abnormal) Collected: 02/06/21 0240    Specimen: Blood Updated: 02/06/21 0330    Narrative:      The following orders were created for panel order CBC & Differential.  Procedure                               Abnormality         Status                     ---------                                -----------         ------                     CBC Auto Differential[[id number]]        Abnormal            Final result                 Please view results for these tests on the individual orders.    CBC Auto Differential [[id number]]  (Abnormal) Collected: 02/06/21 0240    Specimen: Blood Updated: 02/06/21 0330     WBC 9.90 10*3/mm3      RBC 2.74 10*6/mm3      Hemoglobin 7.9 g/dL      Hematocrit 23.7 %      MCV 86.4 fL      MCH 28.9 pg      MCHC 33.5 g/dL      RDW 23.5 %      RDW-SD 70.0 fl      MPV 8.0 fL      Platelets 161 10*3/mm3      Neutrophil % 87.3 %      Lymphocyte % 7.6 %      Monocyte % 4.8 %      Eosinophil % 0.1 %      Basophil % 0.2 %      Neutrophils, Absolute 8.70 10*3/mm3      Lymphocytes, Absolute 0.80 10*3/mm3      Monocytes, Absolute 0.50 10*3/mm3      Eosinophils, Absolute 0.00 10*3/mm3      Basophils, Absolute 0.00 10*3/mm3      nRBC 0.1 /100 WBC     Hemoglobin & Hematocrit, Blood [[id number]]  (Abnormal) Collected: 02/05/21 2208    Specimen: Blood Updated: 02/05/21 2254     Hemoglobin 7.6 g/dL      Hematocrit 23.2 %     Hepatic Function Panel [[id number]]  (Abnormal) Collected: 02/05/21 1608    Specimen: Blood Updated: 02/05/21 1653     Total Protein 4.8 g/dL      Albumin 2.70 g/dL      ALT (SGPT) 13 U/L      AST (SGOT) 37 U/L      Alkaline Phosphatase 106 U/L      Total Bilirubin 0.4 mg/dL      Bilirubin, Direct 0.2 mg/dL      Bilirubin, Indirect 0.2 mg/dL     Blood Culture - Blood, Blood, Central Line [[id number]] Collected: 02/03/21 1618    Specimen: Blood, Central Line Updated: 02/05/21 1631     Blood Culture No growth at 2 days    Hemoglobin & Hematocrit, Blood [[id number]]  (Abnormal) Collected: 02/05/21 1608    Specimen: Blood Updated: 02/05/21 1626     Hemoglobin 7.4 g/dL      Hematocrit 22.3 %           PENDING RESULTS: MMA    IMAGING REVIEWED:  No radiology results for the last day    Assessment/Plan   ASSESSMENT:  1. 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   Switched to FOLFOX due to evidence of disease progression.  FOLFOX last received 2/1/2020.  Continue in the outpatient setting if improves.  Patient is followed by Dr. Garcia.  2. Normocytic normochromic anemia: Consider relation to chemotherapy or anemia of chronic disease.  Receiving Retacrit weekly outpatient, last received 1/14/2021. Anemia studies show likely anemia of chronic disease.  Monitor hemoglobin  3. Leukocytosis: Urinalysis negative for leukocytes or nitrites.  Blood cultures obtained.  Chest x-ray showed no acute cardiopulmonary process. On IV antibiotics. Continue to monitor. Improving.   4. Acute kidney injury: Creatinine 1.76 on admission. Nephrology consulted. Improving.   5. Confusion: CT head showed no acute intracranial process.  Urinalysis negative. LFTs normal on admission, unlikely to be hepatic encephalopathy. Etiology unknown.   6. Weakness: PT/OT consulted  7. History of pulmonary emboli and DVT: On Xarelto  8. HTN: managed per primary team  9. Palliative care consulted to discuss goals of care     PLAN  1. CBC daily  2. Transfuse 1 unit pRBC as needed for hemoglobin <7.5-patient received 2 units on 2/5/2021.   3. Mental status remains the same.  Hemoglobin today 7.9 g per DL.  We will continue to follow.  4. Administer Retacrit 40,000 units subcutaneous injection tomorrow if hemoglobin <10 g/dL  5. Continue Xarelto  6. Continue IV antibiotics  7. Continue IV fluids per nephrology  8. Resume FOLFOX outpatient- due 2/15/2021      Electronically signed by Katie Alexis MD, 02/06/21, 3:24 PM EST.

## 2021-02-06 NOTE — PROGRESS NOTES
LOS: 3 days   Patient Care Team:  Ann Marie Hodgson MD as PCP - General (Family Medicine)      Subjective     Interval History: no events overnight    Subjective: limited hx d/t pts AMS. Per RN, he is more alert today but remains confused. He reported nausea and vomiting, but then also said he is tolerating diet without difficulty. He reports feeling ok, no pain.     Labs - k 3.2, cr 1.3, lfts wnl, hgb 7.9      1/14/2021 CT abdomen/pelvis WO -large right hepatic lobe mass consistent with patient's known cholangiocarcinoma, new low-density lesions in the right and left hepatic lobes, nonspecific 4 mm or less bilateral pulmonary nodules, 2.3 cm lesion within the L2 vertebral body, 1.8 cm low-density left thyroid nodule  9/25/2020 MRCP -focal lobulated large area of signal laterality in the liver parenchyma in the inferior tip of the right lobe of the liver which could represent a primary liver cancer versus liver parenchymal infection, cholelithiasis without evidence of choledocholithiasis  9/23/2020 abdominal US -patchy areas of hypoechogenicity in the right lobe of the liver of uncertain etiology which could be caused by liver cancer, metastatic liver disease, or cirrhosis        Endo History:  11/2020 EGD/colonoscopy (Dr. Mojica) -3 gastric body polyps (HP), 4 colon polyps (TA), diverticulosis, grade 2 internal hemorrhoids      ROS:   Review of Systems   Constitutional: Negative.    HENT: Negative.    Respiratory: Negative.    Cardiovascular: Negative.    Gastrointestinal: Positive for nausea. Negative for abdominal pain, blood in stool, constipation and diarrhea.   Genitourinary: Negative.    Musculoskeletal: Negative.    Skin: Negative.    Neurological: Negative.    Psychiatric/Behavioral: Negative.           Medication Review:     Current Facility-Administered Medications:   •  acetaminophen (TYLENOL) tablet 325 mg, 325 mg, Oral, Q4H PRN **OR** acetaminophen (TYLENOL) 160 MG/5ML solution 325 mg, 325 mg, Oral,  Q4H PRN **OR** acetaminophen (TYLENOL) suppository 650 mg, 650 mg, Rectal, Q4H PRN, Enrike Sherwood MD  •  allopurinol (ZYLOPRIM) tablet 150 mg, 150 mg, Oral, Daily, Enrike Sherwood MD, 150 mg at 02/06/21 0841  •  aspirin tablet 325 mg, 325 mg, Oral, Once, Enrike Sherwood MD  •  epoetin lan-epbx (RETACRIT) injection 40,000 Units, 40,000 Units, Subcutaneous, Weekly, Vashti Royal, APRN  •  lactated ringers infusion, 75 mL/hr, Intravenous, Continuous, Mitch Stone MD, Last Rate: 75 mL/hr at 02/05/21 2101, 75 mL/hr at 02/05/21 2101  •  metoprolol tartrate (LOPRESSOR) half tablet 12.5 mg, 12.5 mg, Oral, Q12H, Mitch Stone MD, 12.5 mg at 02/06/21 0841  •  ondansetron (ZOFRAN) tablet 4 mg, 4 mg, Oral, Q6H PRN **OR** ondansetron (ZOFRAN) injection 4 mg, 4 mg, Intravenous, Q6H PRN, Enrike Sherwood MD  •  pantoprazole (PROTONIX) EC tablet 40 mg, 40 mg, Oral, QAM, Enrike Sherwood MD, 40 mg at 02/06/21 0622  •  piperacillin-tazobactam (ZOSYN) IVPB 3.375 g in 100 mL NS (CD), 3.375 g, Intravenous, Q8H, Enrike Sherwood MD, 3.375 g at 02/06/21 0848  •  rivaroxaban (XARELTO) tablet 15 mg, 15 mg, Oral, BID, Enrike Sherwood MD, 15 mg at 02/06/21 0841  •  [START ON 2/12/2021] rivaroxaban (XARELTO) tablet 20 mg, 20 mg, Oral, Daily With Dinner, Daniela Ulloa, APRPOORNIMA  •  sodium chloride 0.9 % flush 10 mL, 10 mL, Intravenous, PRN, Enrike Sherwood MD, 10 mL at 02/03/21 1540  •  sodium chloride 0.9 % flush 10 mL, 10 mL, Intravenous, Q12H, Enrike Sherwood MD, 10 mL at 02/06/21 0841  •  sodium chloride 0.9 % flush 10 mL, 10 mL, Intravenous, PRN, Enrike Sherwood MD  •  ursodiol (ACTIGALL) tablet 500 mg, 500 mg, Oral, BID, Enrike Sherwood MD, 500 mg at 02/06/21 0841      Objective     Vital Signs  Vitals:    02/05/21 1711 02/05/21 1941 02/06/21 0410 02/06/21 0840   BP: 122/68 118/60 146/82 150/78   BP Location:  Left arm Right arm Left arm   Patient Position:  Lying Lying Lying   Pulse: 68 60 62 71   Resp: 17 18 17    Temp: 97.7 °F (36.5 °C) 97.6 °F (36.4 °C) 97.4 °F (36.3 °C)     TempSrc: Axillary Axillary Oral    SpO2: 95% 98% 99% 99%   Weight:   84.6 kg (186 lb 8.2 oz)    Height:           Physical Exam: resting in bed     General Appearance:    Awake and alert, in no acute distress, confused   Head:    Normocephalic, without obvious abnormality   Eyes:          Conjunctivae normal, anicteric sclera   Throat:   No oral lesions, no thrush, oral mucosa moist   Neck:   No adenopathy, supple, no JVD   Lungs:     Respirations regular, even and unlabored   Abdomen:     Soft, non-tender, non-distended, no rebound or guarding, no hepatosplenomegaly   Rectal:     Deferred   Extremities:   No edema, no cyanosis, moves equally bilaterally   Skin:   No bruising, no rash, no jaundice        Results Review:    Lab Results (last 24 hours)     Procedure Component Value Units Date/Time    Comprehensive Metabolic Panel [[id number]]  (Abnormal) Collected: 02/06/21 0240    Specimen: Blood Updated: 02/06/21 0334     Glucose 89 mg/dL      BUN 21 mg/dL      Creatinine 1.30 mg/dL      Sodium 139 mmol/L      Potassium 3.2 mmol/L      Chloride 101 mmol/L      CO2 30.0 mmol/L      Calcium 7.3 mg/dL      Total Protein 4.7 g/dL      Albumin 2.50 g/dL      ALT (SGPT) 13 U/L      AST (SGOT) 30 U/L      Alkaline Phosphatase 101 U/L      Total Bilirubin 0.4 mg/dL      eGFR Non African Amer 52 mL/min/1.73      Globulin 2.2 gm/dL      A/G Ratio 1.1 g/dL      BUN/Creatinine Ratio 16.2     Anion Gap 8.0 mmol/L     Narrative:      GFR Normal >60  Chronic Kidney Disease <60  Kidney Failure <15      CBC & Differential [[id number]]  (Abnormal) Collected: 02/06/21 0240    Specimen: Blood Updated: 02/06/21 0330    Narrative:      The following orders were created for panel order CBC & Differential.  Procedure                               Abnormality         Status                     ---------                               -----------         ------                     CBC Auto Differential[[id number]]        Abnormal             Final result                 Please view results for these tests on the individual orders.    CBC Auto Differential [[id number]]  (Abnormal) Collected: 02/06/21 0240    Specimen: Blood Updated: 02/06/21 0330     WBC 9.90 10*3/mm3      RBC 2.74 10*6/mm3      Hemoglobin 7.9 g/dL      Hematocrit 23.7 %      MCV 86.4 fL      MCH 28.9 pg      MCHC 33.5 g/dL      RDW 23.5 %      RDW-SD 70.0 fl      MPV 8.0 fL      Platelets 161 10*3/mm3      Neutrophil % 87.3 %      Lymphocyte % 7.6 %      Monocyte % 4.8 %      Eosinophil % 0.1 %      Basophil % 0.2 %      Neutrophils, Absolute 8.70 10*3/mm3      Lymphocytes, Absolute 0.80 10*3/mm3      Monocytes, Absolute 0.50 10*3/mm3      Eosinophils, Absolute 0.00 10*3/mm3      Basophils, Absolute 0.00 10*3/mm3      nRBC 0.1 /100 WBC     Hemoglobin & Hematocrit, Blood [[id number]]  (Abnormal) Collected: 02/05/21 2208    Specimen: Blood Updated: 02/05/21 2254     Hemoglobin 7.6 g/dL      Hematocrit 23.2 %     Hepatic Function Panel [[id number]]  (Abnormal) Collected: 02/05/21 1608    Specimen: Blood Updated: 02/05/21 1653     Total Protein 4.8 g/dL      Albumin 2.70 g/dL      ALT (SGPT) 13 U/L      AST (SGOT) 37 U/L      Alkaline Phosphatase 106 U/L      Total Bilirubin 0.4 mg/dL      Bilirubin, Direct 0.2 mg/dL      Bilirubin, Indirect 0.2 mg/dL     Blood Culture - Blood, Blood, Central Line [[id number]] Collected: 02/03/21 1618    Specimen: Blood, Central Line Updated: 02/05/21 1631     Blood Culture No growth at 2 days    Hemoglobin & Hematocrit, Blood [[id number]]  (Abnormal) Collected: 02/05/21 1608    Specimen: Blood Updated: 02/05/21 1626     Hemoglobin 7.4 g/dL      Hematocrit 22.3 %     Blood Culture - Blood, Chest, Right [[id number]] Collected: 02/03/21 1509    Specimen: Blood from Chest, Right Updated: 02/05/21 1515     Blood Culture No growth at 2 days    Sodium, Urine, Random - Urine, Clean Catch [[id number]] Collected: 02/05/21 0833    Specimen: Urine, Clean Catch Updated:  02/05/21 0938     Sodium, Urine 136 mmol/L     Narrative:      Reference intervals for random urine have not been established.  Clinical usage is dependent upon physician's interpretation in combination with other laboratory tests.             Imaging Results (Last 24 Hours)     ** No results found for the last 24 hours. 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  Unlikely to have any true biliary obstruction. LFTs normal today.   Diet as tolerated.  Monitor H/H, transfuse if needed.   No plans for endoscopy unless overt GI bleeding.   Antiemetics prn.         Rosy Palma, APRN  02/06/21  09:37 EST

## 2021-02-06 NOTE — PLAN OF CARE
Goal Outcome Evaluation:  Plan of Care Reviewed With: patient  Progress: no change   Patient has only been to answer yes and no questions.  Takes medications crushed in applesauce or pudding, can take small ones by themselves.  Ate good today was able to feed himself most of the meals. Family is waiting to advise case management of the facility for patient to attend.  Cancer doctor is saying family may need to put patient on hospice related to his condition and the progression of it.  Continue to monitor.

## 2021-02-06 NOTE — PROGRESS NOTES
Continued Stay Note  JORDAN Churchill     Patient Name: Tristen Garcia  MRN: [id number]  Today's Date: 2/6/2021    Admit Date: 2/3/2021    Discharge Plan     Row Name 02/06/21 1819       Plan    Plan Comments  I spoke with grand dtr /Ms Ames - she wants to discuss her grandfather with PCP and Onc. - I sent a Secure Chat to both. No response ,as yet                Ada Anderson RN

## 2021-02-06 NOTE — PLAN OF CARE
Goal Outcome Evaluation:  Plan of Care Reviewed With: patient  Progress: improving  Outcome Summary: Patient more alert but remains confused.

## 2021-02-06 NOTE — PROGRESS NOTES
[facility] Medicine Services  INPATIENT PROGRESS NOTE  223/1   Hospitalist Team  LOS 3 days      Patient Care Team:  Ann Marie Hodgson MD as PCP - General (Family Medicine)      Patient was examined with relevant and adequate PPE keeping in mind the current coronavirus pandemic. Minimum of 10 minutes to don and doff PPE.    Chief Complaint / Subjective  Chief Complaint   Patient presents with   • Altered Mental Status   • Syncope       HPI (4 hpi elements or status of 3 chronic)  85-year-old male called EMS today for lifting assistance.  The patient had taken a bath and was going to see a physician.  He could not get out of the walk-in bathtub.  EMS got the patient up and then the patient became unresponsive.  He had a slow pulse and it was noted to be faint by EMS.  It took him several minutes to get him on the monitor the patient then slowly became more alert.  The family reports that he has been confused for several days.  They states he has been treating for a cancer around his liver.  The patient has had poor oral intake in the last 24 hours.  He is complained of extensive nausea.  There is been no vomiting or diarrhea.  There is been no reports of melena hematemesis or hematochezia.        Patient was admitted here on 21 January for acute PE and DVT.  Since then he has been on anticoagulation.  Per the son he went into check on his father today and he was slumped in the bathtub.  Conscious but confused.  He was subsequently transferred to the hospital.  Patient apparently lives mostly by himself with his son checking in on him.  Sees Dr. Pope    Not making much sense today as well.  No history obtainable from him.   02/04/21, 5:04 PM EST    Continues to be confused.  Does not follow directions.  No change from prior.  Family is resistant to giving him blood. 02/05/21, 11:40 AM EST    More awake than he has ever been.  He said 1 word today 02/06/21, 1:52 PM EST          Altered Mental  Status    Syncope          History taken from: chart RN    Review of Systems   Unable to perform ROS: mental status change   Cardiovascular: Positive for syncope.   Psychiatric/Behavioral: Positive for altered mental status.              Family History   Problem Relation Age of Onset   • Kidney failure Father        Past Medical History:   Diagnosis Date   • Arthritis    • Cancer (CMS/[facility])    • COPD (chronic obstructive pulmonary disease) (CMS/[facility])    • Elevated cholesterol    • GERD (gastroesophageal reflux disease)    • Hyperlipidemia    • Hypertension        Social History     Socioeconomic History   • Marital status: Single     Spouse name: Not on file   • Number of children: Not on file   • Years of education: Not on file   • Highest education level: Not on file   Tobacco Use   • Smoking status: Former Smoker   • Smokeless tobacco: Never Used   Substance and Sexual Activity   • Alcohol use: Not Currently   • Drug use: Never   • Sexual activity: Defer   Social History Narrative    Lives mostly by himself.  Son checks in on him.       Prior to Admission medications    Medication Sig Start Date End Date Taking? Authorizing Provider   allopurinol (ZYLOPRIM) 300 MG tablet Take 150 mg by mouth Daily. 11/22/20  Yes ProviderWendy MD   atenolol (TENORMIN) 25 MG tablet Take 25 mg by mouth Daily.   Yes ProviderWendy MD   calcium-vitamin D (Oscal 500/200 D-3) 500-200 MG-UNIT per tablet Take 1 tablet by mouth Daily. 12/30/20 12/30/21 Yes Macey Harrison APRN   Ferretts 325 (106 Fe) MG tablet Take 1 tablet by mouth Daily. 10/29/20  Yes Wendy Rivers MD   losartan (COZAAR) 100 MG tablet Take 100 mg by mouth Daily.   Yes ProviderWendy MD   magnesium oxide (MAG-OX) 400 MG tablet Take 1 tablet by mouth 2 (Two) Times a Day. 12/30/20  Yes Macey Harrison APRN   omeprazole (priLOSEC) 20 MG capsule Take 40 mg by mouth Daily.   Yes Wendy Rivers MD   rivaroxaban (XARELTO) 15 MG tablet  "Take 15 mg by mouth 2 (two) times a day. For 3 weeks.....until 02/11/21. Then start Xarelto 20mg daily 02/12/21   Yes ProviderWendy MD   ursodiol (ACTIGALL) 500 MG tablet Take 500 mg by mouth 2 (two) times a day. 12/1/20  Yes ProviderWendy MD   amLODIPine (NORVASC) 10 MG tablet Take 10 mg by mouth Daily.    Provider, MD Wendy   furosemide (LASIX) 40 MG tablet Take 1 tablet by mouth Daily. 10/23/20   Jese Mason MD        Objective    Physical Exam     Vital Signs  Temp:  [97.4 °F (36.3 °C)-98.5 °F (36.9 °C)] 97.8 °F (36.6 °C)  Heart Rate:  [60-78] 63  Resp:  [16-18] 17  BP: (111-156)/(60-82) 156/76    Oxygen Therapy  SpO2: 95 %  Pulse Oximetry Type: Intermittent  Device (Oxygen Therapy): nasal cannula  Flow (L/min): 1.5  Flowsheet Rows      First Filed Value   Admission Height  170.2 cm (67\") Documented at 02/03/2021 1417   Admission Weight  90 kg (198 lb 6.6 oz) Documented at 02/03/2021 1417        Weight change: -8 kg (-17 lb 10.2 oz)   Intake & Output (last 3 days)       02/03 0701 - 02/04 0700 02/04 0701 - 02/05 0700 02/05 0701 - 02/06 0700 02/06 0701 - 02/07 0700    P.O. 120 600 290 360    I.V. (mL/kg)  1068 (11.5) 1900 (22.5)     Blood   700     IV Piggyback 500       Total Intake(mL/kg) 620 (7.1) 1668 (18) 2890 (34.2) 360 (4.3)    Urine (mL/kg/hr) 450       Total Output 450       Net +170 +1668 +2890 +360            Urine Unmeasured Occurrence 2 x 6 x 2 x 1 x    Stool Unmeasured Occurrence  2 x 4 x         Lines, Drains & Airways    Active LDAs     Name:   Placement date:   Placement time:   Site:   Days:    Peripheral IV 02/03/21 1550 Left Hand   02/03/21    1550    Hand   1    Single Lumen Implantable Port Right Subclavian   --    --    Subclavian                   Physical Exam:    Physical Exam  Constitutional:       General: He is not in acute distress.     Appearance: Normal appearance. He is well-developed. He is not ill-appearing, toxic-appearing or " diaphoretic.   HENT:      Head: Normocephalic and atraumatic.      Right Ear: Ear canal and external ear normal.      Left Ear: Ear canal and external ear normal.      Nose: Nose normal. No congestion or rhinorrhea.      Mouth/Throat:      Mouth: Mucous membranes are dry.      Pharynx: No oropharyngeal exudate.      Comments: Edentulous  Eyes:      General: No scleral icterus.        Right eye: No discharge.         Left eye: No discharge.      Extraocular Movements: Extraocular movements intact.      Conjunctiva/sclera: Conjunctivae normal.      Pupils: Pupils are equal, round, and reactive to light.   Neck:      Musculoskeletal: Normal range of motion and neck supple. No neck rigidity or muscular tenderness.      Thyroid: No thyromegaly.      Vascular: No carotid bruit or JVD.      Trachea: No tracheal deviation.   Cardiovascular:      Rate and Rhythm: Normal rate and regular rhythm.      Heart sounds: Normal heart sounds. No murmur. No friction rub. No gallop.       Comments: Absent pedal pulses  Pulmonary:      Effort: Pulmonary effort is normal. No respiratory distress.      Breath sounds: Normal breath sounds. No stridor. No wheezing, rhonchi or rales.      Comments: Right chest [id number] present  Chest:      Chest wall: No tenderness.   Abdominal:      General: Bowel sounds are normal. There is distension.      Palpations: There is mass.      Tenderness: There is no abdominal tenderness. There is no guarding or rebound.      Hernia: No hernia is present.      Comments: Very large hepatomegaly firm.  Reaching out 6 fingerbreadth below the costal margin in the right midclavicular line.  Flanks are full indicating some degree of ascites.   Musculoskeletal: Normal range of motion.         General: No swelling, tenderness, deformity or signs of injury.      Right lower leg: Edema (+2) present.      Left lower leg: Edema (+2) present.   Lymphadenopathy:      Cervical: No cervical adenopathy.   Skin:      General: Skin is warm and dry.      Coloration: Skin is not jaundiced or pale.      Findings: No bruising, erythema or rash.   Neurological:      General: No focal deficit present.      Mental Status: He is alert and oriented to person, place, and time. Mental status is at baseline.      Cranial Nerves: No cranial nerve deficit.      Sensory: No sensory deficit.      Motor: No weakness or abnormal muscle tone.      Coordination: Coordination normal.   Psychiatric:         Mood and Affect: Mood normal.         Behavior: Behavior normal.         Thought Content: Thought content normal.         Judgment: Judgment normal.   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        Estimated Creatinine Clearance: 43.2 mL/min (A) (by C-G formula based on SCr of 1.3 mg/dL (H)).    Code Status and Medical Interventions:   Ordered at: 02/03/21 3996     Code Status:    CPR     Medical Interventions (Level of Support Prior to Arrest):    Full       MEDICAL DECISION MAKING COMPLEXITY BY PROBLEM:     Acute ascending cholangitis  Empiric antibiotics  Await cultures     Cholangiocarcinoma  Consult oncology  Supportive treatment        Renal failure/insufficiency/injury:  Hydration  Supportive treatment  Cut down or hold ACE inhibitors  Avoid nephrotoxic medications   Address diuretics    Cultures negative but patient responding to antibiotics.  Prognosis very poor.  The patient should be  hospice.  Family refusing blood prescribed by nephrology  Care coordination with nursing for current care 02/04/21 4:54 PM EST.    Cultures negative.  Renal function better.  Hemoglobin continues to drop.  Shall consult GI but not a candidate for invasive work-up.  Should be hospice    Continue status quo.  Shall review with  regarding discharge planning prognosis very poor.  Should be hospice    Plan for disposition:            Continued Care and Services - Admitted Since 2/3/2021    Coordination has not been started for this encounter.        Historical & Objective Data     Results Review:    I reviewed the patient's new lab and radiology results. 02/06/21     Results from last 7 days   Lab Units 02/06/21  0240 02/05/21  2208 02/05/21  1608 02/05/21  0506 02/04/21  0413   WBC 10*3/mm3 9.90  --   --  8.20 12.50*   HEMOGLOBIN g/dL 7.9* 7.6* 7.4* 6.1* 7.1*   HEMATOCRIT % 23.7* 23.2* 22.3* 18.8* 22.6*   MCV fL 86.4  --   --  88.1 89.0   MCH pg 28.9  --   --  28.7 27.9   MPV fL 8.0  --   --  8.0 8.0   RDW % 23.5*  --   --  28.3* 29.0*   PLATELETS 10*3/mm3 161  --   --  194 282     Results from last 7 days   Lab Units 02/06/21  0240 02/05/21  1608 02/05/21  0506 02/04/21  0302 02/03/21  1422   SODIUM mmol/L 139  --  136 140 138   POTASSIUM mmol/L 3.2*  --  3.5 4.6 5.1   CHLORIDE mmol/L 101  --  98 103 100   CO2 mmol/L 30.0*  --  28.0 26.0 20.0*   BUN mg/dL 21  --  23 26* 30*   CREATININE mg/dL 1.30*  --  1.42* 1.64* 1.76*   CALCIUM mg/dL 7.3*  --  7.5* 7.8* 8.2*   MAGNESIUM mg/dL  --   --   --   --  1.6   BILIRUBIN mg/dL 0.4 0.4  --   --  0.2   ALK PHOS U/L 101 106  --   --  166*   ALT (SGPT) U/L 13 13  --   --  14   AST (SGOT) U/L 30 37  --   --  37   GLUCOSE mg/dL 89  --  88 93 97     Inflammatory Biomarkers  Results from last 7 days   Lab Units 02/04/21  1522   FERRITIN ng/mL 2,298.00*   LDH U/L 400*     Lab Results   Component Value Date    PHOS 3.5 02/05/2021     No results found for: HGBA1C  No results  found for: CHOL, CHLPL, TRIG, HDL, LDL, LDLDIRECT  Lab Results   Component Value Date    LIPASE 13 02/03/2021     Results from last 7 days   Lab Units 02/03/21  1422   INR  1.32*           Pathology  Lab Results   Lab Value Date/Time    INTRAOP  10/14/2020 1009     TP: Positive for malignancy.     SHIRA/sms       FINALDX  10/14/2020 1009     Liver, CT-guided core biopsy:    Well to moderately differentiated adenocarcinoma, see COMMENT     JPR/sms       COMDX  10/14/2020 1009     The biopsy shows a well to moderately differentiated adenocarcinoma. Immunohistochemistry was performed utilizing appropriate controls and the tumor is strongly positive for only CK7. The CK20, CDX2, TTF-1, napsin A, chromogranin, synaptophysin and CD56 are all negative. This is a nonspecific staining pattern but can be seen in tumors of pancreatobiliary origin, including cholangiocarcinoma which is favored in this case. Adenocarcinomas of the upper gastrointestinal tract can also display only CK7 positivity and should be excluded clinically. This staining pattern argues against, but does not completely exclude a metastasis from a primary lung adenocarcinoma as approximately 10 to 15% of lung adenocarcinomas are negative for TTF-1 and napsin A. This staining pattern does exclude a neuroendocrine tumor and adenocarcinoma of colonic origin. Further clinical workup with imaging studies and serum tumor markers is recommended.        COVID19   Date Value Ref Range Status   02/04/2021 Not Detected Not Detected - Ref. Range Final        Microbiology Results (last 10 days)     Procedure Component Value - Date/Time    COVID PRE-OP / PRE-PROCEDURE SCREENING ORDER (NO ISOLATION) - Swab, Nasopharynx [[id number]]  (Normal) Collected: 02/04/21 1522    Lab Status: Final result Specimen: Swab from Nasopharynx Updated: 02/04/21 2341    Narrative:      The following orders were created for panel order COVID PRE-OP / PRE-PROCEDURE SCREENING ORDER (NO ISOLATION) -  Swab, Nasopharynx.  Procedure                               Abnormality         Status                     ---------                               -----------         ------                     COVID-19,APTIMA PANTHER,...[[id number]]  Normal              Final result                 Please view results for these tests on the individual orders.    COVID-19,APTIMA PANTHER,[facility], NP/OP SWAB IN UTM/VTM/[facility] TRANSPORT MEDIA,24 HR TAT - Swab, Nasopharynx [[id number]]  (Normal) Collected: 02/04/21 1522    Lab Status: Final result Specimen: Swab from Nasopharynx Updated: 02/04/21 2341     COVID19 Not Detected    Narrative:      Fact sheet for providers: https://www.fda.gov/media/142937/download     Fact sheet for patients: https://www.fda.gov/media/920819/download    Test performed by RT PCR.    Blood Culture - Blood, Blood, Central Line [[id number]] Collected: 02/03/21 1618    Lab Status: Preliminary result Specimen: Blood, Central Line Updated: 02/05/21 1631     Blood Culture No growth at 2 days    Blood Culture - Blood, Chest, Right [[id number]] Collected: 02/03/21 1509    Lab Status: Preliminary result Specimen: Blood from Chest, Right Updated: 02/05/21 1515     Blood Culture No growth at 2 days          ECG/EMG Results (most recent)     Procedure Component Value Units Date/Time    ECG 12 Lead [[id number]] Collected: 02/03/21 1429     Updated: 02/04/21 1443     QT Interval 342 ms     Narrative:      HEART RATE= 111  bpm  RR Interval= 540  ms  AZ Interval= 150  ms  P Horizontal Axis= 23  deg  P Front Axis= 96  deg  QRSD Interval= 103  ms  QT Interval= 342  ms  QRS Axis= 87  deg  T Wave Axis= 117  deg  - ABNORMAL ECG -  Sinus tachycardia  Low voltage, extremity leads  Nonspecific repol abnormality, lateral leads  When compared with ECG of 18-Oct-2020 18:41:11,  Significant rate increase  Significant axis, voltage or hypertrophy change  Electronically Signed By: Miky Mena) 04-Feb-2021 14:40:17  Date and Time  of Study: 2021-02-03 14:29:53          Results for orders placed during the hospital encounter of 01/21/21   Duplex Venous Lower Extremity - Left CAR    Narrative · Acute left lower extremity deep vein thrombosis noted in the common   femoral, proximal femoral, mid femoral, distal femoral, popliteal,   posterial tibial and gastrocnemius.  · Acute left lower extremity superficial thrombophlebitis noted in the   small saphenous.  · All other left sided veins appeared normal.               Ct Abdomen Pelvis Without Contrast    Result Date: 2/3/2021   1. With the exception of very slight interval enlargement of a portacaval lymph node, the findings appear stable since 01/14/2021. Heterogeneous right hepatic lobe mass, but appear to be smaller low-density nodules elsewhere in the liver, thought to be unchanged, and remain highly suspicious for malignancy. 2. Borderline enlarged aortocaval lymph node in the retroperitoneum, unchanged. 3. 2.3 cm lucent lesion in L2 is unchanged from prior examination. Osseous metastatic disease not excluded. No new osseous lesions.  4. No acute findings in the abdomen.   Electronically Signed By-Rosa Fox MD On:2/3/2021 3:07 PM This report was finalized on [id number] by  Rosa Fox MD.    Ct Head Without Contrast    Result Date: 2/3/2021  Age-appropriate atrophy, unchanged from the patient's recent head CT of 12/10/2020. No acute intracranial findings.  Electronically Signed By-Pramod Soria MD On:2/3/2021 3:07 PM This report was finalized on [id number] by  Pramod Soria MD.    Xr Chest 1 View    Result Date: 2/3/2021  No acute cardiopulmonary process.  Electronically Signed By-Landon Paredes MD On:2/3/2021 3:34 PM This report was finalized on [id number] by  Landon Paredes MD.       I personally reviewed patient's x-ray films and my findings are: 0    I personally reviewed patient's EKG strip and my findings are: 0    Medication Review:   I have reviewed the patient's current  medication list 02/06/21     Scheduled Meds  allopurinol, 150 mg, Oral, Daily  aspirin, 325 mg, Oral, Once  epoetin lan-epbx, 40,000 Units, Subcutaneous, Weekly  metoprolol tartrate, 12.5 mg, Oral, Q12H  pantoprazole, 40 mg, Oral, QAM  piperacillin-tazobactam, 3.375 g, Intravenous, Q8H  rivaroxaban, 15 mg, Oral, BID  [START ON 2/12/2021] rivaroxaban, 20 mg, Oral, Daily With Dinner  sodium chloride, 10 mL, Intravenous, Q12H  ursodiol, 500 mg, Oral, BID        Meds Infusions  lactated ringers, 75 mL/hr, Last Rate: 75 mL/hr (02/05/21 2101)        DVT prophylaxis  Mechanical Order History:     None      Pharmalogical Order History:      Ordered     Dose Route Frequency Stop    02/03/21 2212  rivaroxaban (XARELTO) tablet 20 mg     Question:  Are you ordering rivaroxaban for the prevention of blood clots in an acutely ill medical patient?  Answer:  No    20 mg PO Daily With Dinner --    02/03/21 2009  rivaroxaban (XARELTO) tablet 15 mg     Question:  Are you ordering rivaroxaban for the prevention of blood clots in an acutely ill medical patient?  Answer:  No    15 mg PO 2 Times Daily 02/11/21 2059                Meds PRN  •  acetaminophen **OR** acetaminophen **OR** acetaminophen  •  ondansetron **OR** ondansetron  •  sodium chloride  •  sodium chloride      Enrike Sherwood MD  02/06/21  13:51 EST

## 2021-02-07 LAB
ALBUMIN SERPL-MCNC: 2.5 G/DL (ref 3.5–5.2)
ALBUMIN/GLOB SERPL: 1.2 G/DL
ALP SERPL-CCNC: 97 U/L (ref 39–117)
ALT SERPL W P-5'-P-CCNC: 10 U/L (ref 1–41)
ANION GAP SERPL CALCULATED.3IONS-SCNC: 7 MMOL/L (ref 5–15)
AST SERPL-CCNC: 21 U/L (ref 1–40)
BASOPHILS # BLD AUTO: 0 10*3/MM3 (ref 0–0.2)
BASOPHILS NFR BLD AUTO: 0.3 % (ref 0–1.5)
BH BB BLOOD EXPIRATION DATE: NORMAL
BH BB BLOOD EXPIRATION DATE: NORMAL
BH BB BLOOD TYPE BARCODE: 600
BH BB BLOOD TYPE BARCODE: 600
BH BB DISPENSE STATUS: NORMAL
BH BB DISPENSE STATUS: NORMAL
BH BB PRODUCT CODE: NORMAL
BH BB PRODUCT CODE: NORMAL
BH BB UNIT NUMBER: NORMAL
BH BB UNIT NUMBER: NORMAL
BILIRUB SERPL-MCNC: 0.2 MG/DL (ref 0–1.2)
BUN SERPL-MCNC: 16 MG/DL (ref 8–23)
BUN/CREAT SERPL: 12.8 (ref 7–25)
CALCIUM SPEC-SCNC: 7.2 MG/DL (ref 8.6–10.5)
CHLORIDE SERPL-SCNC: 104 MMOL/L (ref 98–107)
CO2 SERPL-SCNC: 31 MMOL/L (ref 22–29)
CREAT SERPL-MCNC: 1.25 MG/DL (ref 0.76–1.27)
CROSSMATCH INTERPRETATION: NORMAL
CROSSMATCH INTERPRETATION: NORMAL
DEPRECATED RDW RBC AUTO: 73.9 FL (ref 37–54)
EOSINOPHIL # BLD AUTO: 0 10*3/MM3 (ref 0–0.4)
EOSINOPHIL NFR BLD AUTO: 0.2 % (ref 0.3–6.2)
ERYTHROCYTE [DISTWIDTH] IN BLOOD BY AUTOMATED COUNT: 24.3 % (ref 12.3–15.4)
GFR SERPL CREATININE-BSD FRML MDRD: 55 ML/MIN/1.73
GLOBULIN UR ELPH-MCNC: 2.1 GM/DL
GLUCOSE SERPL-MCNC: 86 MG/DL (ref 65–99)
HCT VFR BLD AUTO: 24.8 % (ref 37.5–51)
HGB BLD-MCNC: 8.1 G/DL (ref 13–17.7)
LYMPHOCYTES # BLD AUTO: 1 10*3/MM3 (ref 0.7–3.1)
LYMPHOCYTES NFR BLD AUTO: 8.1 % (ref 19.6–45.3)
MAGNESIUM SERPL-MCNC: 1.2 MG/DL (ref 1.6–2.4)
MCH RBC QN AUTO: 28.7 PG (ref 26.6–33)
MCHC RBC AUTO-ENTMCNC: 32.6 G/DL (ref 31.5–35.7)
MCV RBC AUTO: 88 FL (ref 79–97)
MONOCYTES # BLD AUTO: 0.7 10*3/MM3 (ref 0.1–0.9)
MONOCYTES NFR BLD AUTO: 5.7 % (ref 5–12)
NEUTROPHILS NFR BLD AUTO: 10.2 10*3/MM3 (ref 1.7–7)
NEUTROPHILS NFR BLD AUTO: 85.7 % (ref 42.7–76)
NRBC BLD AUTO-RTO: 0.1 /100 WBC (ref 0–0.2)
PLATELET # BLD AUTO: 142 10*3/MM3 (ref 140–450)
PMV BLD AUTO: 8.1 FL (ref 6–12)
POTASSIUM SERPL-SCNC: 3.5 MMOL/L (ref 3.5–5.2)
PROT SERPL-MCNC: 4.6 G/DL (ref 6–8.5)
RBC # BLD AUTO: 2.82 10*6/MM3 (ref 4.14–5.8)
SODIUM SERPL-SCNC: 142 MMOL/L (ref 136–145)
UNIT  ABO: NORMAL
UNIT  ABO: NORMAL
UNIT  RH: NORMAL
UNIT  RH: NORMAL
WBC # BLD AUTO: 11.9 10*3/MM3 (ref 3.4–10.8)

## 2021-02-07 PROCEDURE — 83735 ASSAY OF MAGNESIUM: CPT | Performed by: INTERNAL MEDICINE

## 2021-02-07 PROCEDURE — 85025 COMPLETE CBC W/AUTO DIFF WBC: CPT | Performed by: INTERNAL MEDICINE

## 2021-02-07 PROCEDURE — 99497 ADVNCD CARE PLAN 30 MIN: CPT | Performed by: INTERNAL MEDICINE

## 2021-02-07 PROCEDURE — 99233 SBSQ HOSP IP/OBS HIGH 50: CPT | Performed by: INTERNAL MEDICINE

## 2021-02-07 PROCEDURE — 25010000002 PIPERACILLIN SOD-TAZOBACTAM PER 1 G: Performed by: INTERNAL MEDICINE

## 2021-02-07 PROCEDURE — 99232 SBSQ HOSP IP/OBS MODERATE 35: CPT | Performed by: INTERNAL MEDICINE

## 2021-02-07 PROCEDURE — 80053 COMPREHEN METABOLIC PANEL: CPT | Performed by: NURSE PRACTITIONER

## 2021-02-07 RX ADMIN — RIVAROXABAN 15 MG: 15 TABLET, FILM COATED ORAL at 21:23

## 2021-02-07 RX ADMIN — Medication 10 ML: at 09:39

## 2021-02-07 RX ADMIN — PIPERACILLIN AND TAZOBACTAM 3.38 G: 3; .375 INJECTION, POWDER, LYOPHILIZED, FOR SOLUTION INTRAVENOUS at 01:15

## 2021-02-07 RX ADMIN — PIPERACILLIN AND TAZOBACTAM 3.38 G: 3; .375 INJECTION, POWDER, LYOPHILIZED, FOR SOLUTION INTRAVENOUS at 09:40

## 2021-02-07 RX ADMIN — URSODIOL 500 MG: 500 TABLET, FILM COATED ORAL at 21:23

## 2021-02-07 RX ADMIN — PANTOPRAZOLE SODIUM 40 MG: 40 TABLET, DELAYED RELEASE ORAL at 05:28

## 2021-02-07 RX ADMIN — PIPERACILLIN AND TAZOBACTAM 3.38 G: 3; .375 INJECTION, POWDER, LYOPHILIZED, FOR SOLUTION INTRAVENOUS at 17:37

## 2021-02-07 RX ADMIN — METOPROLOL TARTRATE 12.5 MG: 25 TABLET, FILM COATED ORAL at 09:39

## 2021-02-07 RX ADMIN — METOPROLOL TARTRATE 12.5 MG: 25 TABLET, FILM COATED ORAL at 21:23

## 2021-02-07 RX ADMIN — ALLOPURINOL 150 MG: 300 TABLET ORAL at 09:38

## 2021-02-07 RX ADMIN — URSODIOL 500 MG: 500 TABLET, FILM COATED ORAL at 09:39

## 2021-02-07 RX ADMIN — Medication 10 ML: at 21:24

## 2021-02-07 RX ADMIN — RIVAROXABAN 15 MG: 15 TABLET, FILM COATED ORAL at 09:39

## 2021-02-07 NOTE — PROGRESS NOTES
[facility]  INPATIENT PROGRESS NOTE  223/1   Hospitalist Team  LOS 4 days      Patient Care Team:  Ann Marie Hodgson MD as PCP - General (Family Medicine)      Patient was examined with relevant and adequate PPE keeping in mind the current coronavirus pandemic. Minimum of 10 minutes to don and doff PPE.    Chief Complaint / Subjective  Chief Complaint   Patient presents with   • Altered Mental Status   • Syncope       HPI (4 hpi elements or status of 3 chronic)  85-year-old male called EMS today for lifting assistance.  The patient had taken a bath and was going to see a physician.  He could not get out of the walk-in bathtub.  EMS got the patient up and then the patient became unresponsive.  He had a slow pulse and it was noted to be faint by EMS.  It took him several minutes to get him on the monitor the patient then slowly became more alert.  The family reports that he has been confused for several days.  They states he has been treating for a cancer around his liver.  The patient has had poor oral intake in the last 24 hours.  He is complained of extensive nausea.  There is been no vomiting or diarrhea.  There is been no reports of melena hematemesis or hematochezia.        Patient was admitted here on 21 January for acute PE and DVT.  Since then he has been on anticoagulation.  Per the son he went into check on his father today and he was slumped in the bathtub.  Conscious but confused.  He was subsequently transferred to the hospital.  Patient apparently lives mostly by himself with his son checking in on him.  Sees Dr. Pope    Not making much sense today as well.  No history obtainable from him.   02/04/21, 5:04 PM EST    Continues to be confused.  Does not follow directions.  No change from prior.  Family is resistant to giving him blood. 02/05/21, 11:40 AM EST    More awake than he has ever been.  He said 1 word today 02/06/21, 1:52 PM EST    Unchanged 02/07/21, 12:25  PM EST        Altered Mental Status    Syncope          History taken from: chart RN    Review of Systems   Unable to perform ROS: mental status change   Cardiovascular: Positive for syncope.   Psychiatric/Behavioral: Positive for altered mental status.              Family History   Problem Relation Age of Onset   • Kidney failure Father        Past Medical History:   Diagnosis Date   • Arthritis    • Cancer (CMS/[facility])    • COPD (chronic obstructive pulmonary disease) (CMS/[facility])    • Elevated cholesterol    • GERD (gastroesophageal reflux disease)    • Hyperlipidemia    • Hypertension        Social History     Socioeconomic History   • Marital status: Single     Spouse name: Not on file   • Number of children: Not on file   • Years of education: Not on file   • Highest education level: Not on file   Tobacco Use   • Smoking status: Former Smoker   • Smokeless tobacco: Never Used   Substance and Sexual Activity   • Alcohol use: Not Currently   • Drug use: Never   • Sexual activity: Defer   Social History Narrative    Lives mostly by himself.  Son checks in on him.       Prior to Admission medications    Medication Sig Start Date End Date Taking? Authorizing Provider   allopurinol (ZYLOPRIM) 300 MG tablet Take 150 mg by mouth Daily. 11/22/20  Yes Wendy Rivers MD   atenolol (TENORMIN) 25 MG tablet Take 25 mg by mouth Daily.   Yes Wendy Rivers MD   calcium-vitamin D (Oscal 500/200 D-3) 500-200 MG-UNIT per tablet Take 1 tablet by mouth Daily. 12/30/20 12/30/21 Yes Macey Harrison APRN   Ferretts 325 (106 Fe) MG tablet Take 1 tablet by mouth Daily. 10/29/20  Yes Wendy Rivers MD   losartan (COZAAR) 100 MG tablet Take 100 mg by mouth Daily.   Yes Wendy Rivers MD   magnesium oxide (MAG-OX) 400 MG tablet Take 1 tablet by mouth 2 (Two) Times a Day. 12/30/20  Yes Macey Harrison APRN   omeprazole (priLOSEC) 20 MG capsule Take 40 mg by mouth Daily.   Yes Wendy Rivers MD  "  rivaroxaban (XARELTO) 15 MG tablet Take 15 mg by mouth 2 (two) times a day. For 3 weeks.....until 02/11/21. Then start Xarelto 20mg daily 02/12/21   Yes ProviderWendy MD   ursodiol (ACTIGALL) 500 MG tablet Take 500 mg by mouth 2 (two) times a day. 12/1/20  Yes Wendy Rivers MD   amLODIPine (NORVASC) 10 MG tablet Take 10 mg by mouth Daily.    Provider, MD Wendy   furosemide (LASIX) 40 MG tablet Take 1 tablet by mouth Daily. 10/23/20   Jese Mason MD        Objective    Physical Exam     Vital Signs  Temp:  [98 °F (36.7 °C)-98.4 °F (36.9 °C)] 98.1 °F (36.7 °C)  Heart Rate:  [60-71] 70  Resp:  [18-19] 18  BP: (128-146)/(71-74) 128/71    Oxygen Therapy  SpO2: 95 %  Pulse Oximetry Type: Intermittent  Device (Oxygen Therapy): nasal cannula  Flow (L/min): 2  Flowsheet Rows      First Filed Value   Admission Height  170.2 cm (67\") Documented at 02/03/2021 1417   Admission Weight  90 kg (198 lb 6.6 oz) Documented at 02/03/2021 1417        Weight change: 3.3 kg (7 lb 4.4 oz)   Intake & Output (last 3 days)       02/04 0701 - 02/05 0700 02/05 0701 - 02/06 0700 02/06 0701 - 02/07 0700 02/07 0701 - 02/08 0700    P.O. 600 290 480 120    I.V. (mL/kg) 1068 (11.5) 1900 (22.5) 1186 (13.5)     Blood  700      IV Piggyback        Total Intake(mL/kg) 1668 (18) 2890 (34.2) 1666 (19) 120 (1.4)    Urine (mL/kg/hr)        Total Output        Net +1668 +2890 +1666 +120            Urine Unmeasured Occurrence 6 x 2 x 2 x     Stool Unmeasured Occurrence 2 x 4 x          Lines, Drains & Airways    Active LDAs     Name:   Placement date:   Placement time:   Site:   Days:    Peripheral IV 02/03/21 1550 Left Hand   02/03/21    1550    Hand   1    Single Lumen Implantable Port Right Subclavian   --    --    Subclavian                   Physical Exam:    Physical Exam  Constitutional:       General: He is not in acute distress.     Appearance: Normal appearance. He is well-developed. He is not " ill-appearing, toxic-appearing or diaphoretic.   HENT:      Head: Normocephalic and atraumatic.      Right Ear: Ear canal and external ear normal.      Left Ear: Ear canal and external ear normal.      Nose: Nose normal. No congestion or rhinorrhea.      Mouth/Throat:      Mouth: Mucous membranes are dry.      Pharynx: No oropharyngeal exudate.      Comments: Edentulous  Eyes:      General: No scleral icterus.        Right eye: No discharge.         Left eye: No discharge.      Extraocular Movements: Extraocular movements intact.      Conjunctiva/sclera: Conjunctivae normal.      Pupils: Pupils are equal, round, and reactive to light.   Neck:      Musculoskeletal: Normal range of motion and neck supple. No neck rigidity or muscular tenderness.      Thyroid: No thyromegaly.      Vascular: No carotid bruit or JVD.      Trachea: No tracheal deviation.   Cardiovascular:      Rate and Rhythm: Normal rate and regular rhythm.      Heart sounds: Normal heart sounds. No murmur. No friction rub. No gallop.       Comments: Absent pedal pulses  Pulmonary:      Effort: Pulmonary effort is normal. No respiratory distress.      Breath sounds: Normal breath sounds. No stridor. No wheezing, rhonchi or rales.      Comments: Right chest [id number] present  Chest:      Chest wall: No tenderness.   Abdominal:      General: Bowel sounds are normal. There is distension.      Palpations: There is mass.      Tenderness: There is no abdominal tenderness. There is no guarding or rebound.      Hernia: No hernia is present.      Comments: Very large hepatomegaly firm.  Reaching out 6 fingerbreadth below the costal margin in the right midclavicular line.  Flanks are full indicating some degree of ascites.   Musculoskeletal: Normal range of motion.         General: No swelling, tenderness, deformity or signs of injury.      Right lower leg: Edema (+2) present.      Left lower leg: Edema (+2) present.   Lymphadenopathy:      Cervical: No  cervical adenopathy.   Skin:     General: Skin is warm and dry.      Coloration: Skin is not jaundiced or pale.      Findings: No bruising, erythema or rash.   Neurological:      General: No focal deficit present.      Mental Status: He is alert and oriented to person, place, and time. Mental status is at baseline.      Cranial Nerves: No cranial nerve deficit.      Sensory: No sensory deficit.      Motor: No weakness or abnormal muscle tone.      Coordination: Coordination normal.   Psychiatric:         Mood and Affect: Mood normal.         Behavior: Behavior normal.         Thought Content: Thought content normal.         Judgment: Judgment normal.     Reviewed, no change in above data from the prior day.      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        Estimated Creatinine Clearance: 45.7 mL/min (by C-G formula based on SCr of 1.25 mg/dL).    Code Status and Medical Interventions:   Ordered at: 02/03/21 3489     Code Status:    CPR     Medical Interventions (Level of Support Prior to Arrest):    Full       MEDICAL DECISION MAKING COMPLEXITY BY PROBLEM:     Acute ascending cholangitis  Empiric antibiotics  Await cultures     Cholangiocarcinoma  Consult oncology  Supportive treatment        Renal failure/insufficiency/injury:  Hydration  Supportive treatment  Cut down or hold ACE inhibitors  Avoid nephrotoxic medications   Address diuretics    Cultures negative but patient  responding to antibiotics.  Prognosis very poor.  The patient should be hospice.  Family refusing blood prescribed by nephrology  Care coordination with nursing for current care 02/04/21 4:54 PM EST.    Cultures negative.  Renal function better.  Hemoglobin continues to drop.  Shall consult GI but not a candidate for invasive work-up.  Should be hospice    Continue status quo.  Shall review with  regarding discharge planning prognosis very poor.  Should be hospice    Care coordination with  regarding current care and advanced care planning.  Discussed with granddaughter at length.  Advised her of his terminal condition.  I do not recommend any further treatment.  She was in agreement and plans to talk to her family to come to decision.  She is contemplating hospice.  Total time spent at least 30 minutes.    Plan for disposition:            Continued Care and Services - Admitted Since 2/3/2021    Coordination has not been started for this encounter.        Historical & Objective Data     Results Review:    I reviewed the patient's new lab and radiology results. 02/07/21     Results from last 7 days   Lab Units 02/07/21  0336 02/06/21  0240 02/05/21  2208  02/05/21  0506   WBC 10*3/mm3 11.90* 9.90  --   --  8.20   HEMOGLOBIN g/dL 8.1* 7.9* 7.6*   < > 6.1*   HEMATOCRIT % 24.8* 23.7* 23.2*   < > 18.8*   MCV fL 88.0 86.4  --   --  88.1   MCH pg 28.7 28.9  --   --  28.7   MPV fL 8.1 8.0  --   --  8.0   RDW % 24.3* 23.5*  --   --  28.3*   PLATELETS 10*3/mm3 142 161  --   --  194    < > = values in this interval not displayed.     Results from last 7 days   Lab Units 02/07/21  0336 02/06/21  0240 02/05/21  1608 02/05/21  0506  02/03/21  1422   SODIUM mmol/L 142 139  --  136   < > 138   POTASSIUM mmol/L 3.5 3.2*  --  3.5   < > 5.1   CHLORIDE mmol/L 104 101  --  98   < > 100   CO2 mmol/L 31.0* 30.0*  --  28.0   < > 20.0*   BUN mg/dL 16 21  --  23   < > 30*   CREATININE mg/dL 1.25 1.30*  --  1.42*   < >  1.76*   CALCIUM mg/dL 7.2* 7.3*  --  7.5*   < > 8.2*   MAGNESIUM mg/dL 1.2*  --   --   --   --  1.6   BILIRUBIN mg/dL 0.2 0.4 0.4  --   --  0.2   ALK PHOS U/L 97 101 106  --   --  166*   ALT (SGPT) U/L 10 13 13  --   --  14   AST (SGOT) U/L 21 30 37  --   --  37   GLUCOSE mg/dL 86 89  --  88   < > 97    < > = values in this interval not displayed.     Inflammatory Biomarkers  Results from last 7 days   Lab Units 02/04/21  1522   FERRITIN ng/mL 2,298.00*   LDH U/L 400*     Lab Results   Component Value Date    PHOS 3.5 02/05/2021     No results found for: HGBA1C  No results found for: CHOL, CHLPL, TRIG, HDL, LDL, LDLDIRECT  Lab Results   Component Value Date    LIPASE 13 02/03/2021     Results from last 7 days   Lab Units 02/03/21  1422   INR  1.32*           Pathology  Lab Results   Lab Value Date/Time    INTRAOP  10/14/2020 1009     TP: Positive for malignancy.     SHIRA/sms       FINALDX  10/14/2020 1009     Liver, CT-guided core biopsy:    Well to moderately differentiated adenocarcinoma, see COMMENT     JPR/sms       COMDX  10/14/2020 1009     The biopsy shows a well to moderately differentiated adenocarcinoma. Immunohistochemistry was performed utilizing appropriate controls and the tumor is strongly positive for only CK7. The CK20, CDX2, TTF-1, napsin A, chromogranin, synaptophysin and CD56 are all negative. This is a nonspecific staining pattern but can be seen in tumors of pancreatobiliary origin, including cholangiocarcinoma which is favored in this case. Adenocarcinomas of the upper gastrointestinal tract can also display only CK7 positivity and should be excluded clinically. This staining pattern argues against, but does not completely exclude a metastasis from a primary lung adenocarcinoma as approximately 10 to 15% of lung adenocarcinomas are negative for TTF-1 and napsin A. This staining pattern does exclude a neuroendocrine tumor and adenocarcinoma of colonic origin. Further clinical workup with imaging  studies and serum tumor markers is recommended.        COVID19   Date Value Ref Range Status   02/04/2021 Not Detected Not Detected - Ref. Range Final        Microbiology Results (last 10 days)     Procedure Component Value - Date/Time    COVID PRE-OP / PRE-PROCEDURE SCREENING ORDER (NO ISOLATION) - Swab, Nasopharynx [[id number]]  (Normal) Collected: 02/04/21 1522    Lab Status: Final result Specimen: Swab from Nasopharynx Updated: 02/04/21 2341    Narrative:      The following orders were created for panel order COVID PRE-OP / PRE-PROCEDURE SCREENING ORDER (NO ISOLATION) - Swab, Nasopharynx.  Procedure                               Abnormality         Status                     ---------                               -----------         ------                     COVID-19,APTIMA PANTHER,...[[id number]]  Normal              Final result                 Please view results for these tests on the individual orders.    COVID-19,APTIMA PANTHER,[facility] IN-[facility]SE, NP/OP SWAB IN UTM/VTM/[facility] TRANSPORT MEDIA,24 HR TAT - Swab, Nasopharynx [[id number]]  (Normal) Collected: 02/04/21 1522    Lab Status: Final result Specimen: Swab from Nasopharynx Updated: 02/04/21 2341     COVID19 Not Detected    Narrative:      Fact sheet for providers: https://www.fda.gov/media/065664/download     Fact sheet for patients: https://www.fda.gov/media/215708/download    Test performed by RT PCR.    Blood Culture - Blood, Blood, Central Line [[id number]] Collected: 02/03/21 1618    Lab Status: Preliminary result Specimen: Blood, Central Line Updated: 02/06/21 1631     Blood Culture No growth at 3 days    Blood Culture - Blood, Chest, Right [[id number]] Collected: 02/03/21 1509    Lab Status: Preliminary result Specimen: Blood from Chest, Right Updated: 02/06/21 1515     Blood Culture No growth at 3 days          ECG/EMG Results (most recent)     Procedure Component Value Units Date/Time    ECG 12 Lead [[id number]] Collected: 02/03/21 1429     Updated:  02/04/21 1443     QT Interval 342 ms     Narrative:      HEART RATE= 111  bpm  RR Interval= 540  ms  AZ Interval= 150  ms  P Horizontal Axis= 23  deg  P Front Axis= 96  deg  QRSD Interval= 103  ms  QT Interval= 342  ms  QRS Axis= 87  deg  T Wave Axis= 117  deg  - ABNORMAL ECG -  Sinus tachycardia  Low voltage, extremity leads  Nonspecific repol abnormality, lateral leads  When compared with ECG of 18-Oct-2020 18:41:11,  Significant rate increase  Significant axis, voltage or hypertrophy change  Electronically Signed By: Miky Mena (PURNIMA) 04-Feb-2021 14:40:17  Date and Time of Study: 2021-02-03 14:29:53          Results for orders placed during the hospital encounter of 01/21/21   Duplex Venous Lower Extremity - Left CAR    Narrative · Acute left lower extremity deep vein thrombosis noted in the common   femoral, proximal femoral, mid femoral, distal femoral, popliteal,   posterial tibial and gastrocnemius.  · Acute left lower extremity superficial thrombophlebitis noted in the   small saphenous.  · All other left sided veins appeared normal.               Ct Abdomen Pelvis Without Contrast    Result Date: 2/3/2021   1. With the exception of very slight interval enlargement of a portacaval lymph node, the findings appear stable since 01/14/2021. Heterogeneous right hepatic lobe mass, but appear to be smaller low-density nodules elsewhere in the liver, thought to be unchanged, and remain highly suspicious for malignancy. 2. Borderline enlarged aortocaval lymph node in the retroperitoneum, unchanged. 3. 2.3 cm lucent lesion in L2 is unchanged from prior examination. Osseous metastatic disease not excluded. No new osseous lesions.  4. No acute findings in the abdomen.   Electronically Signed By-Rosa Fox MD On:2/3/2021 3:07 PM This report was finalized on [id number] by  Rosa Fox MD.    Ct Head Without Contrast    Result Date: 2/3/2021  Age-appropriate atrophy, unchanged from the patient's recent head  CT of 12/10/2020. No acute intracranial findings.  Electronically Signed By-Pramod Soria MD On:2/3/2021 3:07 PM This report was finalized on [id number] by  Pramod Soria MD.    Xr Chest 1 View    Result Date: 2/3/2021  No acute cardiopulmonary process.  Electronically Signed By-Landon Paredes MD On:2/3/2021 3:34 PM This report was finalized on [id number] by  Landon Paredes MD.       I personally reviewed patient's x-ray films and my findings are: 0    I personally reviewed patient's EKG strip and my findings are: 0    Medication Review:   I have reviewed the patient's current medication list 02/07/21     Scheduled Meds  allopurinol, 150 mg, Oral, Daily  aspirin, 325 mg, Oral, Once  epoetin lan-epbx, 40,000 Units, Subcutaneous, Weekly  metoprolol tartrate, 12.5 mg, Oral, Q12H  pantoprazole, 40 mg, Oral, QAM  piperacillin-tazobactam, 3.375 g, Intravenous, Q8H  rivaroxaban, 15 mg, Oral, BID  [START ON 2/12/2021] rivaroxaban, 20 mg, Oral, Daily With Dinner  sodium chloride, 10 mL, Intravenous, Q12H  ursodiol, 500 mg, Oral, BID        Meds Infusions  lactated ringers, 75 mL/hr, Last Rate: 75 mL/hr (02/05/21 2101)        DVT prophylaxis  Mechanical Order History:     None      Pharmalogical Order History:      Ordered     Dose Route Frequency Stop    02/03/21 2212  rivaroxaban (XARELTO) tablet 20 mg     Question:  Are you ordering rivaroxaban for the prevention of blood clots in an acutely ill medical patient?  Answer:  No    20 mg PO Daily With Dinner --    02/03/21 2009  rivaroxaban (XARELTO) tablet 15 mg     Question:  Are you ordering rivaroxaban for the prevention of blood clots in an acutely ill medical patient?  Answer:  No    15 mg PO 2 Times Daily 02/11/21 2059                Meds PRN  •  acetaminophen **OR** acetaminophen **OR** acetaminophen  •  ondansetron **OR** ondansetron  •  sodium chloride  •  sodium chloride      Enrike Sherwood MD  02/07/21  12:24 EST

## 2021-02-07 NOTE — PROGRESS NOTES
Hematology/Oncology Inpatient Progress Note    PATIENT NAME: Tristen Garcia  : 1935  MRN: [id number]    CHIEF COMPLAINT: Altered mental status    HISTORY OF PRESENT ILLNESS:    Tristen Garcia is a 85 y.o.  male who presented to [facility] on 2/3/2021 via EMS. Patient reportedly called EMS for lifting assistance at home.  The patient had been taking a bath and was unable to get out of his bathtub.  On EMS arrival, the patient was reportedly unresponsive with a slow heart rate. Patient slowly became more alert.  Per family, patient has reportedely been confused for several days. The family reported poor oral intake in the last 24 hours and extensive nausea. Patient denied diarrhea and vomiting. Labs in the ED were significant for WBC 24.60, hemoglobin 7.9, BUN 30, creatinine 1.76, EGFR 37, alkaline phosphatase 166, and troponin 0.285.  CT of the head showed age-appropriate atrophy, unchanged from patient's recent CT performed 12/10/2020.  No acute intracranial findings. Patient was admitted for further evaluation and management.     21  Hematology/Oncology was consulted as patient is known to our service and followed by Dr. 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  Cholangiocarcinoma favored.  Patient was started on chemotherapy with cisplatin and gemcitabine combination on 2020.  On 2021, CT of the abdomen and pelvis was performed that resulted new low-density lesions within the right and left hepatic lobes consistent with new hepatic metastatic disease.  It was recommended for patient to switch his chemotherapy from cisplatin/gemcitabine to FOLFOX.  Patient was started on FOLFOX on 2020.     Of note, patient was recently diagnosed with pulmonary emboli and acute left lower extremity DVT in the common femoral, proximal femoral, mid femoral, distal femoral, popliteal, posterior tibial, and gastrocnemius.  Patient was started on  "Xarelto.     He  has a past medical history of Arthritis, Cancer (CMS/HCC), COPD (chronic obstructive pulmonary disease) (CMS/HCC), Elevated cholesterol, GERD (gastroesophageal reflux disease), Hyperlipidemia, and Hypertension.     PCP: Ann Marie Hodgson MD    History of present illness was reviewed and is unchanged from the previous visit. 02/05/21     Subjective      Patient is more alert today    ROS:  Review of Systems   Unable to perform ROS: Mental status change        MEDICATIONS:    Scheduled Meds:  allopurinol, 150 mg, Oral, Daily  aspirin, 325 mg, Oral, Once  epoetin lan-epbx, 40,000 Units, Subcutaneous, Weekly  metoprolol tartrate, 12.5 mg, Oral, Q12H  pantoprazole, 40 mg, Oral, QAM  piperacillin-tazobactam, 3.375 g, Intravenous, Q8H  rivaroxaban, 15 mg, Oral, BID  [START ON 2/12/2021] rivaroxaban, 20 mg, Oral, Daily With Dinner  sodium chloride, 10 mL, Intravenous, Q12H  ursodiol, 500 mg, Oral, BID       Continuous Infusions:      PRN Meds:  •  acetaminophen **OR** acetaminophen **OR** acetaminophen  •  ondansetron **OR** ondansetron  •  sodium chloride  •  sodium chloride     ALLERGIES:  No Known Allergies    Objective    VITALS:   /71 (BP Location: Left arm, Patient Position: Lying)   Pulse 70   Temp 98.1 °F (36.7 °C) (Oral)   Resp 18   Ht 170.2 cm (67\")   Wt 87.9 kg (193 lb 12.6 oz)   SpO2 95%   BMI 30.35 kg/m²     PHYSICAL EXAM:  Physical Exam  Constitutional:       General: He is not in acute distress.     Appearance: He is obese. He is not toxic-appearing.   HENT:      Head: Normocephalic and atraumatic.      Nose:      Comments: Nasal cannula  Eyes:      General: No scleral icterus.        Right eye: No discharge.         Left eye: No discharge.   Neck:      Musculoskeletal: Normal range of motion. No neck rigidity.   Cardiovascular:      Rate and Rhythm: Normal rate.   Pulmonary:      Effort: Pulmonary effort is normal.   Chest:      Comments: Right chest port-a-cath  Abdominal:    "   General: There is no distension.      Palpations: Abdomen is soft.      Tenderness: There is no abdominal tenderness.   Musculoskeletal: Normal range of motion.      Right lower leg: Edema present.      Left lower leg: Edema present.   Skin:     General: Skin is warm.   Neurological:      Motor: Weakness present.   Psychiatric:         Mood and Affect: Mood normal.         Behavior: Behavior normal.      Comments:  Patient is confused.  He keeps repeating okay to every question.        I have reexamined the patient and the results are consistent with the previously documented exam. Katiewilliam Alexis MD   RECENT LABS:  Lab Results (last 24 hours)     Procedure Component Value Units Date/Time    Magnesium [[id number]]  (Abnormal) Collected: 02/07/21 0336    Specimen: Blood Updated: 02/07/21 0417     Magnesium 1.2 mg/dL     Comprehensive Metabolic Panel [[id number]]  (Abnormal) Collected: 02/07/21 0336    Specimen: Blood Updated: 02/07/21 0417     Glucose 86 mg/dL      BUN 16 mg/dL      Creatinine 1.25 mg/dL      Sodium 142 mmol/L      Potassium 3.5 mmol/L      Chloride 104 mmol/L      CO2 31.0 mmol/L      Calcium 7.2 mg/dL      Total Protein 4.6 g/dL      Albumin 2.50 g/dL      ALT (SGPT) 10 U/L      AST (SGOT) 21 U/L      Alkaline Phosphatase 97 U/L      Total Bilirubin 0.2 mg/dL      eGFR Non African Amer 55 mL/min/1.73      Globulin 2.1 gm/dL      A/G Ratio 1.2 g/dL      BUN/Creatinine Ratio 12.8     Anion Gap 7.0 mmol/L     Narrative:      GFR Normal >60  Chronic Kidney Disease <60  Kidney Failure <15      CBC & Differential [[id number]]  (Abnormal) Collected: 02/07/21 0336    Specimen: Blood Updated: 02/07/21 0400    Narrative:      The following orders were created for panel order CBC & Differential.  Procedure                               Abnormality         Status                     ---------                               -----------         ------                     CBC Auto Differential[[id number]]         Abnormal            Final result                 Please view results for these tests on the individual orders.    CBC Auto Differential [[id number]]  (Abnormal) Collected: 02/07/21 0336    Specimen: Blood Updated: 02/07/21 0400     WBC 11.90 10*3/mm3      RBC 2.82 10*6/mm3      Hemoglobin 8.1 g/dL      Hematocrit 24.8 %      MCV 88.0 fL      MCH 28.7 pg      MCHC 32.6 g/dL      RDW 24.3 %      RDW-SD 73.9 fl      MPV 8.1 fL      Platelets 142 10*3/mm3      Neutrophil % 85.7 %      Lymphocyte % 8.1 %      Monocyte % 5.7 %      Eosinophil % 0.2 %      Basophil % 0.3 %      Neutrophils, Absolute 10.20 10*3/mm3      Lymphocytes, Absolute 1.00 10*3/mm3      Monocytes, Absolute 0.70 10*3/mm3      Eosinophils, Absolute 0.00 10*3/mm3      Basophils, Absolute 0.00 10*3/mm3      nRBC 0.1 /100 WBC     Blood Culture - Blood, Blood, Central Line [[id number]] Collected: 02/03/21 1618    Specimen: Blood, Central Line Updated: 02/06/21 1631     Blood Culture No growth at 3 days    Blood Culture - Blood, Chest, Right [[id number]] Collected: 02/03/21 1509    Specimen: Blood from Chest, Right Updated: 02/06/21 1515     Blood Culture No growth at 3 days          PENDING RESULTS: MMA    IMAGING REVIEWED:  No radiology results for the last day    Assessment/Plan   ASSESSMENT:  1. 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  Switched to FOLFOX due to evidence of disease progression.  FOLFOX last received 2/1/2020.  Continue in the outpatient setting if improves.  Patient is followed by Dr. Garcia.  Reviewed his CT scans overall his disease appears to be stable  2. Normocytic normochromic anemia: Consider relation to chemotherapy or anemia of chronic disease.  Receiving Retacrit weekly outpatient, last received 1/14/2021. Anemia studies show likely anemia of chronic disease.  We will continue to monitor his hemoglobin  3. Leukocytosis: Urinalysis negative for leukocytes or nitrites.  Blood cultures obtained.  Chest x-ray  showed no acute cardiopulmonary process. On IV antibiotics. Continue to monitor. Improving.   4. Acute kidney injury: Creatinine 1.76 on admission. Nephrology consulted.  Reddening has improved to 1.2 today  5. Confusion: CT head showed no acute intracranial process.  Urinalysis negative. LFTs normal on admission, unlikely to be hepatic encephalopathy. Etiology unknown.  Mental status seems to have improved today  6. Weakness: PT/OT consulted  7. History of pulmonary emboli and DVT: On Xarelto  8. HTN: managed per primary team  9. Palliative care consulted to discuss goals of care     PLAN  1. CBC daily  2. Transfuse 1 unit pRBC as needed for hemoglobin <7.5-patient received 2 units on 2/5/2021.   3. Mental status has improved today.  Hemoglobin today 8.1 g per DL.  We will continue to follow.  4. Administer Retacrit 40,000 units subcutaneous injection weekly  5. Continue Xarelto  6. Continue IV antibiotics  7. Continue IV fluids per nephrology  8. Resume FOLFOX outpatient- due 2/15/2021, if clinically improves    Electronically signed by Katie Alexis MD, 02/07/21, 2:40 PM EST.

## 2021-02-07 NOTE — PROGRESS NOTES
"   LOS: 4 days    Patient Care Team:  Ann Marie Hodgson MD as PCP - General (Family Medicine)      Subjective     Feels about the same and has no complaints at this time    Objective     Vital Sign Min/Max for last 24 hours  Temp:  [98 °F (36.7 °C)-98.4 °F (36.9 °C)] 98.1 °F (36.7 °C)  Heart Rate:  [60-71] 70  Resp:  [18-19] 18  BP: (128-146)/(71-74) 128/71                       Flowsheet Rows      First Filed Value   Admission Height  170.2 cm (67\") Documented at 02/03/2021 1417   Admission Weight  90 kg (198 lb 6.6 oz) Documented at 02/03/2021 1417          I/O this shift:  In: 120 [P.O.:120]  Out: -   I/O last 3 completed shifts:  In: 3916 [P.O.:480; I.V.:3086; Blood:350]  Out: -     Physical Exam:  Physical Exam    General.    Awake and alert and appears to be mentating better.  Head.  Atraumatic, normocephalic  Neck.  Supple.  No JVD  Respiratory.  Decreased breath sounds bilaterally without obvious crackles or wheezing  Cardiovascular.    Regular rhythm.  Abdominal.  Obese, soft, nontender  Remedies: Trace pretibial edema bilaterally       LABS:  Lab Results   Component Value Date    CALCIUM 7.2 (L) 02/07/2021    PHOS 3.5 02/05/2021     Results from last 7 days   Lab Units 02/07/21  0336 02/06/21  0240 02/05/21  2208 02/05/21  1608 02/05/21  0506  02/03/21  1422   MAGNESIUM mg/dL 1.2*  --   --   --   --   --  1.6   SODIUM mmol/L 142 139  --   --  136   < > 138   POTASSIUM mmol/L 3.5 3.2*  --   --  3.5   < > 5.1   CHLORIDE mmol/L 104 101  --   --  98   < > 100   CO2 mmol/L 31.0* 30.0*  --   --  28.0   < > 20.0*   BUN mg/dL 16 21  --   --  23   < > 30*   CREATININE mg/dL 1.25 1.30*  --   --  1.42*   < > 1.76*   GLUCOSE mg/dL 86 89  --   --  88   < > 97   CALCIUM mg/dL 7.2* 7.3*  --   --  7.5*   < > 8.2*   WBC 10*3/mm3 11.90* 9.90  --   --  8.20   < > 24.60*   HEMOGLOBIN g/dL 8.1* 7.9* 7.6* 7.4* 6.1*   < > 7.9*   PLATELETS 10*3/mm3 142 161  --   --  194   < > 427   ALT (SGPT) U/L 10 13  --  13  --   --  14   AST " (SGOT) U/L 21 30  --  37  --   --  37    < > = values in this interval not displayed.     Lab Results   Component Value Date    CKTOTAL 228 (H) 02/03/2021    TROPONINT 0.285 (C) 02/03/2021     Estimated Creatinine Clearance: 45.7 mL/min (by C-G formula based on SCr of 1.25 mg/dL).      Brief Urine Lab Results  (Last result in the past 365 days)      Color   Clarity   Blood   Leuk Est   Nitrite   Protein   CREAT   Urine HCG        02/03/21 1527 Yellow Clear Small (1+) Negative Negative Trace             WEIGHTS:     Wt Readings from Last 1 Encounters:   02/07/21 0255 87.9 kg (193 lb 12.6 oz)   02/06/21 0410 84.6 kg (186 lb 8.2 oz)   02/05/21 0420 92.6 kg (204 lb 2.3 oz)   02/04/21 0451 87.3 kg (192 lb 7.4 oz)   02/03/21 1906 87.3 kg (192 lb 7.4 oz)   02/03/21 1417 90 kg (198 lb 6.6 oz)       allopurinol, 150 mg, Oral, Daily  aspirin, 325 mg, Oral, Once  epoetin lan-epbx, 40,000 Units, Subcutaneous, Weekly  metoprolol tartrate, 12.5 mg, Oral, Q12H  pantoprazole, 40 mg, Oral, QAM  piperacillin-tazobactam, 3.375 g, Intravenous, Q8H  rivaroxaban, 15 mg, Oral, BID  [START ON 2/12/2021] rivaroxaban, 20 mg, Oral, Daily With Dinner  sodium chloride, 10 mL, Intravenous, Q12H  ursodiol, 500 mg, Oral, BID           Assessment/Plan       1.  Acute kidney injury, Jermaine due to dehydration/ cisplatin or both  2.  Anemia  3.  Hypertension  4.  Cholangiocarcinoma, followed by oncology    Plan:  1.  Encourage p.o. intake  2.  Discontinue IV fluids  3.  Renal panel magnesium in a.m.    Thank you for involving us in the care of Mr. Garcai, will continue to follow along.      Angelia Soliman MD  02/07/21  12:49 EST

## 2021-02-07 NOTE — NURSING NOTE
Grand-daughter Emily Ames called and ask if we could advise  that she spoke with her father and they were in agreeable with wanting to make the patient Tristen Garcia a DNR.  MD was advised so an order could be put in.

## 2021-02-07 NOTE — PLAN OF CARE
Goal Outcome Evaluation:  Plan of Care Reviewed With: patient  Progress: improving   Patient has improved a little more with communicating with staff. Still incontinent and unable to make needs known. Patient was switched to DNR today. Continue to monitor.

## 2021-02-08 LAB
ALBUMIN SERPL-MCNC: 2.6 G/DL (ref 3.5–5.2)
ALBUMIN/GLOB SERPL: 1.2 G/DL
ALP SERPL-CCNC: 96 U/L (ref 39–117)
ALT SERPL W P-5'-P-CCNC: 10 U/L (ref 1–41)
ANION GAP SERPL CALCULATED.3IONS-SCNC: 6 MMOL/L (ref 5–15)
AST SERPL-CCNC: 24 U/L (ref 1–40)
BACTERIA SPEC AEROBE CULT: NORMAL
BACTERIA SPEC AEROBE CULT: NORMAL
BASOPHILS # BLD AUTO: 0.1 10*3/MM3 (ref 0–0.2)
BASOPHILS NFR BLD AUTO: 0.5 % (ref 0–1.5)
BILIRUB SERPL-MCNC: 0.2 MG/DL (ref 0–1.2)
BUN SERPL-MCNC: 13 MG/DL (ref 8–23)
BUN/CREAT SERPL: 11.6 (ref 7–25)
CALCIUM SPEC-SCNC: 7.2 MG/DL (ref 8.6–10.5)
CHLORIDE SERPL-SCNC: 106 MMOL/L (ref 98–107)
CO2 SERPL-SCNC: 31 MMOL/L (ref 22–29)
CREAT SERPL-MCNC: 1.12 MG/DL (ref 0.76–1.27)
DEPRECATED RDW RBC AUTO: 70.4 FL (ref 37–54)
EOSINOPHIL # BLD AUTO: 0.1 10*3/MM3 (ref 0–0.4)
EOSINOPHIL NFR BLD AUTO: 0.5 % (ref 0.3–6.2)
ERYTHROCYTE [DISTWIDTH] IN BLOOD BY AUTOMATED COUNT: 23.2 % (ref 12.3–15.4)
GFR SERPL CREATININE-BSD FRML MDRD: 62 ML/MIN/1.73
GLOBULIN UR ELPH-MCNC: 2.1 GM/DL
GLUCOSE SERPL-MCNC: 89 MG/DL (ref 65–99)
HCT VFR BLD AUTO: 25.8 % (ref 37.5–51)
HGB BLD-MCNC: 8.4 G/DL (ref 13–17.7)
LYMPHOCYTES # BLD AUTO: 1.3 10*3/MM3 (ref 0.7–3.1)
LYMPHOCYTES NFR BLD AUTO: 10.5 % (ref 19.6–45.3)
MAGNESIUM SERPL-MCNC: 1.1 MG/DL (ref 1.6–2.4)
MCH RBC QN AUTO: 28.6 PG (ref 26.6–33)
MCHC RBC AUTO-ENTMCNC: 32.6 G/DL (ref 31.5–35.7)
MCV RBC AUTO: 87.9 FL (ref 79–97)
MONOCYTES # BLD AUTO: 1 10*3/MM3 (ref 0.1–0.9)
MONOCYTES NFR BLD AUTO: 7.8 % (ref 5–12)
NEUTROPHILS NFR BLD AUTO: 80.7 % (ref 42.7–76)
NEUTROPHILS NFR BLD AUTO: 9.9 10*3/MM3 (ref 1.7–7)
NRBC BLD AUTO-RTO: 0 /100 WBC (ref 0–0.2)
PLATELET # BLD AUTO: 144 10*3/MM3 (ref 140–450)
PMV BLD AUTO: 8.4 FL (ref 6–12)
POTASSIUM SERPL-SCNC: 3.2 MMOL/L (ref 3.5–5.2)
POTASSIUM SERPL-SCNC: 3.9 MMOL/L (ref 3.5–5.2)
PROT SERPL-MCNC: 4.7 G/DL (ref 6–8.5)
RBC # BLD AUTO: 2.93 10*6/MM3 (ref 4.14–5.8)
SODIUM SERPL-SCNC: 143 MMOL/L (ref 136–145)
WBC # BLD AUTO: 12.3 10*3/MM3 (ref 3.4–10.8)

## 2021-02-08 PROCEDURE — 99232 SBSQ HOSP IP/OBS MODERATE 35: CPT | Performed by: INTERNAL MEDICINE

## 2021-02-08 PROCEDURE — 25010000002 PIPERACILLIN SOD-TAZOBACTAM PER 1 G: Performed by: INTERNAL MEDICINE

## 2021-02-08 PROCEDURE — 80053 COMPREHEN METABOLIC PANEL: CPT | Performed by: NURSE PRACTITIONER

## 2021-02-08 PROCEDURE — 25010000002 MAGNESIUM SULFATE 2 GM/50ML SOLUTION: Performed by: NURSE PRACTITIONER

## 2021-02-08 PROCEDURE — 84132 ASSAY OF SERUM POTASSIUM: CPT | Performed by: INTERNAL MEDICINE

## 2021-02-08 PROCEDURE — 85025 COMPLETE CBC W/AUTO DIFF WBC: CPT | Performed by: INTERNAL MEDICINE

## 2021-02-08 PROCEDURE — 83735 ASSAY OF MAGNESIUM: CPT | Performed by: INTERNAL MEDICINE

## 2021-02-08 RX ORDER — MAGNESIUM SULFATE HEPTAHYDRATE 40 MG/ML
2 INJECTION, SOLUTION INTRAVENOUS ONCE
Status: DISCONTINUED | OUTPATIENT
Start: 2021-02-08 | End: 2021-02-08

## 2021-02-08 RX ORDER — POTASSIUM CHLORIDE 7.45 MG/ML
10 INJECTION INTRAVENOUS
Status: DISCONTINUED | OUTPATIENT
Start: 2021-02-08 | End: 2021-02-11 | Stop reason: HOSPADM

## 2021-02-08 RX ORDER — MAGNESIUM SULFATE HEPTAHYDRATE 40 MG/ML
2 INJECTION, SOLUTION INTRAVENOUS AS NEEDED
Status: DISCONTINUED | OUTPATIENT
Start: 2021-02-08 | End: 2021-02-11 | Stop reason: HOSPADM

## 2021-02-08 RX ORDER — POTASSIUM CHLORIDE 20 MEQ/1
40 TABLET, EXTENDED RELEASE ORAL AS NEEDED
Status: DISCONTINUED | OUTPATIENT
Start: 2021-02-08 | End: 2021-02-11 | Stop reason: HOSPADM

## 2021-02-08 RX ORDER — MAGNESIUM SULFATE 1 G/100ML
1 INJECTION INTRAVENOUS AS NEEDED
Status: DISCONTINUED | OUTPATIENT
Start: 2021-02-08 | End: 2021-02-11 | Stop reason: HOSPADM

## 2021-02-08 RX ORDER — POTASSIUM CHLORIDE 1.5 G/1.77G
40 POWDER, FOR SOLUTION ORAL AS NEEDED
Status: DISCONTINUED | OUTPATIENT
Start: 2021-02-08 | End: 2021-02-11 | Stop reason: HOSPADM

## 2021-02-08 RX ADMIN — POTASSIUM CHLORIDE 40 MEQ: 1500 TABLET, EXTENDED RELEASE ORAL at 09:14

## 2021-02-08 RX ADMIN — METOPROLOL TARTRATE 12.5 MG: 25 TABLET, FILM COATED ORAL at 21:23

## 2021-02-08 RX ADMIN — Medication 10 ML: at 09:14

## 2021-02-08 RX ADMIN — RIVAROXABAN 15 MG: 15 TABLET, FILM COATED ORAL at 09:14

## 2021-02-08 RX ADMIN — Medication 10 ML: at 21:24

## 2021-02-08 RX ADMIN — METOPROLOL TARTRATE 12.5 MG: 25 TABLET, FILM COATED ORAL at 09:14

## 2021-02-08 RX ADMIN — MAGNESIUM SULFATE HEPTAHYDRATE 2 G: 40 INJECTION, SOLUTION INTRAVENOUS at 05:20

## 2021-02-08 RX ADMIN — RIVAROXABAN 15 MG: 15 TABLET, FILM COATED ORAL at 21:23

## 2021-02-08 RX ADMIN — POTASSIUM CHLORIDE 40 MEQ: 1500 TABLET, EXTENDED RELEASE ORAL at 06:25

## 2021-02-08 RX ADMIN — PANTOPRAZOLE SODIUM 40 MG: 40 TABLET, DELAYED RELEASE ORAL at 06:25

## 2021-02-08 RX ADMIN — URSODIOL 500 MG: 500 TABLET, FILM COATED ORAL at 21:23

## 2021-02-08 RX ADMIN — URSODIOL 500 MG: 500 TABLET, FILM COATED ORAL at 09:14

## 2021-02-08 RX ADMIN — ALLOPURINOL 150 MG: 300 TABLET ORAL at 09:14

## 2021-02-08 RX ADMIN — PIPERACILLIN AND TAZOBACTAM 3.38 G: 3; .375 INJECTION, POWDER, LYOPHILIZED, FOR SOLUTION INTRAVENOUS at 17:20

## 2021-02-08 RX ADMIN — PIPERACILLIN AND TAZOBACTAM 3.38 G: 3; .375 INJECTION, POWDER, LYOPHILIZED, FOR SOLUTION INTRAVENOUS at 09:19

## 2021-02-08 RX ADMIN — PIPERACILLIN AND TAZOBACTAM 3.38 G: 3; .375 INJECTION, POWDER, LYOPHILIZED, FOR SOLUTION INTRAVENOUS at 00:44

## 2021-02-08 NOTE — PLAN OF CARE
Goal Outcome Evaluation:  Plan of Care Reviewed With: patient  Progress: improving  Outcome Summary: Patient has been alert but still confused. Gave meds crushed in pudding. Pt. tolerated well. No complaints of pain.

## 2021-02-08 NOTE — PROGRESS NOTES
"        [facility] Medicine Services Daily Progress Note      Hospitalist Team  LOS 5 days      Patient Care Team:  Ann Marie Hodgson MD as PCP - General (Family Medicine)    Patient Location: 223/1      Subjective   Subjective     Chief Complaint / Subjective  Chief Complaint   Patient presents with   • Altered Mental Status   • Syncope         Brief Synopsis of Hospital Course/HPI        Date::    2/8/2021 call:.  Little more awake and alert saying some words but otherwise still mostly confused HPI limited.      Review of Systems   Unable to perform ROS: mental status change         Objective   Objective      Vital Signs  Temp:  [97.9 °F (36.6 °C)-99.4 °F (37.4 °C)] 97.9 °F (36.6 °C)  Heart Rate:  [] 96  Resp:  [17-18] 17  BP: (126-153)/(74-87) 134/74  Oxygen Therapy  SpO2: 98 %  Pulse Oximetry Type: Intermittent  Device (Oxygen Therapy): nasal cannula  Flow (L/min): 2  Flowsheet Rows      First Filed Value   Admission Height  170.2 cm (67\") Documented at 02/03/2021 1417   Admission Weight  90 kg (198 lb 6.6 oz) Documented at 02/03/2021 1417        Intake & Output (last 3 days)       02/05 0701 - 02/06 0700 02/06 0701 - 02/07 0700 02/07 0701 - 02/08 0700 02/08 0701 - 02/09 0700    P.O. 290 480 360 0    I.V. (mL/kg) 1900 (22.5) 1186 (13.5)      Blood 700       Total Intake(mL/kg) 2890 (34.2) 1666 (19) 360 (4.1) 0 (0)    Net +2890 +1666 +360 0            Urine Unmeasured Occurrence 2 x 2 x 2 x 1 x    Stool Unmeasured Occurrence 4 x  2 x 2 x        Lines, Drains & Airways    Active LDAs     Name:   Placement date:   Placement time:   Site:   Days:    Single Lumen Implantable Port Right Subclavian   --    --    Subclavian                     Physical Exam:    Physical Exam  Constitutional:       General: He is not in acute distress.     Comments: Appears chronically ill   Cardiovascular:      Rate and Rhythm: Normal rate.   Pulmonary:      Effort: Pulmonary effort is normal. No respiratory " distress.   Abdominal:      General: There is no distension.      Palpations: Abdomen is soft.      Tenderness: There is no abdominal tenderness.   Musculoskeletal:      Right lower leg: No edema.      Left lower leg: No edema.   Skin:     General: Skin is warm and dry.   Neurological:      Mental Status: He is alert.      Comments: AO x1   Psychiatric:      Comments: Flat affect               Procedures:              Results Review:     I reviewed the patient's new clinical results.      Lab Results (last 24 hours)     Procedure Component Value Units Date/Time    Blood Culture - Blood, Blood, Central Line [[id number]] Collected: 02/03/21 1618    Specimen: Blood, Central Line Updated: 02/08/21 1630     Blood Culture No growth at 5 days    Blood Culture - Blood, Chest, Right [[id number]] Collected: 02/03/21 1509    Specimen: Blood from Chest, Right Updated: 02/08/21 1515     Blood Culture No growth at 5 days    Comprehensive Metabolic Panel [[id number]]  (Abnormal) Collected: 02/08/21 0329    Specimen: Blood Updated: 02/08/21 0448     Glucose 89 mg/dL      BUN 13 mg/dL      Creatinine 1.12 mg/dL      Sodium 143 mmol/L      Potassium 3.2 mmol/L      Chloride 106 mmol/L      CO2 31.0 mmol/L      Calcium 7.2 mg/dL      Total Protein 4.7 g/dL      Albumin 2.60 g/dL      ALT (SGPT) 10 U/L      AST (SGOT) 24 U/L      Alkaline Phosphatase 96 U/L      Total Bilirubin 0.2 mg/dL      eGFR Non African Amer 62 mL/min/1.73      Globulin 2.1 gm/dL      A/G Ratio 1.2 g/dL      BUN/Creatinine Ratio 11.6     Anion Gap 6.0 mmol/L     Narrative:      GFR Normal >60  Chronic Kidney Disease <60  Kidney Failure <15      Magnesium [[id number]]  (Abnormal) Collected: 02/08/21 0329    Specimen: Blood Updated: 02/08/21 0448     Magnesium 1.1 mg/dL     CBC & Differential [[id number]]  (Abnormal) Collected: 02/08/21 0329    Specimen: Blood Updated: 02/08/21 0433    Narrative:      The following orders were created for panel order CBC &  Differential.  Procedure                               Abnormality         Status                     ---------                               -----------         ------                     CBC Auto Differential[[id number]]        Abnormal            Final result                 Please view results for these tests on the individual orders.    CBC Auto Differential [[id number]]  (Abnormal) Collected: 02/08/21 0329    Specimen: Blood Updated: 02/08/21 0433     WBC 12.30 10*3/mm3      RBC 2.93 10*6/mm3      Hemoglobin 8.4 g/dL      Hematocrit 25.8 %      MCV 87.9 fL      MCH 28.6 pg      MCHC 32.6 g/dL      RDW 23.2 %      RDW-SD 70.4 fl      MPV 8.4 fL      Platelets 144 10*3/mm3      Neutrophil % 80.7 %      Lymphocyte % 10.5 %      Monocyte % 7.8 %      Eosinophil % 0.5 %      Basophil % 0.5 %      Neutrophils, Absolute 9.90 10*3/mm3      Lymphocytes, Absolute 1.30 10*3/mm3      Monocytes, Absolute 1.00 10*3/mm3      Eosinophils, Absolute 0.10 10*3/mm3      Basophils, Absolute 0.10 10*3/mm3      nRBC 0.0 /100 WBC         No results found for: HGBA1C  Results from last 7 days   Lab Units 02/03/21  1422   INR  1.32*           Lab Results   Component Value Date    LIPASE 13 02/03/2021     No results found for: CHOL, CHLPL, TRIG, HDL, LDL, LDLDIRECT    Lab Results   Lab Value Date/Time    INTRAOP  10/14/2020 1009     TP: Positive for malignancy.     [facility]       FINALDX  10/14/2020 1009     Liver, CT-guided core biopsy:    Well to moderately differentiated adenocarcinoma, see COMMENT     JPR/sms       COMDX  10/14/2020 1009     The biopsy shows a well to moderately differentiated adenocarcinoma. Immunohistochemistry was performed utilizing appropriate controls and the tumor is strongly positive for only CK7. The CK20, CDX2, TTF-1, napsin A, chromogranin, synaptophysin and CD56 are all negative. This is a nonspecific staining pattern but can be seen in tumors of pancreatobiliary origin, including cholangiocarcinoma which  is favored in this case. Adenocarcinomas of the upper gastrointestinal tract can also display only CK7 positivity and should be excluded clinically. This staining pattern argues against, but does not completely exclude a metastasis from a primary lung adenocarcinoma as approximately 10 to 15% of lung adenocarcinomas are negative for TTF-1 and napsin A. This staining pattern does exclude a neuroendocrine tumor and adenocarcinoma of colonic origin. Further clinical workup with imaging studies and serum tumor markers is recommended.          Microbiology Results (last 10 days)     Procedure Component Value - Date/Time    COVID PRE-OP / PRE-PROCEDURE SCREENING ORDER (NO ISOLATION) - Swab, Nasopharynx [[id number]]  (Normal) Collected: 02/04/21 1522    Lab Status: Final result Specimen: Swab from Nasopharynx Updated: 02/04/21 2341    Narrative:      The following orders were created for panel order COVID PRE-OP / PRE-PROCEDURE SCREENING ORDER (NO ISOLATION) - Swab, Nasopharynx.  Procedure                               Abnormality         Status                     ---------                               -----------         ------                     COVID-19,APTIMA PANTHER,...[[id number]]  Normal              Final result                 Please view results for these tests on the individual orders.    COVID-19,APTIMA PANTHER,[facility], NP/OP SWAB IN UTM/VTM/[facility] TRANSPORT MEDIA,24 HR TAT - Swab, Nasopharynx [[id number]]  (Normal) Collected: 02/04/21 1522    Lab Status: Final result Specimen: Swab from Nasopharynx Updated: 02/04/21 2341     COVID19 Not Detected    Narrative:      Fact sheet for providers: https://www.fda.gov/media/164769/download     Fact sheet for patients: https://www.fda.gov/media/729955/download    Test performed by RT PCR.    Blood Culture - Blood, Blood, Central Line [[id number]] Collected: 02/03/21 1618    Lab Status: Final result Specimen: Blood, Central Line Updated: 02/08/21 1630     Blood  Culture No growth at 5 days    Blood Culture - Blood, Chest, Right [[id number]] Collected: 02/03/21 1509    Lab Status: Final result Specimen: Blood from Chest, Right Updated: 02/08/21 1515     Blood Culture No growth at 5 days          ECG/EMG Results (most recent)     Procedure Component Value Units Date/Time    ECG 12 Lead [[id number]] Collected: 02/03/21 1429     Updated: 02/04/21 1443     QT Interval 342 ms     Narrative:      HEART RATE= 111  bpm  RR Interval= 540  ms  KS Interval= 150  ms  P Horizontal Axis= 23  deg  P Front Axis= 96  deg  QRSD Interval= 103  ms  QT Interval= 342  ms  QRS Axis= 87  deg  T Wave Axis= 117  deg  - ABNORMAL ECG -  Sinus tachycardia  Low voltage, extremity leads  Nonspecific repol abnormality, lateral leads  When compared with ECG of 18-Oct-2020 18:41:11,  Significant rate increase  Significant axis, voltage or hypertrophy change  Electronically Signed By: Miky Mena) 04-Feb-2021 14:40:17  Date and Time of Study: 2021-02-03 14:29:53          Results for orders placed during the hospital encounter of 01/21/21   Duplex Venous Lower Extremity - Left CAR    Narrative · Acute left lower extremity deep vein thrombosis noted in the common   femoral, proximal femoral, mid femoral, distal femoral, popliteal,   posterial tibial and gastrocnemius.  · Acute left lower extremity superficial thrombophlebitis noted in the   small saphenous.  · All other left sided veins appeared normal.               Ct Abdomen Pelvis Without Contrast    Result Date: 2/3/2021   1. With the exception of very slight interval enlargement of a portacaval lymph node, the findings appear stable since 01/14/2021. Heterogeneous right hepatic lobe mass, but appear to be smaller low-density nodules elsewhere in the liver, thought to be unchanged, and remain highly suspicious for malignancy. 2. Borderline enlarged aortocaval lymph node in the retroperitoneum, unchanged. 3. 2.3 cm lucent lesion in L2 is unchanged  from prior examination. Osseous metastatic disease not excluded. No new osseous lesions.  4. No acute findings in the abdomen.   Electronically Signed By-Rosa Fox MD On:2/3/2021 3:07 PM This report was finalized on [id number] by  Rosa Fox MD.    Ct Head Without Contrast    Result Date: 2/3/2021  Age-appropriate atrophy, unchanged from the patient's recent head CT of 12/10/2020. No acute intracranial findings.  Electronically Signed By-Pramod Soria MD On:2/3/2021 3:07 PM This report was finalized on [id number] by  Pramod Soria MD.    Xr Chest 1 View    Result Date: 2/3/2021  No acute cardiopulmonary process.  Electronically Signed By-Landon Paredes MD On:2/3/2021 3:34 PM This report was finalized on [id number] by  Landon Paredes MD.          Xrays, labs reviewed personally by physician.    Medication Review:   I have reviewed the patient's current medication list      Scheduled Meds  allopurinol, 150 mg, Oral, Daily  aspirin, 325 mg, Oral, Once  epoetin lan-epbx, 40,000 Units, Subcutaneous, Weekly  metoprolol tartrate, 12.5 mg, Oral, Q12H  pantoprazole, 40 mg, Oral, QAM  piperacillin-tazobactam, 3.375 g, Intravenous, Q8H  rivaroxaban, 15 mg, Oral, BID  [START ON 2/12/2021] rivaroxaban, 20 mg, Oral, Daily With Dinner  sodium chloride, 10 mL, Intravenous, Q12H  ursodiol, 500 mg, Oral, BID        Meds Infusions       Meds PRN  •  acetaminophen **OR** acetaminophen **OR** acetaminophen  •  magnesium sulfate **OR** magnesium sulfate in D5W 1g/100mL (PREMIX)  •  ondansetron **OR** ondansetron  •  potassium chloride **OR** potassium chloride **OR** potassium chloride  •  sodium chloride  •  sodium chloride        Assessment/Plan   Assessment/Plan     Active Hospital Problems    Diagnosis  POA   • **Acute cholangitis [K83.09]  Yes   • 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) [N17.0]  Yes   • Syncope and  collapse [R55]  Yes   • Dizziness [R42]  Yes   • Cholangiocarcinoma (CMS/HCC) [C22.1]  Yes   • Rhabdomyolysis [M62.82]  Yes      Resolved Hospital Problems   No resolved problems to display.       MEDICAL DECISION MAKING COMPLEXITY BY PROBLEM:     Possible acute cholangitis  -On Zosyn  -Cultures negative so far     Metastatic cholangiocarcinoma  -Oncology following  -Supportive treatment      Acute kidney injury  -Improving creatinine noted  -Nephrology following    Acute metabolic encephalopathy  -Likely related to above, slowly improving  -Continue to treat underlying conditions    Generalized weakness  -PT OT consulted    History of pulmonary emboli DVT  -On Xarelto    CODE STATUS: DNR, per palliative note family wants to see if patient will improve mentally before considering hospice    VTE Prophylaxis -   Mechanical Order History:     None      Pharmalogical Order History:      Ordered     Dose Route Frequency Stop    02/03/21 2212  rivaroxaban (XARELTO) tablet 20 mg     Question:  Are you ordering rivaroxaban for the prevention of blood clots in an acutely ill medical patient?  Answer:  No    20 mg PO Daily With Dinner --    02/03/21 2009  rivaroxaban (XARELTO) tablet 15 mg     Question:  Are you ordering rivaroxaban for the prevention of blood clots in an acutely ill medical patient?  Answer:  No    15 mg PO 2 Times Daily 02/11/21 2059                  Code Status -   Code Status and Medical Interventions:   Ordered at: 02/07/21 1326     Level Of Support Discussed With:    Next of Kin (If No Surrogate)     Code Status:    No CPR     Medical Interventions (Level of Support Prior to Arrest):    Comfort Measures       This patient has been examined wearing appropriate Personal Protective Equipment. 02/08/21        Discharge Planning  PT OT eval and if family decides not for hospice then will need rehab placement        Electronically signed by Ruben Marrero DO, 02/08/21, 17:43 EST.  [facility] Hospitalist  Team

## 2021-02-08 NOTE — CONSULTS
"Nutrition Services    Patient Name: Tristen Garcia  YOB: 1935  MRN: [id number]  Admission date: 2/3/2021    Comments: If hospice care to proceed recommend liberalizing diet to only Soft to Chew.  Will continue to monitor POC.  PO intake increasing slightly, still remains insufficient. Monitor appropriateness for TF consult    Increase ONS to [facility] Renal to BID and magic cup BID    PPE Documentation        PPE Worn By Provider Mask and eyewear   PPE Worn By Patient  None     CLINICAL NUTRITION ASSESSMENT      Reason for Assessment 2/8 Follow up protocol    2/5:  MST score 2+     H&P:      Past Medical History:   Diagnosis Date   • Arthritis    • Cancer (CMS/HCC)    • COPD (chronic obstructive pulmonary disease) (CMS/HCC)    • Elevated cholesterol    • GERD (gastroesophageal reflux disease)    • Hyperlipidemia    • Hypertension        Past Surgical History:   Procedure Laterality Date   • COLONOSCOPY     • EYE SURGERY     • SKIN BIOPSY          Current Problems:   Per hospitalist note:  2/7: \"Prognosis very poor.  The patient should be hospice.\"  -Care coordination with  regarding current care and advanced care planning.  Discussed with granddaughter at length.  Advised her of his terminal condition.  I do not recommend any further treatment.  She was in agreement and plans to talk to her family to come to decision.  She is contemplating hospice.    Acute ascending cholangitis  Empiric antibiotics  Await cultures     Cholangiocarcinoma  Consult oncology  Supportive treatment      Renal failure/insufficiency/injury:  Hydration  Supportive treatment  Cut down or hold ACE inhibitors  Avoid nephrotoxic medications   Address diuretics       Nutrition/Diet History         Narrative     2/8: Po intake increasing since last assessment. Per EMR patient still confused, and per hospitalist note patient family considering hospice care. Patient is complaining of pain this AM. RD visited patient room " "where family member was present at bedside and meal tray was on tray table. RD observed tray an saw 100% of one magic cup eaten, 25% of food on plat including minced chicken and mashed potatoes, 100% of italian ice and 25% of apple sauce. Patient family member expressed concern that if he was no in the room the patient probably wouldn't have eaten anything because the tray was just left sitting at bedside and patient is very confused and needs help during meal time. RD states she will mention this to the RN and have patient documented as an assistive feed if not already. Family member also states patient couldn't keep false teeth in without gagging? RD asked if he was only gagging to the teeth and family member stated yes, \"he hasnt had them in in a long time, and he couldn't get used to the feeling\". Brought this to RN attention as well and asked for speech therapy consult. RN states patient refuses to eat for aid - will monitor for appropriateness for TF and speech consult will be put in.    2/5: Pt assessed due to concern for possible weight loss. Pt not able to communicate with this writer or answer any questions due to degree of AMS. Per RN, pt ate no lunch today and has had very limited PO intake. Will try liberalizing diet and adding supplements. If intake does not improve, may need to consider nutrition support.      Functional Status Needs assistance with feeding right now due to severity of AMS     Food Allergies NKFA     Factors Affecting   Nutritional Intake AMS     Anthropometrics        Current Height, Weight Height: 170.2 cm (67\")  Weight: 87.4 kg (192 lb 10.9 oz) (02/08/21 0418)        Admit Height, Weight -    Flowsheet Rows      First Filed Value   Admission Height  170.2 cm (67\") Documented at 02/03/2021 1417   Admission Weight  90 kg (198 lb 6.6 oz) Documented at 02/03/2021 1417             Ideal Body Weight (IBW) 148 lb   % Ideal Body Weight 137%       Usual Body Weight UTD   % Usual Body Weight " UTD   Wt Change Observation   2/8 :Patient weight this admission is fluctuating, no significant weight loss recorded.    2/5: Based on weight from 2/4, pt has lost 4.5% weight in the last three months -- wt loss not significant for timeframe    Weight Hx    Wt Readings from Last 30 Encounters:   02/08/21 0418 87.4 kg (192 lb 10.9 oz)   02/07/21 0255 87.9 kg (193 lb 12.6 oz)   02/06/21 0410 84.6 kg (186 lb 8.2 oz)   02/05/21 0420 92.6 kg (204 lb 2.3 oz)   02/04/21 0451 87.3 kg (192 lb 7.4 oz)   02/03/21 1906 87.3 kg (192 lb 7.4 oz)   02/03/21 1417 90 kg (198 lb 6.6 oz)   02/01/21 1036 88.9 kg (196 lb)   01/27/21 1021 88.9 kg (196 lb)   01/21/21 0802 89 kg (196 lb 4.8 oz)   01/06/21 0749 87.1 kg (192 lb)   12/30/20 0841 88.5 kg (195 lb)   12/21/20 1327 88 kg (194 lb)   12/17/20 0802 86.5 kg (190 lb 11.2 oz)   12/10/20 0800 86.2 kg (190 lb 1.6 oz)   12/03/20 1014 88.5 kg (195 lb 1.6 oz)   11/19/20 1048 89.2 kg (196 lb 9.6 oz)   11/19/20 1129 88.9 kg (196 lb)   11/11/20 0848 91.4 kg (201 lb 8 oz)   10/29/20 1016 95.7 kg (211 lb)   10/29/20 0853 95.9 kg (211 lb 6.4 oz)   10/19/20 0405 100 kg (220 lb 8 oz)   10/19/20 0030 98.8 kg (217 lb 14.4 oz)   10/18/20 1650 98.9 kg (218 lb)   10/18/20 1500 98.8 kg (217 lb 14.4 oz)   10/14/20 0822 94.8 kg (209 lb)        BMI kg/m2 Body mass index is 30.18 kg/m².       Labs/Medications         Pertinent Labs -   Results from last 7 days   Lab Units 02/08/21  0329 02/07/21  0336 02/06/21  0240   SODIUM mmol/L 143 142 139   POTASSIUM mmol/L 3.2* 3.5 3.2*   CHLORIDE mmol/L 106 104 101   CO2 mmol/L 31.0* 31.0* 30.0*   BUN mg/dL 13 16 21   CREATININE mg/dL 1.12 1.25 1.30*   CALCIUM mg/dL 7.2* 7.2* 7.3*   BILIRUBIN mg/dL 0.2 0.2 0.4   ALK PHOS U/L 96 97 101   ALT (SGPT) U/L 10 10 13   AST (SGOT) U/L 24 21 30   GLUCOSE mg/dL 89 86 89     Results from last 7 days   Lab Units 02/08/21 0329 02/07/21  0336  02/05/21  0506  02/03/21  1422   MAGNESIUM mg/dL 1.1* 1.2*  --   --   --  1.6    PHOSPHORUS mg/dL  --   --   --  3.5  --  4.6*   HEMOGLOBIN g/dL 8.4* 8.1*   < > 6.1*   < > 7.9*   HEMATOCRIT % 25.8* 24.8*   < > 18.8*   < > 24.9*    < > = values in this interval not displayed.     COVID19   Date Value Ref Range Status   02/04/2021 Not Detected Not Detected - Ref. Range Final     No results found for: HGBA1C      Pertinent Medications YZyloprim, Lopressor, Protonix, Zosyn, Magnesium sulfate, K-DUR     Physical Findings        Overall Physical   Appearance, MSA 2/8: Patient appears well nourished upon this assessment    2/5: Appears well-nourished on NFPE   --  Edema  None noted      Gastrointestinal BM 2/8     Tubes No feeding tube      Oral/Mouth Cavity Missing some teeth -- pt would not open mouth very far to assess oral condition     Skin Intact      --  Current Nutrition Orders & Evaluation of Intake       Oral Nutrition     Current PO Diet Diet Renal, Texture; 2gm K+; Soft to Chew   Supplement Novasource Renal once daily  Magic cup BID   PO Evaluation     % PO Intake Patient PO increased since last admission, still not sustainable for energy requirements.   --  Clinical Course    Nutrition Course Details 2/3: Regular diet   2/3-current (2/8): Changed to Renal, 2g potassium      Nutritional Risk Screening        NRS-2002 Score   -       Nutrition Diagnosis         Nutrition Dx Problem 1 Inadequate intake related to AMS interfering with appetite/intake; as evidenced by ongoing very poor intake at meals       Nutrition Dx Problem 2 -       Intervention Goal         Intervention Goal(s) Intake improving; pt tolerating least restrictive diet to allow for safe intake; accepts oral supplements     Nutrition Intervention        RD/Tech Action Novasource Renal BID     Nutrition Prescription          Diet Prescription Soft To Chew, 2g potassium    Supplement Prescription Novasource Renal BID   Magic Cup BID   --  Monitor/Evaluation        Monitor I&O, PO intake, Supplement intake, Pertinent labs,  Weight, Skin status, GI status     Electronically signed by:  Jodi Singh RD  02/08/21 11:01 EST

## 2021-02-08 NOTE — PROGRESS NOTES
Hematology/Oncology Inpatient Progress Note    PATIENT NAME: Tristen Garcia  : 1935  MRN: [id number]    CHIEF COMPLAINT: Altered mental status    HISTORY OF PRESENT ILLNESS:    Tristen Garcia is a 85 y.o.  male who presented to [facility] on 2/3/2021 via EMS. Patient reportedly called EMS for lifting assistance at home.  The patient had been taking a bath and was unable to get out of his bathtub.  On EMS arrival, the patient was reportedly unresponsive with a slow heart rate. Patient slowly became more alert.  Per family, patient has reportedely been confused for several days. The family reported poor oral intake in the last 24 hours and extensive nausea. Patient denied diarrhea and vomiting. Labs in the ED were significant for WBC 24.60, hemoglobin 7.9, BUN 30, creatinine 1.76, EGFR 37, alkaline phosphatase 166, and troponin 0.285.  CT of the head showed age-appropriate atrophy, unchanged from patient's recent CT performed 12/10/2020.  No acute intracranial findings. Patient was admitted for further evaluation and management.     21  Hematology/Oncology was consulted as patient is known to our service and followed by Dr. 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  Cholangiocarcinoma favored.  Patient was started on chemotherapy with cisplatin and gemcitabine combination on 2020.  On 2021, CT of the abdomen and pelvis was performed that resulted new low-density lesions within the right and left hepatic lobes consistent with new hepatic metastatic disease.  It was recommended for patient to switch his chemotherapy from cisplatin/gemcitabine to FOLFOX.  Patient was started on FOLFOX on 2020.     Of note, patient was recently diagnosed with pulmonary emboli and acute left lower extremity DVT in the common femoral, proximal femoral, mid femoral, distal femoral, popliteal, posterior tibial, and gastrocnemius.  Patient was started on  "Xarelto.     He  has a past medical history of Arthritis, Cancer (CMS/HCC), COPD (chronic obstructive pulmonary disease) (CMS/HCC), Elevated cholesterol, GERD (gastroesophageal reflux disease), Hyperlipidemia, and Hypertension.     PCP: Ann Marie Hodgson MD    History of present illness was reviewed and is unchanged from the previous visit. 02/08/21     Subjective      Patient is more alert today.  He is much less confused.  He is able to answer questions.  But he still weaker.    ROS:  Review of Systems   Unable to perform ROS: Acuity of condition   Respiratory: Positive for cough and shortness of breath.    Psychiatric/Behavioral: Negative for behavioral problems.      MEDICATIONS:    Scheduled Meds:  allopurinol, 150 mg, Oral, Daily  aspirin, 325 mg, Oral, Once  epoetin lan-epbx, 40,000 Units, Subcutaneous, Weekly  metoprolol tartrate, 12.5 mg, Oral, Q12H  pantoprazole, 40 mg, Oral, QAM  piperacillin-tazobactam, 3.375 g, Intravenous, Q8H  rivaroxaban, 15 mg, Oral, BID  [START ON 2/12/2021] rivaroxaban, 20 mg, Oral, Daily With Dinner  sodium chloride, 10 mL, Intravenous, Q12H  ursodiol, 500 mg, Oral, BID       Continuous Infusions:      PRN Meds:  •  acetaminophen **OR** acetaminophen **OR** acetaminophen  •  magnesium sulfate **OR** magnesium sulfate in D5W 1g/100mL (PREMIX)  •  ondansetron **OR** ondansetron  •  potassium chloride **OR** potassium chloride **OR** potassium chloride  •  sodium chloride  •  sodium chloride     ALLERGIES:  No Known Allergies    Objective    VITALS:   /87   Pulse 90   Temp 98.9 °F (37.2 °C) (Oral)   Resp 17   Ht 170.2 cm (67\")   Wt 87.4 kg (192 lb 10.9 oz)   SpO2 97%   BMI 30.18 kg/m²     PHYSICAL EXAM: (performed by MD)  Physical Exam  Constitutional:       General: He is not in acute distress.     Appearance: Normal appearance. He is obese. He is not toxic-appearing.   HENT:      Head: Normocephalic and atraumatic.      Nose:      Comments: Nasal cannula  Eyes:      " General: No scleral icterus.        Right eye: No discharge.         Left eye: No discharge.   Neck:      Musculoskeletal: Normal range of motion. No neck rigidity.   Cardiovascular:      Rate and Rhythm: Normal rate.   Pulmonary:      Effort: Pulmonary effort is normal.   Chest:      Comments: Right chest port-a-cath  Abdominal:      General: There is no distension.      Palpations: Abdomen is soft.      Tenderness: There is no abdominal tenderness.   Musculoskeletal: Normal range of motion.      Right lower leg: Edema present.      Left lower leg: Edema present.   Skin:     General: Skin is warm.   Neurological:      Mental Status: He is alert and oriented to person, place, and time.      Motor: Weakness present.   Psychiatric:      Comments:  Patient is confused.  He keeps repeating okay to every question.        I have reexamined the patient and the results are consistent with the previously documented exam. CAREY Cho   RECENT LABS:  Lab Results (last 24 hours)     Procedure Component Value Units Date/Time    Comprehensive Metabolic Panel [[id number]]  (Abnormal) Collected: 02/08/21 0329    Specimen: Blood Updated: 02/08/21 0448     Glucose 89 mg/dL      BUN 13 mg/dL      Creatinine 1.12 mg/dL      Sodium 143 mmol/L      Potassium 3.2 mmol/L      Chloride 106 mmol/L      CO2 31.0 mmol/L      Calcium 7.2 mg/dL      Total Protein 4.7 g/dL      Albumin 2.60 g/dL      ALT (SGPT) 10 U/L      AST (SGOT) 24 U/L      Alkaline Phosphatase 96 U/L      Total Bilirubin 0.2 mg/dL      eGFR Non African Amer 62 mL/min/1.73      Globulin 2.1 gm/dL      A/G Ratio 1.2 g/dL      BUN/Creatinine Ratio 11.6     Anion Gap 6.0 mmol/L     Narrative:      GFR Normal >60  Chronic Kidney Disease <60  Kidney Failure <15      Magnesium [[id number]]  (Abnormal) Collected: 02/08/21 0329    Specimen: Blood Updated: 02/08/21 0448     Magnesium 1.1 mg/dL     CBC & Differential [[id number]]  (Abnormal) Collected: 02/08/21 0329    Specimen:  Blood Updated: 02/08/21 0433    Narrative:      The following orders were created for panel order CBC & Differential.  Procedure                               Abnormality         Status                     ---------                               -----------         ------                     CBC Auto Differential[[id number]]        Abnormal            Final result                 Please view results for these tests on the individual orders.    CBC Auto Differential [[id number]]  (Abnormal) Collected: 02/08/21 0329    Specimen: Blood Updated: 02/08/21 0433     WBC 12.30 10*3/mm3      RBC 2.93 10*6/mm3      Hemoglobin 8.4 g/dL      Hematocrit 25.8 %      MCV 87.9 fL      MCH 28.6 pg      MCHC 32.6 g/dL      RDW 23.2 %      RDW-SD 70.4 fl      MPV 8.4 fL      Platelets 144 10*3/mm3      Neutrophil % 80.7 %      Lymphocyte % 10.5 %      Monocyte % 7.8 %      Eosinophil % 0.5 %      Basophil % 0.5 %      Neutrophils, Absolute 9.90 10*3/mm3      Lymphocytes, Absolute 1.30 10*3/mm3      Monocytes, Absolute 1.00 10*3/mm3      Eosinophils, Absolute 0.10 10*3/mm3      Basophils, Absolute 0.10 10*3/mm3      nRBC 0.0 /100 WBC     Blood Culture - Blood, Blood, Central Line [[id number]] Collected: 02/03/21 1618    Specimen: Blood, Central Line Updated: 02/07/21 1630     Blood Culture No growth at 4 days    Blood Culture - Blood, Chest, Right [[id number]] Collected: 02/03/21 1509    Specimen: Blood from Chest, Right Updated: 02/07/21 1515     Blood Culture No growth at 4 days          PENDING RESULTS: MMA    IMAGING REVIEWED:  No radiology results for the last day    Assessment/Plan   ASSESSMENT:  1. 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  Switched to FOLFOX due to evidence of disease progression.  FOLFOX last received 2/1/2020.  Continue in the outpatient setting if improves.  Patient is followed by Dr. Garcia.  Reviewed his CT scans overall his disease appears to be stable.  2. Normocytic normochromic anemia:  Consider relation to chemotherapy or anemia of chronic disease.  Receiving Retacrit weekly outpatient. Anemia studies show likely anemia of chronic disease.  Continue Retacrit 40,000 units subcutaneous injection weekly while inpatient, last received 2/6/2021.   3. Leukocytosis: Urinalysis negative for leukocytes or nitrites.  Blood cultures obtained.  Chest x-ray showed no acute cardiopulmonary process. On IV antibiotics. Continue to monitor.  4. Acute kidney injury: Creatinine 1.76 on admission. Nephrology consulted. Improving.   5. Confusion: CT head showed no acute intracranial process.  Urinalysis negative. LFTs normal on admission, unlikely to be hepatic encephalopathy. Etiology unknown.  Mental status seems to have improved today  6. Weakness: PT/OT consulted  7. History of pulmonary emboli and DVT: On Xarelto  8. HTN: managed per primary team  9. Palliative care consulted to discuss goals of care     PLAN  1. CBC daily  2. Transfuse 1 unit pRBC as needed for hemoglobin <7.5  3. Continue Retacrit 40,000 units subcutaneous injection weekly- due 2/13/2021  4. Continue Xarelto  5. Continue IV antibiotics  6. Resume FOLFOX outpatient- , if clinically improves  7. Continue supportive care    Electronically signed by CAREY Cho, 02/08/21, 11:04 AM EST.            I personally reviewed all pertinent labs, related documentation and the  imaging. ROS performed and physical exam done during face to face enounter with patient. I agree with  nurse practitioner's doumentation, assessment and plan.  Patient is more awake today.  His orientation has returned.  Understands she is very weak.  Decision regarding treatment versus hospice will be made based on his short-term recovery process.  He is DNR..  Continue supportive care.  Patient definitely not ready for chemo in the next 1-2  weeks.  Neurological improvement noted..  Currently on Xarelto    Electronically signed by Aamir Garcia MD, 02/08/21, 8:06 PM EST.

## 2021-02-08 NOTE — PLAN OF CARE
Goal Outcome Evaluation:        Outcome Summary: Patient pleasant and cooperative. Confused. Son here to visit. Patient refused to let staff help him eat and refused breakfast and lunch for staff. Ate for his son. Magnesium and potassium replaced this shift.

## 2021-02-08 NOTE — PROGRESS NOTES
"   LOS: 5 days    Patient Care Team:  Ann Marie Hodgson MD as PCP - General (Family Medicine)      Subjective     Patient feeling tired.  Complaining of hurting all over  Denies any nausea or vomiting    Objective     Vital Sign Min/Max for last 24 hours  Temp:  [98.1 °F (36.7 °C)-99.4 °F (37.4 °C)] 98.9 °F (37.2 °C)  Heart Rate:  [] 87  Resp:  [17-18] 17  BP: (126-135)/(71-83) 126/74                       Flowsheet Rows      First Filed Value   Admission Height  170.2 cm (67\") Documented at 02/03/2021 1417   Admission Weight  90 kg (198 lb 6.6 oz) Documented at 02/03/2021 1417          No intake/output data recorded.  I/O last 3 completed shifts:  In: 1546 [P.O.:360; I.V.:1186]  Out: -     Physical Exam:  Physical Exam    General.    Awake and alert   Head.  Atraumatic, normocephalic  Neck.  Supple.  No JVD  Respiratory.  Decreased breath sounds bilaterally without obvious crackles or wheezing  Cardiovascular.    Regular rhythm.  Abdominal.  Obese, soft, nontender  Remedies: Trace pretibial edema bilaterally       LABS:  Lab Results   Component Value Date    CALCIUM 7.2 (L) 02/08/2021    PHOS 3.5 02/05/2021     Results from last 7 days   Lab Units 02/08/21  0329 02/07/21  0336 02/06/21  0240  02/03/21  1422   MAGNESIUM mg/dL 1.1* 1.2*  --   --  1.6   SODIUM mmol/L 143 142 139   < > 138   POTASSIUM mmol/L 3.2* 3.5 3.2*   < > 5.1   CHLORIDE mmol/L 106 104 101   < > 100   CO2 mmol/L 31.0* 31.0* 30.0*   < > 20.0*   BUN mg/dL 13 16 21   < > 30*   CREATININE mg/dL 1.12 1.25 1.30*   < > 1.76*   GLUCOSE mg/dL 89 86 89   < > 97   CALCIUM mg/dL 7.2* 7.2* 7.3*   < > 8.2*   WBC 10*3/mm3 12.30* 11.90* 9.90   < > 24.60*   HEMOGLOBIN g/dL 8.4* 8.1* 7.9*   < > 7.9*   PLATELETS 10*3/mm3 144 142 161   < > 427   ALT (SGPT) U/L 10 10 13   < > 14   AST (SGOT) U/L 24 21 30   < > 37    < > = values in this interval not displayed.     Lab Results   Component Value Date    CKTOTAL 228 (H) 02/03/2021    TROPONINT 0.285 (C) 02/03/2021 "     Estimated Creatinine Clearance: 50.9 mL/min (by C-G formula based on SCr of 1.12 mg/dL).      Brief Urine Lab Results  (Last result in the past 365 days)      Color   Clarity   Blood   Leuk Est   Nitrite   Protein   CREAT   Urine HCG        02/03/21 1527 Yellow Clear Small (1+) Negative Negative Trace             WEIGHTS:     Wt Readings from Last 1 Encounters:   02/08/21 0418 87.4 kg (192 lb 10.9 oz)   02/07/21 0255 87.9 kg (193 lb 12.6 oz)   02/06/21 0410 84.6 kg (186 lb 8.2 oz)   02/05/21 0420 92.6 kg (204 lb 2.3 oz)   02/04/21 0451 87.3 kg (192 lb 7.4 oz)   02/03/21 1906 87.3 kg (192 lb 7.4 oz)   02/03/21 1417 90 kg (198 lb 6.6 oz)       allopurinol, 150 mg, Oral, Daily  aspirin, 325 mg, Oral, Once  epoetin lan-epbx, 40,000 Units, Subcutaneous, Weekly  metoprolol tartrate, 12.5 mg, Oral, Q12H  pantoprazole, 40 mg, Oral, QAM  piperacillin-tazobactam, 3.375 g, Intravenous, Q8H  rivaroxaban, 15 mg, Oral, BID  [START ON 2/12/2021] rivaroxaban, 20 mg, Oral, Daily With Dinner  sodium chloride, 10 mL, Intravenous, Q12H  ursodiol, 500 mg, Oral, BID           Assessment/Plan       1.  Acute kidney injury,  due to dehydration/ cisplatin or both  Renal function improving slowly.  Creatinine 1.12 mg/DL this morning  2.  Hypokalemia  3.  Anemia.  Stable.  Hemoglobin 8.4 this morning  4.  Hypertension.  Blood pressure well controlled  5.  Cholangiocarcinoma, followed by oncology   6.  Hypomagnesemia    Plan:  Oral potassium replacement  IV magnesium replacement  Encouraged oral fluid intake  Repeat labs in morning.  I will continue to follow along      Mitch Stone MD  02/08/21  08:45 EST

## 2021-02-08 NOTE — PROGRESS NOTES
Continued Stay Note  [facility]     Patient Name: Tristen Garcia  MRN: [id number]  Today's Date: 2/8/2021    Admit Date: 2/3/2021    Discharge Plan     Row Name 02/08/21 1444       Plan    Plan  DC Plan: From home alone, current IV antibiotics, home O2. PT pending. Palliative consulted. Emailed inpt rehab/HHC lists to granddaughter 2/8.    Provided Post Acute Provider List?  Yes    Provided Post Acute Provider Quality & Resource List?  Yes    Delivered To  Patient;Support Person    Support Person  Email: [email or web address]    Method of Delivery  In person;Telephone    Patient/Family in Agreement with Plan  yes    Plan Comments  SW spoke with pt, pt's son (Josue), and pt's granddaughter (Emily, via phone). Pt sitting up in bed, eating lunch. Pt reports feeling much better, but allowed son to complete most of coversation. Son informed SW of events leading up to admission and during this time, granddaughter called. SW spoke with both son/granddaughter regarding pt's need for PT evaluation to determine needs at home. Agreeable to plan. Sent email that included inpt rehab & HHC list to granddaughter (email: [email or web address]). Requested new PT eval from CM via secure chat.     Met with patient in room wearing PPE: mask, face shield/goggles, gloves, gown.    Maintained distance greater than six feet and spent less than 15 minutes in the room.    ELLE Chapin    Phone: [phone]  Cell: [phone]  Fax: [phone]  [email or web address]

## 2021-02-08 NOTE — CONSULTS
"DNR / Comfort vs Aggressive Measures    Per chart, pt remains confused.  Palliative care  spoke with pt's son via phone to address goals of care.  Pt's son discussed circumstances preceding hospitalization.  Son reports meeting with care coordination  to discuss pt's aftercare plan.  Son reports that the pt will not be able to go home due to his needs, and so they are considering rehab, or possibly long-term care with hospice.  \"I know the cancer is getting worse, and we can't care for him at home if he's dying,\" reports pt's son.  Son reports that his daughter is a nurse and they have discussed hospice care as well.  However, at the moment they want to see if the pt will be able to get stronger and be less confused so that he can make further decisions on his own.  Emotional support provided.  DNR code status affirmed by son.  Thank you for the consult.  Goals of care established, will sign off.  Please reconsult if further needs arise.    .Palliative Care Assessment    PC Encounter Information  Palliative Care Patient?: yes  Referral Date: 21  Referral Time: 1325  Date of Initial Encounter with a Palliative Care Provider: 21  Time of initial encounter with a Palliative Care Provider: 1200  Time Required for Initial Referral: 30 mins  Additional Visit/Time Required to Achieve Results: 30 mins  Patient Unit at Time of Referral: 2B  Patient Unit Specialty at Time of Referral: medical surgical  Primary Diagnosis Leading to PC Consult: infections/immunologic/HIV  Code Status at Time of Consult: DNR/DNI  Palliative Care Team Members Involved in Consultation:   Pain Assessment  CPOT Facial Expression: 0-->relaxed, neutral  CPOT Body Movements: 0-->absence of movements  CPOT Muscle Tension: 0-->relaxed  Ventilator Compliance/Vocalization: 0-->talking in normal tone or no sound  CPOT Score: 0  PAINAD Breathin-->occasional labored breathing, short period of " hyperventilation  PAINAD Negative Vocalization: 1-->occasional moan/groan, low speech, negative/disapproving quality  PAINAD Facial Expression: 0-->smiling or inexpressive  PAINAD Body Language: 0-->relaxed  PAINAD Consolability: 0-->no need to console  PAINAD Score: 2  Screening Status/Interventions Data  Psychosocial Needs: pos  Psychosocial Needs Intervened: yes  Spiritual Needs: unable  Goals of Care/ACP: pos  Goals of Care/ACP Intervened: yes  Health Care Directives/Treatment Preferences  POLST/MOST Initiated: no  Pre-existing AND/MOST/POLST Order: No  Advance Directive Status: Patient does not have advance directive  Outcomes  Code Status After Consult: DNR/DNI  Interventions  Oral Care: swabbed with sterile water  Family/Support System Care: self-care encouraged, support provided

## 2021-02-09 LAB
ALBUMIN SERPL-MCNC: 2.6 G/DL (ref 3.5–5.2)
ALBUMIN/GLOB SERPL: 1.2 G/DL
ALP SERPL-CCNC: 92 U/L (ref 39–117)
ALT SERPL W P-5'-P-CCNC: 10 U/L (ref 1–41)
ANION GAP SERPL CALCULATED.3IONS-SCNC: 7 MMOL/L (ref 5–15)
AST SERPL-CCNC: 21 U/L (ref 1–40)
BASOPHILS # BLD AUTO: 0.1 10*3/MM3 (ref 0–0.2)
BASOPHILS NFR BLD AUTO: 0.5 % (ref 0–1.5)
BILIRUB SERPL-MCNC: 0.2 MG/DL (ref 0–1.2)
BUN SERPL-MCNC: 11 MG/DL (ref 8–23)
BUN/CREAT SERPL: 8.6 (ref 7–25)
CALCIUM SPEC-SCNC: 7.4 MG/DL (ref 8.6–10.5)
CHLORIDE SERPL-SCNC: 106 MMOL/L (ref 98–107)
CO2 SERPL-SCNC: 30 MMOL/L (ref 22–29)
CREAT SERPL-MCNC: 1.28 MG/DL (ref 0.76–1.27)
DEPRECATED RDW RBC AUTO: 75.3 FL (ref 37–54)
EOSINOPHIL # BLD AUTO: 0.1 10*3/MM3 (ref 0–0.4)
EOSINOPHIL NFR BLD AUTO: 1.1 % (ref 0.3–6.2)
ERYTHROCYTE [DISTWIDTH] IN BLOOD BY AUTOMATED COUNT: 24.4 % (ref 12.3–15.4)
GFR SERPL CREATININE-BSD FRML MDRD: 53 ML/MIN/1.73
GLOBULIN UR ELPH-MCNC: 2.2 GM/DL
GLUCOSE SERPL-MCNC: 98 MG/DL (ref 65–99)
HCT VFR BLD AUTO: 25.2 % (ref 37.5–51)
HGB BLD-MCNC: 8.1 G/DL (ref 13–17.7)
LYMPHOCYTES # BLD AUTO: 1.5 10*3/MM3 (ref 0.7–3.1)
LYMPHOCYTES NFR BLD AUTO: 11.6 % (ref 19.6–45.3)
MAGNESIUM SERPL-MCNC: 1.2 MG/DL (ref 1.6–2.4)
MCH RBC QN AUTO: 28.3 PG (ref 26.6–33)
MCHC RBC AUTO-ENTMCNC: 32 G/DL (ref 31.5–35.7)
MCV RBC AUTO: 88.3 FL (ref 79–97)
MONOCYTES # BLD AUTO: 1.1 10*3/MM3 (ref 0.1–0.9)
MONOCYTES NFR BLD AUTO: 8.4 % (ref 5–12)
NEUTROPHILS NFR BLD AUTO: 78.4 % (ref 42.7–76)
NEUTROPHILS NFR BLD AUTO: 9.9 10*3/MM3 (ref 1.7–7)
NRBC BLD AUTO-RTO: 0 /100 WBC (ref 0–0.2)
PLATELET # BLD AUTO: 134 10*3/MM3 (ref 140–450)
PMV BLD AUTO: 8 FL (ref 6–12)
POTASSIUM SERPL-SCNC: 3.6 MMOL/L (ref 3.5–5.2)
PROT SERPL-MCNC: 4.8 G/DL (ref 6–8.5)
RBC # BLD AUTO: 2.86 10*6/MM3 (ref 4.14–5.8)
SODIUM SERPL-SCNC: 143 MMOL/L (ref 136–145)
WBC # BLD AUTO: 12.7 10*3/MM3 (ref 3.4–10.8)

## 2021-02-09 PROCEDURE — 85025 COMPLETE CBC W/AUTO DIFF WBC: CPT | Performed by: INTERNAL MEDICINE

## 2021-02-09 PROCEDURE — 97116 GAIT TRAINING THERAPY: CPT

## 2021-02-09 PROCEDURE — 83735 ASSAY OF MAGNESIUM: CPT | Performed by: NURSE PRACTITIONER

## 2021-02-09 PROCEDURE — 25010000002 MAGNESIUM SULFATE 2 GM/50ML SOLUTION: Performed by: INTERNAL MEDICINE

## 2021-02-09 PROCEDURE — 25010000002 MAGNESIUM SULFATE 2 GM/50ML SOLUTION: Performed by: NURSE PRACTITIONER

## 2021-02-09 PROCEDURE — 25010000002 PIPERACILLIN SOD-TAZOBACTAM PER 1 G: Performed by: INTERNAL MEDICINE

## 2021-02-09 PROCEDURE — 80053 COMPREHEN METABOLIC PANEL: CPT | Performed by: NURSE PRACTITIONER

## 2021-02-09 PROCEDURE — 97164 PT RE-EVAL EST PLAN CARE: CPT

## 2021-02-09 PROCEDURE — 99232 SBSQ HOSP IP/OBS MODERATE 35: CPT | Performed by: INTERNAL MEDICINE

## 2021-02-09 RX ORDER — MAGNESIUM SULFATE HEPTAHYDRATE 40 MG/ML
2 INJECTION, SOLUTION INTRAVENOUS ONCE
Status: COMPLETED | OUTPATIENT
Start: 2021-02-09 | End: 2021-02-09

## 2021-02-09 RX ADMIN — MAGNESIUM SULFATE HEPTAHYDRATE 2 G: 40 INJECTION, SOLUTION INTRAVENOUS at 13:59

## 2021-02-09 RX ADMIN — Medication 10 ML: at 21:57

## 2021-02-09 RX ADMIN — METOPROLOL TARTRATE 12.5 MG: 25 TABLET, FILM COATED ORAL at 20:26

## 2021-02-09 RX ADMIN — MAGNESIUM SULFATE HEPTAHYDRATE 2 G: 40 INJECTION, SOLUTION INTRAVENOUS at 20:26

## 2021-02-09 RX ADMIN — URSODIOL 500 MG: 500 TABLET, FILM COATED ORAL at 09:15

## 2021-02-09 RX ADMIN — PIPERACILLIN AND TAZOBACTAM 3.38 G: 3; .375 INJECTION, POWDER, LYOPHILIZED, FOR SOLUTION INTRAVENOUS at 09:15

## 2021-02-09 RX ADMIN — PANTOPRAZOLE SODIUM 40 MG: 40 TABLET, DELAYED RELEASE ORAL at 06:05

## 2021-02-09 RX ADMIN — ACETAMINOPHEN 325 MG: 325 TABLET, FILM COATED ORAL at 20:27

## 2021-02-09 RX ADMIN — URSODIOL 500 MG: 500 TABLET, FILM COATED ORAL at 20:27

## 2021-02-09 RX ADMIN — PIPERACILLIN AND TAZOBACTAM 3.38 G: 3; .375 INJECTION, POWDER, LYOPHILIZED, FOR SOLUTION INTRAVENOUS at 01:12

## 2021-02-09 RX ADMIN — METOPROLOL TARTRATE 12.5 MG: 25 TABLET, FILM COATED ORAL at 09:15

## 2021-02-09 RX ADMIN — ALLOPURINOL 150 MG: 300 TABLET ORAL at 09:15

## 2021-02-09 RX ADMIN — RIVAROXABAN 15 MG: 15 TABLET, FILM COATED ORAL at 20:27

## 2021-02-09 RX ADMIN — RIVAROXABAN 15 MG: 15 TABLET, FILM COATED ORAL at 09:15

## 2021-02-09 RX ADMIN — PIPERACILLIN AND TAZOBACTAM 3.38 G: 3; .375 INJECTION, POWDER, LYOPHILIZED, FOR SOLUTION INTRAVENOUS at 16:37

## 2021-02-09 NOTE — PROGRESS NOTES
Hematology/Oncology Inpatient Progress Note    PATIENT NAME: Tristen Garcia  : 1935  MRN: [id number]    CHIEF COMPLAINT: Altered mental status    HISTORY OF PRESENT ILLNESS:    Tristen Garcia is a 85 y.o.  male who presented to [facility] on 2/3/2021 via EMS. Patient reportedly called EMS for lifting assistance at home.  The patient had been taking a bath and was unable to get out of his bathtub.  On EMS arrival, the patient was reportedly unresponsive with a slow heart rate. Patient slowly became more alert.  Per family, patient has reportedely been confused for several days. The family reported poor oral intake in the last 24 hours and extensive nausea. Patient denied diarrhea and vomiting. Labs in the ED were significant for WBC 24.60, hemoglobin 7.9, BUN 30, creatinine 1.76, EGFR 37, alkaline phosphatase 166, and troponin 0.285.  CT of the head showed age-appropriate atrophy, unchanged from patient's recent CT performed 12/10/2020.  No acute intracranial findings. Patient was admitted for further evaluation and management.     21  Hematology/Oncology was consulted as patient is known to our service and followed by Dr. 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  Cholangiocarcinoma favored.  Patient was started on chemotherapy with cisplatin and gemcitabine combination on 2020.  On 2021, CT of the abdomen and pelvis was performed that resulted new low-density lesions within the right and left hepatic lobes consistent with new hepatic metastatic disease.  It was recommended for patient to switch his chemotherapy from cisplatin/gemcitabine to FOLFOX.  Patient was started on FOLFOX on 2020.     Of note, patient was recently diagnosed with pulmonary emboli and acute left lower extremity DVT in the common femoral, proximal femoral, mid femoral, distal femoral, popliteal, posterior tibial, and gastrocnemius.  Patient was started on  Xarelto.     He  has a past medical history of Arthritis, Cancer (CMS/[facility]), COPD (chronic obstructive pulmonary disease) (CMS/[facility]), Elevated cholesterol, GERD (gastroesophageal reflux disease), Hyperlipidemia, and Hypertension.     PCP: Ann Marie Hodgson MD    History of present illness was reviewed and is unchanged from the previous visit. 02/09/21     Subjective    Patient seen this morning.  He is alert.  But looks somewhat lethargic.    ROS:  Review of Systems   Constitutional: Positive for fatigue. Negative for fever.   HENT: Negative for mouth sores and nosebleeds.    Respiratory: Positive for shortness of breath. Negative for cough.    Gastrointestinal: Negative for diarrhea, nausea and vomiting.   Genitourinary: Negative for hematuria.   Skin: Negative for rash.   Neurological: Negative for facial asymmetry and light-headedness.   Psychiatric/Behavioral: Negative for behavioral problems and confusion. The patient is not nervous/anxious.       MEDICATIONS:    Scheduled Meds:  allopurinol, 150 mg, Oral, Daily  aspirin, 325 mg, Oral, Once  epoetin lan-epbx, 40,000 Units, Subcutaneous, Weekly  magnesium sulfate, 2 g, Intravenous, Once  metoprolol tartrate, 12.5 mg, Oral, Q12H  pantoprazole, 40 mg, Oral, QAM  piperacillin-tazobactam, 3.375 g, Intravenous, Q8H  rivaroxaban, 15 mg, Oral, BID  [START ON 2/12/2021] rivaroxaban, 20 mg, Oral, Daily With Dinner  sodium chloride, 10 mL, Intravenous, Q12H  ursodiol, 500 mg, Oral, BID       Continuous Infusions:      PRN Meds:  •  acetaminophen **OR** acetaminophen **OR** acetaminophen  •  magnesium sulfate **OR** magnesium sulfate in D5W 1g/100mL (PREMIX)  •  ondansetron **OR** ondansetron  •  potassium chloride **OR** potassium chloride **OR** potassium chloride  •  sodium chloride  •  sodium chloride     ALLERGIES:  No Known Allergies    Objective    VITALS:   /68 (BP Location: Left arm, Patient Position: Lying)   Pulse 91   Temp 98.1 °F (36.7 °C) (Oral)    "Resp 18   Ht 170.2 cm (67\")   Wt 87.8 kg (193 lb 9 oz)   SpO2 99%   BMI 30.32 kg/m²     PHYSICAL EXAM: (performed by MD)  Physical Exam  Constitutional:       General: He is not in acute distress.     Appearance: Normal appearance. He is obese. He is ill-appearing. He is not toxic-appearing.   HENT:      Head: Normocephalic and atraumatic.      Nose:      Comments: Nasal cannula  Eyes:      General: No scleral icterus.        Right eye: No discharge.         Left eye: No discharge.   Neck:      Musculoskeletal: Normal range of motion. No neck rigidity.   Cardiovascular:      Rate and Rhythm: Normal rate.   Pulmonary:      Effort: Pulmonary effort is normal.   Chest:      Comments: Right chest port-a-cath  Abdominal:      General: There is no distension.      Palpations: Abdomen is soft.      Tenderness: There is no abdominal tenderness.   Musculoskeletal: Normal range of motion.      Right lower leg: Edema present.      Left lower leg: Edema present.   Skin:     General: Skin is warm.   Neurological:      Mental Status: He is alert and oriented to person, place, and time.      Motor: Weakness present.      Comments: Confusion   Psychiatric:      Comments:         I have reexamined the patient and the results are consistent with the previously documented exam. CAREY Cho   RECENT LABS:  Lab Results (last 24 hours)     Procedure Component Value Units Date/Time    Comprehensive Metabolic Panel [[id number]]  (Abnormal) Collected: 02/09/21 0311    Specimen: Blood Updated: 02/09/21 0359     Glucose 98 mg/dL      BUN 11 mg/dL      Creatinine 1.28 mg/dL      Sodium 143 mmol/L      Potassium 3.6 mmol/L      Chloride 106 mmol/L      CO2 30.0 mmol/L      Calcium 7.4 mg/dL      Total Protein 4.8 g/dL      Albumin 2.60 g/dL      ALT (SGPT) 10 U/L      AST (SGOT) 21 U/L      Alkaline Phosphatase 92 U/L      Total Bilirubin 0.2 mg/dL      eGFR Non African Amer 53 mL/min/1.73      Globulin 2.2 gm/dL      A/G Ratio 1.2 " g/dL      BUN/Creatinine Ratio 8.6     Anion Gap 7.0 mmol/L     Narrative:      GFR Normal >60  Chronic Kidney Disease <60  Kidney Failure <15      Magnesium [[id number]]  (Abnormal) Collected: 02/09/21 0311    Specimen: Blood Updated: 02/09/21 0359     Magnesium 1.2 mg/dL     CBC & Differential [[id number]]  (Abnormal) Collected: 02/09/21 0311    Specimen: Blood Updated: 02/09/21 0335    Narrative:      The following orders were created for panel order CBC & Differential.  Procedure                               Abnormality         Status                     ---------                               -----------         ------                     CBC Auto Differential[[id number]]        Abnormal            Final result                 Please view results for these tests on the individual orders.    CBC Auto Differential [[id number]]  (Abnormal) Collected: 02/09/21 0311    Specimen: Blood Updated: 02/09/21 0335     WBC 12.70 10*3/mm3      RBC 2.86 10*6/mm3      Hemoglobin 8.1 g/dL      Hematocrit 25.2 %      MCV 88.3 fL      MCH 28.3 pg      MCHC 32.0 g/dL      RDW 24.4 %      RDW-SD 75.3 fl      MPV 8.0 fL      Platelets 134 10*3/mm3      Neutrophil % 78.4 %      Lymphocyte % 11.6 %      Monocyte % 8.4 %      Eosinophil % 1.1 %      Basophil % 0.5 %      Neutrophils, Absolute 9.90 10*3/mm3      Lymphocytes, Absolute 1.50 10*3/mm3      Monocytes, Absolute 1.10 10*3/mm3      Eosinophils, Absolute 0.10 10*3/mm3      Basophils, Absolute 0.10 10*3/mm3      nRBC 0.0 /100 WBC     Potassium [[id number]]  (Normal) Collected: 02/08/21 2153    Specimen: Blood Updated: 02/08/21 2200     Potassium 3.9 mmol/L     Blood Culture - Blood, Blood, Central Line [[id number]] Collected: 02/03/21 1618    Specimen: Blood, Central Line Updated: 02/08/21 1630     Blood Culture No growth at 5 days    Blood Culture - Blood, Chest, Right [[id number]] Collected: 02/03/21 1509    Specimen: Blood from Chest, Right Updated: 02/08/21 1515     Blood Culture  No growth at 5 days          PENDING RESULTS: MMA    IMAGING REVIEWED:  No radiology results for the last day    Assessment/Plan   ASSESSMENT:  1. 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  Switched to FOLFOX due to evidence of disease progression.  FOLFOX last received 2/1/2020.  Continue in the outpatient setting if improves. Reviewed CT scans, overall his disease appears to be stable.  2. Normocytic normochromic anemia: Consider relation to chemotherapy or anemia of chronic disease.  Receiving Retacrit weekly outpatient. Anemia studies show likely anemia of chronic disease.  Continue Retacrit 40,000 units subcutaneous injection weekly while inpatient, last received 2/6/2021.   3. Leukocytosis: Urinalysis negative for leukocytes or nitrites.  Blood cultures obtained.  Chest x-ray showed no acute cardiopulmonary process. On IV antibiotics. Continue to monitor.  4. Thrombocytopenia: Platelets 134,000. Monitor closely and order additional testing if thrombocytopenia persists.   5. Acute kidney injury: Creatinine 1.76 on admission. Nephrology consulted.   6. Confusion: CT head showed no acute intracranial process.  Urinalysis negative. LFTs normal on admission, unlikely to be hepatic encephalopathy. Etiology unknown.  Mental status improving.   7. Weakness: PT/OT consulted  8. History of pulmonary emboli and DVT: On Xarelto  9. HTN: managed per primary team  10. Palliative care consulted to discuss goals of care  11. DNR     PLAN  1. CBC daily  2. Monitor for bleeding/bruising  3. Transfuse 1 unit pRBC as needed for hemoglobin <7.5  4. Continue Retacrit 40,000 units subcutaneous injection weekly- due 2/13/2021  5. Continue Xarelto  6. Continue IV antibiotics  7. Resume FOLFOX outpatient if clinically improves  8. Continue supportive care    Electronically signed by CAREY Cho, 02/09/21, 10:55 AM EST.  I have personally performed a face-to-face diagnostic evaluation of this patient.  I have  reviewed and agree with the care plan scribed above by nurse practitioner.  Pertinent labs and imaging have been reviewed. Exam shows    Patient still is very weak.  Awaiting recovery.  Going to rehab.  Receiving IV Zosyn.  If patient does not show meaningful improvement in the next 1 week, we will have to consider hospice.  Currently is not strong to restart chemotherapy        Electronically signed by Aamir Garcia MD, 02/09/21, 5:55 PM EST.

## 2021-02-09 NOTE — THERAPY RE-EVALUATION
Patient Name: Tristen Garcia  : 1935    MRN: [id number]                              Today's Date: 2021       Admit Date: 2/3/2021    Visit Dx:     ICD-10-CM ICD-9-CM   1. Syncope and collapse  R55 780.2   2. Cholangiocarcinoma (CMS/HCC)  C22.1 155.1   3. Dehydration  E86.0 276.51   4. Acute urinary tract infection  N39.0 599.0   5. Elevated troponin  R77.8 790.6   6. Liver mass  R16.0 573.8   7. Mass of spine  M89.8X8 733.99   8. Retroperitoneal lymphadenopathy  R59.0 785.6     Patient Active Problem List   Diagnosis   • Rhabdomyolysis   • Cholangiocarcinoma (CMS/HCC)   • Encounter for medication management and education of chemotherapy/biotherapy     • Fitting and adjustment of vascular catheter   • Dehydration   • Dizziness   • Anemia   • CKD (chronic kidney disease)   • Malabsorption of iron   • Antineoplastic chemotherapy induced anemia   • Acute cholangitis   • Acute kidney injury (SHANEL) with acute tubular necrosis (ATN) (CMS/HCC)   • Syncope and collapse     Past Medical History:   Diagnosis Date   • Arthritis    • Cancer (CMS/HCC)    • COPD (chronic obstructive pulmonary disease) (CMS/HCC)    • Elevated cholesterol    • GERD (gastroesophageal reflux disease)    • Hyperlipidemia    • Hypertension      Past Surgical History:   Procedure Laterality Date   • COLONOSCOPY     • EYE SURGERY     • SKIN BIOPSY       General Information     Row Name 21 1232          Physical Therapy Time and Intention    Document Type  re-evaluation  -HC     Mode of Treatment  physical therapy  -HC     Row Name 21 1232          General Information    Patient Profile Reviewed  yes  -HC     Prior Level of Function  independent:  -HC     Existing Precautions/Restrictions  fall  -HC     Barriers to Rehab  cognitive status;medically complex  -HC     Row Name 21 1232          Living Environment    Lives With  alone  -HC     Row Name 21 1232          Home Main Entrance    Number of Stairs, Main Entrance   none ramp to enter  -     Row Name 21 1232          Cognition    Orientation Status (Cognition)  -- Oriented to month and day of , states he is on a train, not aware of current place, time, situation  -     Row Name 21 1232          Safety Issues, Functional Mobility    Safety Issues Affecting Function (Mobility)  insight into deficits/self-awareness;judgment;problem-solving;safety precaution awareness;at risk behavior observed;awareness of need for assistance  -     Impairments Affecting Function (Mobility)  balance;cognition;postural/trunk control;endurance/activity tolerance;strength  -     Cognitive Impairments, Mobility Safety/Performance  attention;impulsivity;insight into deficits/self-awareness  -       User Key  (r) = Recorded By, (t) = Taken By, (c) = Cosigned By    Initials Name Provider Type    Quyen Bailon, PT Physical Therapist        Mobility     Row Name 21 1233          Bed Mobility    Bed Mobility  supine-sit  -     Supine-Sit [place] (Bed Mobility)  minimum assist (75% patient effort)  -     Row Name 21 1233          Sit-Stand Transfer    Sit-Stand [place] (Transfers)  minimum assist (75% patient effort)  -     Assistive Device (Sit-Stand Transfers)  walker, front-wheeled  -     Row Name 21 1233          Gait/Stairs (Locomotion)    [place] Level (Gait)  moderate assist (50% patient effort);minimum assist (75% patient effort)  -     Assistive Device (Gait)  walker, front-wheeled  -     Distance in Feet (Gait)  30 ft  -     Deviations/Abnormal Patterns (Gait)  gait speed decreased  -     Comment (Gait/Stairs)  assist needed for navigation of RW in his environment, easily distracted requiring verbal cues for attention to task  -       User Key  (r) = Recorded By, (t) = Taken By, (c) = Cosigned By    Initials Name Provider Type    Quyen Bailon, PT Physical Therapist        Obj/Interventions     Row Name  02/09/21 1234          Range of Motion Comprehensive    General Range of Motion  no range of motion deficits identified  -HC     Row Name 02/09/21 1234          Strength Comprehensive (MMT)    Comment, General Manual Muscle Testing (MMT) Assessment  BUEs 4-/5, BLEs 4-/5  -HC     Row Name 02/09/21 1234          Balance    Balance Assessment  sitting static balance;sitting dynamic balance;standing static balance;standing dynamic balance  -HC     Static Sitting Balance  WFL  -HC     Dynamic Sitting Balance  mild impairment  -HC     Static Standing Balance  mild impairment  -HC     Dynamic Standing Balance  moderate impairment  -HC       User Key  (r) = Recorded By, (t) = Taken By, (c) = Cosigned By    Initials Name Provider Type    HC Quyen Joseph, PT Physical Therapist        Goals/Plan     Row Name 02/09/21 1236          Bed Mobility Goal 1 (PT)    Activity/Assistive Device (Bed Mobility Goal 1, PT)  bed mobility activities, all  -HC     [place]/Cues Needed (Bed Mobility Goal 1, PT)  standby assist  -HC     Time Frame (Bed Mobility Goal 1, PT)  2 weeks  -HC     Row Name 02/09/21 1236          Transfer Goal 1 (PT)    Activity/Assistive Device (Transfer Goal 1, PT)  transfers, all  -HC     [place]/Cues Needed (Transfer Goal 1, PT)  standby assist  -HC     Time Frame (Transfer Goal 1, PT)  2 weeks  -HC     Row Name 02/09/21 1236          Gait Training Goal 1 (PT)    Activity/Assistive Device (Gait Training Goal 1, PT)  gait (walking locomotion);assistive device use  -HC     [place] (Gait Training Goal 1, PT)  standby assist  -HC     Distance (Gait Training Goal 1, PT)  100 ft  -HC     Time Frame (Gait Training Goal 1, PT)  2 weeks  -HC       User Key  (r) = Recorded By, (t) = Taken By, (c) = Cosigned By    Initials Name Provider Type    HC Quyen Joseph, PT Physical Therapist        Clinical Impression     Row Name 02/09/21 1235          Pain    Additional Documentation  Pain  Scale: Numbers Pre/Post-Treatment (Group)  -     Row Name 02/09/21 1235          Pain Scale: Numbers Pre/Post-Treatment    Pretreatment Pain Rating  0/10 - no pain  -HC     Posttreatment Pain Rating  0/10 - no pain  -HC     Row Name 02/09/21 1235          Plan of Care Review    Outcome Summary  Re-eval completed on 84 yo male due to improved alertness.  Pt admitted for confusion, weakness, SHANEL, and hx of metastatic cholangiocarcinoma.  CT head (-) acute changes.  -     Row Name 02/09/21 1235          Therapy Assessment/Plan (PT)    Patient/Family Therapy Goals Statement (PT)  unable to state  -     Rehab Potential (PT)  fair, will monitor progress closely  -HC     Criteria for Skilled Interventions Met (PT)  yes;skilled treatment is necessary  -     Predicted Duration of Therapy Intervention (PT)  until d/c  -     Row Name 02/09/21 1235          Positioning and Restraints    Pre-Treatment Position  in bed  -HC     Post Treatment Position  chair  -HC     In Chair  notified nsg;call light within reach;encouraged to call for assist;exit alarm on  -HC       User Key  (r) = Recorded By, (t) = Taken By, (c) = Cosigned By    Initials Name Provider Type    HC Quyen Joseph, PT Physical Therapist        Outcome Measures     Row Name 02/09/21 1237          How much help from another person do you currently need...    Turning from your back to your side while in flat bed without using bedrails?  3  -HC     Moving from lying on back to sitting on the side of a flat bed without bedrails?  3  -HC     Moving to and from a bed to a chair (including a wheelchair)?  2  -HC     Standing up from a chair using your arms (e.g., wheelchair, bedside chair)?  2  -HC     Climbing 3-5 steps with a railing?  2  -HC     To walk in hospital room?  2  -HC     AM-PAC 6 Clicks Score (PT)  14  -HC     Row Name 02/09/21 1237          Modified [place] Scale    Modified [place] Scale  4 - Moderately severe disability.  Unable to walk  without assistance, and unable to attend to own bodily needs without assistance.  -     Row Name 02/09/21 1237          Functional Assessment    Outcome Measure Options  AM-PAC 6 Clicks Basic Mobility (PT);Modified [place]  -       User Key  (r) = Recorded By, (t) = Taken By, (c) = Cosigned By    Initials Name Provider Type    HC Quyen Joseph, PT Physical Therapist        Physical Therapy Education                 Title: PT OT SLP Therapies (In Progress)     Topic: Physical Therapy (In Progress)     Point: Mobility training (In Progress)     Learning Progress Summary           Patient Acceptance, E, NR by  at 2/9/2021 1237    Acceptance, E, VU,NR by JW at 2/7/2021 0940    Acceptance, E, VU,NR by JW at 2/6/2021 1438    Comment: confusion, acknowledges but not sure if he really knows.    Acceptance, E, NR,VU by JW at 2/6/2021 0947    Comment: slow to respond    Acceptance, E,TB, NL by  at 2/4/2021 1615                   Point: Precautions (In Progress)     Learning Progress Summary           Patient Acceptance, E, NR by  at 2/9/2021 1237    Acceptance, E, VU,NR by JW at 2/7/2021 0940    Acceptance, E, VU,NR by JW at 2/6/2021 1438    Comment: confusion, acknowledges but not sure if he really knows.    Acceptance, E, NR,VU by JW at 2/6/2021 0947    Comment: slow to respond    Acceptance, E,TB, NL by  at 2/4/2021 1615                               User Key     Initials Effective Dates Name Provider Type Discipline     04/17/20 -  Quyen Joseph, PT Physical Therapist PT    EL 06/23/20 -  James Miranda, PT Physical Therapist PT    JW 06/24/19 -  Alyce Potts LPN Licensed Nurse Nurse              PT Recommendation and Plan  Planned Therapy Interventions (PT): balance training, bed mobility training, gait training, neuromuscular re-education, strengthening, patient/family education, postural re-education, transfer training  Plan of Care Reviewed With: patient  Outcome Summary: Re-eval completed on 85  yo male due to improved alertness.  Pt admitted for confusion, weakness, SHANEL, and hx of metastatic cholangiocarcinoma.  CT head (-) acute changes.  Pt presents with increased alertness, interaction in treatment session.  Pt remains confused and repeats the day and month of his  during most orientation questions.  Pt requiring [place]-modA for safety with transfers and ambulation using RW.  Pt also needing assist for navigation of RW in his environment due to impaired balance.  Due to deficits, pt is a high risk for falls with functional mobility and not safe to return home alone.  Pt will need extesnive IP rehab to address.  PT will follow 3x/week while at [facility].  PPE donned: mask, goggles, gloves.     Time Calculation:   PT Charges     Row Name 21 1242             Time Calculation    Start Time  0955  -      Stop Time  1013  -      Time Calculation (min)  18 min  -      PT Received On  21  -      PT - Next Appointment  21  -      PT Goal Re-Cert Due Date  21  -         Time Calculation- PT    Total Timed Code Minutes- PT  8 minute(s)  -        User Key  (r) = Recorded By, (t) = Taken By, (c) = Cosigned By    Initials Name Provider Type     Quyen Joseph, PT Physical Therapist        Therapy Charges for Today     Code Description Service Date Service Provider Modifiers Qty    [id number]  PT RE-EVAL ESTABLISHED PLAN 2 2021 Quyen Joseph, PT GP 1    [id number]  GAIT TRAINING EA 15 MIN 2021 Quyen Joseph, PT GP 1          PT G-Codes  Outcome Measure Options: AM-PAC 6 Clicks Basic Mobility (PT), Modified [place]  AM-PAC 6 Clicks Score (PT): 14  Modified Khanh Scale: 4 - Moderately severe disability.  Unable to walk without assistance, and unable to attend to own bodily needs without assistance.    Quyen Joseph PT  2021

## 2021-02-09 NOTE — DISCHARGE PLACEMENT REQUEST
"Carmita Angeles (85 y.o. Male)     Date of Birth Social Security Number Address Home Phone MRN    1935  [id number] [place] [facility] [phone] [id number]    Rastafari Marital Status          None Single       Admission Date Admission Type Admitting Provider Attending Provider Department, Room/Bed    2/3/21 Emergency Ruben Marrero DO Riddle, Lee M, DO [facility] 2B MEDICAL INPATIENT, 223/1    Discharge Date Discharge Disposition Discharge Destination                       Attending Provider: Ruben Marrero DO    Allergies: No Known Allergies    Isolation: None   Infection: None   Code Status: No CPR    Ht: 170.2 cm (67\")   Wt: 87.8 kg (193 lb 9 oz)    Admission Cmt: None   Principal Problem: Acute cholangitis [K83.09]                 Active Insurance as of 2/3/2021     Primary Coverage     Payor Plan Insurance Group Employer/Plan Group    MEDICARE MEDICARE A & B      Payor Plan Address Payor Plan Phone Number Payor Plan Fax Number Effective Dates    [address] [phone]  8/1/2000 - None Entered    [facility] [id number]       Subscriber Name Subscriber Birth Date Member ID       CARMITA ANGELES 1935 [id number]           Secondary Coverage     Payor Plan Insurance Group Employer/Plan Group    [employer]                Payor Plan Address Payor Plan Phone Number Payor Plan Fax Number Effective Dates    [address] [address]   11/2/2010 - None Entered    [facility] [id number]       Subscriber Name Subscriber Birth Date Member ID       CARMITA ANGELES 1935 [id number]                 Emergency Contacts      (Rel.) Home Phone Work Phone Mobile Phone    Josue Angeles (Son) -- -- [phone]    REMA TELLES (Sister) [phone] -- [phone]    JOSE FISHER (Grandchild) [phone] -- [phone]              "

## 2021-02-09 NOTE — PROGRESS NOTES
"        [facility] Medicine Services Daily Progress Note      Hospitalist Team  LOS 6 days      Patient Care Team:  Ann Marie Hodgson MD as PCP - General (Family Medicine)    Patient Location: 223/1      Subjective   Subjective     Chief Complaint / Subjective  Chief Complaint   Patient presents with   • Altered Mental Status   • Syncope         Brief Synopsis of Hospital Course/HPI        Date::    2/8/2021 call:.  Little more awake and alert saying some words but otherwise still mostly confused HPI limited.    2/9/21: seems slightly improved and setting in chair, still confused     Review of Systems   Unable to perform ROS: mental status change         Objective   Objective      Vital Signs  Temp:  [98.1 °F (36.7 °C)-98.3 °F (36.8 °C)] 98.3 °F (36.8 °C)  Heart Rate:  [86-94] 86  Resp:  [18-20] 20  BP: (121-146)/() 121/69  Oxygen Therapy  SpO2: 100 %  Pulse Oximetry Type: Intermittent  Device (Oxygen Therapy): nasal cannula  Flow (L/min): 2  Flowsheet Rows      First Filed Value   Admission Height  170.2 cm (67\") Documented at 02/03/2021 1417   Admission Weight  90 kg (198 lb 6.6 oz) Documented at 02/03/2021 1417        Intake & Output (last 3 days)       02/06 0701 - 02/07 0700 02/07 0701 - 02/08 0700 02/08 0701 - 02/09 0700 02/09 0701 - 02/10 0700    P.O. 480 360 240 480    I.V. (mL/kg) 1186 (13.5)       Blood        Total Intake(mL/kg) 1666 (19) 360 (4.1) 240 (2.7) 480 (5.5)    Net +1666 +360 +240 +480            Urine Unmeasured Occurrence 2 x 2 x 2 x 1 x    Stool Unmeasured Occurrence  2 x 3 x 2 x        Lines, Drains & Airways    Active LDAs     Name:   Placement date:   Placement time:   Site:   Days:    Single Lumen Implantable Port Right Subclavian   --    --    Subclavian                     Physical Exam:    Physical Exam  Vitals signs reviewed.   Constitutional:       General: He is not in acute distress.     Comments: Appears chronically ill   Cardiovascular:      Rate and " Rhythm: Normal rate.   Pulmonary:      Effort: Pulmonary effort is normal. No respiratory distress.   Abdominal:      General: There is no distension.      Palpations: Abdomen is soft.      Tenderness: There is no abdominal tenderness.   Musculoskeletal:      Right lower leg: No edema.      Left lower leg: No edema.   Skin:     General: Skin is warm and dry.   Neurological:      Mental Status: He is alert.      Comments: AO x1   Psychiatric:      Comments: Flat affect               Procedures:              Results Review:     I reviewed the patient's new clinical results.      Lab Results (last 24 hours)     Procedure Component Value Units Date/Time    Comprehensive Metabolic Panel [[id number]]  (Abnormal) Collected: 02/09/21 0311    Specimen: Blood Updated: 02/09/21 0359     Glucose 98 mg/dL      BUN 11 mg/dL      Creatinine 1.28 mg/dL      Sodium 143 mmol/L      Potassium 3.6 mmol/L      Chloride 106 mmol/L      CO2 30.0 mmol/L      Calcium 7.4 mg/dL      Total Protein 4.8 g/dL      Albumin 2.60 g/dL      ALT (SGPT) 10 U/L      AST (SGOT) 21 U/L      Alkaline Phosphatase 92 U/L      Total Bilirubin 0.2 mg/dL      eGFR Non African Amer 53 mL/min/1.73      Globulin 2.2 gm/dL      A/G Ratio 1.2 g/dL      BUN/Creatinine Ratio 8.6     Anion Gap 7.0 mmol/L     Narrative:      GFR Normal >60  Chronic Kidney Disease <60  Kidney Failure <15      Magnesium [[id number]]  (Abnormal) Collected: 02/09/21 0311    Specimen: Blood Updated: 02/09/21 0359     Magnesium 1.2 mg/dL     CBC & Differential [[id number]]  (Abnormal) Collected: 02/09/21 0311    Specimen: Blood Updated: 02/09/21 0335    Narrative:      The following orders were created for panel order CBC & Differential.  Procedure                               Abnormality         Status                     ---------                               -----------         ------                     CBC Auto Differential[[id number]]        Abnormal            Final result                  Please view results for these tests on the individual orders.    CBC Auto Differential [[id number]]  (Abnormal) Collected: 02/09/21 0311    Specimen: Blood Updated: 02/09/21 0335     WBC 12.70 10*3/mm3      RBC 2.86 10*6/mm3      Hemoglobin 8.1 g/dL      Hematocrit 25.2 %      MCV 88.3 fL      MCH 28.3 pg      MCHC 32.0 g/dL      RDW 24.4 %      RDW-SD 75.3 fl      MPV 8.0 fL      Platelets 134 10*3/mm3      Neutrophil % 78.4 %      Lymphocyte % 11.6 %      Monocyte % 8.4 %      Eosinophil % 1.1 %      Basophil % 0.5 %      Neutrophils, Absolute 9.90 10*3/mm3      Lymphocytes, Absolute 1.50 10*3/mm3      Monocytes, Absolute 1.10 10*3/mm3      Eosinophils, Absolute 0.10 10*3/mm3      Basophils, Absolute 0.10 10*3/mm3      nRBC 0.0 /100 WBC     Potassium [[id number]]  (Normal) Collected: 02/08/21 2153    Specimen: Blood Updated: 02/08/21 2200     Potassium 3.9 mmol/L         No results found for: HGBA1C  Results from last 7 days   Lab Units 02/03/21  1422   INR  1.32*           Lab Results   Component Value Date    LIPASE 13 02/03/2021     No results found for: CHOL, CHLPL, TRIG, HDL, LDL, LDLDIRECT    Lab Results   Lab Value Date/Time    INTRAOP  10/14/2020 1009     TP: Positive for malignancy.     SHIRA/[facility]       FINALDX  10/14/2020 1009     Liver, CT-guided core biopsy:    Well to moderately differentiated adenocarcinoma, see COMMENT     JPR/sms       COMDX  10/14/2020 1009     The biopsy shows a well to moderately differentiated adenocarcinoma. Immunohistochemistry was performed utilizing appropriate controls and the tumor is strongly positive for only CK7. The CK20, CDX2, TTF-1, napsin A, chromogranin, synaptophysin and CD56 are all negative. This is a nonspecific staining pattern but can be seen in tumors of pancreatobiliary origin, including cholangiocarcinoma which is favored in this case. Adenocarcinomas of the upper gastrointestinal tract can also display only CK7 positivity and should be excluded  clinically. This staining pattern argues against, but does not completely exclude a metastasis from a primary lung adenocarcinoma as approximately 10 to 15% of lung adenocarcinomas are negative for TTF-1 and napsin A. This staining pattern does exclude a neuroendocrine tumor and adenocarcinoma of colonic origin. Further clinical workup with imaging studies and serum tumor markers is recommended.          Microbiology Results (last 10 days)     Procedure Component Value - Date/Time    COVID PRE-OP / PRE-PROCEDURE SCREENING ORDER (NO ISOLATION) - Swab, Nasopharynx [[id number]]  (Normal) Collected: 02/04/21 1522    Lab Status: Final result Specimen: Swab from Nasopharynx Updated: 02/04/21 2341    Narrative:      The following orders were created for panel order COVID PRE-OP / PRE-PROCEDURE SCREENING ORDER (NO ISOLATION) - Swab, Nasopharynx.  Procedure                               Abnormality         Status                     ---------                               -----------         ------                     COVID-19,APTIMA PANTHER,...[[id number]]  Normal              Final result                 Please view results for these tests on the individual orders.    COVID-19,APTIMA PANTHER,[facility], NP/OP SWAB IN UTM/VTM/[facility] TRANSPORT MEDIA,24 HR TAT - Swab, Nasopharynx [[id number]]  (Normal) Collected: 02/04/21 1522    Lab Status: Final result Specimen: Swab from Nasopharynx Updated: 02/04/21 2341     COVID19 Not Detected    Narrative:      Fact sheet for providers: https://www.fda.gov/media/554396/download     Fact sheet for patients: https://www.fda.gov/media/492168/download    Test performed by RT PCR.    Blood Culture - Blood, Blood, Central Line [[id number]] Collected: 02/03/21 1618    Lab Status: Final result Specimen: Blood, Central Line Updated: 02/08/21 1630     Blood Culture No growth at 5 days    Blood Culture - Blood, Chest, Right [[id number]] Collected: 02/03/21 1509    Lab Status: Final result Specimen:  Blood from Chest, Right Updated: 02/08/21 1515     Blood Culture No growth at 5 days          ECG/EMG Results (most recent)     Procedure Component Value Units Date/Time    ECG 12 Lead [[id number]] Collected: 02/03/21 1429     Updated: 02/04/21 1443     QT Interval 342 ms     Narrative:      HEART RATE= 111  bpm  RR Interval= 540  ms  AL Interval= 150  ms  P Horizontal Axis= 23  deg  P Front Axis= 96  deg  QRSD Interval= 103  ms  QT Interval= 342  ms  QRS Axis= 87  deg  T Wave Axis= 117  deg  - ABNORMAL ECG -  Sinus tachycardia  Low voltage, extremity leads  Nonspecific repol abnormality, lateral leads  When compared with ECG of 18-Oct-2020 18:41:11,  Significant rate increase  Significant axis, voltage or hypertrophy change  Electronically Signed By: Miky Mena (PURNIMA) 04-Feb-2021 14:40:17  Date and Time of Study: 2021-02-03 14:29:53          Results for orders placed during the hospital encounter of 01/21/21   Duplex Venous Lower Extremity - Left CAR    Narrative · Acute left lower extremity deep vein thrombosis noted in the common   femoral, proximal femoral, mid femoral, distal femoral, popliteal,   posterial tibial and gastrocnemius.  · Acute left lower extremity superficial thrombophlebitis noted in the   small saphenous.  · All other left sided veins appeared normal.               Ct Abdomen Pelvis Without Contrast    Result Date: 2/3/2021   1. With the exception of very slight interval enlargement of a portacaval lymph node, the findings appear stable since 01/14/2021. Heterogeneous right hepatic lobe mass, but appear to be smaller low-density nodules elsewhere in the liver, thought to be unchanged, and remain highly suspicious for malignancy. 2. Borderline enlarged aortocaval lymph node in the retroperitoneum, unchanged. 3. 2.3 cm lucent lesion in L2 is unchanged from prior examination. Osseous metastatic disease not excluded. No new osseous lesions.  4. No acute findings in the abdomen.   Electronically  Signed By-Rosa Fox MD On:2/3/2021 3:07 PM This report was finalized on [id number] by  Rosa Fox MD.    Ct Head Without Contrast    Result Date: 2/3/2021  Age-appropriate atrophy, unchanged from the patient's recent head CT of 12/10/2020. No acute intracranial findings.  Electronically Signed By-Pramod Soria MD On:2/3/2021 3:07 PM This report was finalized on [id number] by  Pramod Soria MD.    Xr Chest 1 View    Result Date: 2/3/2021  No acute cardiopulmonary process.  Electronically Signed By-Landon Paredes MD On:2/3/2021 3:34 PM This report was finalized on [id number] by  Landon Paredes MD.          Xrays, labs reviewed personally by physician.    Medication Review:   I have reviewed the patient's current medication list      Scheduled Meds  allopurinol, 150 mg, Oral, Daily  aspirin, 325 mg, Oral, Once  epoetin lan-epbx, 40,000 Units, Subcutaneous, Weekly  metoprolol tartrate, 12.5 mg, Oral, Q12H  pantoprazole, 40 mg, Oral, QAM  piperacillin-tazobactam, 3.375 g, Intravenous, Q8H  rivaroxaban, 15 mg, Oral, BID  [START ON 2/12/2021] rivaroxaban, 20 mg, Oral, Daily With Dinner  sodium chloride, 10 mL, Intravenous, Q12H  ursodiol, 500 mg, Oral, BID        Meds Infusions       Meds PRN  •  acetaminophen **OR** acetaminophen **OR** acetaminophen  •  magnesium sulfate **OR** magnesium sulfate in D5W 1g/100mL (PREMIX)  •  ondansetron **OR** ondansetron  •  potassium chloride **OR** potassium chloride **OR** potassium chloride  •  sodium chloride  •  sodium chloride        Assessment/Plan   Assessment/Plan     Active Hospital Problems    Diagnosis  POA   • **Acute cholangitis [K83.09]  Yes   • 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       MEDICAL DECISION MAKING COMPLEXITY BY PROBLEM:     Possible acute cholangitis  -On Zosyn for course   -Cultures negative so far     Metastatic cholangiocarcinoma  -Oncology following  -Supportive treatment  -per their note if does not show meaningful improvement in a week or so, would need to consider hospice       Acute kidney injury  -Improving creatinine noted  -Nephrology following    Acute metabolic encephalopathy  -Likely related to above, slowly improving  -Continue to treat underlying conditions    Generalized weakness  -PT OT consulted    History of pulmonary emboli DVT  -On Xarelto    CODE STATUS: DNR, per palliative note family wants to see if patient will improve mentally before considering hospice    VTE Prophylaxis -   Mechanical Order History:     None      Pharmalogical Order History:      Ordered     Dose Route Frequency Stop    02/03/21 2212  rivaroxaban (XARELTO) tablet 20 mg     Question:  Are you ordering rivaroxaban for the prevention of blood clots in an acutely ill medical patient?  Answer:  No    20 mg PO Daily With Dinner --    02/03/21 2009  rivaroxaban (XARELTO) tablet 15 mg     Question:  Are you ordering rivaroxaban for the prevention of blood clots in an acutely ill medical patient?  Answer:  No    15 mg PO 2 Times Daily 02/11/21 2059                  Code Status -   Code Status and Medical Interventions:   Ordered at: 02/07/21 1326     Level Of Support Discussed With:    Next of Kin (If No Surrogate)     Code Status:    No CPR     Medical Interventions (Level of Support Prior to Arrest):    Comfort Measures       This patient has been examined wearing appropriate Personal Protective Equipment. 02/09/21        Discharge Planning  Inpt rehab pending placement        Electronically signed by Ruben Marrero DO, 02/09/21, 18:50 [facility] Hospitalist Team

## 2021-02-09 NOTE — PLAN OF CARE
Goal Outcome Evaluation:  Plan of Care Reviewed With: patient  Progress: improving  Outcome Summary: Patient able to tell me his name and birthdate tonight. Pt. would get up out of bed and sit on the bsc to use bathroom. Told pt. to let us know when he needs help. Vitals have been normal.No complaints of pain.

## 2021-02-09 NOTE — PROGRESS NOTES
Continued Stay Note   Zhao     Patient Name: Tristen Garcia  MRN: [id number]  Today's Date: 2/9/2021    Admit Date: 2/3/2021    Discharge Plan     Row Name 02/09/21 1618       Plan    Plan  DC Plan: [facility] - referrals pending. PASRR approved. No pre-cet required.    Patient/Family in Agreement with Plan  yes    Plan Comments  CM obtained rehab choices form pt's granddaughter, Emily, as follows: 1) [facility], 2) [facility]. Made dual referral to both facilities & referrals are pending at this time.     Phone communication or documentation only - no physical contact with patient or family.    ELLE Chapin    Phone: [phone]  Cell: [phone]  Fax: [phone]  [email or web address]

## 2021-02-09 NOTE — PLAN OF CARE
Problem: Adult Inpatient Plan of Care  Goal: Plan of Care Review  Outcome: Ongoing, Progressing  Flowsheets  Taken 2021 1237  Plan of Care Reviewed With: patient  Outcome Summary: Re-eval completed on 84 yo male due to improved alertness.  Pt admitted for confusion, weakness, SHANEL, and hx of metastatic cholangiocarcinoma.  CT head (-) acute changes.  Pt presents with increased alertness, interaction in treatment session.  Pt remains confused and repeats the day and month of his  during most orientation questions.  Pt requiring Whitley-modA for safety with transfers and ambulation using RW.  Pt also needing assist for navigation of RW in his environment due to impaired balance.  Due to deficits, pt is a high risk for falls with functional mobility and not safe to return home alone.  Pt will need extesnive IP rehab to address.  PT will follow 3x/week while at [facility].  PPE donned: mask, goggles, gloves.

## 2021-02-09 NOTE — PROGRESS NOTES
Continued Stay Note   Zhao     Patient Name: Tristen Garcia  MRN: [id number]  Today's Date: 2/9/2021    Admit Date: 2/3/2021    Discharge Plan     Row Name 02/09/21 1423       Plan    Plan  DC Plan: PT recommends inpt rehab - pending choice from granddaughter 2/9. Current IV antibiotics and has home O2.    Plan Comments  Spoke to son, Josue Garcia re rehab choice. He states his daughter will be here Friday, she's a nurse. Explained we need choice before today if possible. VM left for Emily Ames [phone] for rehab choice. WBC 12.7        Phone communication or documentation only - no physical contact with patient or family.             Darlene Patricia RN

## 2021-02-09 NOTE — PROGRESS NOTES
"   LOS: 6 days    Patient Care Team:  Ann Marie Hodgson MD as PCP - General (Family Medicine)      Subjective     Patient more awake.  Denies any chest pain, shortness of breath, nausea or vomiting    Objective     Vital Sign Min/Max for last 24 hours  Temp:  [97.9 °F (36.6 °C)-98.1 °F (36.7 °C)] 98.1 °F (36.7 °C)  Heart Rate:  [91-96] 91  Resp:  [17-18] 18  BP: (133-135)/(68-78) 135/68                       Flowsheet Rows      First Filed Value   Admission Height  170.2 cm (67\") Documented at 02/03/2021 1417   Admission Weight  90 kg (198 lb 6.6 oz) Documented at 02/03/2021 1417          No intake/output data recorded.  I/O last 3 completed shifts:  In: 240 [P.O.:240]  Out: -     Physical Exam:  Physical Exam    General.    Awake and alert   Head.  Atraumatic, normocephalic  Neck.  Supple.  No JVD  Respiratory.  Decreased breath sounds bilaterally without obvious crackles or wheezing  Cardiovascular.    Regular rhythm.  Abdominal.  Obese, soft, nontender  Remedies: Trace pretibial edema bilaterally       LABS:  Lab Results   Component Value Date    CALCIUM 7.4 (L) 02/09/2021    PHOS 3.5 02/05/2021     Results from last 7 days   Lab Units 02/09/21  0311 02/08/21  2153 02/08/21  0329 02/07/21  0336   MAGNESIUM mg/dL 1.2*  --  1.1* 1.2*   SODIUM mmol/L 143  --  143 142   POTASSIUM mmol/L 3.6 3.9 3.2* 3.5   CHLORIDE mmol/L 106  --  106 104   CO2 mmol/L 30.0*  --  31.0* 31.0*   BUN mg/dL 11  --  13 16   CREATININE mg/dL 1.28*  --  1.12 1.25   GLUCOSE mg/dL 98  --  89 86   CALCIUM mg/dL 7.4*  --  7.2* 7.2*   WBC 10*3/mm3 12.70*  --  12.30* 11.90*   HEMOGLOBIN g/dL 8.1*  --  8.4* 8.1*   PLATELETS 10*3/mm3 134*  --  144 142   ALT (SGPT) U/L 10  --  10 10   AST (SGOT) U/L 21  --  24 21     Lab Results   Component Value Date    CKTOTAL 228 (H) 02/03/2021    TROPONINT 0.285 (C) 02/03/2021     Estimated Creatinine Clearance: 44.6 mL/min (A) (by C-G formula based on SCr of 1.28 mg/dL (H)).      Brief Urine Lab Results  (Last " result in the past 365 days)      Color   Clarity   Blood   Leuk Est   Nitrite   Protein   CREAT   Urine HCG        02/03/21 1527 Yellow Clear Small (1+) Negative Negative Trace             WEIGHTS:     Wt Readings from Last 1 Encounters:   02/09/21 0303 87.8 kg (193 lb 9 oz)   02/08/21 0418 87.4 kg (192 lb 10.9 oz)   02/07/21 0255 87.9 kg (193 lb 12.6 oz)   02/06/21 0410 84.6 kg (186 lb 8.2 oz)   02/05/21 0420 92.6 kg (204 lb 2.3 oz)   02/04/21 0451 87.3 kg (192 lb 7.4 oz)   02/03/21 1906 87.3 kg (192 lb 7.4 oz)   02/03/21 1417 90 kg (198 lb 6.6 oz)       allopurinol, 150 mg, Oral, Daily  aspirin, 325 mg, Oral, Once  epoetin lan-epbx, 40,000 Units, Subcutaneous, Weekly  metoprolol tartrate, 12.5 mg, Oral, Q12H  pantoprazole, 40 mg, Oral, QAM  piperacillin-tazobactam, 3.375 g, Intravenous, Q8H  rivaroxaban, 15 mg, Oral, BID  [START ON 2/12/2021] rivaroxaban, 20 mg, Oral, Daily With Dinner  sodium chloride, 10 mL, Intravenous, Q12H  ursodiol, 500 mg, Oral, BID           Assessment/Plan       1.  Acute kidney injury,  due to dehydration/ cisplatin or both  Creatinine slightly increased to 1.2 mg/DL.  Volume status okay  2.  Hypokalemia. improved  3.  Anemia.   Hemoglobin 8.1 this morning. On Epogen.  Hematology following  4.  Hypertension.  Blood pressure well controlled  5.  Cholangiocarcinoma, followed by oncology   6.  Hypomagnesemia    Plan:  IV magnesium replacement  Encouraged to increase oral fluid intake    Mitch Stone MD  02/09/21  09:51 EST

## 2021-02-10 LAB
ALBUMIN SERPL-MCNC: 2.4 G/DL (ref 3.5–5.2)
ANION GAP SERPL CALCULATED.3IONS-SCNC: 5 MMOL/L (ref 5–15)
BASOPHILS # BLD AUTO: 0.1 10*3/MM3 (ref 0–0.2)
BASOPHILS NFR BLD AUTO: 0.4 % (ref 0–1.5)
BUN SERPL-MCNC: 11 MG/DL (ref 8–23)
BUN/CREAT SERPL: 10.3 (ref 7–25)
CALCIUM SPEC-SCNC: 7.4 MG/DL (ref 8.6–10.5)
CHLORIDE SERPL-SCNC: 105 MMOL/L (ref 98–107)
CO2 SERPL-SCNC: 32 MMOL/L (ref 22–29)
CREAT SERPL-MCNC: 1.07 MG/DL (ref 0.76–1.27)
DEPRECATED RDW RBC AUTO: 72.2 FL (ref 37–54)
EOSINOPHIL # BLD AUTO: 0.1 10*3/MM3 (ref 0–0.4)
EOSINOPHIL NFR BLD AUTO: 1.2 % (ref 0.3–6.2)
ERYTHROCYTE [DISTWIDTH] IN BLOOD BY AUTOMATED COUNT: 23.5 % (ref 12.3–15.4)
GFR SERPL CREATININE-BSD FRML MDRD: 66 ML/MIN/1.73
GLUCOSE SERPL-MCNC: 94 MG/DL (ref 65–99)
HCT VFR BLD AUTO: 23.7 % (ref 37.5–51)
HCT VFR BLD AUTO: 24.4 % (ref 37.5–51)
HGB BLD-MCNC: 7.6 G/DL (ref 13–17.7)
HGB BLD-MCNC: 7.9 G/DL (ref 13–17.7)
LYMPHOCYTES # BLD AUTO: 1.1 10*3/MM3 (ref 0.7–3.1)
LYMPHOCYTES NFR BLD AUTO: 9.4 % (ref 19.6–45.3)
MAGNESIUM SERPL-MCNC: 1.9 MG/DL (ref 1.6–2.4)
MCH RBC QN AUTO: 28.3 PG (ref 26.6–33)
MCHC RBC AUTO-ENTMCNC: 32.1 G/DL (ref 31.5–35.7)
MCV RBC AUTO: 87.9 FL (ref 79–97)
MONOCYTES # BLD AUTO: 0.9 10*3/MM3 (ref 0.1–0.9)
MONOCYTES NFR BLD AUTO: 7.2 % (ref 5–12)
NEUTROPHILS NFR BLD AUTO: 81.8 % (ref 42.7–76)
NEUTROPHILS NFR BLD AUTO: 9.7 10*3/MM3 (ref 1.7–7)
NRBC BLD AUTO-RTO: 0 /100 WBC (ref 0–0.2)
PHOSPHATE SERPL-MCNC: 1.5 MG/DL (ref 2.5–4.5)
PLATELET # BLD AUTO: 132 10*3/MM3 (ref 140–450)
PMV BLD AUTO: 8 FL (ref 6–12)
POTASSIUM SERPL-SCNC: 3.5 MMOL/L (ref 3.5–5.2)
RBC # BLD AUTO: 2.7 10*6/MM3 (ref 4.14–5.8)
SODIUM SERPL-SCNC: 142 MMOL/L (ref 136–145)
WBC # BLD AUTO: 11.9 10*3/MM3 (ref 3.4–10.8)

## 2021-02-10 PROCEDURE — 25010000002 PIPERACILLIN SOD-TAZOBACTAM PER 1 G: Performed by: INTERNAL MEDICINE

## 2021-02-10 PROCEDURE — 85014 HEMATOCRIT: CPT | Performed by: NURSE PRACTITIONER

## 2021-02-10 PROCEDURE — 80069 RENAL FUNCTION PANEL: CPT | Performed by: INTERNAL MEDICINE

## 2021-02-10 PROCEDURE — 85018 HEMOGLOBIN: CPT | Performed by: NURSE PRACTITIONER

## 2021-02-10 PROCEDURE — 99232 SBSQ HOSP IP/OBS MODERATE 35: CPT | Performed by: INTERNAL MEDICINE

## 2021-02-10 PROCEDURE — 85025 COMPLETE CBC W/AUTO DIFF WBC: CPT | Performed by: INTERNAL MEDICINE

## 2021-02-10 PROCEDURE — 83735 ASSAY OF MAGNESIUM: CPT | Performed by: NURSE PRACTITIONER

## 2021-02-10 RX ADMIN — URSODIOL 500 MG: 500 TABLET, FILM COATED ORAL at 10:28

## 2021-02-10 RX ADMIN — RIVAROXABAN 15 MG: 15 TABLET, FILM COATED ORAL at 10:28

## 2021-02-10 RX ADMIN — POTASSIUM, SODIUM PHOSPHATES 280 MG-160 MG-250 MG ORAL POWDER PACKET 1 PACKET: POWDER IN PACKET at 08:15

## 2021-02-10 RX ADMIN — URSODIOL 500 MG: 500 TABLET, FILM COATED ORAL at 20:54

## 2021-02-10 RX ADMIN — METOPROLOL TARTRATE 12.5 MG: 25 TABLET, FILM COATED ORAL at 10:27

## 2021-02-10 RX ADMIN — SODIUM CHLORIDE 20 MMOL: 9 INJECTION, SOLUTION INTRAVENOUS at 14:27

## 2021-02-10 RX ADMIN — PIPERACILLIN AND TAZOBACTAM 3.38 G: 3; .375 INJECTION, POWDER, LYOPHILIZED, FOR SOLUTION INTRAVENOUS at 20:53

## 2021-02-10 RX ADMIN — PANTOPRAZOLE SODIUM 40 MG: 40 TABLET, DELAYED RELEASE ORAL at 06:19

## 2021-02-10 RX ADMIN — ACETAMINOPHEN 325 MG: 325 TABLET, FILM COATED ORAL at 20:54

## 2021-02-10 RX ADMIN — METOPROLOL TARTRATE 12.5 MG: 25 TABLET, FILM COATED ORAL at 20:54

## 2021-02-10 RX ADMIN — PIPERACILLIN AND TAZOBACTAM 3.38 G: 3; .375 INJECTION, POWDER, LYOPHILIZED, FOR SOLUTION INTRAVENOUS at 01:00

## 2021-02-10 RX ADMIN — PIPERACILLIN AND TAZOBACTAM 3.38 G: 3; .375 INJECTION, POWDER, LYOPHILIZED, FOR SOLUTION INTRAVENOUS at 10:27

## 2021-02-10 RX ADMIN — ALLOPURINOL 150 MG: 300 TABLET ORAL at 08:15

## 2021-02-10 RX ADMIN — Medication 10 ML: at 20:53

## 2021-02-10 RX ADMIN — Medication 10 ML: at 10:27

## 2021-02-10 RX ADMIN — RIVAROXABAN 15 MG: 15 TABLET, FILM COATED ORAL at 20:54

## 2021-02-10 NOTE — PROGRESS NOTES
"   LOS: 7 days    Patient Care Team:  Ann Marie Hodgson MD as PCP - General (Family Medicine)      Subjective     Patient resting comfortably.  New complaint    Objective     Vital Sign Min/Max for last 24 hours  Temp:  [97.9 °F (36.6 °C)-98.3 °F (36.8 °C)] 98.2 °F (36.8 °C)  Heart Rate:  [78-89] 84  Resp:  [16-20] 18  BP: (121-146)/() 122/72                       Flowsheet Rows      First Filed Value   Admission Height  170.2 cm (67\") Documented at 02/03/2021 1417   Admission Weight  90 kg (198 lb 6.6 oz) Documented at 02/03/2021 1417          No intake/output data recorded.  I/O last 3 completed shifts:  In: 820 [P.O.:720; IV Piggyback:100]  Out: -     Physical Exam:  Physical Exam    General.    Awake and alert   Head.  Atraumatic, normocephalic  Neck.  Supple.  No JVD  Respiratory.  Decreased breath sounds bilaterally without obvious crackles or wheezing  Cardiovascular.    Regular rhythm.  Abdominal.  Obese, soft, nontender  Remedies: Trace pretibial edema bilaterally       LABS:  Lab Results   Component Value Date    CALCIUM 7.4 (L) 02/10/2021    PHOS 1.5 (C) 02/10/2021     Results from last 7 days   Lab Units 02/10/21  0503 02/09/21  0311 02/08/21  2153 02/08/21  0329 02/07/21  0336   MAGNESIUM mg/dL 1.9 1.2*  --  1.1* 1.2*   SODIUM mmol/L 142 143  --  143 142   POTASSIUM mmol/L 3.5 3.6 3.9 3.2* 3.5   CHLORIDE mmol/L 105 106  --  106 104   CO2 mmol/L 32.0* 30.0*  --  31.0* 31.0*   BUN mg/dL 11 11  --  13 16   CREATININE mg/dL 1.07 1.28*  --  1.12 1.25   GLUCOSE mg/dL 94 98  --  89 86   CALCIUM mg/dL 7.4* 7.4*  --  7.2* 7.2*   WBC 10*3/mm3 11.90* 12.70*  --  12.30* 11.90*   HEMOGLOBIN g/dL 7.6* 8.1*  --  8.4* 8.1*   PLATELETS 10*3/mm3 132* 134*  --  144 142   ALT (SGPT) U/L  --  10  --  10 10   AST (SGOT) U/L  --  21  --  24 21     Lab Results   Component Value Date    CKTOTAL 228 (H) 02/03/2021    TROPONINT 0.285 (C) 02/03/2021     Estimated Creatinine Clearance: 53.4 mL/min (by C-G formula based on " SCr of 1.07 mg/dL).      Brief Urine Lab Results  (Last result in the past 365 days)      Color   Clarity   Blood   Leuk Est   Nitrite   Protein   CREAT   Urine HCG        02/03/21 1527 Yellow Clear Small (1+) Negative Negative Trace             WEIGHTS:     Wt Readings from Last 1 Encounters:   02/10/21 0510 87.9 kg (193 lb 12.6 oz)   02/09/21 0303 87.8 kg (193 lb 9 oz)   02/08/21 0418 87.4 kg (192 lb 10.9 oz)   02/07/21 0255 87.9 kg (193 lb 12.6 oz)   02/06/21 0410 84.6 kg (186 lb 8.2 oz)   02/05/21 0420 92.6 kg (204 lb 2.3 oz)   02/04/21 0451 87.3 kg (192 lb 7.4 oz)   02/03/21 1906 87.3 kg (192 lb 7.4 oz)   02/03/21 1417 90 kg (198 lb 6.6 oz)       allopurinol, 150 mg, Oral, Daily  aspirin, 325 mg, Oral, Once  epoetin lan-epbx, 40,000 Units, Subcutaneous, Weekly  metoprolol tartrate, 12.5 mg, Oral, Q12H  pantoprazole, 40 mg, Oral, QAM  piperacillin-tazobactam, 3.375 g, Intravenous, Q8H  potassium phosphate, 20 mmol, Intravenous, Once  rivaroxaban, 15 mg, Oral, BID  [START ON 2/12/2021] rivaroxaban, 20 mg, Oral, Daily With Dinner  sodium chloride, 10 mL, Intravenous, Q12H  ursodiol, 500 mg, Oral, BID           Assessment/Plan       1.  Acute kidney injury,  due to dehydration/ cisplatin or both  Creatinine improving.  Volume status okay  2.  Hypokalemia. improved  3.  Anemia.   Hemoglobin 7.6 this morning. On Epogen.  Hematology following  4.  Hypertension.  Blood pressure well controlled  5.  Cholangiocarcinoma, followed by oncology   6.  Hypophosphatemia    Plan:  IV K-Phos  Encouraged to increase oral fluid intake    Mitch Stone MD  02/10/21  10:51 EST

## 2021-02-10 NOTE — PLAN OF CARE
Goal Outcome Evaluation:  Plan of Care Reviewed With: patient  Progress: improving  Outcome Summary: Patient able to sleep through the night without complaint.  Mag replaced overnight, patient continueing abx and tolerating well.  Will continue to monitor.

## 2021-02-10 NOTE — PROGRESS NOTES
"        [facility] Medicine Services Daily Progress Note      Hospitalist Team  LOS 7 days      Patient Care Team:  Ann Marie Hodgson MD as PCP - General (Family Medicine)    Patient Location: 223/1      Subjective   Subjective     Chief Complaint / Subjective  Chief Complaint   Patient presents with   • Altered Mental Status   • Syncope         Brief Synopsis of Hospital Course/HPI        Date::    2/8/2021 call:.  Little more awake and alert saying some words but otherwise still mostly confused HPI limited.    2/9/21: seems slightly improved and setting in chair, still confused     2/10/21: mild confusion still noted but has been much more alert.     Review of Systems   Unable to perform ROS: mental status change         Objective   Objective      Vital Signs  Temp:  [97.9 °F (36.6 °C)-98.2 °F (36.8 °C)] 98.2 °F (36.8 °C)  Heart Rate:  [78-89] 84  Resp:  [16-18] 18  BP: (122-134)/(68-72) 122/72  Oxygen Therapy  SpO2: 94 %  Pulse Oximetry Type: Intermittent  Device (Oxygen Therapy): nasal cannula  Flow (L/min): 2  Flowsheet Rows      First Filed Value   Admission Height  170.2 cm (67\") Documented at 02/03/2021 1417   Admission Weight  90 kg (198 lb 6.6 oz) Documented at 02/03/2021 1417        Intake & Output (last 3 days)       02/07 0701 - 02/08 0700 02/08 0701 - 02/09 0700 02/09 0701 - 02/10 0700 02/10 0701 - 02/11 0700    P.O. 360 240 720 240    I.V. (mL/kg)        IV Piggyback   100     Total Intake(mL/kg) 360 (4.1) 240 (2.7) 820 (9.3) 240 (2.7)    Net +360 +240 +820 +240            Urine Unmeasured Occurrence 2 x 2 x 4 x     Stool Unmeasured Occurrence 2 x 3 x 4 x 1 x        Lines, Drains & Airways    Active LDAs     Name:   Placement date:   Placement time:   Site:   Days:    Single Lumen Implantable Port Right Subclavian   --    --    Subclavian                     Physical Exam:    Physical Exam  Vitals signs reviewed.   Constitutional:       General: He is not in acute distress.     " Comments: Appears chronically ill   Cardiovascular:      Rate and Rhythm: Normal rate.   Pulmonary:      Effort: Pulmonary effort is normal. No respiratory distress.   Abdominal:      General: There is no distension.      Palpations: Abdomen is soft.      Tenderness: There is no abdominal tenderness.   Musculoskeletal:      Right lower leg: No edema.      Left lower leg: No edema.   Skin:     General: Skin is warm and dry.   Neurological:      Mental Status: He is alert.      Comments: AO x1   Psychiatric:      Comments: Flat affect               Procedures:              Results Review:     I reviewed the patient's new clinical results.      Lab Results (last 24 hours)     Procedure Component Value Units Date/Time    Renal Function Panel [[id number]]  (Abnormal) Collected: 02/10/21 0503    Specimen: Blood, Central Line Updated: 02/10/21 0634     Glucose 94 mg/dL      BUN 11 mg/dL      Creatinine 1.07 mg/dL      Sodium 142 mmol/L      Potassium 3.5 mmol/L      Chloride 105 mmol/L      CO2 32.0 mmol/L      Calcium 7.4 mg/dL      Albumin 2.40 g/dL      Phosphorus 1.5 mg/dL      Anion Gap 5.0 mmol/L      BUN/Creatinine Ratio 10.3     eGFR Non African Amer 66 mL/min/1.73     Narrative:      GFR Normal >60  Chronic Kidney Disease <60  Kidney Failure <15      Magnesium [[id number]]  (Normal) Collected: 02/10/21 0503    Specimen: Blood, Central Line Updated: 02/10/21 0632     Magnesium 1.9 mg/dL     CBC & Differential [[id number]]  (Abnormal) Collected: 02/10/21 0503    Specimen: Blood, Central Line Updated: 02/10/21 0527    Narrative:      The following orders were created for panel order CBC & Differential.  Procedure                               Abnormality         Status                     ---------                               -----------         ------                     CBC Auto Differential[[id number]]        Abnormal            Final result                 Please view results for these tests on the individual  orders.    CBC Auto Differential [[id number]]  (Abnormal) Collected: 02/10/21 0503    Specimen: Blood, Central Line Updated: 02/10/21 0527     WBC 11.90 10*3/mm3      RBC 2.70 10*6/mm3      Hemoglobin 7.6 g/dL      Hematocrit 23.7 %      MCV 87.9 fL      MCH 28.3 pg      MCHC 32.1 g/dL      RDW 23.5 %      RDW-SD 72.2 fl      MPV 8.0 fL      Platelets 132 10*3/mm3      Neutrophil % 81.8 %      Lymphocyte % 9.4 %      Monocyte % 7.2 %      Eosinophil % 1.2 %      Basophil % 0.4 %      Neutrophils, Absolute 9.70 10*3/mm3      Lymphocytes, Absolute 1.10 10*3/mm3      Monocytes, Absolute 0.90 10*3/mm3      Eosinophils, Absolute 0.10 10*3/mm3      Basophils, Absolute 0.10 10*3/mm3      nRBC 0.0 /100 WBC         No results found for: HGBA1C            Lab Results   Component Value Date    LIPASE 13 02/03/2021     No results found for: CHOL, CHLPL, TRIG, HDL, LDL, LDLDIRECT    Lab Results   Lab Value Date/Time    INTRAOP  10/14/2020 1009     TP: Positive for malignancy.     [facility]       FINALDX  10/14/2020 1009     Liver, CT-guided core biopsy:    Well to moderately differentiated adenocarcinoma, see COMMENT     JPR/sms       COMDX  10/14/2020 1009     The biopsy shows a well to moderately differentiated adenocarcinoma. Immunohistochemistry was performed utilizing appropriate controls and the tumor is strongly positive for only CK7. The CK20, CDX2, TTF-1, napsin A, chromogranin, synaptophysin and CD56 are all negative. This is a nonspecific staining pattern but can be seen in tumors of pancreatobiliary origin, including cholangiocarcinoma which is favored in this case. Adenocarcinomas of the upper gastrointestinal tract can also display only CK7 positivity and should be excluded clinically. This staining pattern argues against, but does not completely exclude a metastasis from a primary lung adenocarcinoma as approximately 10 to 15% of lung adenocarcinomas are negative for TTF-1 and napsin A. This staining pattern does  exclude a neuroendocrine tumor and adenocarcinoma of colonic origin. Further clinical workup with imaging studies and serum tumor markers is recommended.          Microbiology Results (last 10 days)     Procedure Component Value - Date/Time    COVID PRE-OP / PRE-PROCEDURE SCREENING ORDER (NO ISOLATION) - Swab, Nasopharynx [[id number]]  (Normal) Collected: 02/04/21 1522    Lab Status: Final result Specimen: Swab from Nasopharynx Updated: 02/04/21 2341    Narrative:      The following orders were created for panel order COVID PRE-OP / PRE-PROCEDURE SCREENING ORDER (NO ISOLATION) - Swab, Nasopharynx.  Procedure                               Abnormality         Status                     ---------                               -----------         ------                     COVID-19,APTIMA PANTHER,...[[id number]]  Normal              Final result                 Please view results for these tests on the individual orders.    COVID-19,APTIMA PANTHER,[facility] IN-HOUSE, NP/OP SWAB IN UTM/VTM/[facility] TRANSPORT MEDIA,24 HR TAT - Swab, Nasopharynx [[id number]]  (Normal) Collected: 02/04/21 1522    Lab Status: Final result Specimen: Swab from Nasopharynx Updated: 02/04/21 2341     COVID19 Not Detected    Narrative:      Fact sheet for providers: https://www.fda.gov/media/511518/download     Fact sheet for patients: https://www.fda.gov/media/735579/download    Test performed by RT PCR.    Blood Culture - Blood, Blood, Central Line [[id number]] Collected: 02/03/21 1618    Lab Status: Final result Specimen: Blood, Central Line Updated: 02/08/21 1630     Blood Culture No growth at 5 days    Blood Culture - Blood, Chest, Right [[id number]] Collected: 02/03/21 1509    Lab Status: Final result Specimen: Blood from Chest, Right Updated: 02/08/21 1515     Blood Culture No growth at 5 days          ECG/EMG Results (most recent)     Procedure Component Value Units Date/Time    ECG 12 Lead [[id number]] Collected: 02/03/21 1429     Updated: 02/04/21  1443     QT Interval 342 ms     Narrative:      HEART RATE= 111  bpm  RR Interval= 540  ms  TX Interval= 150  ms  P Horizontal Axis= 23  deg  P Front Axis= 96  deg  QRSD Interval= 103  ms  QT Interval= 342  ms  QRS Axis= 87  deg  T Wave Axis= 117  deg  - ABNORMAL ECG -  Sinus tachycardia  Low voltage, extremity leads  Nonspecific repol abnormality, lateral leads  When compared with ECG of 18-Oct-2020 18:41:11,  Significant rate increase  Significant axis, voltage or hypertrophy change  Electronically Signed By: Miky Mena (PURNIMA) 04-Feb-2021 14:40:17  Date and Time of Study: 2021-02-03 14:29:53          Results for orders placed during the hospital encounter of 01/21/21   Duplex Venous Lower Extremity - Left CAR    Narrative · Acute left lower extremity deep vein thrombosis noted in the common   femoral, proximal femoral, mid femoral, distal femoral, popliteal,   posterial tibial and gastrocnemius.  · Acute left lower extremity superficial thrombophlebitis noted in the   small saphenous.  · All other left sided veins appeared normal.               Ct Abdomen Pelvis Without Contrast    Result Date: 2/3/2021   1. With the exception of very slight interval enlargement of a portacaval lymph node, the findings appear stable since 01/14/2021. Heterogeneous right hepatic lobe mass, but appear to be smaller low-density nodules elsewhere in the liver, thought to be unchanged, and remain highly suspicious for malignancy. 2. Borderline enlarged aortocaval lymph node in the retroperitoneum, unchanged. 3. 2.3 cm lucent lesion in L2 is unchanged from prior examination. Osseous metastatic disease not excluded. No new osseous lesions.  4. No acute findings in the abdomen.   Electronically Signed By-Rosa Fox MD On:2/3/2021 3:07 PM This report was finalized on [id number] by  Rosa Fox MD.    Ct Head Without Contrast    Result Date: 2/3/2021  Age-appropriate atrophy, unchanged from the patient's recent head CT of  12/10/2020. No acute intracranial findings.  Electronically Signed By-Pramod Soria MD On:2/3/2021 3:07 PM This report was finalized on [id number] by  Pramod Soria MD.    Xr Chest 1 View    Result Date: 2/3/2021  No acute cardiopulmonary process.  Electronically Signed By-Landon Paredes MD On:2/3/2021 3:34 PM This report was finalized on [id number] by  Landon Paredes MD.          Xrays, labs reviewed personally by physician.    Medication Review:   I have reviewed the patient's current medication list      Scheduled Meds  allopurinol, 150 mg, Oral, Daily  aspirin, 325 mg, Oral, Once  epoetin lan-epbx, 40,000 Units, Subcutaneous, Weekly  metoprolol tartrate, 12.5 mg, Oral, Q12H  pantoprazole, 40 mg, Oral, QAM  piperacillin-tazobactam, 3.375 g, Intravenous, Q8H  potassium phosphate, 20 mmol, Intravenous, Once  rivaroxaban, 15 mg, Oral, BID  [START ON 2/12/2021] rivaroxaban, 20 mg, Oral, Daily With Dinner  sodium chloride, 10 mL, Intravenous, Q12H  ursodiol, 500 mg, Oral, BID        Meds Infusions       Meds PRN  •  acetaminophen **OR** acetaminophen **OR** acetaminophen  •  magnesium sulfate **OR** magnesium sulfate in D5W 1g/100mL (PREMIX)  •  ondansetron **OR** ondansetron  •  potassium & sodium phosphates  •  potassium chloride **OR** potassium chloride **OR** potassium chloride  •  sodium chloride  •  sodium chloride        Assessment/Plan   Assessment/Plan     Active Hospital Problems    Diagnosis  POA   • **Acute cholangitis [K83.09]  Yes   • 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       MEDICAL DECISION MAKING COMPLEXITY BY PROBLEM:     Possible acute cholangitis  -finished Zosyn today    -Cultures negative so far     Metastatic cholangiocarcinoma  -Oncology following  -Supportive treatment  -per their note if does not show meaningful improvement in a week or so, would need to consider hospice       Acute kidney injury  -Improving creatinine noted  -Nephrology following    Anemia of chronic disease, thrombocytopenia  -hgb low but in line with previous levels  -monitor     Acute metabolic encephalopathy  -Likely related to above, slowly improving  -Continue to treat underlying conditions    Generalized weakness  -PT OT consulted    History of pulmonary emboli DVT  -On Xarelto    CODE STATUS: DNR, per palliative note family wants to see if patient will improve mentally before considering hospice but may in the future     VTE Prophylaxis -   Mechanical Order History:     None      Pharmalogical Order History:      Ordered     Dose Route Frequency Stop    02/03/21 2212  rivaroxaban (XARELTO) tablet 20 mg     Question:  Are you ordering rivaroxaban for the prevention of blood clots in an acutely ill medical patient?  Answer:  No    20 mg PO Daily With Dinner --    02/03/21 2009  rivaroxaban (XARELTO) tablet 15 mg     Question:  Are you ordering rivaroxaban for the prevention of blood clots in an acutely ill medical patient?  Answer:  No    15 mg PO 2 Times Daily 02/11/21 2059                  Code Status -   Code Status and Medical Interventions:   Ordered at: 02/07/21 1326     Level Of Support Discussed With:    Next of Kin (If No Surrogate)     Code Status:    No CPR     Medical Interventions (Level of Support Prior to Arrest):    Comfort Measures       This patient has been examined wearing appropriate Personal Protective Equipment. 02/10/21        Discharge Planning  Inpt rehab pending placement        Electronically signed by Ruben Marrero DO, 02/10/21, 15:37 EST.  [facility] Hospitalist Team

## 2021-02-10 NOTE — PROGRESS NOTES
Hematology/Oncology Inpatient Progress Note    PATIENT NAME: Tristen Garcia  : 1935  MRN: [id number]    CHIEF COMPLAINT: Altered mental status    HISTORY OF PRESENT ILLNESS:    Trsiten Garcia is a 85 y.o.  male who presented to [facility] on 2/3/2021 via EMS. Patient reportedly called EMS for lifting assistance at home.  The patient had been taking a bath and was unable to get out of his bathtub.  On EMS arrival, the patient was reportedly unresponsive with a slow heart rate. Patient slowly became more alert.  Per family, patient has reportedely been confused for several days. The family reported poor oral intake in the last 24 hours and extensive nausea. Patient denied diarrhea and vomiting. Labs in the ED were significant for WBC 24.60, hemoglobin 7.9, BUN 30, creatinine 1.76, EGFR 37, alkaline phosphatase 166, and troponin 0.285.  CT of the head showed age-appropriate atrophy, unchanged from patient's recent CT performed 12/10/2020.  No acute intracranial findings. Patient was admitted for further evaluation and management.     21  Hematology/Oncology was consulted as patient is known to our service and followed by Dr. 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  Cholangiocarcinoma favored.  Patient was started on chemotherapy with cisplatin and gemcitabine combination on 2020.  On 2021, CT of the abdomen and pelvis was performed that resulted new low-density lesions within the right and left hepatic lobes consistent with new hepatic metastatic disease.  It was recommended for patient to switch his chemotherapy from cisplatin/gemcitabine to FOLFOX.  Patient was started on FOLFOX on 2020.     Of note, patient was recently diagnosed with pulmonary emboli and acute left lower extremity DVT in the common femoral, proximal femoral, mid femoral, distal femoral, popliteal, posterior tibial, and gastrocnemius.  Patient was started on  Xarelto.     He  has a past medical history of Arthritis, Cancer (CMS/HCC), COPD (chronic obstructive pulmonary disease) (CMS/[facility]), Elevated cholesterol, GERD (gastroesophageal reflux disease), Hyperlipidemia, and Hypertension.     PCP: Ann Marie Hodgson MD    History of present illness was reviewed and is unchanged from the previous visit. 02/10/21     Subjective    Patient much more alert.  Still feels very weak.  Lying in the bed most of the time.    ROS:  Review of Systems   Constitutional: Positive for fatigue. Negative for fever.   HENT: Negative for mouth sores and nosebleeds.    Respiratory: Negative for cough and shortness of breath.    Gastrointestinal: Negative for diarrhea, nausea and vomiting.   Endocrine: Negative for polyphagia.   Genitourinary: Negative for hematuria.   Skin: Negative for rash.   Neurological: Negative for seizures, facial asymmetry and light-headedness.   Psychiatric/Behavioral: Negative for behavioral problems and confusion. The patient is not nervous/anxious.       MEDICATIONS:    Scheduled Meds:  allopurinol, 150 mg, Oral, Daily  aspirin, 325 mg, Oral, Once  epoetin lan-epbx, 40,000 Units, Subcutaneous, Weekly  metoprolol tartrate, 12.5 mg, Oral, Q12H  pantoprazole, 40 mg, Oral, QAM  piperacillin-tazobactam, 3.375 g, Intravenous, Q8H  potassium phosphate, 20 mmol, Intravenous, Once  rivaroxaban, 15 mg, Oral, BID  [START ON 2/12/2021] rivaroxaban, 20 mg, Oral, Daily With Dinner  sodium chloride, 10 mL, Intravenous, Q12H  ursodiol, 500 mg, Oral, BID       Continuous Infusions:      PRN Meds:  •  acetaminophen **OR** acetaminophen **OR** acetaminophen  •  magnesium sulfate **OR** magnesium sulfate in D5W 1g/100mL (PREMIX)  •  ondansetron **OR** ondansetron  •  potassium & sodium phosphates  •  potassium chloride **OR** potassium chloride **OR** potassium chloride  •  sodium chloride  •  sodium chloride     ALLERGIES:  No Known Allergies    Objective    VITALS:   /72   Pulse  "84   Temp 98.2 °F (36.8 °C) (Oral)   Resp 18   Ht 170.2 cm (67\")   Wt 87.9 kg (193 lb 12.6 oz)   SpO2 94%   BMI 30.35 kg/m²     PHYSICAL EXAM: (performed by MD)  Physical Exam  Constitutional:       General: He is not in acute distress.     Appearance: Normal appearance. He is obese. He is ill-appearing. He is not toxic-appearing.   HENT:      Head: Normocephalic and atraumatic.      Nose:      Comments: Nasal cannula  Eyes:      General: No scleral icterus.        Right eye: No discharge.         Left eye: No discharge.   Neck:      Musculoskeletal: Normal range of motion. No neck rigidity or muscular tenderness.   Cardiovascular:      Rate and Rhythm: Normal rate and regular rhythm.      Pulses: Normal pulses.      Heart sounds: Normal heart sounds.   Pulmonary:      Effort: Pulmonary effort is normal.      Breath sounds: Normal breath sounds.   Chest:      Comments: Right chest port-a-cath  Abdominal:      General: There is no distension.      Palpations: Abdomen is soft.      Tenderness: There is no abdominal tenderness.   Musculoskeletal: Normal range of motion.      Right lower leg: Edema present.      Left lower leg: Edema present.   Skin:     General: Skin is warm.   Neurological:      General: No focal deficit present.      Mental Status: He is alert and oriented to person, place, and time.      Motor: Weakness present.      Comments: Confusion   Psychiatric:      Comments:         RECENT LABS:  Lab Results (last 24 hours)     Procedure Component Value Units Date/Time    Renal Function Panel [[id number]]  (Abnormal) Collected: 02/10/21 0503    Specimen: Blood, Central Line Updated: 02/10/21 0634     Glucose 94 mg/dL      BUN 11 mg/dL      Creatinine 1.07 mg/dL      Sodium 142 mmol/L      Potassium 3.5 mmol/L      Chloride 105 mmol/L      CO2 32.0 mmol/L      Calcium 7.4 mg/dL      Albumin 2.40 g/dL      Phosphorus 1.5 mg/dL      Anion Gap 5.0 mmol/L      BUN/Creatinine Ratio 10.3     eGFR Non  " Amer 66 mL/min/1.73     Narrative:      GFR Normal >60  Chronic Kidney Disease <60  Kidney Failure <15      Magnesium [[id number]]  (Normal) Collected: 02/10/21 0503    Specimen: Blood, Central Line Updated: 02/10/21 0632     Magnesium 1.9 mg/dL     CBC & Differential [[id number]]  (Abnormal) Collected: 02/10/21 0503    Specimen: Blood, Central Line Updated: 02/10/21 0527    Narrative:      The following orders were created for panel order CBC & Differential.  Procedure                               Abnormality         Status                     ---------                               -----------         ------                     CBC Auto Differential[[id number]]        Abnormal            Final result                 Please view results for these tests on the individual orders.    CBC Auto Differential [[id number]]  (Abnormal) Collected: 02/10/21 0503    Specimen: Blood, Central Line Updated: 02/10/21 0527     WBC 11.90 10*3/mm3      RBC 2.70 10*6/mm3      Hemoglobin 7.6 g/dL      Hematocrit 23.7 %      MCV 87.9 fL      MCH 28.3 pg      MCHC 32.1 g/dL      RDW 23.5 %      RDW-SD 72.2 fl      MPV 8.0 fL      Platelets 132 10*3/mm3      Neutrophil % 81.8 %      Lymphocyte % 9.4 %      Monocyte % 7.2 %      Eosinophil % 1.2 %      Basophil % 0.4 %      Neutrophils, Absolute 9.70 10*3/mm3      Lymphocytes, Absolute 1.10 10*3/mm3      Monocytes, Absolute 0.90 10*3/mm3      Eosinophils, Absolute 0.10 10*3/mm3      Basophils, Absolute 0.10 10*3/mm3      nRBC 0.0 /100 WBC           PENDING RESULTS: MMA    IMAGING REVIEWED:  No radiology results for the last day    Assessment/Plan   ASSESSMENT:  1. 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  Switched to FOLFOX due to evidence of disease progression.  FOLFOX last received 2/1/2020.  Continue in the outpatient setting if improves. Reviewed CT scans, overall his disease appears to be stable.  2. Normocytic normochromic anemia: Consider relation to chemotherapy  or anemia of chronic disease.  Receiving Retacrit weekly outpatient. Anemia studies show likely anemia of chronic disease.  Continue Retacrit 40,000 units subcutaneous injection weekly while inpatient, last received 2/6/2021.   3. Leukocytosis: Urinalysis negative for leukocytes or nitrites.  Blood cultures obtained.  Chest x-ray showed no acute cardiopulmonary process. On IV antibiotics. Continue to monitor.  4. Thrombocytopenia: Platelets 134,000. Monitor closely and order additional testing if thrombocytopenia persists. Platelets stable today.   5. Acute kidney injury: Creatinine 1.76 on admission. Nephrology consulted.   6. Confusion: CT head showed no acute intracranial process.  Urinalysis negative. LFTs normal on admission, unlikely to be hepatic encephalopathy. Etiology unknown.  Mental status improving.   7. Weakness: PT/OT consulted  8. History of pulmonary emboli and DVT: On Xarelto  9. HTN: managed per primary team  10. Palliative care consulted to discuss goals of care  11. DNR     PLAN  1. CBC daily  2. Monitor for bleeding/bruising  3. Transfuse 1 unit pRBC as needed for hemoglobin <7.5  4. Repeat H and H at 1700  5. Continue Retacrit 40,000 units subcutaneous injection weekly- due 2/13/2021  6. Continue Xarelto  7. Continue IV antibiotics  8. Resume FOLFOX outpatient if clinically improves  9. Family considering Hospice if patient does not have meaningful improvement  10. Continue supportive care  11. Discharge plan: rehab    Electronically signed by CAREY Cho, 02/10/21, 11:28 AM EST.            I personally reviewed all pertinent labs, related documentation and the  imaging. ROS performed and physical exam done during face to face enounter with patient. I agree with  nurse practitioner's doumentation, assessment and plan.  Patient still looks very weak.  Has not shown much improvement.  Understands that if he does not show improvement, he will not be a candidate for any further chemotherapy  treatment.  Pending rehab transfer.  Continues to remain anemic      Electronically signed by Aamir Garcia MD, 02/10/21, 10:20 PM EST.

## 2021-02-10 NOTE — PROGRESS NOTES
Continued Stay Note  JORDAN Churchill     Patient Name: Tristen Garcia  MRN: [id number]  Today's Date: 2/10/2021    Admit Date: 2/3/2021    Discharge Plan     Row Name 02/10/21 6590       Plan    Plan  DC Plan: [facility] accepted at d/c. PASRR approved. No pre-cert required.    Plan Comments  SW notified by liaison (Becky) that they can accept pt at d/c at [facility]. Confirmed with oncology that pt has chemo treatments every 14 days with next treatment scheduled for 2/15 (however they anticipate this will be on hold, until he shows more improvement). Facility okay with cost of epoetin that is anticipated at d/c per liaison. Facility to contact granddaughter regarding any questions about acceptance. Updated pharmacy [facility] [place] for [facility].     Phone communication or documentation only - no physical contact with patient or family.    ELLE Chapin    Phone: [phone]  Cell: [phone]  Fax: [phone]  [email or web address]

## 2021-02-10 NOTE — PROGRESS NOTES
"Nutrition Services    Patient Name: Tristen Garcia  YOB: 1935  MRN: [id number]  Admission date: 2/3/2021    Liberalize diet to Mechanical Soft without other restrictions, until PO diet increases. Patient eating 25% average.   If aggressive care is elected please consult RD for TF consult.     PPE Documentation        PPE Worn By Provider Provided care remotely during this assessment   PPE Worn By Patient  N/A     PROGRESS NOTE      Encounter Information: RD following up on patient for poor intake. Patient has been 7 days without adequate nutrition. Patients family member expressed concern of patient not eating unless family member present. RD sent secure message to RN 2/8 who stated patient refuses to eat. Patients PO intake is averaging at 25% energy needs. Pt family member also states pt was not using dentures that were present in room due to dentures \"gagging\" when being used. Reached out to RN for speech consult on 2/8. Sent secure message to attending to discuss nutrition status, MD stated patient is considering hospice.  If aggressive care is continued patient will likely require EN.        PO Diet: Renal, Soft to Chew, 2gm K+   PO Supplements: Novasource Renal BID   PO Intake:  25% average       Nutrition support orders: -    Nutrition support review: -       Labs (reviewed below): Results from last 7 days   Lab Units 02/10/21  0503 02/09/21  0311 02/08/21  2153 02/08/21  0329 02/07/21  0336   SODIUM mmol/L 142 143  --  143 142   POTASSIUM mmol/L 3.5 3.6 3.9 3.2* 3.5   CHLORIDE mmol/L 105 106  --  106 104   CO2 mmol/L 32.0* 30.0*  --  31.0* 31.0*   BUN mg/dL 11 11  --  13 16   CREATININE mg/dL 1.07 1.28*  --  1.12 1.25   CALCIUM mg/dL 7.4* 7.4*  --  7.2* 7.2*   BILIRUBIN mg/dL  --  0.2  --  0.2 0.2   ALK PHOS U/L  --  92  --  96 97   ALT (SGPT) U/L  --  10  --  10 10   AST (SGOT) U/L  --  21  --  24 21   GLUCOSE mg/dL 94 98  --  89 86     Results from last 7 days   Lab Units 02/10/21  0503 " 02/09/21  0311 02/08/21  0329   MAGNESIUM mg/dL 1.9 1.2* 1.1*   PHOSPHORUS mg/dL 1.5*  --   --            GI Function:  BM 2/10       Nutrition Intervention: Liberalize diet to Mechanical Soft without other restrictions, until PO diet increases. Patients Na+/K+ WNL, will monitor closely to determine necessary Renal restrictions.           RD to follow up per protocol.    Electronically signed by:  Jodi Singh RD  02/10/21 09:40 EST

## 2021-02-11 VITALS
RESPIRATION RATE: 20 BRPM | HEIGHT: 67 IN | DIASTOLIC BLOOD PRESSURE: 78 MMHG | OXYGEN SATURATION: 99 % | WEIGHT: 193.56 LBS | BODY MASS INDEX: 30.38 KG/M2 | SYSTOLIC BLOOD PRESSURE: 129 MMHG | HEART RATE: 113 BPM | TEMPERATURE: 98.8 F

## 2021-02-11 PROBLEM — M62.82 RHABDOMYOLYSIS: Status: RESOLVED | Noted: 2020-10-19 | Resolved: 2021-02-11

## 2021-02-11 PROBLEM — K83.09 ACUTE CHOLANGITIS: Status: RESOLVED | Noted: 2021-02-03 | Resolved: 2021-02-11

## 2021-02-11 PROBLEM — N17.0 ACUTE KIDNEY INJURY (AKI) WITH ACUTE TUBULAR NECROSIS (ATN) (HCC): Status: RESOLVED | Noted: 2021-02-03 | Resolved: 2021-02-11

## 2021-02-11 LAB
ALBUMIN SERPL-MCNC: 2.5 G/DL (ref 3.5–5.2)
ANION GAP SERPL CALCULATED.3IONS-SCNC: 3 MMOL/L (ref 5–15)
BASOPHILS # BLD AUTO: 0 10*3/MM3 (ref 0–0.2)
BASOPHILS NFR BLD AUTO: 0.4 % (ref 0–1.5)
BUN SERPL-MCNC: 9 MG/DL (ref 8–23)
BUN/CREAT SERPL: 8.3 (ref 7–25)
CALCIUM SPEC-SCNC: 7.6 MG/DL (ref 8.6–10.5)
CHLORIDE SERPL-SCNC: 106 MMOL/L (ref 98–107)
CO2 SERPL-SCNC: 33 MMOL/L (ref 22–29)
CREAT SERPL-MCNC: 1.08 MG/DL (ref 0.76–1.27)
DEPRECATED RDW RBC AUTO: 76.6 FL (ref 37–54)
EOSINOPHIL # BLD AUTO: 0.2 10*3/MM3 (ref 0–0.4)
EOSINOPHIL NFR BLD AUTO: 1.7 % (ref 0.3–6.2)
ERYTHROCYTE [DISTWIDTH] IN BLOOD BY AUTOMATED COUNT: 24.6 % (ref 12.3–15.4)
GFR SERPL CREATININE-BSD FRML MDRD: 65 ML/MIN/1.73
GLUCOSE SERPL-MCNC: 86 MG/DL (ref 65–99)
HCT VFR BLD AUTO: 24 % (ref 37.5–51)
HGB BLD-MCNC: 7.8 G/DL (ref 13–17.7)
LYMPHOCYTES # BLD AUTO: 1.1 10*3/MM3 (ref 0.7–3.1)
LYMPHOCYTES NFR BLD AUTO: 11 % (ref 19.6–45.3)
Lab: ABNORMAL
MAGNESIUM SERPL-MCNC: 1.5 MG/DL (ref 1.6–2.4)
MCH RBC QN AUTO: 28.7 PG (ref 26.6–33)
MCHC RBC AUTO-ENTMCNC: 32.4 G/DL (ref 31.5–35.7)
MCV RBC AUTO: 88.6 FL (ref 79–97)
METHYLMALONATE SERPL-SCNC: 722 NMOL/L (ref 0–378)
MONOCYTES # BLD AUTO: 0.9 10*3/MM3 (ref 0.1–0.9)
MONOCYTES NFR BLD AUTO: 9.1 % (ref 5–12)
NEUTROPHILS NFR BLD AUTO: 77.8 % (ref 42.7–76)
NEUTROPHILS NFR BLD AUTO: 8 10*3/MM3 (ref 1.7–7)
NRBC BLD AUTO-RTO: 0 /100 WBC (ref 0–0.2)
PHOSPHATE SERPL-MCNC: 2.4 MG/DL (ref 2.5–4.5)
PLATELET # BLD AUTO: 112 10*3/MM3 (ref 140–450)
PMV BLD AUTO: 7.8 FL (ref 6–12)
POTASSIUM SERPL-SCNC: 3.8 MMOL/L (ref 3.5–5.2)
RBC # BLD AUTO: 2.7 10*6/MM3 (ref 4.14–5.8)
SODIUM SERPL-SCNC: 142 MMOL/L (ref 136–145)
WBC # BLD AUTO: 10.3 10*3/MM3 (ref 3.4–10.8)

## 2021-02-11 PROCEDURE — 97530 THERAPEUTIC ACTIVITIES: CPT

## 2021-02-11 PROCEDURE — 99239 HOSP IP/OBS DSCHRG MGMT >30: CPT | Performed by: INTERNAL MEDICINE

## 2021-02-11 PROCEDURE — 85025 COMPLETE CBC W/AUTO DIFF WBC: CPT | Performed by: INTERNAL MEDICINE

## 2021-02-11 PROCEDURE — 99232 SBSQ HOSP IP/OBS MODERATE 35: CPT | Performed by: INTERNAL MEDICINE

## 2021-02-11 PROCEDURE — 80069 RENAL FUNCTION PANEL: CPT | Performed by: INTERNAL MEDICINE

## 2021-02-11 PROCEDURE — 83735 ASSAY OF MAGNESIUM: CPT | Performed by: NURSE PRACTITIONER

## 2021-02-11 PROCEDURE — 97110 THERAPEUTIC EXERCISES: CPT

## 2021-02-11 PROCEDURE — 25010000002 PIPERACILLIN SOD-TAZOBACTAM PER 1 G: Performed by: INTERNAL MEDICINE

## 2021-02-11 PROCEDURE — 25010000003 HEPARIN LOCK FLUSH PER 10 UNITS: Performed by: INTERNAL MEDICINE

## 2021-02-11 RX ORDER — HEPARIN SODIUM (PORCINE) LOCK FLUSH IV SOLN 100 UNIT/ML 100 UNIT/ML
500 SOLUTION INTRAVENOUS ONCE
Status: COMPLETED | OUTPATIENT
Start: 2021-02-11 | End: 2021-02-11

## 2021-02-11 RX ADMIN — PIPERACILLIN AND TAZOBACTAM 3.38 G: 3; .375 INJECTION, POWDER, LYOPHILIZED, FOR SOLUTION INTRAVENOUS at 05:51

## 2021-02-11 RX ADMIN — Medication 500 UNITS: at 19:08

## 2021-02-11 RX ADMIN — RIVAROXABAN 15 MG: 15 TABLET, FILM COATED ORAL at 09:46

## 2021-02-11 RX ADMIN — URSODIOL 500 MG: 500 TABLET, FILM COATED ORAL at 09:46

## 2021-02-11 RX ADMIN — Medication 10 ML: at 09:47

## 2021-02-11 RX ADMIN — PANTOPRAZOLE SODIUM 40 MG: 40 TABLET, DELAYED RELEASE ORAL at 05:51

## 2021-02-11 RX ADMIN — METOPROLOL TARTRATE 12.5 MG: 25 TABLET, FILM COATED ORAL at 09:46

## 2021-02-11 RX ADMIN — ALLOPURINOL 150 MG: 300 TABLET ORAL at 09:46

## 2021-02-11 RX ADMIN — SODIUM CHLORIDE 20 MMOL: 9 INJECTION, SOLUTION INTRAVENOUS at 12:39

## 2021-02-11 NOTE — SIGNIFICANT NOTE
"At first PT requested that medical team would bring \"diarrhea medicine\". (Communicated to nurse about PT's request) PT also was concerned about treatment with cancer. Reports that a \"older\" treatment did not work and did not know if a \"new\" treatment would be done. Asked how PT processing his concerns about health. PT believes if he does not get treatment that health will decline and he will die. PT reports that he is 85 years old and understands that he can die soon. Assessed PT about beliefs/thoughts about subject of death/dying. PT reports that he has not given much thought to the subject and did not elaborate. Reported to Shaq KING on Palliative about PT's concern with treatment.    Chaplain Dion Hudson     02/11/21 3805   Coping/Psychosocial   Observed Emotional State cooperative   Verbalized Emotional State anxiety  (PT concerned about \"new treatment\" for cancer)   Trust Relationship/Rapport care explained;thoughts/feelings acknowledged   Family/Support Persons son;family   Involvement in Care not present at bedside   Additional Documentation Spiritual Care (Group)   Spiritual Care   Spiritual Care Visit Type follow-up   Spiritual Care Source other (see comments)  (palliative PT)   Receptivity to Spiritual Care visit welcomed   Spiritual Care Request advocacy support;spiritual/moral support   Spiritual Care Interventions staff consultation provided;supportive conversation provided   Response to Spiritual Care engaged in conversation   Use of Spiritual Resources non-Druze use of spiritual care   Spiritual Care Follow-Up follow-up planned regularly for general support     "

## 2021-02-11 NOTE — THERAPY TREATMENT NOTE
Subjective: Pt agreeable to therapeutic plan of care.    Objective:     Bed mobility - CGA  Transfers - Min-A and with rolling walker  Ambulation - 5 feet Min-A rwx    Pain: 0 VAS  Education: Provided education on importance of mobility and skilled verbal / tactile cueing throughout intervention.     Assessment: Tristen Garcia presents with functional mobility impairments which indicate the need for skilled intervention. Seated at eob became dizzy, requiring rest to recover. Tolerating smip and seated exercise once to chair. Difficulty motor planning. Will continue to follow and progress as tolerated.     Plan/Recommendations:   Pt would benefit from Inpatient Rehabilitation placement at discharge from facility and requires no DME at discharge.   Pt desires Inpatient Rehabilitation placement at discharge. Pt cooperative; agreeable to therapeutic recommendations and plan of care.     Basic Mobility 6-click:  Rollin = Total, A lot = 2, A little = 3; 4 = None  Supine>Sit:   1 = Total, A lot = 2, A little = 3; 4 = None   Sit>Stand with arms:  1 = Total, A lot = 2, A little = 3; 4 = None  Bed>Chair:   1 = Total, A lot = 2, A little = 3; 4 = None  Ambulate in room:  1 = Total, A lot = 2, A little = 3; 4 = None  3-5 Steps with railin = Total, A lot = 2, A little = 3; 4 = None  Score: 16    Modified [place]: 4 = Moderately severe disability (Unable to attend to own bodily needs without assistance, and unable to walk unassisted)     Post-Tx Position: Up in Chair, Alarms activated and Call light and personal items within reach  PPE: gloves, surgical mask, eyewear protection

## 2021-02-11 NOTE — PROGRESS NOTES
Continued Stay Note   Zhao     Patient Name: Tristen Garcia  MRN: [id number]  Today's Date: 2/11/2021    Admit Date: 2/3/2021    Discharge Plan     Row Name 02/11/21 1616       Plan    Plan Comments  Liaison (Becky) notified of d/c orders and ready for pt. [facility] to transport at d/c. Had spoken to granddaughter about transport needs before confirming EMS transport time. Granddaughter requested call back with update, called to notify @ 1632 that pt will go via EMS after potassium phosphate replacement, had to leave voicemail - provided nurse's station phone number for any questions.     Phone communication or documentation only - no physical contact with patient or family.  ELLE Chapin    Phone: [phone]  Cell: [phone]  Fax: [phone]  [email or web address]

## 2021-02-11 NOTE — PLAN OF CARE
Goal Outcome Evaluation:  Plan of Care Reviewed With: patient  Progress: improving  Patient able to sleep through the night with only complaint of loose stools, but nursing staff only saw one loose stool.  Patient is urinating frequently.  Otherwise, no complaints.  Will continue to monitor.

## 2021-02-11 NOTE — PROGRESS NOTES
"   LOS: 8 days    Patient Care Team:  Ann Marie Hodgson MD as PCP - General (Family Medicine)      Subjective     Patient feeling tired.  Mild shortness of breath not more than usual  Diarrhea+  No Chest pain, nausea or vomiting    Objective     Vital Sign Min/Max for last 24 hours  Temp:  [98.7 °F (37.1 °C)-98.8 °F (37.1 °C)] 98.8 °F (37.1 °C)  Heart Rate:  [] 113  Resp:  [19-20] 20  BP: (116-129)/(66-78) 129/78                       Flowsheet Rows      First Filed Value   Admission Height  170.2 cm (67\") Documented at 02/03/2021 1417   Admission Weight  90 kg (198 lb 6.6 oz) Documented at 02/03/2021 1417          I/O this shift:  In: 240 [P.O.:240]  Out: -   I/O last 3 completed shifts:  In: 820 [P.O.:720; IV Piggyback:100]  Out: 400 [Urine:400]    Physical Exam:  Physical Exam    General.    Awake and alert   Head.  Atraumatic, normocephalic  Neck.  Supple.  No JVD  Respiratory.  Decreased breath sounds bilaterally without obvious crackles or wheezing  Cardiovascular.    Regular rhythm.  Abdominal.  Obese, soft, nontender  Remedies: Trace pretibial edema bilaterally       LABS:  Lab Results   Component Value Date    CALCIUM 7.6 (L) 02/11/2021    PHOS 2.4 (L) 02/11/2021     Results from last 7 days   Lab Units 02/11/21  0420 02/10/21  1744 02/10/21  0503 02/09/21  0311  02/08/21  0329 02/07/21  0336   MAGNESIUM mg/dL 1.5*  --  1.9 1.2*  --  1.1* 1.2*   SODIUM mmol/L 142  --  142 143  --  143 142   POTASSIUM mmol/L 3.8  --  3.5 3.6   < > 3.2* 3.5   CHLORIDE mmol/L 106  --  105 106  --  106 104   CO2 mmol/L 33.0*  --  32.0* 30.0*  --  31.0* 31.0*   BUN mg/dL 9  --  11 11  --  13 16   CREATININE mg/dL 1.08  --  1.07 1.28*  --  1.12 1.25   GLUCOSE mg/dL 86  --  94 98  --  89 86   CALCIUM mg/dL 7.6*  --  7.4* 7.4*  --  7.2* 7.2*   WBC 10*3/mm3 10.30  --  11.90* 12.70*  --  12.30* 11.90*   HEMOGLOBIN g/dL 7.8* 7.9* 7.6* 8.1*  --  8.4* 8.1*   PLATELETS 10*3/mm3 112*  --  132* 134*  --  144 142   ALT (SGPT) U/L  -- "   --   --  10  --  10 10   AST (SGOT) U/L  --   --   --  21  --  24 21    < > = values in this interval not displayed.     Lab Results   Component Value Date    CKTOTAL 228 (H) 02/03/2021    TROPONINT 0.285 (C) 02/03/2021     Estimated Creatinine Clearance: 52.9 mL/min (by C-G formula based on SCr of 1.08 mg/dL).      Brief Urine Lab Results  (Last result in the past 365 days)      Color   Clarity   Blood   Leuk Est   Nitrite   Protein   CREAT   Urine HCG        02/03/21 1527 Yellow Clear Small (1+) Negative Negative Trace             WEIGHTS:     Wt Readings from Last 1 Encounters:   02/11/21 0400 87.8 kg (193 lb 9 oz)   02/10/21 0510 87.9 kg (193 lb 12.6 oz)   02/09/21 0303 87.8 kg (193 lb 9 oz)   02/08/21 0418 87.4 kg (192 lb 10.9 oz)   02/07/21 0255 87.9 kg (193 lb 12.6 oz)   02/06/21 0410 84.6 kg (186 lb 8.2 oz)   02/05/21 0420 92.6 kg (204 lb 2.3 oz)   02/04/21 0451 87.3 kg (192 lb 7.4 oz)   02/03/21 1906 87.3 kg (192 lb 7.4 oz)   02/03/21 1417 90 kg (198 lb 6.6 oz)       allopurinol, 150 mg, Oral, Daily  aspirin, 325 mg, Oral, Once  epoetin lan-epbx, 40,000 Units, Subcutaneous, Weekly  metoprolol tartrate, 12.5 mg, Oral, Q12H  pantoprazole, 40 mg, Oral, QAM  [START ON 2/12/2021] rivaroxaban, 20 mg, Oral, Daily With Dinner  sodium chloride, 10 mL, Intravenous, Q12H  ursodiol, 500 mg, Oral, BID           Assessment/Plan       1.  Acute kidney injury,  due to dehydration/ cisplatin or both  Patient renal function improved.  Volume status okay   2.  Hypokalemia. improved  3.  Anemia.   Hemoglobin 7.6 . On Epogen.  Hematology following  4.  Hypertension.  Blood pressure well controlled  5.  Cholangiocarcinoma, followed by oncology   6.  Hypophosphatemia  7.  Hypomagnesemia    Plan:  IV mag and Phos replacement  Encouraged to increase oral fluid intake  We will sign off.  Please call for any question.  Please monitor electrolytes closely and replace accordingly    Mitch Stone MD  02/11/21  11:07 EST

## 2021-02-11 NOTE — PLAN OF CARE
Goal Outcome:   Objective:     Bed mobility - CGA  Transfers - Min-A and with rolling walker  Ambulation - 5 feet Min-A rwx    Pain: 0 VAS  Education: Provided education on importance of mobility and skilled verbal / tactile cueing throughout intervention.     Assessment: Tristen Garcia presents with functional mobility impairments which indicate the need for skilled intervention.Seated at eob became dizzy, requiring rest to recover. Tolerating smip and seated exercise once to chair. Difficulty motor planning. Will continue to follow and progress as tolerated.     Plan/Recommendations:   Pt would benefit from Inpatient Rehabilitation placement at discharge from facility and requires no DME at discharge.   Pt desires Inpatient Rehabilitation placement at discharge. Pt cooperative; agreeable to therapeutic recommendations and plan of care.

## 2021-02-11 NOTE — NURSING NOTE
"Patient asking for \"diarrhea medicine\" after stating he has had 4 loose stools overnight.  However, nursing staff has only seen one \"loose stool\" overnight.  Call put out to hospitalist per patient request.  No new orders.  "

## 2021-02-11 NOTE — DISCHARGE SUMMARY
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, T45.1X5A]  Yes   • Syncope and collapse [R55]  Yes   • Dizziness [R42]  Yes   • Cholangiocarcinoma (CMS/HCC) [C22.1]  Yes      Resolved Hospital Problems    Diagnosis Date Resolved POA   • **Acute cholangitis [K83.09] 02/11/2021 Yes   • Acute kidney injury (SHANEL) with acute tubular necrosis (ATN) (CMS/HCC) [N17.0] 02/11/2021 Yes   • Rhabdomyolysis [M62.82] 02/11/2021 Yes     Possible acute cholangitis  -finished Zosyn course while here  -Cultures negative      Metastatic cholangiocarcinoma  -Oncology following  -chemo being held until condition improves   -per their note if does not show meaningful improvement in a week or so, would need to consider hospice       Acute kidney injury  -Improving creatinine noted  -Nephrology follow up      Anemia of chronic disease, thrombocytopenia  -hgb low but in line with previous levels  -monitor      Acute metabolic encephalopathy  -Likely related to above, slowly improving  -Continue to treat underlying conditions     Generalized weakness  -PT OT consulted     HTN  -BP stable now on metoprolol alone  -can add back other agents as needed outpt    History of pulmonary emboli DVT  -On Xarelto     CODE STATUS: DNR, per palliative note family wanted to see if patient will improve some with rehab before considering hospice but may in the future          Hospital Course  Patient is a 85 y.o. male presented with confusion weakness. Was admitted and treated as noted above. Did  improve somewhat mentally, but still very weak and functional status poor. Will be transferred to rehab, however if shows no meaningful improvement in a week or so, would strongly recommend hospice at that point.       Procedures Performed:as noted above         Consults:   Consults     Date and Time Order Name Status Description    2/5/2021 1142 Inpatient Gastroenterology Consult Completed     2/4/2021 0032 Inpatient Nephrology Consult Completed     2/3/2021 1654 Hematology and Oncology (on-call MD unless specified)      2/3/2021 1654 Hospitalist (on-call MD unless specified) Completed           Pertinent Test Results:     Lab Results (last 72 hours)     Procedure Component Value Units Date/Time    Renal Function Panel [[id number]]  (Abnormal) Collected: 02/11/21 0420    Specimen: Blood, Central Line Updated: 02/11/21 0520     Glucose 86 mg/dL      BUN 9 mg/dL      Creatinine 1.08 mg/dL      Sodium 142 mmol/L      Potassium 3.8 mmol/L      Chloride 106 mmol/L      CO2 33.0 mmol/L      Calcium 7.6 mg/dL      Albumin 2.50 g/dL      Phosphorus 2.4 mg/dL      Anion Gap 3.0 mmol/L      BUN/Creatinine Ratio 8.3     eGFR Non African Amer 65 mL/min/1.73     Narrative:      GFR Normal >60  Chronic Kidney Disease <60  Kidney Failure <15      Magnesium [[id number]]  (Abnormal) Collected: 02/11/21 0420    Specimen: Blood, Central Line Updated: 02/11/21 0518     Magnesium 1.5 mg/dL     CBC & Differential [[id number]]  (Abnormal) Collected: 02/11/21 0420    Specimen: Blood, Central Line Updated: 02/11/21 0448    Narrative:      The following orders were created for panel order CBC & Differential.  Procedure                               Abnormality         Status                     ---------                               -----------         ------                     CBC Auto Differential[[id number]]        Abnormal            Final result                 Please view results for these tests on the individual orders.    CBC Auto  Differential [[id number]]  (Abnormal) Collected: 02/11/21 0420    Specimen: Blood, Central Line Updated: 02/11/21 0448     WBC 10.30 10*3/mm3      RBC 2.70 10*6/mm3      Hemoglobin 7.8 g/dL      Hematocrit 24.0 %      MCV 88.6 fL      MCH 28.7 pg      MCHC 32.4 g/dL      RDW 24.6 %      RDW-SD 76.6 fl      MPV 7.8 fL      Platelets 112 10*3/mm3      Neutrophil % 77.8 %      Lymphocyte % 11.0 %      Monocyte % 9.1 %      Eosinophil % 1.7 %      Basophil % 0.4 %      Neutrophils, Absolute 8.00 10*3/mm3      Lymphocytes, Absolute 1.10 10*3/mm3      Monocytes, Absolute 0.90 10*3/mm3      Eosinophils, Absolute 0.20 10*3/mm3      Basophils, Absolute 0.00 10*3/mm3      nRBC 0.0 /100 WBC     Hemoglobin & Hematocrit, Blood [[id number]]  (Abnormal) Collected: 02/10/21 1744    Specimen: Blood Updated: 02/10/21 1757     Hemoglobin 7.9 g/dL      Hematocrit 24.4 %     Renal Function Panel [[id number]]  (Abnormal) Collected: 02/10/21 0503    Specimen: Blood, Central Line Updated: 02/10/21 0634     Glucose 94 mg/dL      BUN 11 mg/dL      Creatinine 1.07 mg/dL      Sodium 142 mmol/L      Potassium 3.5 mmol/L      Chloride 105 mmol/L      CO2 32.0 mmol/L      Calcium 7.4 mg/dL      Albumin 2.40 g/dL      Phosphorus 1.5 mg/dL      Anion Gap 5.0 mmol/L      BUN/Creatinine Ratio 10.3     eGFR Non African Amer 66 mL/min/1.73     Narrative:      GFR Normal >60  Chronic Kidney Disease <60  Kidney Failure <15      Magnesium [[id number]]  (Normal) Collected: 02/10/21 0503    Specimen: Blood, Central Line Updated: 02/10/21 0632     Magnesium 1.9 mg/dL     CBC & Differential [[id number]]  (Abnormal) Collected: 02/10/21 0503    Specimen: Blood, Central Line Updated: 02/10/21 0527    Narrative:      The following orders were created for panel order CBC & Differential.  Procedure                               Abnormality         Status                     ---------                               -----------         ------                     CBC Auto  Differential[[id number]]        Abnormal            Final result                 Please view results for these tests on the individual orders.    CBC Auto Differential [[id number]]  (Abnormal) Collected: 02/10/21 0503    Specimen: Blood, Central Line Updated: 02/10/21 0527     WBC 11.90 10*3/mm3      RBC 2.70 10*6/mm3      Hemoglobin 7.6 g/dL      Hematocrit 23.7 %      MCV 87.9 fL      MCH 28.3 pg      MCHC 32.1 g/dL      RDW 23.5 %      RDW-SD 72.2 fl      MPV 8.0 fL      Platelets 132 10*3/mm3      Neutrophil % 81.8 %      Lymphocyte % 9.4 %      Monocyte % 7.2 %      Eosinophil % 1.2 %      Basophil % 0.4 %      Neutrophils, Absolute 9.70 10*3/mm3      Lymphocytes, Absolute 1.10 10*3/mm3      Monocytes, Absolute 0.90 10*3/mm3      Eosinophils, Absolute 0.10 10*3/mm3      Basophils, Absolute 0.10 10*3/mm3      nRBC 0.0 /100 WBC     Comprehensive Metabolic Panel [[id number]]  (Abnormal) Collected: 02/09/21 0311    Specimen: Blood Updated: 02/09/21 0359     Glucose 98 mg/dL      BUN 11 mg/dL      Creatinine 1.28 mg/dL      Sodium 143 mmol/L      Potassium 3.6 mmol/L      Chloride 106 mmol/L      CO2 30.0 mmol/L      Calcium 7.4 mg/dL      Total Protein 4.8 g/dL      Albumin 2.60 g/dL      ALT (SGPT) 10 U/L      AST (SGOT) 21 U/L      Alkaline Phosphatase 92 U/L      Total Bilirubin 0.2 mg/dL      eGFR Non African Amer 53 mL/min/1.73      Globulin 2.2 gm/dL      A/G Ratio 1.2 g/dL      BUN/Creatinine Ratio 8.6     Anion Gap 7.0 mmol/L     Narrative:      GFR Normal >60  Chronic Kidney Disease <60  Kidney Failure <15      Magnesium [[id number]]  (Abnormal) Collected: 02/09/21 0311    Specimen: Blood Updated: 02/09/21 0359     Magnesium 1.2 mg/dL     CBC & Differential [[id number]]  (Abnormal) Collected: 02/09/21 0311    Specimen: Blood Updated: 02/09/21 0335    Narrative:      The following orders were created for panel order CBC & Differential.  Procedure                               Abnormality         Status                      ---------                               -----------         ------                     CBC Auto Differential[[id number]]        Abnormal            Final result                 Please view results for these tests on the individual orders.    CBC Auto Differential [[id number]]  (Abnormal) Collected: 02/09/21 0311    Specimen: Blood Updated: 02/09/21 0335     WBC 12.70 10*3/mm3      RBC 2.86 10*6/mm3      Hemoglobin 8.1 g/dL      Hematocrit 25.2 %      MCV 88.3 fL      MCH 28.3 pg      MCHC 32.0 g/dL      RDW 24.4 %      RDW-SD 75.3 fl      MPV 8.0 fL      Platelets 134 10*3/mm3      Neutrophil % 78.4 %      Lymphocyte % 11.6 %      Monocyte % 8.4 %      Eosinophil % 1.1 %      Basophil % 0.5 %      Neutrophils, Absolute 9.90 10*3/mm3      Lymphocytes, Absolute 1.50 10*3/mm3      Monocytes, Absolute 1.10 10*3/mm3      Eosinophils, Absolute 0.10 10*3/mm3      Basophils, Absolute 0.10 10*3/mm3      nRBC 0.0 /100 WBC     Potassium [[id number]]  (Normal) Collected: 02/08/21 2153    Specimen: Blood Updated: 02/08/21 2200     Potassium 3.9 mmol/L     Blood Culture - Blood, Blood, Central Line [[id number]] Collected: 02/03/21 1618    Specimen: Blood, Central Line Updated: 02/08/21 1630     Blood Culture No growth at 5 days    Blood Culture - Blood, Chest, Right [[id number]] Collected: 02/03/21 1509    Specimen: Blood from Chest, Right Updated: 02/08/21 1515     Blood Culture No growth at 5 days               Microbiology Results (last 10 days)     Procedure Component Value - Date/Time    COVID PRE-OP / PRE-PROCEDURE SCREENING ORDER (NO ISOLATION) - Swab, Nasopharynx [[id number]]  (Normal) Collected: 02/04/21 1522    Lab Status: Final result Specimen: Swab from Nasopharynx Updated: 02/04/21 2341    Narrative:      The following orders were created for panel order COVID PRE-OP / PRE-PROCEDURE SCREENING ORDER (NO ISOLATION) - Swab, Nasopharynx.  Procedure                               Abnormality         Status                      ---------                               -----------         ------                     COVID-19,APTIMA PANTHER,...[[id number]]  Normal              Final result                 Please view results for these tests on the individual orders.    COVID-19,APTIMA PANTHER,[facility], NP/OP SWAB IN UTM/VTM/[facility] TRANSPORT MEDIA,24 HR TAT - Swab, Nasopharynx [[id number]]  (Normal) Collected: 02/04/21 1522    Lab Status: Final result Specimen: Swab from Nasopharynx Updated: 02/04/21 2341     COVID19 Not Detected    Narrative:      Fact sheet for providers: https://www.fda.gov/media/837176/download     Fact sheet for patients: https://www.fda.gov/media/050478/download    Test performed by RT PCR.    Blood Culture - Blood, Blood, Central Line [[id number]] Collected: 02/03/21 1618    Lab Status: Final result Specimen: Blood, Central Line Updated: 02/08/21 1630     Blood Culture No growth at 5 days    Blood Culture - Blood, Chest, Right [[id number]] Collected: 02/03/21 1509    Lab Status: Final result Specimen: Blood from Chest, Right Updated: 02/08/21 1515     Blood Culture No growth at 5 days                 Imaging Results (All)     Procedure Component Value Units Date/Time    XR Chest 1 View [[id number]] Collected: 02/03/21 1533     Updated: 02/03/21 1543    Narrative:         DATE OF EXAM:   2/3/2021 3:27 PM     PROCEDURE:   XR CHEST 1 VW-     INDICATIONS:   weakness     COMPARISON:  No Comparisons Available     TECHNIQUE:   Portable chest radiograph.     FINDINGS:    There is a right-sided port catheter with the tip at the cavoatrial  junction. The patient slightly rotated to the right. Heart size within  normal limits for technique. No pneumothorax or large effusion. The  osseous structures are intact.       Impression:      No acute cardiopulmonary process.     Electronically Signed By-Landon Paredes MD On:2/3/2021 3:34 PM  This report was finalized on [id number] by  Landon Paredes MD.    CT Head Without Contrast  [[id number]] Collected: 02/03/21 1506     Updated: 02/03/21 1543    Narrative:         DATE OF EXAM:  2/3/2021 2:45 PM     PROCEDURE:   CT HEAD WO CONTRAST-     INDICATIONS:   weakness, syncope     COMPARISON:  12/10/2020     TECHNIQUE:   Routine transaxial and coronal reconstruction images were obtained  through the head without the administration of contrast. Automated  exposure control and iterative reconstruction methods were used.     FINDINGS:  There is generalized prominence of the ventricles and CSF containing  spaces, unchanged from the patient's last study. No intra or extra-axial  mass lesions, fluid collections, or mass effect are seen. No focal areas  of low attenuation or acute intracranial hemorrhage. Cranial vault  appears intact.        Impression:      Age-appropriate atrophy, unchanged from the patient's recent head CT of  12/10/2020. No acute intracranial findings.     Electronically Signed By-Pramod Soria MD On:2/3/2021 3:07 PM  This report was finalized on [id number] by  Pramod Soria MD.    CT Abdomen Pelvis Without Contrast [[id number]] Collected: 02/03/21 1500     Updated: 02/03/21 1509    Narrative:      CT ABDOMEN PELVIS WO CONTRAST-     Date of Exam: 2/3/2021 2:45 PM     Indication: weakness, syncope  . Fell today.     Comparison: CT abdomen pelvis 01/14/2021. MRI abdomen 09/25/2020.     Technique: Contiguous axial CT images were obtained from the lung bases  to the pubic symphysis without contrast. Sagittal and coronal  reconstructions were performed.  Automated exposure control and  iterative reconstruction methods were used.     FINDINGS:     Hepatomegaly, 19.7 seen. Heterogeneous mass in the right hepatic lobe  poorly visualized due to lack of IV contrast, but is thought unchanged  since, measuring up to 12 cm anterior posterior, and about 13.5 cm  craniocaudal. 2 additional vague areas of slightly greater than 1 cm low  density within the right and left hepatic lobes are again  noted,  probably not clinically change.     Uncomplicated cholelithiasis. Spleen, pancreas, adrenals, are normal.  Left renal cysts. Stable 1.3 cm hyperdense right renal lesion.     A mildly prominent lymph node interposed between the IVC and abdominal  aorta measuring 12 mm short axis is stable. Approximately 14 mm short  axis portacaval lymph node is slightly larger than on the prior exam. No  ascites. Uncomplicated colonic diverticulosis. Trace pelvic ascites  appears stable. Urinary bladder, prostate and rectum are within normal  limits.     Heart size is within normal limits. Lung bases are clear. Benign  calcified granuloma in the right middle lobe.     Lucent lesion with peripheral sclerosis in the L2 vertebral body  measuring up to 2.3 cm is unchanged. No new osseous lesions are  identified.          Impression:         1. With the exception of very slight interval enlargement of a  portacaval lymph node, the findings appear stable since 01/14/2021.  Heterogeneous right hepatic lobe mass, but appear to be smaller  low-density nodules elsewhere in the liver, thought to be unchanged, and  remain highly suspicious for malignancy.  2. Borderline enlarged aortocaval lymph node in the retroperitoneum,  unchanged.  3. 2.3 cm lucent lesion in L2 is unchanged from prior examination.  Osseous metastatic disease not excluded. No new osseous lesions.     4. No acute findings in the abdomen.        Electronically Signed By-Rosa Fox MD On:2/3/2021 3:07 PM  This report was finalized on [id number] by  Rosa Fox MD.            Condition on Discharge:  Chronically poor and long term prognosis poor    Vital Signs  Temp:  [98.7 °F (37.1 °C)-98.8 °F (37.1 °C)] 98.8 °F (37.1 °C)  Heart Rate:  [] 97  Resp:  [19-20] 20  BP: (116-122)/(66-72) 120/72    Physical Exam:  Physical Exam  Vitals signs reviewed.   Constitutional:       Comments: Chronically ill appearing   Cardiovascular:      Rate and Rhythm: Normal  rate.   Pulmonary:      Effort: Pulmonary effort is normal. No respiratory distress.   Abdominal:      General: There is no distension.      Palpations: Abdomen is soft.   Skin:     General: Skin is warm and dry.   Neurological:      Comments: Oriented x2         Discharge Disposition  Rehab Facility or Unit (DC - External)    Discharge Medications     Discharge Medications      New Medications      Instructions Start Date   epoetin lan-epbx 50658 UNIT/ML injection  Commonly known as: RETACRIT   40,000 Units, Subcutaneous, Weekly   Start Date: February 13, 2021     metoprolol tartrate 25 MG tablet  Commonly known as: LOPRESSOR   12.5 mg, Oral, Every 12 Hours Scheduled         Changes to Medications      Instructions Start Date   rivaroxaban 20 MG tablet  Commonly known as: XARELTO  What changed:   · medication strength  · how much to take  · when to take this  · additional instructions   20 mg, Oral, Daily With Dinner   Start Date: February 12, 2021        Continue These Medications      Instructions Start Date   allopurinol 300 MG tablet  Commonly known as: ZYLOPRIM   150 mg, Oral, Daily      calcium-vitamin D 500-200 MG-UNIT per tablet  Commonly known as: Oscal 500/200 D-3   1 tablet, Oral, Daily      Ferretts 325 (106 Fe) MG tablet  Generic drug: Ferrous Fumarate   1 tablet, Oral, Daily      magnesium oxide 400 MG tablet  Commonly known as: MAG-OX   400 mg, Oral, 2 Times Daily      omeprazole 20 MG capsule  Commonly known as: priLOSEC   40 mg, Oral, Daily      ursodiol 500 MG tablet  Commonly known as: ACTIGALL   500 mg, Oral, 2 times daily         Stop These Medications    amLODIPine 10 MG tablet  Commonly known as: NORVASC     atenolol 25 MG tablet  Commonly known as: TENORMIN     furosemide 40 MG tablet  Commonly known as: LASIX     losartan 100 MG tablet  Commonly known as: COZAAR            Discharge Diet:   Diet Instructions     Diet: Soft Texture; Thin Liquids, No Restrictions; Ground      Discharge Diet:  Soft Texture    Fluid Consistency: Thin Liquids, No Restrictions    Soft Options: Ground          Activity at Discharge:   Activity Instructions     Gradually Increase Activity Until at Pre-Hospitalization Level            Follow-up Appointments  Future Appointments   Date Time Provider Department Center   2/15/2021 10:30 AM INF ROOM 30 - CHAIR A  [facility]   2/17/2021  2:00 PM INF ROOM 33 - CHAIR F  [facility]   2/24/2021  1:00 PM RN REVIEW ROOM  [facility]   2/24/2021  1:15 PM Aamir Garcia MD [facility]     Additional Instructions for the Follow-ups that You Need to Schedule     Discharge Follow-up with Specified Provider: nephrology; 2 Weeks   As directed      To: nephrology    Follow Up: 2 Weeks         Discharge Follow-up with Specified Provider: oncology; 1 Week   As directed      To: oncology    Follow Up: 1 Week               Test Results Pending at Discharge  Pending Labs     Order Current Status    Methylmalonic Acid, Serum In process           Risk for Readmission (LACE) Score: 14 (2/11/2021  6:00 AM)      This patient has been examined wearing appropriate Personal Protective Equipment . 02/11/21      Time: Discharge 36 min with face-to-face history exam, writing of prescriptions, and documenting discharge data including care coordination with the nursing staff.      Electronically signed by Ruben Marrero DO, 02/11/21, 09:21 EST.

## 2021-02-11 NOTE — PROGRESS NOTES
Talk to by patient's nurse stating patient would like medication for loose stools.  Patient is active chemo for liver cancer.  Will defer to attending.

## 2021-02-11 NOTE — PROGRESS NOTES
Hematology/Oncology Inpatient Progress Note    PATIENT NAME: Tristen Garcia  : 1935  MRN: [id number]    CHIEF COMPLAINT: Altered mental status    HISTORY OF PRESENT ILLNESS:    Tristen Garcia is a 85 y.o.  male who presented to [facility] on 2/3/2021 via EMS. Patient reportedly called EMS for lifting assistance at home.  The patient had been taking a bath and was unable to get out of his bathtub.  On EMS arrival, the patient was reportedly unresponsive with a slow heart rate. Patient slowly became more alert.  Per family, patient has reportedely been confused for several days. The family reported poor oral intake in the last 24 hours and extensive nausea. Patient denied diarrhea and vomiting. Labs in the ED were significant for WBC 24.60, hemoglobin 7.9, BUN 30, creatinine 1.76, EGFR 37, alkaline phosphatase 166, and troponin 0.285.  CT of the head showed age-appropriate atrophy, unchanged from patient's recent CT performed 12/10/2020.  No acute intracranial findings. Patient was admitted for further evaluation and management.     21  Hematology/Oncology was consulted as patient is known to our service and followed by Dr. 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  Cholangiocarcinoma favored.  Patient was started on chemotherapy with cisplatin and gemcitabine combination on 2020.  On 2021, CT of the abdomen and pelvis was performed that resulted new low-density lesions within the right and left hepatic lobes consistent with new hepatic metastatic disease.  It was recommended for patient to switch his chemotherapy from cisplatin/gemcitabine to FOLFOX.  Patient was started on FOLFOX on 2020.     Of note, patient was recently diagnosed with pulmonary emboli and acute left lower extremity DVT in the common femoral, proximal femoral, mid femoral, distal femoral, popliteal, posterior tibial, and gastrocnemius.  Patient was started on  "Xarelto.     He  has a past medical history of Arthritis, Cancer (CMS/HCC), COPD (chronic obstructive pulmonary disease) (CMS/HCC), Elevated cholesterol, GERD (gastroesophageal reflux disease), Hyperlipidemia, and Hypertension.     PCP: Ann Marie Hodgson MD    History of present illness was reviewed and is unchanged from the previous visit. 02/11/21      Subjective    Patient looks very weak.  Not much improvement    ROS:  Review of Systems   Constitutional: Positive for fatigue. Negative for fever.   HENT: Negative for mouth sores and nosebleeds.    Respiratory: Negative for cough and shortness of breath.    Gastrointestinal: Negative for diarrhea, nausea and vomiting.   Endocrine: Negative for polyphagia.   Genitourinary: Negative for hematuria.   Skin: Negative for rash.   Neurological: Negative for tremors, seizures, facial asymmetry, speech difficulty and light-headedness.   Psychiatric/Behavioral: Negative for behavioral problems and confusion. The patient is not nervous/anxious.       MEDICATIONS:    Scheduled Meds:  allopurinol, 150 mg, Oral, Daily  aspirin, 325 mg, Oral, Once  epoetin lan-epbx, 40,000 Units, Subcutaneous, Weekly  metoprolol tartrate, 12.5 mg, Oral, Q12H  pantoprazole, 40 mg, Oral, QAM  [START ON 2/12/2021] rivaroxaban, 20 mg, Oral, Daily With Dinner  sodium chloride, 10 mL, Intravenous, Q12H  ursodiol, 500 mg, Oral, BID       Continuous Infusions:      PRN Meds:  •  acetaminophen **OR** acetaminophen **OR** acetaminophen  •  magnesium sulfate **OR** magnesium sulfate in D5W 1g/100mL (PREMIX)  •  ondansetron **OR** ondansetron  •  potassium & sodium phosphates  •  potassium chloride **OR** potassium chloride **OR** potassium chloride  •  sodium chloride  •  sodium chloride     ALLERGIES:  No Known Allergies    Objective    VITALS:   /78   Pulse 113   Temp 98.8 °F (37.1 °C) (Oral)   Resp 20   Ht 170.2 cm (67\")   Wt 87.8 kg (193 lb 9 oz)   SpO2 99%   BMI 30.32 kg/m²     PHYSICAL " EXAM: (performed by MD)  Physical Exam  Constitutional:       General: He is not in acute distress.     Appearance: Normal appearance. He is obese. He is ill-appearing. He is not toxic-appearing.   HENT:      Head: Normocephalic and atraumatic.      Nose:      Comments: Nasal cannula  Eyes:      General: No scleral icterus.        Right eye: No discharge.         Left eye: No discharge.   Neck:      Musculoskeletal: Normal range of motion. No neck rigidity or muscular tenderness.   Cardiovascular:      Rate and Rhythm: Normal rate and regular rhythm.      Pulses: Normal pulses.      Heart sounds: Normal heart sounds.   Pulmonary:      Effort: Pulmonary effort is normal.      Breath sounds: Normal breath sounds.   Chest:      Comments: Right chest port-a-cath  Abdominal:      General: There is no distension.      Palpations: Abdomen is soft.      Tenderness: There is no abdominal tenderness.   Musculoskeletal: Normal range of motion.      Right lower leg: Edema present.      Left lower leg: Edema present.   Skin:     General: Skin is warm.      Findings: No erythema.   Neurological:      General: No focal deficit present.      Mental Status: He is alert and oriented to person, place, and time.      Motor: Weakness present.   Psychiatric:      Comments:         RECENT LABS:  Lab Results (last 24 hours)     Procedure Component Value Units Date/Time    Renal Function Panel [[id number]]  (Abnormal) Collected: 02/11/21 0420    Specimen: Blood, Central Line Updated: 02/11/21 0520     Glucose 86 mg/dL      BUN 9 mg/dL      Creatinine 1.08 mg/dL      Sodium 142 mmol/L      Potassium 3.8 mmol/L      Chloride 106 mmol/L      CO2 33.0 mmol/L      Calcium 7.6 mg/dL      Albumin 2.50 g/dL      Phosphorus 2.4 mg/dL      Anion Gap 3.0 mmol/L      BUN/Creatinine Ratio 8.3     eGFR Non African Amer 65 mL/min/1.73     Narrative:      GFR Normal >60  Chronic Kidney Disease <60  Kidney Failure <15      Magnesium [[id number]]  (Abnormal)  Collected: 02/11/21 0420    Specimen: Blood, Central Line Updated: 02/11/21 0518     Magnesium 1.5 mg/dL     CBC & Differential [[id number]]  (Abnormal) Collected: 02/11/21 0420    Specimen: Blood, Central Line Updated: 02/11/21 0448    Narrative:      The following orders were created for panel order CBC & Differential.  Procedure                               Abnormality         Status                     ---------                               -----------         ------                     CBC Auto Differential[[id number]]        Abnormal            Final result                 Please view results for these tests on the individual orders.    CBC Auto Differential [[id number]]  (Abnormal) Collected: 02/11/21 0420    Specimen: Blood, Central Line Updated: 02/11/21 0448     WBC 10.30 10*3/mm3      RBC 2.70 10*6/mm3      Hemoglobin 7.8 g/dL      Hematocrit 24.0 %      MCV 88.6 fL      MCH 28.7 pg      MCHC 32.4 g/dL      RDW 24.6 %      RDW-SD 76.6 fl      MPV 7.8 fL      Platelets 112 10*3/mm3      Neutrophil % 77.8 %      Lymphocyte % 11.0 %      Monocyte % 9.1 %      Eosinophil % 1.7 %      Basophil % 0.4 %      Neutrophils, Absolute 8.00 10*3/mm3      Lymphocytes, Absolute 1.10 10*3/mm3      Monocytes, Absolute 0.90 10*3/mm3      Eosinophils, Absolute 0.20 10*3/mm3      Basophils, Absolute 0.00 10*3/mm3      nRBC 0.0 /100 WBC     Hemoglobin & Hematocrit, Blood [[id number]]  (Abnormal) Collected: 02/10/21 1744    Specimen: Blood Updated: 02/10/21 1757     Hemoglobin 7.9 g/dL      Hematocrit 24.4 %           PENDING RESULTS: MMA    IMAGING REVIEWED:  No radiology results for the last day    Assessment/Plan   ASSESSMENT:  1. 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  Switched to FOLFOX due to evidence of disease progression.  FOLFOX last received 2/1/2020.  Continue in the outpatient setting if improves. Reviewed CT scans, overall his disease appears to be stable.  2. Normocytic normochromic anemia:  Consider relation to chemotherapy or anemia of chronic disease.  Receiving Retacrit weekly outpatient. Anemia studies show likely anemia of chronic disease.  Continue Retacrit 40,000 units subcutaneous injection weekly while inpatient, last received 2/6/2021.   3. Leukocytosis: Urinalysis negative for leukocytes or nitrites.  Blood cultures obtained.  Chest x-ray showed no acute cardiopulmonary process. On IV antibiotics. Improved.  4. Thrombocytopenia: Platelets 112,000 today.   5. Acute kidney injury: Creatinine 1.76 on admission. Nephrology consulted.   6. Confusion: CT head showed no acute intracranial process.  Urinalysis negative. LFTs normal on admission, unlikely to be hepatic encephalopathy. Etiology unknown.  Mental status improving.   7. Weakness: PT/OT consulted  8. History of pulmonary emboli and DVT: On Xarelto  9. HTN: managed per primary team    PLAN  1. CBC daily  2. Monitor for bleeding/bruising  3. Transfuse 1 unit pRBC as needed for hemoglobin <7.5  4. Continue Retacrit 40,000 units subcutaneous injection weekly- due 2/13/2021  5. Continue Xarelto  6. Continue IV antibiotics  7. Resume FOLFOX outpatient if clinically improves  8. Family considering Hospice if patient does not have meaningful improvement  9. Palliative care following  10. Continue supportive care  11. Monitor CBC at rehab  12. Discharge plan: rehab    Electronically signed by CAREY Cho, 02/11/21, 3:03 PM EST.      I personally reviewed all pertinent labs, related documentation and the  imaging. ROS performed and physical exam done during face to face enounter with patient. I agree with  nurse practitioner's doumentation, assessment and plan.        Patient remains very weak.  Consider hospice if patient does not have meaningful improvement in the next few days.  Going to rehab.    Electronically signed by Aamir Garcia MD, 02/12/21, 3:54 PM EST.

## 2021-02-12 NOTE — PROGRESS NOTES
Case Management Discharge Note      Final Note: [facility]    Provided Post Acute Provider List?: Yes  Provided Post Acute Provider Quality & Resource List?: Yes  Delivered To: Patient, Support Person  Support Person: Email: [email or web address]  Method of Delivery: In person, Telephone                     Final Discharge Disposition Code: 03 - skilled nursing facility (SNF)

## 2021-02-15 ENCOUNTER — HOSPITAL ENCOUNTER (OUTPATIENT)
Dept: ONCOLOGY | Facility: HOSPITAL | Age: 86
Setting detail: INFUSION SERIES
Discharge: HOME | End: 2021-02-15

## 2021-02-15 ENCOUNTER — TELEPHONE (OUTPATIENT)
Dept: ONCOLOGY | Facility: HOSPITAL | Age: 86
End: 2021-02-15

## 2021-02-15 NOTE — TELEPHONE ENCOUNTER
Annie from [facility] called to let us know the patient and his family have decided not to go any further with chemo. MD and charge nurse notified.

## 2021-02-15 NOTE — TELEPHONE ENCOUNTER
I called patient and spoke with his son to ask if he was planning on coming to his chemo appt this morning. His son said he is currently in a nursing home and in quarantine and does not think he's going to continue treatment. His cancer has spread and he has become more confused. I told his son that he is scheduled to see Dr. Garcia next week and he said he didn't know if he would want to keep that appt at this time or not. I kept him on just in case.

## 2021-02-15 NOTE — TELEPHONE ENCOUNTER
Ousmane from [facility] called in regards to patients retacrit. Insurance will not approve, but will approve procrit, she needed to know dose. Per Inga dose would remain the same. So procrit 29012 units weekly. Ousmane verbalized understanding.

## 2021-02-17 ENCOUNTER — HOSPITAL ENCOUNTER (OUTPATIENT)
Dept: ONCOLOGY | Facility: HOSPITAL | Age: 86
Setting detail: INFUSION SERIES
End: 2021-02-17

## 2021-04-15 NOTE — TELEPHONE ENCOUNTER
Faxed electronic order for 1 unit PRBC to [facility] as requested.    Oriented - self; Oriented - place; Oriented - time

## 2023-01-07 NOTE — PROGRESS NOTES
"   LOS: 3 days    Patient Care Team:  Ann Marie Hodgson MD as PCP - General (Family Medicine)      Subjective     Patient appears to be comfortable and daughter is at bedside  He is more awake and alert  Renal function improving    Objective     Vital Sign Min/Max for last 24 hours  Temp:  [97.4 °F (36.3 °C)-98.9 °F (37.2 °C)] 97.8 °F (36.6 °C)  Heart Rate:  [60-80] 63  Resp:  [16-18] 17  BP: (109-156)/(60-82) 156/76                       Flowsheet Rows      First Filed Value   Admission Height  170.2 cm (67\") Documented at 02/03/2021 1417   Admission Weight  90 kg (198 lb 6.6 oz) Documented at 02/03/2021 1417          I/O this shift:  In: 240 [P.O.:240]  Out: -   I/O last 3 completed shifts:  In: 3958 [P.O.:290; I.V.:2968; Blood:700]  Out: -     Physical Exam:  Physical Exam    General.    Awake and alert and appears to be mentating better.  Head.  Atraumatic, normocephalic  Neck.  Supple.  No JVD  Respiratory.  Decreased breath sounds bilaterally without obvious crackles or wheezing  Cardiovascular.    Regular rhythm.  Abdominal.  Obese, soft, nontender  Remedies: Trace pretibial edema bilaterally       LABS:  Lab Results   Component Value Date    CALCIUM 7.3 (L) 02/06/2021    PHOS 3.5 02/05/2021     Results from last 7 days   Lab Units 02/06/21  0240 02/05/21  2208 02/05/21  1608 02/05/21  0506 02/04/21  0413 02/04/21  0302 02/03/21  1422   MAGNESIUM mg/dL  --   --   --   --   --   --  1.6   SODIUM mmol/L 139  --   --  136  --  140 138   POTASSIUM mmol/L 3.2*  --   --  3.5  --  4.6 5.1   CHLORIDE mmol/L 101  --   --  98  --  103 100   CO2 mmol/L 30.0*  --   --  28.0  --  26.0 20.0*   BUN mg/dL 21  --   --  23  --  26* 30*   CREATININE mg/dL 1.30*  --   --  1.42*  --  1.64* 1.76*   GLUCOSE mg/dL 89  --   --  88  --  93 97   CALCIUM mg/dL 7.3*  --   --  7.5*  --  7.8* 8.2*   WBC 10*3/mm3 9.90  --   --  8.20 12.50*  --  24.60*   HEMOGLOBIN g/dL 7.9* 7.6* 7.4* 6.1* 7.1*  --  7.9*   PLATELETS 10*3/mm3 161  --   --  " 194 282  --  427   ALT (SGPT) U/L 13  --  13  --   --   --  14   AST (SGOT) U/L 30  --  37  --   --   --  37     Lab Results   Component Value Date    CKTOTAL 228 (H) 02/03/2021    TROPONINT 0.285 (C) 02/03/2021     Estimated Creatinine Clearance: 43.2 mL/min (A) (by C-G formula based on SCr of 1.3 mg/dL (H)).      Brief Urine Lab Results  (Last result in the past 365 days)      Color   Clarity   Blood   Leuk Est   Nitrite   Protein   CREAT   Urine HCG        02/03/21 1527 Yellow Clear Small (1+) Negative Negative Trace             WEIGHTS:     Wt Readings from Last 1 Encounters:   02/06/21 0410 84.6 kg (186 lb 8.2 oz)   02/05/21 0420 92.6 kg (204 lb 2.3 oz)   02/04/21 0451 87.3 kg (192 lb 7.4 oz)   02/03/21 1906 87.3 kg (192 lb 7.4 oz)   02/03/21 1417 90 kg (198 lb 6.6 oz)       allopurinol, 150 mg, Oral, Daily  aspirin, 325 mg, Oral, Once  epoetin lan-epbx, 40,000 Units, Subcutaneous, Weekly  metoprolol tartrate, 12.5 mg, Oral, Q12H  pantoprazole, 40 mg, Oral, QAM  piperacillin-tazobactam, 3.375 g, Intravenous, Q8H  rivaroxaban, 15 mg, Oral, BID  [START ON 2/12/2021] rivaroxaban, 20 mg, Oral, Daily With Dinner  sodium chloride, 10 mL, Intravenous, Q12H  ursodiol, 500 mg, Oral, BID      lactated ringers, 75 mL/hr, Last Rate: 75 mL/hr (02/05/21 2101)        Assessment/Plan       1.  Acute kidney injury, [place] due to dehydration/ cisplatin or both  2.  Anemia  3.  Hypertension  4.  Cholangiocarcinoma, followed by oncology    Plan:  1.  Encourage p.o. intake  2.  Continue gentle hydration with normal saline at 75 cc/h  3.  Renal panel magnesium in a.m.    Thank you for involving us in the care of Mr. Garcia, will continue to follow along.      Angelia Soliman MD  02/06/21  12:10 EST   Patient states she was having shooting chest pain intermittently. Vitals are wnl, and patient states chest pain went away. Patient believes it is because of the way she was sleeping.

## 2023-10-01 NOTE — PLAN OF CARE
Goal Outcome Evaluation:        Outcome Summary: Patient awaiting inpatient rehab. Continues ABTs. Tolerating well. Sat up in the chair for awhile today. Family here to visit.   No